# Patient Record
Sex: MALE | Race: WHITE | NOT HISPANIC OR LATINO | Employment: OTHER | URBAN - METROPOLITAN AREA
[De-identification: names, ages, dates, MRNs, and addresses within clinical notes are randomized per-mention and may not be internally consistent; named-entity substitution may affect disease eponyms.]

---

## 2017-01-17 ENCOUNTER — PATIENT MESSAGE (OUTPATIENT)
Dept: UROLOGY | Facility: CLINIC | Age: 62
End: 2017-01-17

## 2017-03-07 ENCOUNTER — LAB VISIT (OUTPATIENT)
Dept: LAB | Facility: HOSPITAL | Age: 62
End: 2017-03-07
Attending: INTERNAL MEDICINE
Payer: COMMERCIAL

## 2017-03-07 DIAGNOSIS — R97.20 ELEVATED PSA: ICD-10-CM

## 2017-03-07 DIAGNOSIS — N40.1 BPH WITH URINARY OBSTRUCTION: ICD-10-CM

## 2017-03-07 DIAGNOSIS — Z00.00 ROUTINE MEDICAL EXAM: ICD-10-CM

## 2017-03-07 DIAGNOSIS — R97.20 ELEVATED PSA, BETWEEN 10 AND LESS THAN 20 NG/ML: ICD-10-CM

## 2017-03-07 DIAGNOSIS — N13.8 BPH WITH URINARY OBSTRUCTION: ICD-10-CM

## 2017-03-07 LAB
ALBUMIN SERPL BCP-MCNC: 3.8 G/DL
ALP SERPL-CCNC: 51 U/L
ALT SERPL W/O P-5'-P-CCNC: 23 U/L
ANION GAP SERPL CALC-SCNC: 8 MMOL/L
AST SERPL-CCNC: 22 U/L
BASOPHILS # BLD AUTO: 0.02 K/UL
BASOPHILS NFR BLD: 0.4 %
BILIRUB SERPL-MCNC: 1.3 MG/DL
BUN SERPL-MCNC: 18 MG/DL
CALCIUM SERPL-MCNC: 9 MG/DL
CHLORIDE SERPL-SCNC: 105 MMOL/L
CHOLEST/HDLC SERPL: 4.7 {RATIO}
CO2 SERPL-SCNC: 28 MMOL/L
COMPLEXED PSA SERPL-MCNC: 16.3 NG/ML
COMPLEXED PSA SERPL-MCNC: 16.3 NG/ML
CREAT SERPL-MCNC: 1.1 MG/DL
DIFFERENTIAL METHOD: ABNORMAL
EOSINOPHIL # BLD AUTO: 0.1 K/UL
EOSINOPHIL NFR BLD: 1 %
ERYTHROCYTE [DISTWIDTH] IN BLOOD BY AUTOMATED COUNT: 14 %
EST. GFR  (AFRICAN AMERICAN): >60 ML/MIN/1.73 M^2
EST. GFR  (NON AFRICAN AMERICAN): >60 ML/MIN/1.73 M^2
ESTIMATED AVG GLUCOSE: 91 MG/DL
GLUCOSE SERPL-MCNC: 109 MG/DL
HBA1C MFR BLD HPLC: 4.8 %
HCT VFR BLD AUTO: 41.7 %
HDL/CHOLESTEROL RATIO: 21.2 %
HDLC SERPL-MCNC: 184 MG/DL
HDLC SERPL-MCNC: 39 MG/DL
HGB BLD-MCNC: 13.9 G/DL
LDLC SERPL CALC-MCNC: 117.2 MG/DL
LYMPHOCYTES # BLD AUTO: 1.3 K/UL
LYMPHOCYTES NFR BLD: 26.7 %
MCH RBC QN AUTO: 29.4 PG
MCHC RBC AUTO-ENTMCNC: 33.3 %
MCV RBC AUTO: 88 FL
MONOCYTES # BLD AUTO: 0.5 K/UL
MONOCYTES NFR BLD: 10.4 %
NEUTROPHILS # BLD AUTO: 3 K/UL
NEUTROPHILS NFR BLD: 61.3 %
NONHDLC SERPL-MCNC: 145 MG/DL
PLATELET # BLD AUTO: 196 K/UL
PMV BLD AUTO: 10.3 FL
POTASSIUM SERPL-SCNC: 3.9 MMOL/L
PROT SERPL-MCNC: 6.7 G/DL
RBC # BLD AUTO: 4.72 M/UL
SODIUM SERPL-SCNC: 141 MMOL/L
TRIGL SERPL-MCNC: 139 MG/DL
TSH SERPL DL<=0.005 MIU/L-ACNC: 1.54 UIU/ML
WBC # BLD AUTO: 4.91 K/UL

## 2017-03-07 PROCEDURE — 83036 HEMOGLOBIN GLYCOSYLATED A1C: CPT

## 2017-03-07 PROCEDURE — 84153 ASSAY OF PSA TOTAL: CPT

## 2017-03-07 PROCEDURE — 85025 COMPLETE CBC W/AUTO DIFF WBC: CPT

## 2017-03-07 PROCEDURE — 80053 COMPREHEN METABOLIC PANEL: CPT

## 2017-03-07 PROCEDURE — 36415 COLL VENOUS BLD VENIPUNCTURE: CPT

## 2017-03-07 PROCEDURE — 84443 ASSAY THYROID STIM HORMONE: CPT

## 2017-03-07 PROCEDURE — 80061 LIPID PANEL: CPT

## 2017-03-10 ENCOUNTER — OFFICE VISIT (OUTPATIENT)
Dept: INTERNAL MEDICINE | Facility: CLINIC | Age: 62
End: 2017-03-10
Payer: COMMERCIAL

## 2017-03-10 VITALS
BODY MASS INDEX: 28.07 KG/M2 | HEIGHT: 72 IN | HEART RATE: 76 BPM | DIASTOLIC BLOOD PRESSURE: 72 MMHG | SYSTOLIC BLOOD PRESSURE: 122 MMHG | WEIGHT: 207.25 LBS

## 2017-03-10 DIAGNOSIS — Z00.00 ROUTINE PHYSICAL EXAMINATION: Primary | ICD-10-CM

## 2017-03-10 DIAGNOSIS — N40.1 BPH WITH URINARY OBSTRUCTION: ICD-10-CM

## 2017-03-10 DIAGNOSIS — N13.8 BPH WITH URINARY OBSTRUCTION: ICD-10-CM

## 2017-03-10 DIAGNOSIS — Z12.11 COLON CANCER SCREENING: ICD-10-CM

## 2017-03-10 DIAGNOSIS — I10 ESSENTIAL HYPERTENSION: ICD-10-CM

## 2017-03-10 DIAGNOSIS — R97.20 ELEVATED PSA: ICD-10-CM

## 2017-03-10 PROCEDURE — 99999 PR PBB SHADOW E&M-EST. PATIENT-LVL III: CPT | Mod: PBBFAC,,, | Performed by: INTERNAL MEDICINE

## 2017-03-10 PROCEDURE — 99396 PREV VISIT EST AGE 40-64: CPT | Mod: S$GLB,,, | Performed by: INTERNAL MEDICINE

## 2017-03-10 PROCEDURE — 3074F SYST BP LT 130 MM HG: CPT | Mod: S$GLB,,, | Performed by: INTERNAL MEDICINE

## 2017-03-10 PROCEDURE — 3078F DIAST BP <80 MM HG: CPT | Mod: S$GLB,,, | Performed by: INTERNAL MEDICINE

## 2017-03-10 RX ORDER — FENOFIBRATE 54 MG/1
54 TABLET ORAL DAILY
Qty: 30 TABLET | Refills: 12 | Status: SHIPPED | OUTPATIENT
Start: 2017-03-10 | End: 2018-03-11 | Stop reason: SDUPTHER

## 2017-03-10 NOTE — MR AVS SNAPSHOT
[facility]  [address]  [facility] [address]  Phone: [phone]  Fax: [phone]                  Wero Spangler   3/10/2017 1:30 PM   Office Visit    Description:  Male : 1955   Provider:  Duke Cano MD   [facility]           Reason for Visit     Annual Exam           Diagnoses this Visit        Comments    Colon cancer screening    -  Primary            To Do List           Future Appointments        Provider Department Dept Phone    2017 3:00 PM Darren Yuen MD [facility] - Urology 4th Floor [phone]      To Schedule:     Please call the Endoscopy Department at [phone] to schedule your appointment.          Goals (5 Years of Data)     None       These Medications        Disp Refills Start End    fenofibrate (TRICOR) 54 MG tablet 30 tablet 12 3/10/2017     Take 1 tablet (54 mg total) by mouth once daily. - Oral    Pharmacy: [id number] [place] [id number]  #: [phone]         [facility]     [facility] On [facility] Nurse Care Line -  Assistance  Registered nurses in the [facility] Call Center provide clinical advisement, health education, appointment booking, and other advisory services.  Call for this free service at [phone].             Medications           Message regarding Medications     Verify the changes and/or additions to your medication regime listed below are the same as discussed with your clinician today.  If any of these changes or additions are incorrect, please notify your healthcare provider.             Verify that the below list of medications is an accurate representation of the medications you are currently taking.  If none reported, the list may be blank. If incorrect, please contact your healthcare provider. Carry this list with you in case of emergency.           Current Medications     alprazolam (XANAX) 0.5 MG tablet Take 1 tablet (0.5  mg total) by mouth nightly.    fenofibrate (TRICOR) 54 MG tablet Take 1 tablet (54 mg total) by mouth once daily.    OMEPRAZOLE (PRILOSEC ORAL) Take by mouth.    sodium chloride 2% (BARBI 128) 2 % ophthalmic solution 1 drop as needed (takes 3-4 times/day).    triamterene-hydrochlorothiazide 37.5-25 mg (MAXZIDE-25) 37.5-25 mg per tablet Take 1 tablet by mouth once daily.           Clinical Reference Information           Your Vitals Were     BP Pulse Height Weight BMI    122/72 76 6' (1.829 m) 94 kg (207 lb 3.7 oz) 28.11 kg/m2      Blood Pressure          Most Recent Value    BP  122/72      Allergies as of 3/10/2017     No Known Allergies      Immunizations Administered on Date of Encounter - 3/10/2017     None      Orders Placed During Today's Visit      Normal Orders This Visit    Case request GI: COLONOSCOPY       Language Assistance Services     ATTENTION: Language assistance services are available, free of charge. Please call [phone].      ATENCIÓN: Si habla sam, tiene a moreno disposición servicios gratuitos de asistencia lingüística. Llame al [phone].     DALIA Ý: N?u b?n nói Ti?ng Vi?t, có các d?ch v? h? tr? ngôn ng? mi?n phí dành cho b?n. G?i s? [phone].         [facility] complies with applicable Federal civil rights laws and does not discriminate on the basis of race, color, national origin, age, disability, or sex.

## 2017-03-11 NOTE — PROGRESS NOTES
Subjective:       Patient ID: Wero Spangler is a 61 y.o. male.    Chief Complaint: Annual Exam    HPI Comments: History of present illness: Patient comes in for annual exam.  He continues to see urology for BPH and elevated PSA.  Prostate biopsy on 2 occasions were negative.  He would like a C-scope. Also pt wanted to discuss PPI use.  He noted some recent media reports of possible association with kidney abnormalities.  He believes that he cannot totally come off of an acid medicine we talked about tapering down and trying an H2 blocker.  I assured him that his kidney function has been stable and we reviewed that in detail.  We will continue to monitor.  He otherwise feels well.  He sees urology regularly.    Review of Systems   Constitutional: Negative for chills, fatigue, fever and unexpected weight change.   HENT: Negative for ear pain and sore throat.    Eyes: Negative for pain and visual disturbance.   Respiratory: Negative for cough, shortness of breath and wheezing.    Cardiovascular: Negative for chest pain and leg swelling.   Gastrointestinal: Negative for abdominal pain, constipation, diarrhea, nausea and vomiting.   Genitourinary: Negative for difficulty urinating, frequency and hematuria.   Musculoskeletal: Negative for arthralgias, back pain, myalgias, neck pain and neck stiffness.   Skin: Negative for rash and wound.   Neurological: Negative for dizziness, numbness and headaches.   Hematological: Negative for adenopathy.   Psychiatric/Behavioral: Positive for sleep disturbance (occasional). Negative for dysphoric mood. The patient is not nervous/anxious.        Objective:      Physical Exam   Constitutional: He is oriented to person, place, and time. He appears well-developed and well-nourished. No distress.   HENT:   Head: Normocephalic and atraumatic.   Right Ear: External ear normal.   Left Ear: External ear normal.   Nose: Nose normal. No rhinorrhea.   Mouth/Throat: Oropharynx is clear and  moist and mucous membranes are normal. No oropharyngeal exudate.   Eyes: Conjunctivae and EOM are normal. Pupils are equal, round, and reactive to light.   Neck: Normal range of motion. Neck supple. No JVD present. No thyromegaly present.   No supraclavicular nodes palpated bilaterally.    Cardiovascular: Normal rate, regular rhythm, normal heart sounds and intact distal pulses.    No murmur heard.  Pulmonary/Chest: Effort normal and breath sounds normal. He has no wheezes. He has no rales.   Abdominal: Soft. Bowel sounds are normal. He exhibits no distension and no mass. There is no tenderness.   Musculoskeletal: Normal range of motion. He exhibits no edema or tenderness.   Lymphadenopathy:     He has no cervical adenopathy.        Right: No supraclavicular adenopathy present.        Left: No supraclavicular adenopathy present.   Neurological: He is alert and oriented to person, place, and time. He has normal reflexes. No cranial nerve deficit.   Reflex Scores:       Bicep reflexes are 2+ on the right side and 2+ on the left side.       Patellar reflexes are 2+ on the right side and 2+ on the left side.  Skin: Skin is warm and dry. No rash noted.   Psychiatric: He has a normal mood and affect. His behavior is normal. He does not exhibit a depressed mood.       Assessment:       1. Routine physical examination    2. Colon cancer screening    3. Elevated PSA    4. BPH with urinary obstruction    5. Essential hypertension        Plan:       Wero was seen today for annual exam.    Diagnoses and all orders for this visit:    Routine physical examination    Colon cancer screening  -     Case request GI: COLONOSCOPY    Elevated PSA    BPH with urinary obstruction    Essential hypertension    Other orders  -     fenofibrate (TRICOR) 54 MG tablet; Take 1 tablet (54 mg total) by mouth once daily.        Continue meds.  Continue regular exercise, diet.  Follow-up in 6-12 months

## 2017-03-13 RX ORDER — TRIAMTERENE/HYDROCHLOROTHIAZID 37.5-25 MG
1 TABLET ORAL DAILY
Qty: 30 TABLET | Refills: 12 | Status: SHIPPED | OUTPATIENT
Start: 2017-03-13 | End: 2017-10-20 | Stop reason: SDUPTHER

## 2017-04-18 ENCOUNTER — PATIENT MESSAGE (OUTPATIENT)
Dept: INTERNAL MEDICINE | Facility: CLINIC | Age: 62
End: 2017-04-18

## 2017-04-18 DIAGNOSIS — L98.9 SKIN LESION OF FACE: Primary | ICD-10-CM

## 2017-04-18 NOTE — TELEPHONE ENCOUNTER
Pt would like to see derm for a red, rough, circular bump on his forehead x 1 month. Please advise orders.

## 2017-05-09 ENCOUNTER — OFFICE VISIT (OUTPATIENT)
Dept: OPTOMETRY | Facility: CLINIC | Age: 62
End: 2017-05-09
Payer: COMMERCIAL

## 2017-05-09 DIAGNOSIS — H16.001 CORNEAL EROSION OF RIGHT EYE: Primary | ICD-10-CM

## 2017-05-09 PROCEDURE — 92012 INTRM OPH EXAM EST PATIENT: CPT | Mod: S$GLB,,, | Performed by: OPTOMETRIST

## 2017-05-09 PROCEDURE — 99999 PR PBB SHADOW E&M-EST. PATIENT-LVL II: CPT | Mod: PBBFAC,,, | Performed by: OPTOMETRIST

## 2017-05-09 RX ORDER — TOBRAMYCIN AND DEXAMETHASONE 3; 1 MG/ML; MG/ML
1 SUSPENSION/ DROPS OPHTHALMIC 4 TIMES DAILY
Qty: 5 ML | Refills: 0 | Status: SHIPPED | OUTPATIENT
Start: 2017-05-09 | End: 2017-05-16

## 2017-05-09 NOTE — MR AVS SNAPSHOT
[place] [facility]   [facility]  [place] [address]  Phone: [phone]  Fax: [phone]                  Wero Spangler   2017 10:30 AM   Office Visit    Description:  Male : 1955   Provider:  Doron Pereyra OD   Department:  [place] [facility]           Reason for Visit     Concerns About Ocular Health           Diagnoses this Visit        Comments    Corneal erosion of right eye    -  Primary            To Do List           Future Appointments        Provider Department Dept Phone    2017 1:00 PM Darren Yuen MD [facility] - Urology 4th Floor [phone]      Goals (5 Years of Data)     None      Follow-Up and Disposition     Return if symptoms worsen or fail to improve.       These Medications        Disp Refills Start End    tobramycin-dexamethasone 0.3-0.1% (TOBRADEX) 0.3-0.1 % DrpS 5 mL 0 2017    Place 1 drop into the right eye 4 (four) times daily. - Right Eye    Pharmacy: [id number] - [address] Ph #: [phone]         [facility] On Call     [facility] Nurse Care Line -  Assistance  Unless otherwise directed by your provider, please contact [facility], our nurse care line that is available for  assistance.     Registered nurses in the [facility] Center provide: appointment scheduling, clinical advisement, health education, and other advisory services.  Call: [phone] (toll free)               Medications           Message regarding Medications     Verify the changes and/or additions to your medication regime listed below are the same as discussed with your clinician today.  If any of these changes or additions are incorrect, please notify your healthcare provider.        START taking these NEW medications        Refills    tobramycin-dexamethasone 0.3-0.1% (TOBRADEX) 0.3-0.1 % DrpS 0    Sig: Place 1 drop into the right eye 4 (four) times daily.    Class: Normal     Route: Right Eye           Verify that the below list of medications is an accurate representation of the medications you are currently taking.  If none reported, the list may be blank. If incorrect, please contact your healthcare provider. Carry this list with you in case of emergency.           Current Medications     alprazolam (XANAX) 0.5 MG tablet Take 1 tablet (0.5 mg total) by mouth nightly.    fenofibrate (TRICOR) 54 MG tablet Take 1 tablet (54 mg total) by mouth once daily.    OMEPRAZOLE (PRILOSEC ORAL) Take by mouth.    sodium chloride 2% (BARBI 128) 2 % ophthalmic solution 1 drop as needed (takes 3-4 times/day).    triamterene-hydrochlorothiazide 37.5-25 mg (MAXZIDE-25) 37.5-25 mg per tablet Take 1 tablet by mouth once daily.    tobramycin-dexamethasone 0.3-0.1% (TOBRADEX) 0.3-0.1 % DrpS Place 1 drop into the right eye 4 (four) times daily.           Clinical Reference Information           Allergies as of 5/9/2017     No Known Allergies      Immunizations Administered on Date of Encounter - 5/9/2017     None      Language Assistance Services     ATTENTION: Language assistance services are available, free of charge. Please call [phone].      ATENCIÓN: Si habla sam, tiene a moreno disposición servicios gratuitos de asistencia lingüística. Llame al [phone].     CHÚ Ý: N?u b?n nói Ti?ng Vi?t, có các d?ch v? h? tr? ngôn ng? mi?n phí dành cho b?n. G?i s? [phone].         [facility] complies with applicable Federal civil rights laws and does not discriminate on the basis of race, color, national origin, age, disability, or sex.

## 2017-05-09 NOTE — PROGRESS NOTES
HPI     Concerns About Ocular Health    Additional comments: Pt states that on Saturday while cutting down tree   limbs he thinks some saw dust went into the right eye .           Comments   `Pt states that on Saturday while cutting down tree limbs he thinks some   saw dust went into the right eye . FB sensation right eyes. Sharp eye pain   on this morning. Watering right eye. No light sensitivity. No noticeable   VA changes since last visit. Pull the top lid out to make the right eye   tear, but did not seem to change anything per pt.     Meds: Olya TID to QID OU       Last edited by Doron Pereyra, OD on 5/9/2017 10:56 AM. (History)        ROS     Positive for: Gastrointestinal, Cardiovascular, Eyes    Negative for: Constitutional, Neurological, Skin, Genitourinary,   Musculoskeletal, HENT, Endocrine, Respiratory, Psychiatric, Allergic/Imm,   Heme/Lymph    Last edited by Doron Pereyra, OD on 5/9/2017 11:00 AM. (History)        Assessment /Plan     For exam results, see Encounter Report.    Corneal erosion of right eye  -     tobramycin-dexamethasone 0.3-0.1% (TOBRADEX) 0.3-0.1 % DrpS; Place 1 drop into the right eye 4 (four) times daily.  Dispense: 5 mL; Refill: 0      Pt reports over weekend was cutting tree limbs and felt some saw dust get into OD.  Has had mild FBS off and on since, until this AM had what sounds like a K erosion.  No FB noted today, but does have mild SPK inf OD, so may be dislodged FB?  Pt ALSO has K dyst so might be mild MCE?    PLAN:    1. TOBRADEX QID OD X 1 week  2. Pt will call if sx persist w tx.  Otherwise rtc as sched July 2017 for full exam

## 2017-05-17 ENCOUNTER — PATIENT MESSAGE (OUTPATIENT)
Dept: OPTOMETRY | Facility: CLINIC | Age: 62
End: 2017-05-17

## 2017-05-24 ENCOUNTER — OFFICE VISIT (OUTPATIENT)
Dept: UROLOGY | Facility: CLINIC | Age: 62
End: 2017-05-24
Payer: COMMERCIAL

## 2017-05-24 VITALS
HEART RATE: 68 BPM | WEIGHT: 206.38 LBS | HEIGHT: 72 IN | BODY MASS INDEX: 27.95 KG/M2 | SYSTOLIC BLOOD PRESSURE: 134 MMHG | DIASTOLIC BLOOD PRESSURE: 71 MMHG

## 2017-05-24 DIAGNOSIS — R97.20 ELEVATED PSA: Primary | ICD-10-CM

## 2017-05-24 DIAGNOSIS — N40.2 PROSTATE NODULE: ICD-10-CM

## 2017-05-24 DIAGNOSIS — N13.8 BPH WITH URINARY OBSTRUCTION: ICD-10-CM

## 2017-05-24 DIAGNOSIS — N40.1 BPH WITH URINARY OBSTRUCTION: ICD-10-CM

## 2017-05-24 PROCEDURE — 99213 OFFICE O/P EST LOW 20 MIN: CPT | Mod: S$GLB,,, | Performed by: UROLOGY

## 2017-05-24 PROCEDURE — 3075F SYST BP GE 130 - 139MM HG: CPT | Mod: S$GLB,,, | Performed by: UROLOGY

## 2017-05-24 PROCEDURE — 99999 PR PBB SHADOW E&M-EST. PATIENT-LVL III: CPT | Mod: PBBFAC,,, | Performed by: UROLOGY

## 2017-05-24 PROCEDURE — 3078F DIAST BP <80 MM HG: CPT | Mod: S$GLB,,, | Performed by: UROLOGY

## 2017-05-24 PROCEDURE — 1160F RVW MEDS BY RX/DR IN RCRD: CPT | Mod: S$GLB,,, | Performed by: UROLOGY

## 2017-05-24 NOTE — PROGRESS NOTES
CHIEF COMPLAINT:    Mr. Spangler is a 61 y.o. male presenting with an elevated PSA.    PRESENTING ILLNESS:    Wero Spangler is a 61 y.o. male.  He has had multiple biopsies done.  One was in 2012 when his PSA was 10.14.  There was also a prostate nodule at that time.  Most recent one was 8/23/16 when his PSA was 18.1.  This was a cognitive fusion biopsy.    He also has nocturia x 2-3 and slight decreased FOS.  He started flomax, but he stopped it as he thought it was given him leg cramps.  He's currently pleased with his voiding.  No hematuria. No dysuria.  No f/c.    He denies ED.    REVIEW OF SYSTEMS:    Patient denies bleeding diathesis, chills, dysuria, fever, flank pain, frequency or urgency, hematuria, hesitancy, intermittency or feeling of incomplete emptying, stones, stress or urgency incontinence, TB or genitourinary trauma and urethral discharge.    JOSE MANUEL  1. 3  2. 3  3. 4  4. 4  5. 4     PATIENT HISTORY:    Past Medical History:   Diagnosis Date    Allergy     Cataract     Elevated PSA     Enlarged prostate     General anesthetics causing adverse effect in therapeutic use 2000    delayed emergence after back surgery    GERD (gastroesophageal reflux disease)     HTN (hypertension) 9/24/2013    Hypertension        Past Surgical History:   Procedure Laterality Date    BACK SURGERY  4/2000    CATARACT EXTRACTION W/  INTRAOCULAR LENS IMPLANT      Both Eyes    EYE SURGERY      TONSILLECTOMY         Family History   Problem Relation Age of Onset    Cancer Mother     Prostate cancer Brother     Cancer Brother      prostate    Macular degeneration Maternal Grandmother     Amblyopia Neg Hx     Blindness Neg Hx     Cataracts Neg Hx     Glaucoma Neg Hx     Retinal detachment Neg Hx     Strabismus Neg Hx        Social History     Social History    Marital status:      Spouse name: N/A    Number of children: N/A    Years of education: N/A     Occupational History    Not on file.      Social History Main Topics    Smoking status: Former Smoker    Smokeless tobacco: Never Used    Alcohol use 0.6 oz/week     1 Glasses of wine per week      Comment: social    Drug use: No    Sexual activity: Yes     Partners: Female     Other Topics Concern    Not on file     Social History Narrative    No narrative on file       Allergies:  Review of patient's allergies indicates no known allergies.    Medications:    Current Outpatient Prescriptions:     alprazolam (XANAX) 0.5 MG tablet, Take 1 tablet (0.5 mg total) by mouth nightly., Disp: 30 tablet, Rfl: 5    fenofibrate (TRICOR) 54 MG tablet, Take 1 tablet (54 mg total) by mouth once daily., Disp: 30 tablet, Rfl: 12    OMEPRAZOLE (PRILOSEC ORAL), Take by mouth., Disp: , Rfl:     sodium chloride 2% (BARBI 128) 2 % ophthalmic solution, 1 drop as needed (takes 3-4 times/day)., Disp: , Rfl:     triamterene-hydrochlorothiazide 37.5-25 mg (MAXZIDE-25) 37.5-25 mg per tablet, Take 1 tablet by mouth once daily., Disp: 30 tablet, Rfl: 12    PHYSICAL EXAMINATION:    The patient generally appears in good health, is appropriately interactive, and is in no apparent distress.    Skin: No lesions.    Mental: Cooperative with normal affect.    Neuro: Grossly intact.    HEENT: Normal. No evidence of lymphadenopathy.    Heart: Regular rhythm.  No murmurs    Abdomen: BS active. Soft, non-tender. No masses or organomegaly. Bladder is not palpable. No evidence of flank discomfort. No evidence of inguinal hernia.    Genitourinary: The penis is not circumcised with no evidence of plaques or induration. The urethral meatus is normal. The testes, epididymides, and cord structures are normal in size and contour bilaterally. The scrotum is normal in size and contour.    Normal anal sphincter tone. No rectal mass.    The prostate is 40 g. Normal landmarks. Lateral sulci. Median furrow intact. There is a 1.5 cm x 1.5 cm nodule in the midportion of the gland. Stable.  Seminal  vesicles are normal.    Extremities: No clubbing, cyanosis, or edema      LABS:    UA dipped negative today  Lab Results   Component Value Date    PSA 14.0 (H) 09/22/2014    PSA 15.48 (H) 08/16/2013    PSA 11.06 (H) 02/25/2013    PSADIAG 16.3 (H) 03/07/2017    PSADIAG 16.3 (H) 03/07/2017    PSADIAG 18.1 (H) 08/23/2016    PSATOTAL 6.8 (H) 07/12/2011    PSATOTAL 7.9 (H) 01/11/2011    PSAFREE 1.07 07/12/2011    PSAFREE 1.67 (H) 01/11/2011    PSAFREEPCT 15.74 07/12/2011    PSAFREEPCT 21.14 01/11/2011        IMPRESSION:    Encounter Diagnoses   Name Primary?    Elevated PSA Yes    Prostate nodule     BPH with urinary obstruction        PLAN:    1. Can observe his LUTS as they don't bother him.  2. Went over his PSA.  3. RTC 6 months with a PSA.      Copy to:

## 2017-06-28 DIAGNOSIS — Z12.11 COLON CANCER SCREENING: ICD-10-CM

## 2017-07-17 ENCOUNTER — OFFICE VISIT (OUTPATIENT)
Dept: OPTOMETRY | Facility: CLINIC | Age: 62
End: 2017-07-17
Payer: COMMERCIAL

## 2017-07-17 DIAGNOSIS — H18.509 CORNEAL DYSTROPHY: Primary | ICD-10-CM

## 2017-07-17 DIAGNOSIS — H52.4 PRESBYOPIA OU: ICD-10-CM

## 2017-07-17 DIAGNOSIS — Z98.890 HISTORY OF LASER PHOTOCOAGULATION OF RETINA: ICD-10-CM

## 2017-07-17 DIAGNOSIS — H52.13 MYOPIA OF BOTH EYES: ICD-10-CM

## 2017-07-17 PROCEDURE — 99999 PR PBB SHADOW E&M-EST. PATIENT-LVL II: CPT | Mod: PBBFAC,,, | Performed by: OPTOMETRIST

## 2017-07-17 PROCEDURE — 92015 DETERMINE REFRACTIVE STATE: CPT | Mod: S$GLB,,, | Performed by: OPTOMETRIST

## 2017-07-17 PROCEDURE — 92014 COMPRE OPH EXAM EST PT 1/>: CPT | Mod: S$GLB,,, | Performed by: OPTOMETRIST

## 2017-07-17 NOTE — PROGRESS NOTES
HPI     DLS: 5/9/2017  Pt states no va changes. States occasionally he may feel pain in the right   eye more than left eye.   States he rubs eyes constantly   Denies f/f    Tobradex od qid     Last edited by Qi Galindo on 7/17/2017  9:39 AM. (History)        ROS     Positive for: Eyes (K dyst )    Negative for: Constitutional, Gastrointestinal, Neurological, Skin,   Genitourinary, Musculoskeletal, HENT, Endocrine, Respiratory, Psychiatric,   Allergic/Imm, Heme/Lymph    Last edited by Doron Pereyra, OD on 7/17/2017 10:04 AM. (History)        Assessment /Plan     For exam results, see Encounter Report.    Corneal dystrophy - Both Eyes    Presbyopia OU    Myopia of both eyes    History of laser photocoagulation of retina      1. Mild pco sp pciol ou--pt happy w Rx--dist only  2. Sp focal laser for periph hole OD.  Stable (hx high myopia prior to cat surgery)  3. 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  Discussed surgery, but pt wishes to wait--pt to cont w josé antonio 128 QID+         Plan:    rtc 1 yr, or will call sooner if wishes corneal consult

## 2017-08-08 ENCOUNTER — OFFICE VISIT (OUTPATIENT)
Dept: INTERNAL MEDICINE | Facility: CLINIC | Age: 62
End: 2017-08-08
Payer: COMMERCIAL

## 2017-08-08 ENCOUNTER — PATIENT MESSAGE (OUTPATIENT)
Dept: INTERNAL MEDICINE | Facility: CLINIC | Age: 62
End: 2017-08-08

## 2017-08-08 VITALS
WEIGHT: 205.25 LBS | BODY MASS INDEX: 27.8 KG/M2 | HEIGHT: 72 IN | DIASTOLIC BLOOD PRESSURE: 84 MMHG | SYSTOLIC BLOOD PRESSURE: 124 MMHG | HEART RATE: 84 BPM

## 2017-08-08 DIAGNOSIS — R53.83 FATIGUE, UNSPECIFIED TYPE: ICD-10-CM

## 2017-08-08 DIAGNOSIS — R53.1 WEAKNESS: ICD-10-CM

## 2017-08-08 DIAGNOSIS — R10.9 RIGHT SIDED ABDOMINAL PAIN: Primary | ICD-10-CM

## 2017-08-08 PROCEDURE — 3074F SYST BP LT 130 MM HG: CPT | Mod: S$GLB,,, | Performed by: INTERNAL MEDICINE

## 2017-08-08 PROCEDURE — 3008F BODY MASS INDEX DOCD: CPT | Mod: S$GLB,,, | Performed by: INTERNAL MEDICINE

## 2017-08-08 PROCEDURE — 99214 OFFICE O/P EST MOD 30 MIN: CPT | Mod: S$GLB,,, | Performed by: INTERNAL MEDICINE

## 2017-08-08 PROCEDURE — 3079F DIAST BP 80-89 MM HG: CPT | Mod: S$GLB,,, | Performed by: INTERNAL MEDICINE

## 2017-08-08 PROCEDURE — 99999 PR PBB SHADOW E&M-EST. PATIENT-LVL III: CPT | Mod: PBBFAC,,, | Performed by: INTERNAL MEDICINE

## 2017-08-08 NOTE — PROGRESS NOTES
Subjective:       Patient ID: Wero Spanlger is a 61 y.o. male.    Chief Complaint: Abdominal Pain (RLQ pain x 1 day)    History of present illness: Patient complains of 1-2 days of abdominal pain along with some weakness and some nausea.  Does feel better today.  2 days ago, Sunday, he had some vague central abdominal discomfort.  Decreased appetite.  Didn't eat anything unusual.  Yesterday was somewhat tired and felt similar so went to bed early.  He did have a normal bowel movement.  No urinary symptoms fever or new symptoms.  He awoke last night with more right sided abdominal pain.  He thought it was more in the right lower quadrant and thought about going to the emergency room but didn't.  This morning he awoke and has a bit better energy and appetite.  Was able to eat his own and have some coffee this morning.  That went down fine.  The pain is now much milder but in the right mid to upper abdomen.  No pain with walking or riding in the car.  He wanted my opinion and possibly some labs      Abdominal Pain   Associated symptoms include nausea (improved). Pertinent negatives include no constipation, diarrhea, fever, headaches or vomiting.     Review of Systems   Constitutional: Positive for fatigue. Negative for chills, fever and unexpected weight change.   HENT: Negative for trouble swallowing.    Eyes: Negative for visual disturbance.   Respiratory: Negative for cough, shortness of breath and wheezing.    Cardiovascular: Negative for chest pain and palpitations.   Gastrointestinal: Positive for abdominal pain and nausea (improved). Negative for blood in stool, constipation, diarrhea and vomiting.   Genitourinary: Negative for difficulty urinating.   Musculoskeletal: Negative for neck pain.   Skin: Negative for rash.   Neurological: Negative for dizziness and headaches.       Objective:      Physical Exam   Constitutional: He is oriented to person, place, and time. He appears well-developed and  well-nourished. No distress.   HENT:   Head: Normocephalic and atraumatic.   Mouth/Throat: No oropharyngeal exudate.   TM's clear, pharynx clear   Eyes: Conjunctivae and EOM are normal. Pupils are equal, round, and reactive to light. No scleral icterus.   Neck: Normal range of motion. Neck supple. No thyromegaly present.   No supraclavicular nodes palpated   Cardiovascular: Normal rate, regular rhythm and normal heart sounds.    No murmur heard.  Pulmonary/Chest: Effort normal and breath sounds normal. He has no wheezes.   Abdominal: Soft. Bowel sounds are normal. He exhibits no distension and no mass. There is tenderness (mild mid to right upper quadrant tenderness.  No right lower quadrant tenderness). There is no rebound and no guarding.   Musculoskeletal: He exhibits no edema.   Lymphadenopathy:     He has no cervical adenopathy.   Neurological: He is alert and oriented to person, place, and time.   Skin: No pallor.   Psychiatric: He has a normal mood and affect.       Assessment:       1. Right sided abdominal pain    2. Weakness    3. Fatigue, unspecified type        Plan:       Wero was seen today for abdominal pain.    Diagnoses and all orders for this visit:    Right sided abdominal pain  -     CBC auto differential; Future  -     Comprehensive metabolic panel; Future  -     Urinalysis; Future    Weakness  -     CBC auto differential; Future  -     Comprehensive metabolic panel; Future  -     Urinalysis; Future    Fatigue, unspecified type  -     CBC auto differential; Future  -     Comprehensive metabolic panel; Future  -     Urinalysis; Future        Pt does not have a surgical abdomen by my exam. Nothing to indicate appendicitis. Better today than yesterday. He would still like to get labs. [place] diet and monitor symptoms.

## 2017-08-24 ENCOUNTER — PATIENT MESSAGE (OUTPATIENT)
Dept: INTERNAL MEDICINE | Facility: CLINIC | Age: 62
End: 2017-08-24

## 2017-08-24 DIAGNOSIS — R10.11 RUQ ABDOMINAL PAIN: Primary | ICD-10-CM

## 2017-08-24 NOTE — TELEPHONE ENCOUNTER
Good afternoon, I've scheduled your appointment for an ultrasound on this Friday at [address] [place] [id number] ([place]) 8/25/2017 for 5:00 PM. Please let me know if this date and time works for you.      ANNE Dye.

## 2017-08-25 ENCOUNTER — HOSPITAL ENCOUNTER (OUTPATIENT)
Dept: RADIOLOGY | Facility: HOSPITAL | Age: 62
Discharge: HOME | End: 2017-08-25
Attending: INTERNAL MEDICINE
Payer: COMMERCIAL

## 2017-08-25 DIAGNOSIS — R10.11 RUQ ABDOMINAL PAIN: ICD-10-CM

## 2017-08-25 PROCEDURE — 76700 US EXAM ABDOM COMPLETE: CPT | Mod: 26,,, | Performed by: RADIOLOGY

## 2017-08-25 PROCEDURE — 76700 US EXAM ABDOM COMPLETE: CPT | Mod: TC

## 2017-08-28 ENCOUNTER — PATIENT MESSAGE (OUTPATIENT)
Dept: INTERNAL MEDICINE | Facility: CLINIC | Age: 62
End: 2017-08-28

## 2017-08-28 ENCOUNTER — HOSPITAL ENCOUNTER (OUTPATIENT)
Dept: RADIOLOGY | Facility: HOSPITAL | Age: 62
Discharge: HOME | End: 2017-08-28
Attending: INTERNAL MEDICINE
Payer: COMMERCIAL

## 2017-08-28 DIAGNOSIS — K82.8 GALLBLADDER MASS: ICD-10-CM

## 2017-08-28 DIAGNOSIS — R93.2 ABNORMAL GALLBLADDER ULTRASOUND: Primary | ICD-10-CM

## 2017-08-28 DIAGNOSIS — N28.89 LEFT KIDNEY MASS: ICD-10-CM

## 2017-08-28 DIAGNOSIS — R93.2 ABNORMAL GALLBLADDER ULTRASOUND: ICD-10-CM

## 2017-08-28 PROCEDURE — 74178 CT ABD&PLV WO CNTR FLWD CNTR: CPT | Mod: TC

## 2017-08-28 PROCEDURE — 25500020 PHARM REV CODE 255: Performed by: INTERNAL MEDICINE

## 2017-08-28 PROCEDURE — 74178 CT ABD&PLV WO CNTR FLWD CNTR: CPT | Mod: 26,,, | Performed by: RADIOLOGY

## 2017-08-28 RX ADMIN — IOHEXOL 15 ML: 350 INJECTION, SOLUTION INTRAVENOUS at 04:08

## 2017-08-28 RX ADMIN — IOHEXOL 15 ML: 350 INJECTION, SOLUTION INTRAVENOUS at 03:08

## 2017-08-28 RX ADMIN — IOHEXOL 100 ML: 350 INJECTION, SOLUTION INTRAVENOUS at 05:08

## 2017-08-28 NOTE — TELEPHONE ENCOUNTER
Good morning, I've scheduled your appointment today for 5:15pm although to take the prep you will need to be there 2 hours prior to appointment.

## 2017-08-29 ENCOUNTER — PATIENT MESSAGE (OUTPATIENT)
Dept: INTERNAL MEDICINE | Facility: CLINIC | Age: 62
End: 2017-08-29

## 2017-08-29 DIAGNOSIS — N28.89 LEFT RENAL MASS: Primary | ICD-10-CM

## 2017-08-29 DIAGNOSIS — M89.9 BONE LESION: ICD-10-CM

## 2017-08-29 NOTE — TELEPHONE ENCOUNTER
Please help pt see Urology for a renal mass. The bone scan can be scheduled but I would want it after the Urology visit in case they recommend something else. Let me know of any questions.

## 2017-08-30 ENCOUNTER — PATIENT MESSAGE (OUTPATIENT)
Dept: INTERNAL MEDICINE | Facility: CLINIC | Age: 62
End: 2017-08-30

## 2017-08-31 ENCOUNTER — PATIENT MESSAGE (OUTPATIENT)
Dept: INTERNAL MEDICINE | Facility: CLINIC | Age: 62
End: 2017-08-31

## 2017-08-31 ENCOUNTER — OFFICE VISIT (OUTPATIENT)
Dept: UROLOGY | Facility: CLINIC | Age: 62
End: 2017-08-31
Payer: COMMERCIAL

## 2017-08-31 VITALS — HEIGHT: 72 IN | BODY MASS INDEX: 28.37 KG/M2 | WEIGHT: 209.44 LBS

## 2017-08-31 DIAGNOSIS — R97.20 ELEVATED PSA: ICD-10-CM

## 2017-08-31 DIAGNOSIS — N13.8 BPH WITH URINARY OBSTRUCTION: ICD-10-CM

## 2017-08-31 DIAGNOSIS — N40.1 BPH WITH URINARY OBSTRUCTION: ICD-10-CM

## 2017-08-31 DIAGNOSIS — N28.89 RENAL MASS: Primary | ICD-10-CM

## 2017-08-31 DIAGNOSIS — N40.2 PROSTATE NODULE: ICD-10-CM

## 2017-08-31 PROCEDURE — 99999 PR PBB SHADOW E&M-EST. PATIENT-LVL III: CPT | Mod: PBBFAC,,, | Performed by: UROLOGY

## 2017-08-31 PROCEDURE — 99214 OFFICE O/P EST MOD 30 MIN: CPT | Mod: S$GLB,,, | Performed by: UROLOGY

## 2017-08-31 PROCEDURE — 3008F BODY MASS INDEX DOCD: CPT | Mod: S$GLB,,, | Performed by: UROLOGY

## 2017-08-31 NOTE — LETTER
August 31, 2017      Duke Cano MD  [address]  [facility] [address]           [facility] 4th Floor  [address]  [facility] [address]  Phone: [phone]          Patient: Wero Spangler   MR Number: [id number]   YOB: 1955   Date of Visit: 8/31/2017       Dear Dr. Duke Cano:    Thank you for referring Wero Spangler to me for evaluation. Attached you will find relevant portions of my assessment and plan of care.    If you have questions, please do not hesitate to call me. I look forward to following Wero Spangler along with you.    Sincerely,    Darren Yuen MD    Enclosure  CC:  No Recipients    If you would like to receive this communication electronically, please contact [email or web address] or [phone] to request more information on [employer] access.    For providers and/or their staff who would like to refer a patient to [facility], please contact us through our one-stop-shop provider referral line, [facility], at [phone].    If you feel you have received this communication in error or would no longer like to receive these types of communications, please e-mail [email or web address]

## 2017-08-31 NOTE — PROGRESS NOTES
CHIEF COMPLAINT:    Mr. Spangler is a 62 y.o. male presenting with an elevated PSA.    PRESENTING ILLNESS:    Wero Spangler is a 62 y.o. male.  He has had multiple biopsies done.  One was in 2012 when his PSA was 10.14.  There was also a prostate nodule at that time.  Most recent one was 8/23/16 when his PSA was 18.1.  This was a cognitive fusion biopsy.    He also has nocturia x 2-3 and slight decreased FOS.  He started flomax, but he stopped it as he thought it was given him leg cramps.  He's currently pleased with his voiding.  No hematuria. No dysuria.  No f/c.    He denies ED.    He was c/o some vague abdominal pain and underwent a renal u/s.  This showed a possible renal mass.  As a result, he underwent a CT with and without.  I reviewed it personally.  There is a 2.5 cm R cystic exophytic renal mass that appears to enhance.      Per radiology's report, there's also a sclerotic lesion in the L ilium.     REVIEW OF SYSTEMS:    Patient denies bleeding diathesis, chills, dysuria, fever, flank pain, frequency or urgency, hematuria, hesitancy, intermittency or feeling of incomplete emptying, stones, stress or urgency incontinence, TB or genitourinary trauma and urethral discharge.    JOSE MANUEL Questionnaire   1. 4   2. 3   3. 4   4. 4   5. 4      PATIENT HISTORY:    Past Medical History:   Diagnosis Date    Allergy     Cataract     Elevated PSA     Enlarged prostate     General anesthetics causing adverse effect in therapeutic use 2000    delayed emergence after back surgery    GERD (gastroesophageal reflux disease)     HTN (hypertension) 9/24/2013    Hypertension        Past Surgical History:   Procedure Laterality Date    BACK SURGERY  4/2000    CATARACT EXTRACTION W/  INTRAOCULAR LENS IMPLANT      Both Eyes    EYE SURGERY      TONSILLECTOMY         Family History   Problem Relation Age of Onset    Cancer Mother     Prostate cancer Brother     Cancer Brother      prostate    Macular degeneration  Maternal Grandmother     Amblyopia Neg Hx     Blindness Neg Hx     Cataracts Neg Hx     Glaucoma Neg Hx     Retinal detachment Neg Hx     Strabismus Neg Hx        Social History     Social History    Marital status:      Spouse name: N/A    Number of children: N/A    Years of education: N/A     Occupational History    Not on file.     Social History Main Topics    Smoking status: Former Smoker    Smokeless tobacco: Never Used    Alcohol use 0.6 oz/week     1 Glasses of wine per week      Comment: social    Drug use: No    Sexual activity: Yes     Partners: Female     Other Topics Concern    Not on file     Social History Narrative    No narrative on file       Allergies:  Review of patient's allergies indicates no known allergies.    Medications:    Current Outpatient Prescriptions:     alprazolam (XANAX) 0.5 MG tablet, Take 1 tablet (0.5 mg total) by mouth nightly., Disp: 30 tablet, Rfl: 5    fenofibrate (TRICOR) 54 MG tablet, Take 1 tablet (54 mg total) by mouth once daily., Disp: 30 tablet, Rfl: 12    OMEPRAZOLE (PRILOSEC ORAL), Take by mouth., Disp: , Rfl:     sodium chloride 2% (BARBI 128) 2 % ophthalmic solution, 1 drop as needed (takes 3-4 times/day)., Disp: , Rfl:     triamterene-hydrochlorothiazide 37.5-25 mg (MAXZIDE-25) 37.5-25 mg per tablet, Take 1 tablet by mouth once daily., Disp: 30 tablet, Rfl: 12    PHYSICAL EXAMINATION:    The patient generally appears in good health, is appropriately interactive, and is in no apparent distress.     Eyes: anicteric sclerae, moist conjunctivae; no lid-lag; PERRLA     HENT: Atraumatic; oropharynx clear with moist mucous membranes and no mucosal ulcerations;normal hard and soft palate.  No evidence of lymphadenopathy.    Neck: Trachea midline.  No thyromegaly.    Musculoskeletal: No abnormal gait.    Skin: No lesions.    Mental: Cooperative with normal affect.  Is oriented to time, place, and person.    Neuro: Grossly intact.    Chest:  Normal inspiratory effort.   No accessory muscles.  No audible wheezes.  Respirations symmetric on inspiration and expiration.    Heart: Regular rhythm.      Abdomen:  Soft, non-tender. No masses or organomegaly. Bladder is not palpable. No evidence of flank discomfort. No evidence of inguinal hernia.  Genitourinary: The penis is not circumcised with no evidence of plaques or induration. The urethral meatus is normal. The testes, epididymides, and cord structures are normal in size and contour bilaterally. The scrotum is normal in size and contour.    Normal anal sphincter tone. No rectal mass.    The prostate is 40 g. Normal landmarks. Lateral sulci. Median furrow intact. There is a 1.5 cm x 1.5 cm nodule in the midportion of the gland. Stable.  Seminal vesicles are normal.    Extremities: No clubbing, cyanosis, or edema      LABS:    UA dipped negative today  Lab Results   Component Value Date    PSA 14.0 (H) 09/22/2014    PSA 15.48 (H) 08/16/2013    PSA 11.06 (H) 02/25/2013    PSADIAG 16.3 (H) 03/07/2017    PSADIAG 16.3 (H) 03/07/2017    PSADIAG 18.1 (H) 08/23/2016    PSATOTAL 6.8 (H) 07/12/2011    PSATOTAL 7.9 (H) 01/11/2011    PSAFREE 1.07 07/12/2011    PSAFREE 1.67 (H) 01/11/2011    PSAFREEPCT 15.74 07/12/2011    PSAFREEPCT 21.14 01/11/2011        IMPRESSION:    Encounter Diagnoses   Name Primary?    Elevated PSA Yes    BPH with urinary obstruction     Prostate nodule        PLAN:    1. Can observe his LUTS as they don't bother him.  2. Discussed that he has a R renal mass that is concerning for malignancy.  It's in an excellent location for a robotic partial.  Will consult Dr. Cameron.  3. Discussed that he has a sclerotic lesion in his L ilieum.  However, it would be very unusual for this to be a met from a renal cell.  4. He can keep his appt for the elevated PSA.      Copy to:

## 2017-09-06 ENCOUNTER — OFFICE VISIT (OUTPATIENT)
Dept: UROLOGY | Facility: CLINIC | Age: 62
End: 2017-09-06
Payer: COMMERCIAL

## 2017-09-06 VITALS
WEIGHT: 209.44 LBS | HEIGHT: 72 IN | RESPIRATION RATE: 16 BRPM | BODY MASS INDEX: 28.37 KG/M2 | DIASTOLIC BLOOD PRESSURE: 66 MMHG | SYSTOLIC BLOOD PRESSURE: 114 MMHG | HEART RATE: 68 BPM

## 2017-09-06 DIAGNOSIS — N28.89 LEFT RENAL MASS: Primary | ICD-10-CM

## 2017-09-06 DIAGNOSIS — N18.1 CHRONIC KIDNEY DISEASE (CKD), STAGE I: ICD-10-CM

## 2017-09-06 PROCEDURE — 3074F SYST BP LT 130 MM HG: CPT | Mod: S$GLB,,, | Performed by: UROLOGY

## 2017-09-06 PROCEDURE — 3078F DIAST BP <80 MM HG: CPT | Mod: S$GLB,,, | Performed by: UROLOGY

## 2017-09-06 PROCEDURE — 3008F BODY MASS INDEX DOCD: CPT | Mod: S$GLB,,, | Performed by: UROLOGY

## 2017-09-06 PROCEDURE — 99215 OFFICE O/P EST HI 40 MIN: CPT | Mod: S$GLB,,, | Performed by: UROLOGY

## 2017-09-06 PROCEDURE — 99999 PR PBB SHADOW E&M-EST. PATIENT-LVL IV: CPT | Mod: PBBFAC,,, | Performed by: UROLOGY

## 2017-09-06 RX ORDER — FLUTICASONE PROPIONATE 50 MCG
1 SPRAY, SUSPENSION (ML) NASAL DAILY PRN
COMMUNITY
End: 2021-02-24

## 2017-09-06 RX ORDER — HEPARIN SODIUM 5000 [USP'U]/ML
5000 INJECTION, SOLUTION INTRAVENOUS; SUBCUTANEOUS EVERY 8 HOURS
Status: CANCELLED | OUTPATIENT
Start: 2017-09-06

## 2017-09-06 NOTE — PROGRESS NOTES
Subjective:       Patient ID: Wero Spangler is a 62 y.o. male.    Chief Complaint: renal mass; Elevated PSA; Benign Prostatic Hypertrophy; and Nocturia (2-5)      HPI: Wero Spangler is a 62 y.o. White male who presents today for evaluation and management of a left renal mass.    The mass was found incidentally during evaluation for possible gallbladder pathology.    The patient denies gross hematuria and/or constitutional symptoms attributable to his recently discovered renal mass. The patient denies a personal and family history of kidney cancer.    The Nephrometry score of the patient's mass is R=1, E=2, N=1, X, L=2, 6x low complexity.    The patient presents today to discuss evaluation and management of his left renal mass.    Review of patient's allergies indicates:  No Known Allergies    Current Outpatient Prescriptions   Medication Sig Dispense Refill    alprazolam (XANAX) 0.5 MG tablet Take 1 tablet (0.5 mg total) by mouth nightly. 30 tablet 5    fenofibrate (TRICOR) 54 MG tablet Take 1 tablet (54 mg total) by mouth once daily. 30 tablet 12    fluticasone (FLONASE) 50 mcg/actuation nasal spray 1 spray by Each Nare route once daily.      OMEPRAZOLE (PRILOSEC ORAL) Take by mouth.      sodium chloride 2% (BARBI 128) 2 % ophthalmic solution 1 drop as needed (takes 3-4 times/day).      triamterene-hydrochlorothiazide 37.5-25 mg (MAXZIDE-25) 37.5-25 mg per tablet Take 1 tablet by mouth once daily. 30 tablet 12     No current facility-administered medications for this visit.        Past Medical History:   Diagnosis Date    Allergy     Cataract     Elevated PSA     Enlarged prostate     Gallstones     General anesthetics causing adverse effect in therapeutic use 2000    delayed emergence after back surgery    GERD (gastroesophageal reflux disease)     HTN (hypertension) 9/24/2013    Hypertension     Urinary tract infection        Past Surgical History:   Procedure Laterality Date    BACK  SURGERY  4/2000    CATARACT EXTRACTION W/  INTRAOCULAR LENS IMPLANT      Both Eyes    EYE SURGERY      PROSTATE SURGERY      prostate biopsy benign    TONSILLECTOMY      VASECTOMY         Family History   Problem Relation Age of Onset    Cancer Mother     Prostate cancer Brother     Cancer Brother      prostate    Macular degeneration Maternal Grandmother     Cancer Other     Cancer Other     Amblyopia Neg Hx     Blindness Neg Hx     Cataracts Neg Hx     Glaucoma Neg Hx     Retinal detachment Neg Hx     Strabismus Neg Hx        Review of Systems    Review of Systems   Constitutional: Negative for fever, chills, activity change, appetite change and unexpected weight change.   HENT: Negative for congestion, nosebleeds, sneezing, sore throat and trouble swallowing.    Eyes: Negative for pain, discharge, redness and visual disturbance.   Respiratory: Negative for cough, choking, chest tightness and shortness of breath.    Cardiovascular: Negative for chest pain, palpitations and leg swelling.   Gastrointestinal: Negative for nausea, vomiting, abdominal pain, diarrhea, blood in stool, abdominal distention and anal bleeding.  Genitourinary: As documented per HPI   Endocrine: Negative for cold intolerance, heat intolerance, polydipsia, polyphagia and polyuria.   Musculoskeletal: Negative for myalgias, gait problem, neck pain and neck stiffness.   Skin: Negative for color change, pallor, rash and wound.   Allergic/Immunologic: Negative for immunocompromised state.   Neurological: Negative for seizures, facial asymmetry, speech difficulty, weakness and light-headedness.   Hematological: Negative for adenopathy. Does not bruise/bleed easily.   Psychiatric/Behavioral: Negative for hallucinations, behavioral problems, self-injury and agitation. The patient is not hyperactive.      All other systems were reviewed and were negative.    Objective:     Vitals:    09/06/17 0946   BP: 114/66   Pulse: 68   Resp: 16      Physical Exam   Vitals reviewed.  Constitutional: He is oriented to person, place, and time. He appears well-developed and well-nourished. No distress.   HENT:   Head: Normocephalic and atraumatic.   Right Ear: External ear normal.   Left Ear: External ear normal.   Nose: Nose normal.   Eyes: EOM are normal. Pupils are equal, round, and reactive to light. Right eye exhibits no discharge. Left eye exhibits no discharge.   Neck: Normal range of motion. Neck supple. No tracheal deviation present. No thyroid enlargement or tenderness.  Cardiovascular: Regular rhythm and intact distal pulses. No signs of peripheral edema.   Pulmonary/Chest: Effort normal and breath sounds normal. No stridor. No respiratory distress. He has no wheezes.   Abdominal: Soft. Bowel sounds are normal. He exhibits no distension. There is no tenderness. Hernia confirmed negative in the right inguinal area and confirmed negative in the left inguinal area. No hepatic or splenic enlargement or tenderness.  Genitourinary: Penis normal. Right testis shows no mass, no swelling and no tenderness. Right testis is descended. Left testis shows no mass, no swelling and no tenderness. Left testis is descended. Circumcised. No phimosis or hypospadias.   Musculoskeletal: Normal range of motion. He exhibits no edema.   Neurological: He is alert and oriented to person, place, and time. He exhibits normal muscle tone. Coordination normal.   Skin: Skin is warm. No rash noted.   Lymphatic: No palpable lymph nodes.  Psychiatric: He has a normal mood and affect. His behavior is normal. Judgment and thought content normal.     Lab Results   Component Value Date    PSA 14.0 (H) 09/22/2014    PSA 15.48 (H) 08/16/2013    PSA 11.06 (H) 02/25/2013     Lab Results   Component Value Date    CREATININE 1.1 08/08/2017     Lab Results   Component Value Date    EGFRNONAA >60 08/08/2017     Lab Results   Component Value Date    ESTGFRAFRICA >60 08/08/2017     I personally  reviewed all the patient's imaging studies.    CT abdomen/pelvis without and with contrast (8/28/17): Enhancing soft tissue lesion in the midpole of left kidney measuring 2.3 x 2.0 cm, concerning for neoplasm.  RCC cannot be excluded.  Consultation with urology is recommended.  The renal vasculature and IVC are patent.     Assessment:       1. Left renal mass    2. Chronic kidney disease (CKD), stage I        Plan:     Wero was seen today for renal mass, elevated psa, benign prostatic hypertrophy and nocturia.    Diagnoses and all orders for this visit:    Left renal mass  -     Diet NPO; Standing  -     Case Request Operating Room: ROBOTIC ASSISTED LAPAROSCOPIC NEPHRECTOMY PARTIAL  -     Admit to Inpatient; Standing    Chronic kidney disease (CKD), stage I    Other orders  -     High Risk of VTE; Standing  -     heparin (porcine) injection 5,000 Units; Inject 1 mL (5,000 Units total) into the skin every 8 (eight) hours.    The patient has a cT1a left renal mass.    I spent a long time discussing with the patient and his family the nature and natural history of renal masses. >80% of enhancing renal masses represent a renal cell carcinoma, and surgical excision of these masses is the primary and preferred treatment strategy. Fortunately, for small renal masses (< 4 cm), surgical excision is curative with no need for further treatments. When amenable, a nephron-sparing procedure is optimal followed by a minimally-invasive approach. These treatment principles result in outstanding oncologic outcomes for patients with T1 renal masses (> 90%). I did mention to the patient that although partial nephrectomies do maximize long-term renal function, they are associated with a slightly increased risk of lacye-procedural complications (bleeding and urinary fistulas). However, these short-term complications are far outweighed by the benefits of renal preservation. We also spent some time discussing the concept of active  surveillance of renal masses, including its risks and benefits. In general, active surveillance is usually recommended for older and/or more co-morbid patients who have a higher surgical risk. Fortunately, there is no long-term data to suggest that the risk of metastasis with a mass less than 4 cm while on active surveillance approaches 0%.    I had a long discussion with the patient regarding his left renal mass. We discussed excision versus surveillance. After our discussion, the patient wished to proceed with excision. I described the surgery, associated hospital stay, and overall recovery. Due to family events, the patient wishes to undergo surgery in early November. We will make all the arrangements.    I answered all his and his family's questions.    At the conclusion of our visit, the patient was amenable to proceeding as outlined above.    I encouraged him or any of his family members to call or email me with questions and/or concerns.    I spent 40 minutes with the patient of which more than half was spent in coordinating the patient's care as well as in direct consultation with the patient in regards to our treatment and plan.

## 2017-09-06 NOTE — PATIENT INSTRUCTIONS
What Is Kidney (Renal) Cancer?    Cancer occurs when cells in the body begin changing and multiplying out of control. These cells can form lumps of tissue called tumors. Cancer that starts in a kidney is called kidney or renal cancer.  Understanding the kidneys  The kidneys are bean-shaped organs about the size of a bar of soap. They are found in the low back area, one on each side of the spine. The kidneys help keep the body alive by filtering waste and excess fluid from the blood. The kidneys send this liquid and waste (urine) to the bladder through the ureters. Urine then leaves the body through the urethra.  When kidney cancer forms  Kidney cancer forms when cells in the kidney change and multiply abnormally. The cancer can interfere with the working of the kidneys. Kidney cancer may spread beyond the kidneys to other parts of the body. This spread is called metastasis. The more cancer spreads, the harder it is to treat.  Treatment choices for kidney cancer  You and your healthcare provider will discuss a treatment plan that's best for your needs. Treatment choices may include the following.  Surgery  Surgery is the most common treatment for kidney cancer. Your healthcare provider may advise surgery to treat your kidney cancer if any of these apply to you:  · You are healthy enough to have surgery. Your healthcare provider will only advise surgery if he or she expects you to be able to recover from it.  · The tumor is small. In this case, your healthcare provider may do a partial nephrectomy. This surgery allows you to keep some kidney function. Only the part of the kidney that contains the tumor is taken out. In some cases, your provider may advise this surgery if the tumor is larger but you have cancer in both kidneys or if you have only one kidney. The benefit is that you keep part of the kidney. The risk is that there is a chance some cancer cells will be left behind.  · The tumor is larger, but is only in  your kidney. Your healthcare provider will advise the type of surgery you need based on the size of the tumor and where it is. Your provider may advise a simple nephrectomy. This is surgery to take out the entire kidney. Or your provider may advise a radical nephrectomy. This is surgery to take out the whole kidney and the adrenal gland. The adrenal gland is attached to the top of the kidney. Much of the nearby fatty tissue is also taken out. Nearby lymph nodes will also likely be removed. That's because cancer may travel to the nodes first. Taking out the lymph nodes may help prevent the spread of cancer to other parts of your body. And looking at these lymph nodes helps your provider figure out the stage of the cancer. This is important in deciding whether you need other treatments after surgery.  · You have kidney cancer that has spread to only one other area. Your healthcare provider may suggest a radical nephrectomy and removal of nearby lymph nodes. The tumor or tumors in other parts of the body will also be removed. Even in cases where surgery wont cure the cancer, surgery can sometimes help ease symptoms such as pressure, pain, or bleeding.  · You have symptoms. You may have pain, pressure, or bleeding from tumors that have spread. Your healthcare provider may suggest surgery to remove the tumors. This is done to ease symptoms. Because it doesn't cure the cancer, it is called palliative therapy.  Biologic therapy (immunotherapy)  This treatment strengthens your immune system to help fight cancer. It uses medicines that work like the chemicals that your bodys immune system makes. They help your immune system fight the cancer.  Chemotherapy  Chemotherapy uses strong medicines to kill cancer cells. Regular chemotherapy medicines don't usually work well against kidney cancer. Chemotherapy has mostly been replaced by biologic therapy in kidney cancer treatment. But chemotherapy medicines are still sometimes  used in rare cases where biologic therapy has not worked.  Radiation therapy  Radiation therapy uses directed rays of energy to kill cancer cells. Radiation therapy doesn't work as well as other methods for treating kidney cancer. Still, it may be used to treat someone who's not healthy enough to have surgery, or a person with kidney cancer that's not responding to other treatments. It may also be used to treat kidney cancer that has spread to the brain or bones. It may be used to treat pain caused by cancer that has spread to the bone (bone metastasis). It can also help stabilize a bone that has been weakened by metastasis and may be at risk for breaking. In cases like this, you will get the radiation treatment, but also medicines to help increase bone density and further protect against fractures.  Targeted therapies  These therapies use medicines to focus on or prevent changes in the cell. The changes may be either in the cell's molecules or genes. These medicines keep the cells from growing out of control.  Date Last Reviewed: 3/28/2015  © 5075-9908 [employer]. [address] [place] [address], [place] [address]. All rights reserved. This information is not intended as a substitute for professional medical care. Always follow your healthcare professional's instructions.

## 2017-09-07 ENCOUNTER — TELEPHONE (OUTPATIENT)
Dept: RADIOLOGY | Facility: HOSPITAL | Age: 62
End: 2017-09-07

## 2017-09-08 ENCOUNTER — HOSPITAL ENCOUNTER (OUTPATIENT)
Dept: RADIOLOGY | Facility: HOSPITAL | Age: 62
Discharge: HOME | End: 2017-09-08
Attending: INTERNAL MEDICINE
Payer: COMMERCIAL

## 2017-09-08 DIAGNOSIS — N28.89 LEFT RENAL MASS: ICD-10-CM

## 2017-09-08 DIAGNOSIS — M89.9 BONE LESION: ICD-10-CM

## 2017-09-08 PROCEDURE — A9503 TC99M MEDRONATE: HCPCS

## 2017-09-08 PROCEDURE — 78306 BONE IMAGING WHOLE BODY: CPT | Mod: 26,,, | Performed by: RADIOLOGY

## 2017-09-11 ENCOUNTER — PATIENT MESSAGE (OUTPATIENT)
Dept: INTERNAL MEDICINE | Facility: CLINIC | Age: 62
End: 2017-09-11

## 2017-09-11 ENCOUNTER — PATIENT MESSAGE (OUTPATIENT)
Dept: SURGERY | Facility: HOSPITAL | Age: 62
End: 2017-09-11

## 2017-09-14 ENCOUNTER — PATIENT MESSAGE (OUTPATIENT)
Dept: INTERNAL MEDICINE | Facility: CLINIC | Age: 62
End: 2017-09-14

## 2017-09-14 DIAGNOSIS — M89.9 RIB LESION: Primary | ICD-10-CM

## 2017-09-18 ENCOUNTER — HOSPITAL ENCOUNTER (OUTPATIENT)
Dept: RADIOLOGY | Facility: HOSPITAL | Age: 62
Discharge: HOME | End: 2017-09-18
Attending: INTERNAL MEDICINE
Payer: COMMERCIAL

## 2017-09-18 ENCOUNTER — PATIENT MESSAGE (OUTPATIENT)
Dept: INTERNAL MEDICINE | Facility: CLINIC | Age: 62
End: 2017-09-18

## 2017-09-18 DIAGNOSIS — M89.9 RIB LESION: ICD-10-CM

## 2017-09-18 PROCEDURE — 71100 X-RAY EXAM RIBS UNI 2 VIEWS: CPT | Mod: TC

## 2017-09-18 PROCEDURE — 71100 X-RAY EXAM RIBS UNI 2 VIEWS: CPT | Mod: 26,,, | Performed by: RADIOLOGY

## 2017-09-19 ENCOUNTER — PATIENT MESSAGE (OUTPATIENT)
Dept: INTERNAL MEDICINE | Facility: CLINIC | Age: 62
End: 2017-09-19

## 2017-10-03 RX ORDER — ALPRAZOLAM 0.5 MG/1
0.5 TABLET ORAL NIGHTLY
Qty: 30 TABLET | Refills: 5 | Status: SHIPPED | OUTPATIENT
Start: 2017-10-03 | End: 2018-06-29 | Stop reason: SDUPTHER

## 2017-10-19 NOTE — TELEPHONE ENCOUNTER
----- Message from Danielito Acevedo sent at 10/19/2017  1:41 PM CDT -----  Contact: self [phone]  Patient stated he had an emergency out of town , requesting medication triamterene-hydrochlorothiazide 37.5-25 mg (MAXZIDE-25) 37.5-25 mg per tablet call into [address] . Phone number [phone].      Please advise , Thanks !

## 2017-10-20 RX ORDER — TRIAMTERENE/HYDROCHLOROTHIAZID 37.5-25 MG
1 TABLET ORAL DAILY
Qty: 30 TABLET | Refills: 0 | Status: SHIPPED | OUTPATIENT
Start: 2017-10-20 | End: 2019-07-31

## 2017-10-20 NOTE — TELEPHONE ENCOUNTER
----- Message from Danielito Acevedo sent at 10/20/2017 12:34 PM CDT -----  Contact: self [phone]  Patient is calling to do a status check on medication triamterene-hydrochlorothiazide 37.5-25 mg (MAXZIDE-25) 37.5-25 mg per tablet . Please advise, Thanks !

## 2017-10-24 ENCOUNTER — ANESTHESIA EVENT (OUTPATIENT)
Dept: SURGERY | Facility: HOSPITAL | Age: 62
DRG: 658 | End: 2017-10-24
Payer: COMMERCIAL

## 2017-10-24 DIAGNOSIS — Z01.818 PREOP TESTING: Primary | ICD-10-CM

## 2017-10-24 NOTE — ANESTHESIA PREPROCEDURE EVALUATION
Anesthesia Assessment: Preoperative EQUATION     Planned Procedure: Procedure(s) (LRB):  ROBOTIC ASSISTED LAPAROSCOPIC NEPHRECTOMY PARTIAL (Left)  Requested Anesthesia Type:General  Surgeon: Joe Cameron MD  Service: Urology  Known or anticipated Date of Surgery:11/7/2017     Surgeon notes: reviewed     Electronic QUestionnaire Assessment completed via nurse interview with patient.                     Optimization:  Anesthesia Preop Clinic Assessment  Indicated    Medical Opinion Indicated                                        PLan:   Testing:  Hematology Profile, CMP, T&S and EKG   Pre-anesthesia  visit                                        Visit focus: concerns in complex and/or prolonged anesthesia                           Consultation:IM Perioperative Hospitalist                             Navigation: Tests Scheduled.                         Consults scheduled.                        Results will be tracked by Preop Clinic.                                                                                                                 10/24/2017  Wero Spangler is a 62 y.o., male.    Anesthesia Evaluation    I have reviewed the Patient Summary Reports.     I have reviewed the Medications.     Review of Systems  Anesthesia Hx:  Hx of Anesthetic complications States was unable to be aroused after back surgery 17 years ago.  Surgery in 2015 without any problems History of prior surgery of interest to airway management or planning: Previous anesthesia: General   Social:  Alcohol Use, Non-Smoker 1-2 drinks a week   Hematology/Oncology:  Hematology Normal   Oncology Normal     EENT/Dental:EENT/Dental Normal   Cardiovascular:   Exercise tolerance: good Hypertension, well controlled Walk 2 miles a day.  Denies chest pain or sob   Pulmonary:  Pulmonary Normal  Possible Obstructive Sleep Apnea , (STOP/BANG) Symptoms S - Snoring (loud), P - Pressure being treated for high BP, G - Gender (Male > Female), A -  Age > 50 and N - Neck circumference > 40 cms    Renal/:   BPH    Hepatic/GI:   GERD, well controlled Liver Disease, (fatty liver)    Musculoskeletal:   S/p back sx Musculoskeletal General/Symptoms: Functional capacity is ambulatory without assistance.  Spine Disorders: Disc disease    Neurological:   Denies CVA. Denies Seizures.  Denies Pain    Endocrine:  Endocrine Normal    Dermatological:  Skin Normal    Psych:  Psychiatric Normal           Physical Exam  General:  Well nourished    Airway/Jaw/Neck:  Airway Findings: Mouth Opening: Normal Tongue: Normal  General Airway Assessment: Adult  Mallampati: II  TM Distance: Normal, at least 6 cm  Jaw/Neck Findings:  Neck ROM: Normal ROM      Dental:  Dental Findings: In tact, molar caps        Mental Status:  Mental Status Findings:  Cooperative           Labs pending    Please refer to , Internal Medicine, perioperative risk assessment and recommendations.     Elba Kathleen RN 11/02/2017                Anesthesia Plan  Type of Anesthesia, risks & benefits discussed:  Anesthesia Type:  general  Patient's Preference: GA  Intra-op Monitoring Plan: standard ASA monitors and arterial line  Intra-op Monitoring Plan Comments:   Post Op Pain Control Plan:   Post Op Pain Control Plan Comments:   Induction:   IV  Beta Blocker:  Patient is not currently on a Beta-Blocker (No further documentation required).       Informed Consent: Patient understands risks and agrees with Anesthesia plan.  Questions answered. Anesthesia consent signed with patient.  ASA Score: 3     Day of Surgery Review of History & Physical:  There are no significant changes.  H&P update referred to the surgeon.         Ready For Surgery From Anesthesia Perspective.

## 2017-10-24 NOTE — PRE ADMISSION SCREENING
Anesthesia Assessment: Preoperative EQUATION    Planned Procedure: Procedure(s) (LRB):  ROBOTIC ASSISTED LAPAROSCOPIC NEPHRECTOMY PARTIAL (Left)  Requested Anesthesia Type:General  Surgeon: Joe Cameron MD  Service: Urology  Known or anticipated Date of Surgery:11/7/2017    Surgeon notes: reviewed    Electronic QUestionnaire Assessment completed via nurse interview with patient.      Pt states had a problem with anesthesia when had back surgery/ didn't  Wake up          Optimization:  Anesthesia Preop Clinic Assessment  Indicated    Medical Opinion Indicated           PLan:   Testing:  Hematology Profile, CMP, T&S and EKG   Pre-anesthesia  visit       Visit focus: concerns in complex and/or prolonged anesthesia     Consultation:IM Perioperative Hospitalist       Navigation: Tests Scheduled.              Consults scheduled.             Results will be tracked by Preop Clinic.

## 2017-11-02 ENCOUNTER — OFFICE VISIT (OUTPATIENT)
Dept: UROLOGY | Facility: CLINIC | Age: 62
End: 2017-11-02
Payer: COMMERCIAL

## 2017-11-02 ENCOUNTER — INITIAL CONSULT (OUTPATIENT)
Dept: INTERNAL MEDICINE | Facility: CLINIC | Age: 62
End: 2017-11-02
Payer: COMMERCIAL

## 2017-11-02 ENCOUNTER — HOSPITAL ENCOUNTER (OUTPATIENT)
Dept: PREADMISSION TESTING | Facility: HOSPITAL | Age: 62
Discharge: HOME | End: 2017-11-02
Attending: ANESTHESIOLOGY
Payer: COMMERCIAL

## 2017-11-02 ENCOUNTER — HOSPITAL ENCOUNTER (OUTPATIENT)
Dept: CARDIOLOGY | Facility: CLINIC | Age: 62
Discharge: HOME | End: 2017-11-02
Attending: ANESTHESIOLOGY
Payer: COMMERCIAL

## 2017-11-02 VITALS
WEIGHT: 211 LBS | SYSTOLIC BLOOD PRESSURE: 122 MMHG | OXYGEN SATURATION: 96 % | BODY MASS INDEX: 28.58 KG/M2 | DIASTOLIC BLOOD PRESSURE: 74 MMHG | HEART RATE: 69 BPM | HEIGHT: 72 IN | TEMPERATURE: 98 F

## 2017-11-02 DIAGNOSIS — I10 ESSENTIAL HYPERTENSION: ICD-10-CM

## 2017-11-02 DIAGNOSIS — Z01.818 PREOP TESTING: ICD-10-CM

## 2017-11-02 DIAGNOSIS — Z91.89: ICD-10-CM

## 2017-11-02 DIAGNOSIS — R17 ELEVATED BILIRUBIN: ICD-10-CM

## 2017-11-02 DIAGNOSIS — R06.83 SNORING: ICD-10-CM

## 2017-11-02 DIAGNOSIS — N13.8 BPH WITH URINARY OBSTRUCTION: ICD-10-CM

## 2017-11-02 DIAGNOSIS — N28.89 RENAL MASS, LEFT: Primary | ICD-10-CM

## 2017-11-02 DIAGNOSIS — N28.89 LEFT RENAL MASS: ICD-10-CM

## 2017-11-02 DIAGNOSIS — K76.0 FATTY LIVER: ICD-10-CM

## 2017-11-02 DIAGNOSIS — N40.1 BPH WITH URINARY OBSTRUCTION: ICD-10-CM

## 2017-11-02 DIAGNOSIS — K21.9 GASTROESOPHAGEAL REFLUX DISEASE, ESOPHAGITIS PRESENCE NOT SPECIFIED: ICD-10-CM

## 2017-11-02 DIAGNOSIS — R60.9 EDEMA, UNSPECIFIED TYPE: ICD-10-CM

## 2017-11-02 PROCEDURE — 99999 PR PBB SHADOW E&M-EST. PATIENT-LVL III: CPT | Mod: PBBFAC,,, | Performed by: HOSPITALIST

## 2017-11-02 PROCEDURE — 99244 OFF/OP CNSLTJ NEW/EST MOD 40: CPT | Mod: S$GLB,,, | Performed by: HOSPITALIST

## 2017-11-02 PROCEDURE — 93000 ELECTROCARDIOGRAM COMPLETE: CPT | Mod: S$GLB,,, | Performed by: INTERNAL MEDICINE

## 2017-11-02 PROCEDURE — 99999 PR PBB SHADOW E&M-EST. PATIENT-LVL II: CPT | Mod: PBBFAC,,,

## 2017-11-02 PROCEDURE — 99499 UNLISTED E&M SERVICE: CPT | Mod: S$GLB,,, | Performed by: UROLOGY

## 2017-11-02 RX ORDER — ACETAMINOPHEN 10 MG/ML
1000 INJECTION, SOLUTION INTRAVENOUS ONCE
Status: CANCELLED | OUTPATIENT
Start: 2017-11-02 | End: 2017-11-02

## 2017-11-02 RX ORDER — ACETAMINOPHEN 10 MG/ML
1000 INJECTION, SOLUTION INTRAVENOUS EVERY 8 HOURS
Status: CANCELLED | OUTPATIENT
Start: 2017-11-02 | End: 2017-11-03

## 2017-11-02 NOTE — HPI
History of present illness- I had the pleasure of meeting this pleasant 62 y.o. gentleman in the pre op clinic prior to his elective Urological surgery. The patient is new to me .   I have obtained the history by speaking to the patient and by reviewing the electronic health records.    Events leading up to surgery / History of presenting illness -    Left renal mass. The mass was found incidentally during evaluation for possible gallbladder pathology.  Had  Rt upper abdominal discomfort August 2017  .Had noticed at night time in the RUQ area , lasting for less than 24 hours and reoccurred 2 weeks later much less severity that lead to evaluation  Both occasions lasted less than 24 hours   No longer has any discomfort in that area      Relevant health conditions of significance for the perioperative period/ History of presenting illness -    Hypertension ,On medication,Usual BP readings 120-130/70's    BPH with urinary obstruction -usually urinates well , some times has hesitancy . Tried Flomax but had leg cramps  Risk of post op urinary retention discussed, suggested to seek attention , if unable to void      Had back surgery about  17 years ago for a herniated disc  Had bad back pain at that time .Had to wait for 4-5 days  for surgery and had Had to use opioids during the wait time   Was unwell and lost weight at that time , was hallucinating from opioids even the surgery   Eventually had back surgery  , took long time to recover from anesthesia   Was kept over night and was on Ventilator , woke up the Next morning after a bad night  and decided not to use opioids , and he recollects shaking , no nausea, vomiting   As per his interpretation , he may have received more opioids leading to late arousal / with drawl/ effects of restless night     Had knee surgery May 2015 ( not general to his understanding , out patient procedure ) and had no recurrence     US-Hepatic steatosis and mild splenomegaly. No focal hepatic  lesion.  Not known to have liver cirrhosis, jaundice, yellow urine, slow mentation, ascites  Suggested weight loss    Not known to have heart disease , Diabetes Mellitus, Lung disease

## 2017-11-02 NOTE — DISCHARGE INSTRUCTIONS
Your surgery has been scheduled for:__________________________________________    You should report to:  ____[facility], located on the [place] side of the first floor of the           [facility] ([phone])  ____[facility], located on the [facility] side of the            Second floor of the [facility] ([phone])  ____3rd Floor [facility] located on the [facility] side of the [facility] [phone]  Please Note   - Tell your doctor if you take Aspirin, products containing Aspirin, herbal medications  or blood thinners, such as Coumadin, Ticlid, or Plavix.  (Consult your provider regarding holding or stopping before surgery).  - Arrange for someone to drive you home following surgery.  You will not be allowed to leave the surgical facility alone or drive yourself home following sedation and anesthesia.  Before Surgery  - Stop taking all herbal medications 14days prior to surgery  - No Motrin/Advil (Ibuprofen) 7 days before surgery  - No Aleve (Naproxen) 7 days before surgery  - Stop Taking Asprin, products containing Asprin _____days before surgery  - Stop taking blood thinners_______days before surgery  - Refrain from drinking alcoholic beverages for 24hours before and after surgery  - Stop or limit smoking _________days before surgery  Night before Surgery  - DO NOT EAT OR DRINK ANYTHING AFTER MIDNIGHT, INCLUDING GUM, HARD CANDY, MINTS, OR CHEWING TOBACCO.  - Take a shower or bath (shower is recommended).  Bathe with Hibiclens soap or an antibacterial soap from the neck down.  If not supplied by your surgeon, hibiclens soap will need to be purchased over the counter in pharmacy.  Rinse soap off thoroughly.  - Shampoo your hair with your regular shampoo  The Day of Surgery  - Take another bath or shower with hibiclens or any antibacterial soap, to reduce the chance of infection.  - Take heart and blood  pressure medications with a small sip of water, as advised by the perioperative team.  - Do not take fluid pills  - You may brush your teeth and rinse your mouth, but do not swall any additional water.   - Do not apply perfumes, powder, body lotions or deodorant on the day of surgery.  - Nail polish should be removed.  - Do not wear makeup or moisturizer  - Wear comfortable clothes, such as a button front shirt and loose fitting pants.  - Leave all jewelry, including body piercings, and valuables at home.    - Bring any devices you will neeed after surgery such as crutches or canes.  - If you have sleep apnea, please bring your CPAP machine  In the event that your physical condition changes including the onset of a cold or respiratory illness, or if you have to delay or cancel your surgery, please notify your surgeon.Anesthesia: General Anesthesia  Youre due to have surgery. During surgery, youll be given medication called anesthesia. (It is also called anesthetic.) This will keep you comfortable and pain-free. Your anesthesia provider will use general anesthesia. This sheet tells you more about it.  What is general anesthesia?     You are watched continuously during your procedure by the anesthesia provider   General anesthesia puts you into a state like deep sleep. It goes into the bloodstream (IV anesthetics), into the lungs (gas anesthetics), or both. You feel nothing during the procedure. You will not remember it. During the procedure, the anesthesia provider monitors you continuously. He or she checks your heart rate and rhythm, blood pressure, breathing, and blood oxygen.  · IV Anesthetics. IV anesthetics are given through an IV line in your arm. Theyre often given first. This is so you are asleep before a gas anesthetic is started. Some kinds of IV anesthetics relieve pain. Others relax you. Your doctor will decide which kind is best in your case.  · Gas Anesthetics. Gas anesthetics are breathed into the  lungs. They are often used to keep you asleep. They can be given through a facemask or a tube placed in your larynx or trachea (breathing tube).  ? If you have a facemask, your anesthesia provider will most likely place it over your nose and mouth while youre still awake. Youll breathe oxygen through the mask as your IV anesthetic is started. Gas anesthetic may be added through the mask.  ? If you have a tube in the larynx or trachea, it will be inserted into your throat after youre asleep.  Anesthesia tools and medications  You will likely have:  · IV anesthetics. These are put into an IV line into your bloodstream.  · Gas anesthetics. You breathe these anesthetics into your lungs, where they pass into your bloodstream.  · Pulse oximeter. This is a small clip that is attached to the end of your finger. This measures your blood oxygen level.  · Electrocardiography leads (electrodes). These are small sticky pads that are placed on your chest. They record your heart rate and rhythm.  · Blood pressure cuff. This reads your blood pressure.  Risks and possible complications  General anesthesia has some risks. These include:  · Breathing problems  · Nausea and vomiting  · Sore throat or hoarseness (usually temporary)  · Allergic reaction to the anesthetic  · Irregular heartbeat (rare)  · Cardiac arrest (rare)   Anesthesia safety  · Follow all instructions you are given for how long not to eat or drink before your procedure.  · Be sure your doctor knows what medications and drugs you take. This includes over-the-counter medications, herbs, supplements, alcohol or other drugs. You will be asked when those were last taken.  · Have an adult family member or friend drive you home after the procedure.  · For the first 24 hours after your surgery:  ? Do not drive or use heavy equipment.  ? Have a trusted family member or spouse make important decisions or sign documents.  ? Avoid alcohol.  ? Have a responsible adult stay with  you. He or she can watch for problems and help keep you safe.  Date Last Reviewed: 10/16/2014  © 3280-6089 [employer]. [address] [place] [address], [place] [address]. All rights reserved. This information is not intended as a substitute for professional medical care. Always follow your healthcare professional's instructions.

## 2017-11-02 NOTE — LETTER
November 2, 2017      Joe Cameron MD  [address] [facility] [address]           [facility] - Pre [facility] Consult  [address] [facility] [address]  Phone: [phone]          Patient: Wero Spangler   MR Number: [id number]   YOB: 1955   Date of Visit: 11/2/2017       Dear Dr. Joe Cameron:    Thank you for referring Wero Spangler to me for evaluation. Attached you will find relevant portions of my assessment and plan of care.    If you have questions, please do not hesitate to call me. I look forward to following Wero Spangler along with you.    Sincerely,    Nadege Wright MD    Enclosure  CC:  No Recipients    If you would like to receive this communication electronically, please contact [email or web address] or [phone] to request more information on [employer] access.    For providers and/or their staff who would like to refer a patient to [facility], please contact us through our one-stop-shop provider referral line, [facility], at [phone].    If you feel you have received this communication in error or would no longer like to receive these types of communications, please e-mail [email or web address]

## 2017-11-02 NOTE — PROGRESS NOTES
Urology ([facility]) H&P for upcoming procedure  Staff:  Joe Cameron MD    Is this patient in a research study?  No    CC: left renal mass    HPI:  Wero Spangler is a 62 y.o. male with history of elevated PSA s/p two negative TRUS biopsies a left renal mass.        The mass was found incidentally during evaluation for possible gallbladder pathology.  He denies fever, chills, weight loss, bone pain, headaches, vision changes, seizures, SOB. No personal history of cancer.  Family history positive for prostate cancer in brother, no renal cell in family.        The Nephrometry score of the patient's mass is R=1, E=2, N=1, X, L=2, 6x low complexity.          ROS: Negative except for as stated above    Past Medical History:   Diagnosis Date    Allergy     Cataract     Elevated PSA     Enlarged prostate     Gallstones     General anesthetics causing adverse effect in therapeutic use 2000    delayed emergence after back surgery    GERD (gastroesophageal reflux disease)     HTN (hypertension) 9/24/2013    Hypertension     Urinary tract infection        Past Surgical History:   Procedure Laterality Date    BACK SURGERY  4/2000    CATARACT EXTRACTION W/  INTRAOCULAR LENS IMPLANT      Both Eyes    EYE SURGERY      PROSTATE SURGERY      prostate biopsy benign    TONSILLECTOMY      VASECTOMY         Social History     Social History    Marital status:      Spouse name: N/A    Number of children: N/A    Years of education: N/A     Social History Main Topics    Smoking status: Former Smoker     Quit date: 2000    Smokeless tobacco: Never Used    Alcohol use 0.6 oz/week     1 Glasses of wine per week      Comment: 2 beers three times a week     Drug use: No    Sexual activity: Yes     Partners: Female     Other Topics Concern    Not on file     Social History Narrative    No narrative on file       Family History   Problem Relation Age of Onset    Cancer Mother      breast    Prostate  cancer Brother     Cancer Brother      prostate    Macular degeneration Maternal Grandmother     Cancer Other     Cancer Other     Amblyopia Neg Hx     Blindness Neg Hx     Cataracts Neg Hx     Glaucoma Neg Hx     Retinal detachment Neg Hx     Strabismus Neg Hx        Review of patient's allergies indicates:  No Known Allergies    Current Outpatient Prescriptions on File Prior to Visit   Medication Sig Dispense Refill    alprazolam (XANAX) 0.5 MG tablet Take 1 tablet (0.5 mg total) by mouth nightly. (Patient taking differently: Take 0.25 mg by mouth nightly. To help with sleep) 30 tablet 5    fenofibrate (TRICOR) 54 MG tablet Take 1 tablet (54 mg total) by mouth once daily. (Patient taking differently: Take 54 mg by mouth every morning. ) 30 tablet 12    fluticasone (FLONASE) 50 mcg/actuation nasal spray 1 spray by Each Nare route daily as needed.       OMEPRAZOLE (PRILOSEC ORAL) Take by mouth every morning.       sodium chloride 2% (BARBI 128) 2 % ophthalmic solution 1 drop as needed (takes 3-4 times/day).      triamterene-hydrochlorothiazide 37.5-25 mg (MAXZIDE-25) 37.5-25 mg per tablet Take 1 tablet by mouth once daily. (Patient taking differently: Take 1 tablet by mouth every morning. ) 30 tablet 0     No current facility-administered medications on file prior to visit.        Anticoagulation:  No    Physical Exam:  weight 211 lb/96 kg  BMI 28.6    AAOx4, NAD, WDWN  NC/AT, EOMI, PER, sclerae anicteric, speech normal, tongue midline  Nl effort, CTAB  RRR  Soft, non-tender, non-distended, no CVAT   Scars: none  Circumcised, normal testes and cord structures bilaterally     Labs:    BMP  Lab Results   Component Value Date     11/02/2017    K 4.1 11/02/2017     11/02/2017    CO2 29 11/02/2017    BUN 17 11/02/2017    CREATININE 1.1 11/02/2017    CALCIUM 9.7 11/02/2017    ANIONGAP 9 11/02/2017    ESTGFRAFRICA >60.0 11/02/2017    EGFRNONAA >60.0 11/02/2017     Lab Results   Component Value  Date    ALT 24 11/02/2017    AST 24 11/02/2017    ALKPHOS 55 11/02/2017    BILITOT 1.3 (H) 11/02/2017     Lab Results   Component Value Date    WBC 5.75 11/02/2017    HGB 13.9 (L) 11/02/2017    HCT 39.8 (L) 11/02/2017    MCV 87 11/02/2017     11/02/2017         Lab Results   Component Value Date    PSA 14.0 (H) 09/22/2014    PSA 15.48 (H) 08/16/2013    PSA 11.06 (H) 02/25/2013       Imaging:   CTAP (date8/28/2017) - left renal mass, 2.7 cm, enhancing at middle pole, 1 left renal artery/ies, 1 left renal veins, negative for RP LAD    NM whole body bone scan 9/8/2017:  Increased uptake in left rib 7 and 10-- trauma vs malignancy. No increased uptake in left ilium    CXR (9/18/2017) - no evidence of metastatic disease.  No sclerotic lesion in left rib 7 noted.      Assessment: Wero Spangler is a 62 y.o. male with left renal mass, suspicious for [id number] RCC    Plan:     1.  To OR on 11/7/2017 for left robotic partial nephrectomy  2.  Consents signed   3.  I have explained the risk, benefits, and alternatives of the procedure in detail. The patient voices understanding and all questions have been answered. The patient agrees to proceed as planned.   4.  The risks and benefits of participating in our clinical trial have been discussed and the patient has consented for the research study here at [facility].   5.  Type and screen ordered preoperatively  6.  Patient to drop off urine specimen tomorrow     Kirk Lauren MD

## 2017-11-02 NOTE — ASSESSMENT & PLAN NOTE
Had back surgery about  17 years ago for a herniated disc  Had bad back pain at that time .Had to wait for 4-5 days  for surgery and had Had to use opioids during the wait time   Was unwell and lost weight at that time , was hallucinating from opioids even the surgery .Eventually had back surgery  , took long time to recover from anesthesia   Was kept over night and was on Ventilator , woke up the Next morning after a bad night  and decided not to use opioids , and he recollects shaking , no nausea, vomiting   As per his interpretation , he may have received more opioids leading to late arousal / with drawl/ effects of restless night   Sleep apnea was considered to be a possibility at that time     As per my interpretation , he may not have had opioid with drawl , having used opioids only a few days . He may have been sensitive to opioids with possible underlying sleep apnea  His current STOPBANG score 5/8

## 2017-11-02 NOTE — OUTPATIENT SUBJECTIVE & OBJECTIVE
Outpatient Subjective & Objective     Chief complaint-Preoperative evaluation, Perioperative Medical management, complication reduction plan     Active cardiac conditions- none    Revised cardiac risk index predictors- none    Functional capacity -Examples of physical activity, Retired in May 2017, and since then walks 2 miles a day ,  house work and can take 1 flight of stairs, can get on the roof and clean gutters and cuts  the trees ----- He can undertake all the above activities without  chest pain,chest tightness, Shortness of breath ,dizziness,lightheadedness making his exercise tolerance more  than 4 Mets.       Review of Systems   Constitutional: Negative for chills and fever.        No unusual weight changes   HENT:        Snoring   Elevated BP  Age over 50 years  Neck size over 40 CM  Male gender   Eyes:        No unusual vision changes   Respiratory:        No Cough ,Phlegm , hemoptysis     Cardiovascular:        As noted   Gastrointestinal:        Bowels- Regular  No overt GI/ blood losses   Endocrine:        Recent steroid use- none   Genitourinary: Negative for dysuria.   Musculoskeletal:        Leg cramps   Stays hydrated   Skin: Negative for rash.   Neurological: Negative for syncope.        No unilateral weakness   Hematological:        Current use of Anticoagulants- none   Psychiatric/Behavioral:        No Depression     No vascular stenting     Past Surgical History:   Procedure Laterality Date    BACK SURGERY  4/2000    CATARACT EXTRACTION W/  INTRAOCULAR LENS IMPLANT      Both Eyes    EYE SURGERY      PROSTATE SURGERY      prostate biopsy benign    TONSILLECTOMY      VASECTOMY         No  bleeding, cardiac problems, PONV with previous surgeries/procedures.  Medications and Allergies reviewed in epic.   Suggested cancer screening   FH- No anesthesia,bleeding , early onset heart disease in family   Mother had post op thrombosis after breast cancer surgery around 65 Years  Care with  Benzodiazepine   Lives with wife , help available     Physical Exam   HENT:   Head: Normocephalic.   .    Physical Exam   HENT:   Head: Normocephalic.     Constitutional- Vitals - Body mass index is 28.62 kg/m².,   Vitals:    11/02/17 0838   BP: 122/74   Pulse: 69   Temp: 97.8 °F (36.6 °C)     General appearance-Conscious,Coherent  Eyes- No conjunctival icterus,pupils  round  and reactive to light   ENT-Oral cavity- moist  , Hearing grossly normal   Neck- No thyromegaly ,Trachea -central, No jugular venous distension,   No Carotid Bruit   Cardiovascular -Heart Sounds- Normal  and  no murmur   , No gallop rhythm   Respiratory - Normal Respiratory Effort, Normal breath sounds and  no wheeze    Peripheral pitting pedal edema-- mild and  bilateral lower extremity telangiectasia , no calf pain   Gastrointestinal -Soft abdomen, No palpable masses, Non Tender,Liver,Spleen not palpable. No-- free fluid and shifting dullness  Musculoskeletal- No finger Clubbing. Strength grossly normal   Lymphatic-No Palpable cervical, axillary,Inguinal lymphadenopathy   Psychiatric - normal effect,Orientation  Rt Dorsalis pedis pulses-palpable    Lt Dorsalis pedis pulses- palpable   Rt Posterior tibial pulses -palpable   Left posterior tibial pulses -palpable   Miscellaneous -  no abnormal testicular masses,  no asterixis and  no renal bruit   No left sided bimanual masses  Blood pressure 122/74, pulse 69, temperature 97.8 °F (36.6 °C), temperature source Oral, height 6' (1.829 m), weight 95.7 kg (211 lb), SpO2 96 %.      Investigations  Lab and Imaging have been reviewed in epic.    Review of Medicine tests    EKG- I had independently reviewed the EKG from--4/24/2015   It was reported to be showing     Normal sinus rhythm  Right bundle branch block  Abnormal ECG  When compared with ECG of 12-JUL-2011 08:46,  No significant change was found    Review of clinical lab tests-Date---8/8/2017  Creatinine-1.1  Date-8/8/2017 -Hemoglobin--N   Platelet count--N    Review of old records- Was done and information gathered regards to events leading to surgery and health conditions of significance in the perioperative period.    Outpatient Subjective & Objective

## 2017-11-02 NOTE — ASSESSMENT & PLAN NOTE
Edema- I suggested avoidance of added salt,avoidance of NSAID's and suggested Limb elevation and branden hose use

## 2017-11-02 NOTE — ASSESSMENT & PLAN NOTE
sleep apnea- I suggested a sleep study and suggest caution with usage of medication that can cause respiratory suppression in the perioperative period  potential ramifications of untreated sleep apnea, which could include daytime sleepiness, hypertension, heart disease and stroke were discussed

## 2017-11-02 NOTE — PROGRESS NOTES
[facility] - Pre Op Consult  Progress Note    Patient Name: Wero Spangler  MRN: [id number]  Date of Evaluation- 11/02/2017  PCP- Duke Cano MD    Future cases for Wero Spangler [[id number]]     Case ID Status Date Time [facility] Procedure Provider Location    [id number] [facility] 11/7/2017  7:00  ROBOTIC ASSISTED LAPAROSCOPIC NEPHRECTOMY PARTIAL Joe Cameron MD [[id number]] [facility] OR 2ND FLR      Left     HPI:  History of present illness- I had the pleasure of meeting this pleasant 62 y.o. gentleman in the pre op clinic prior to his elective Urological surgery. The patient is new to me .   I have obtained the history by speaking to the patient and by reviewing the electronic health records.    Events leading up to surgery / History of presenting illness -    Left renal mass. The mass was found incidentally during evaluation for possible gallbladder pathology.  Had  Rt upper abdominal discomfort August 2017  .Had noticed at night time in the RUQ area , lasting for less than 24 hours and reoccurred 2 weeks later much less severity that lead to evaluation  Both occasions lasted less than 24 hours   No longer has any discomfort in that area      Relevant health conditions of significance for the perioperative period/ History of presenting illness -    Hypertension ,On medication,Usual BP readings 120-130/70's    BPH with urinary obstruction -usually urinates well , some times has hesitancy . Tried Flomax but had leg cramps  Risk of post op urinary retention discussed, suggested to seek attention , if unable to void      Had back surgery about  17 years ago for a herniated disc  Had bad back pain at that time .Had to wait for 4-5 days  for surgery and had Had to use opioids during the wait time   Was unwell and lost weight at that time , was hallucinating from opioids even the surgery   Eventually had back surgery  , took long time to recover from anesthesia   Was kept over night and was on Ventilator , woke up the Next  morning after a bad night  and decided not to use opioids , and he recollects shaking , no nausea, vomiting   As per his interpretation , he may have received more opioids leading to late arousal / with drawl/ effects of restless night     Had knee surgery May 2015 ( not general to his understanding , out patient procedure ) and had no recurrence     US-Hepatic steatosis and mild splenomegaly. No focal hepatic lesion.  Not known to have liver cirrhosis, jaundice, yellow urine, slow mentation, ascites  Suggested weight loss    Not known to have heart disease , Diabetes Mellitus, Lung disease         Subjective/ Objective:          Chief complaint-Preoperative evaluation, Perioperative Medical management, complication reduction plan     Active cardiac conditions- none    Revised cardiac risk index predictors- none    Functional capacity -Examples of physical activity, Retired in May 2017, and since then walks 2 miles a day ,  house work and can take 1 flight of stairs, can get on the roof and clean gutters and cuts  the trees ----- He can undertake all the above activities without  chest pain,chest tightness, Shortness of breath ,dizziness,lightheadedness making his exercise tolerance more  than 4 Mets.       Review of Systems   Constitutional: Negative for chills and fever.        No unusual weight changes   HENT:        Snoring   Elevated BP  Age over 48 years  Neck size over 40 CM  Male gender   Eyes:        No unusual vision changes   Respiratory:        No Cough ,Phlegm , hemoptysis     Cardiovascular:        As noted   Gastrointestinal:        Bowels- Regular  No overt GI/ blood losses   Endocrine:        Recent steroid use- none   Genitourinary: Negative for dysuria.   Musculoskeletal:        Leg cramps   Stays hydrated   Skin: Negative for rash.   Neurological: Negative for syncope.        No unilateral weakness   Hematological:        Current use of Anticoagulants- none   Psychiatric/Behavioral:        No  Depression     No vascular stenting     Past Surgical History:   Procedure Laterality Date    BACK SURGERY  4/2000    CATARACT EXTRACTION W/  INTRAOCULAR LENS IMPLANT      Both Eyes    EYE SURGERY      PROSTATE SURGERY      prostate biopsy benign    TONSILLECTOMY      VASECTOMY         No  bleeding, cardiac problems, PONV with previous surgeries/procedures.  Medications and Allergies reviewed in epic.   Suggested cancer screening   FH- No anesthesia,bleeding , early onset heart disease in family   Mother had post op thrombosis after breast cancer surgery around 65 Years  Care with Benzodiazepine   Lives with wife , help available     Physical Exam   HENT:   Head: Normocephalic.   .    Physical Exam   HENT:   Head: Normocephalic.     Constitutional- Vitals - Body mass index is 28.62 kg/m².,   Vitals:    11/02/17 0838   BP: 122/74   Pulse: 69   Temp: 97.8 °F (36.6 °C)     General appearance-Conscious,Coherent  Eyes- No conjunctival icterus,pupils  round  and reactive to light   ENT-Oral cavity- moist  , Hearing grossly normal   Neck- No thyromegaly ,Trachea -central, No jugular venous distension,   No Carotid Bruit   Cardiovascular -Heart Sounds- Normal  and  no murmur   , No gallop rhythm   Respiratory - Normal Respiratory Effort, Normal breath sounds and  no wheeze    Peripheral pitting pedal edema-- mild and  bilateral lower extremity telangiectasia , no calf pain   Gastrointestinal -Soft abdomen, No palpable masses, Non Tender,Liver,Spleen not palpable. No-- free fluid and shifting dullness  Musculoskeletal- No finger Clubbing. Strength grossly normal   Lymphatic-No Palpable cervical, axillary,Inguinal lymphadenopathy   Psychiatric - normal effect,Orientation  Rt Dorsalis pedis pulses-palpable    Lt Dorsalis pedis pulses- palpable   Rt Posterior tibial pulses -palpable   Left posterior tibial pulses -palpable   Miscellaneous -  no abnormal testicular masses,  no asterixis and  no renal bruit   No left sided  bimanual masses  Blood pressure 122/74, pulse 69, temperature 97.8 °F (36.6 °C), temperature source Oral, height 6' (1.829 m), weight 95.7 kg (211 lb), SpO2 96 %.      Investigations  Lab and Imaging have been reviewed in [facility].    Review of Medicine tests    EKG- I had independently reviewed the EKG from--4/24/2015   It was reported to be showing     Normal sinus rhythm  Right bundle branch block  Abnormal ECG  When compared with ECG of 12-JUL-2011 08:46,  No significant change was found    Review of clinical lab tests-Date---8/8/2017  Creatinine-1.1  Date-8/8/2017 -Hemoglobin--N  Platelet count--N    Review of old records- Was done and information gathered regards to events leading to surgery and health conditions of significance in the perioperative period.        Preoperative cardiac risk assessment-  The patient does not have any active cardiac conditions . Revised cardiac risk index predictors-0 ---.Functional capacity is more than 4 Mets. He will be undergoing a Urological procedure that carries a intermediate risk     The estimated risk of the rate of adverse cardiac outcomes  0.4%    No further cardiac work up is indicated prior to proceeding with the surgery     Orders Placed This Encounter    Protime-INR       American Society of Anesthesiologists Physical status classification ( ASA ) class: 3     Postoperative pulmonary complication risk assessment:     /74 Comment: rt  Pulse 69   Temp 97.8 °F (36.6 °C) (Oral)   Ht 6' (1.829 m)   Wt 95.7 kg (211 lb)   SpO2 96%   BMI 28.62 kg/m²      ARISCAT ( Canet) risk index- risk class -  Low, if duration of surgery is under 3 hours, intermediate, if duration of surgery is over 3 hours      Abdifatah Respiratory failure index- percentage risk of respiratory failure: 0.5 %    Assessment/Plan:     HTN (hypertension)  Hypertension-  Blood pressure is acceptable .  I suggest holding Losartan HCTZ- on the morning of the surgery and can continue that  post  operatively under blood pressure, electrolyte and renal function monitoring as long as they are acceptable.I suggest addressing pain control as uncontrolled pain can increased blood pressure     BPH with urinary obstruction  Discussed risk of post op urinary retention     Fatty liver  With splenomegaly   No suggestion of Cirrhosis , or liver decompensation  Platelet count is normal   Suggested follow up    Hx of complications due to general anesthesia  Had back surgery about  17 years ago for a herniated disc  Had bad back pain at that time .Had to wait for 4-5 days  for surgery and had Had to use opioids during the wait time   Was unwell and lost weight at that time , was hallucinating from opioids even the surgery .Eventually had back surgery  , took long time to recover from anesthesia   Was kept over night and was on Ventilator , woke up the Next morning after a bad night  and decided not to use opioids , and he recollects shaking , no nausea, vomiting   As per his interpretation , he may have received more opioids leading to late arousal / with drawl/ effects of restless night   Sleep apnea was considered to be a possibility at that time     As per my interpretation , he may not have had opioid with drawl , having used opioids only a few days . He may have been sensitive to opioids with possible underlying sleep apnea  His current STOPBANG score 5/8      Snoring    sleep apnea- I suggested a sleep study and suggest caution with usage of medication that can cause respiratory suppression in the perioperative period  potential ramifications of untreated sleep apnea, which could include daytime sleepiness, hypertension, heart disease and stroke were discussed          GERD (gastroesophageal reflux disease)  GERD-  I suggest continuation of the Proton pump inhibitor in the perioperative period . I suggest aspiration precautions    Elevated bilirubin  Chronic in nature       Edema  Edema- I suggested avoidance of  added salt,avoidance of NSAID's and suggested Limb elevation and branden hose use        Preventive perioperative care    Thromboembolic prophylaxis:  His risk factors for thrombosis include surgical procedure and age.I suggest  thromboembolic prophylaxis ( mechanical/pharmacological, weighing the risk benefits of pharmacological agent use considering lacey procedural bleeding )  during the perioperative period.I suggested being active in the post operative period.      Postoperative pulmonary complication prophylaxis-Risk factors for post operative pulmonary complications include surgery lasting over 3 hours, ASA class >2 and proximity of the surgical site to the lungs- I suggest incentive spirometry use, early ambulation and end tidal carbon dioxide monitoring  , oral care , head end of bed elevation      Renal complication prophylaxis-Risk factors for renal complications include hypertension . I suggest keeping him well hydrated I suggested drinking 2 litre's of water a day      Surgical site Infection Prophylaxis-I  suggest appropriate antibiotic for Prophylaxis against Surgical site infections     Delirium prophylaxis-Risk factors - benzodiazepine use - I suggest avoidance / minimizing the use of  Benzodiazepines ( unless the patient has been taking it on a regular basis ),Anticholinergic medication,Antihistamines ( like  Benadryl).I suggest minimizing the use of opioid medication and use of IV tylenol,if it is appropriate. I suggest using the lowest possible dose of opioids for the shortest duration possible in the perioperative period. I suggest to Keep shades/blinds open during the day, lights off and shades closed at night to encourage normal sleep/wake cycle.I encourage the presence of the family member with the patient at all times, if at all possible as mental status changes can be picked up early by the family members and they help with reorientation. I encouraged the presence of family to help with  orientation in the perioperative period. Benadryl avoidance suggested      In view of BPH the patient  is at risk of postoperative urinary retention.  I suggest avoidance / minimizing the of  Benzodiazepines,Anticholinergic medication,antihistamines ( Benadryl) , if possible in the perioperative period. I suggest using the minimum possible use of opioids for the minimum period of time in the perioperative period. Benadryl avoidance suggested      This visit was focused on Preoperative evaluation, Perioperative Medical management, complication reduction plans. I suggest that the patient follows up with primary care or relevant sub specialists for ongoing health care.    I appreciate the opportunity to be involved in this patients care. Please feel free to contact me if there were any questions about this consultation.    Patient is optimized    Nadege Wright MD  Perioperative Medicine  [facility]   Pager [phone]  ---------  11/2-- 17 20     CBC - mildly low Hb,HCT,INR-N  CMP- Bilirubin improved and down close to the base line     EKG from today personally reviewed reportedly showed     Sinus bradycardia  Right bundle branch block  Abnormal ECG  When compared with ECG of 24-APR-2015 08:55,  No significant change was found    Called to discuss above  Left a message to call, if needed   -------    11/6 - 17 20     Called to follow up  Doing good   No changes to medication, health   Hold Maxzide on the AM of surgery   Omeprazole AM of surgery

## 2017-11-02 NOTE — ASSESSMENT & PLAN NOTE
Hypertension-  Blood pressure is acceptable .  I suggest holding Losartan HCTZ- on the morning of the surgery and can continue that  post operatively under blood pressure, electrolyte and renal function monitoring as long as they are acceptable.I suggest addressing pain control as uncontrolled pain can increased blood pressure

## 2017-11-06 ENCOUNTER — PATIENT MESSAGE (OUTPATIENT)
Dept: INTERNAL MEDICINE | Facility: CLINIC | Age: 62
End: 2017-11-06

## 2017-11-06 ENCOUNTER — TELEPHONE (OUTPATIENT)
Dept: UROLOGY | Facility: CLINIC | Age: 62
End: 2017-11-06

## 2017-11-07 ENCOUNTER — HOSPITAL ENCOUNTER (INPATIENT)
Facility: HOSPITAL | Age: 62
LOS: 1 days | Discharge: HOME | DRG: 658 | End: 2017-11-08
Attending: UROLOGY | Admitting: UROLOGY
Payer: COMMERCIAL

## 2017-11-07 ENCOUNTER — ANESTHESIA (OUTPATIENT)
Dept: SURGERY | Facility: HOSPITAL | Age: 62
DRG: 658 | End: 2017-11-07
Payer: COMMERCIAL

## 2017-11-07 ENCOUNTER — SURGERY (OUTPATIENT)
Age: 62
End: 2017-11-07

## 2017-11-07 DIAGNOSIS — N28.89 LEFT RENAL MASS: ICD-10-CM

## 2017-11-07 DIAGNOSIS — N28.89 RENAL MASS, LEFT: ICD-10-CM

## 2017-11-07 LAB
ANION GAP SERPL CALC-SCNC: 5 MMOL/L
BASOPHILS # BLD AUTO: 0.03 K/UL
BASOPHILS NFR BLD: 0.5 %
BUN SERPL-MCNC: 19 MG/DL
CALCIUM SERPL-MCNC: 8.1 MG/DL
CHLORIDE SERPL-SCNC: 109 MMOL/L
CO2 SERPL-SCNC: 24 MMOL/L
CREAT SERPL-MCNC: 0.9 MG/DL
DIFFERENTIAL METHOD: ABNORMAL
EOSINOPHIL # BLD AUTO: 0 K/UL
EOSINOPHIL NFR BLD: 0.3 %
ERYTHROCYTE [DISTWIDTH] IN BLOOD BY AUTOMATED COUNT: 14 %
EST. GFR  (AFRICAN AMERICAN): >60 ML/MIN/1.73 M^2
EST. GFR  (NON AFRICAN AMERICAN): >60 ML/MIN/1.73 M^2
GLUCOSE SERPL-MCNC: 172 MG/DL
HCT VFR BLD AUTO: 36.3 %
HGB BLD-MCNC: 12.6 G/DL
IMM GRANULOCYTES # BLD AUTO: 0.06 K/UL
IMM GRANULOCYTES NFR BLD AUTO: 1 %
LYMPHOCYTES # BLD AUTO: 0.6 K/UL
LYMPHOCYTES NFR BLD: 10.4 %
MCH RBC QN AUTO: 29.9 PG
MCHC RBC AUTO-ENTMCNC: 34.7 G/DL
MCV RBC AUTO: 86 FL
MONOCYTES # BLD AUTO: 0.2 K/UL
MONOCYTES NFR BLD: 3.4 %
NEUTROPHILS # BLD AUTO: 5 K/UL
NEUTROPHILS NFR BLD: 84.4 %
NRBC BLD-RTO: 0 /100 WBC
PLATELET # BLD AUTO: 141 K/UL
PMV BLD AUTO: 9.7 FL
POTASSIUM SERPL-SCNC: 3.7 MMOL/L
RBC # BLD AUTO: 4.21 M/UL
SODIUM SERPL-SCNC: 138 MMOL/L
WBC # BLD AUTO: 5.97 K/UL

## 2017-11-07 PROCEDURE — 63600175 PHARM REV CODE 636 W HCPCS: Performed by: STUDENT IN AN ORGANIZED HEALTH CARE EDUCATION/TRAINING PROGRAM

## 2017-11-07 PROCEDURE — 63600175 PHARM REV CODE 636 W HCPCS: Performed by: UROLOGY

## 2017-11-07 PROCEDURE — 25000003 PHARM REV CODE 250: Performed by: NURSE ANESTHETIST, CERTIFIED REGISTERED

## 2017-11-07 PROCEDURE — 99900035 HC TECH TIME PER 15 MIN (STAT)

## 2017-11-07 PROCEDURE — 88305 TISSUE EXAM BY PATHOLOGIST: CPT | Mod: 26,,,

## 2017-11-07 PROCEDURE — 8E0W4CZ ROBOTIC ASSISTED PROCEDURE OF TRUNK REGION, PERCUTANEOUS ENDOSCOPIC APPROACH: ICD-10-PCS | Performed by: UROLOGY

## 2017-11-07 PROCEDURE — 50543 LAPARO PARTIAL NEPHRECTOMY: CPT | Mod: LT,,, | Performed by: UROLOGY

## 2017-11-07 PROCEDURE — 27100025 HC TUBING, SET FLUID WARMER: Performed by: NURSE ANESTHETIST, CERTIFIED REGISTERED

## 2017-11-07 PROCEDURE — 88307 TISSUE EXAM BY PATHOLOGIST: CPT | Mod: 26,,,

## 2017-11-07 PROCEDURE — A4216 STERILE WATER/SALINE, 10 ML: HCPCS | Performed by: NURSE ANESTHETIST, CERTIFIED REGISTERED

## 2017-11-07 PROCEDURE — 25000003 PHARM REV CODE 250: Performed by: ANESTHESIOLOGY

## 2017-11-07 PROCEDURE — 63600175 PHARM REV CODE 636 W HCPCS: Performed by: NURSE ANESTHETIST, CERTIFIED REGISTERED

## 2017-11-07 PROCEDURE — 71000039 HC RECOVERY, EACH ADD'L HOUR: Performed by: UROLOGY

## 2017-11-07 PROCEDURE — 50543 LAPARO PARTIAL NEPHRECTOMY: CPT | Mod: AS,LT,, | Performed by: PHYSICIAN ASSISTANT

## 2017-11-07 PROCEDURE — 27800505 HC CATH, RADIAL ARTERY KIT: Performed by: NURSE ANESTHETIST, CERTIFIED REGISTERED

## 2017-11-07 PROCEDURE — 25000003 PHARM REV CODE 250: Performed by: UROLOGY

## 2017-11-07 PROCEDURE — 37000008 HC ANESTHESIA 1ST 15 MINUTES: Performed by: UROLOGY

## 2017-11-07 PROCEDURE — 76998 US GUIDE INTRAOP: CPT | Mod: 26,,, | Performed by: UROLOGY

## 2017-11-07 PROCEDURE — 71000033 HC RECOVERY, INTIAL HOUR: Performed by: UROLOGY

## 2017-11-07 PROCEDURE — 85025 COMPLETE CBC W/AUTO DIFF WBC: CPT

## 2017-11-07 PROCEDURE — 27201037 HC PRESSURE MONITORING SET UP

## 2017-11-07 PROCEDURE — D9220A PRA ANESTHESIA: Mod: CRNA,,, | Performed by: NURSE ANESTHETIST, CERTIFIED REGISTERED

## 2017-11-07 PROCEDURE — 88331 PATH CONSLTJ SURG 1 BLK 1SPC: CPT | Mod: 26,,,

## 2017-11-07 PROCEDURE — 27000221 HC OXYGEN, UP TO 24 HOURS

## 2017-11-07 PROCEDURE — 11000001 HC ACUTE MED/SURG PRIVATE ROOM

## 2017-11-07 PROCEDURE — 0TB14ZZ EXCISION OF LEFT KIDNEY, PERCUTANEOUS ENDOSCOPIC APPROACH: ICD-10-PCS | Performed by: UROLOGY

## 2017-11-07 PROCEDURE — 36000712 HC OR TIME LEV V 1ST 15 MIN: Performed by: UROLOGY

## 2017-11-07 PROCEDURE — 36620 INSERTION CATHETER ARTERY: CPT | Mod: 59,,, | Performed by: ANESTHESIOLOGY

## 2017-11-07 PROCEDURE — D9220A PRA ANESTHESIA: Mod: ANES,,, | Performed by: ANESTHESIOLOGY

## 2017-11-07 PROCEDURE — 27201423 OPTIME MED/SURG SUP & DEVICES STERILE SUPPLY: Performed by: UROLOGY

## 2017-11-07 PROCEDURE — 36000713 HC OR TIME LEV V EA ADD 15 MIN: Performed by: UROLOGY

## 2017-11-07 PROCEDURE — 88305 TISSUE EXAM BY PATHOLOGIST: CPT

## 2017-11-07 PROCEDURE — 80048 BASIC METABOLIC PNL TOTAL CA: CPT

## 2017-11-07 PROCEDURE — 37000009 HC ANESTHESIA EA ADD 15 MINS: Performed by: UROLOGY

## 2017-11-07 RX ORDER — ACETAMINOPHEN 10 MG/ML
1000 INJECTION, SOLUTION INTRAVENOUS EVERY 8 HOURS
Status: COMPLETED | OUTPATIENT
Start: 2017-11-07 | End: 2017-11-07

## 2017-11-07 RX ORDER — ACETAMINOPHEN 10 MG/ML
1000 INJECTION, SOLUTION INTRAVENOUS EVERY 6 HOURS
Status: COMPLETED | OUTPATIENT
Start: 2017-11-07 | End: 2017-11-08

## 2017-11-07 RX ORDER — HEPARIN SODIUM 5000 [USP'U]/ML
5000 INJECTION, SOLUTION INTRAVENOUS; SUBCUTANEOUS EVERY 8 HOURS
Status: DISCONTINUED | OUTPATIENT
Start: 2017-11-07 | End: 2017-11-07

## 2017-11-07 RX ORDER — ONDANSETRON 2 MG/ML
4 INJECTION INTRAMUSCULAR; INTRAVENOUS EVERY 8 HOURS PRN
Status: DISCONTINUED | OUTPATIENT
Start: 2017-11-07 | End: 2017-11-08 | Stop reason: HOSPADM

## 2017-11-07 RX ORDER — SODIUM CHLORIDE 9 MG/ML
INJECTION, SOLUTION INTRAVENOUS CONTINUOUS
Status: DISCONTINUED | OUTPATIENT
Start: 2017-11-07 | End: 2017-11-07

## 2017-11-07 RX ORDER — OXYCODONE HYDROCHLORIDE 5 MG/1
10 TABLET ORAL EVERY 4 HOURS PRN
Status: DISCONTINUED | OUTPATIENT
Start: 2017-11-07 | End: 2017-11-08 | Stop reason: HOSPADM

## 2017-11-07 RX ORDER — OXYCODONE HYDROCHLORIDE 5 MG/1
5 TABLET ORAL EVERY 4 HOURS PRN
Status: DISCONTINUED | OUTPATIENT
Start: 2017-11-07 | End: 2017-11-08 | Stop reason: HOSPADM

## 2017-11-07 RX ORDER — POLYETHYLENE GLYCOL 3350 17 G/17G
17 POWDER, FOR SOLUTION ORAL DAILY
Status: DISCONTINUED | OUTPATIENT
Start: 2017-11-07 | End: 2017-11-08 | Stop reason: HOSPADM

## 2017-11-07 RX ORDER — SODIUM CHLORIDE 9 MG/ML
INJECTION, SOLUTION INTRAVENOUS CONTINUOUS
Status: DISCONTINUED | OUTPATIENT
Start: 2017-11-07 | End: 2017-11-08

## 2017-11-07 RX ORDER — LIDOCAINE HYDROCHLORIDE 10 MG/ML
1 INJECTION, SOLUTION EPIDURAL; INFILTRATION; INTRACAUDAL; PERINEURAL ONCE
Status: COMPLETED | OUTPATIENT
Start: 2017-11-07 | End: 2017-11-07

## 2017-11-07 RX ORDER — LIDOCAINE HCL/PF 100 MG/5ML
SYRINGE (ML) INTRAVENOUS
Status: DISCONTINUED | OUTPATIENT
Start: 2017-11-07 | End: 2017-11-07

## 2017-11-07 RX ORDER — MIDAZOLAM HYDROCHLORIDE 1 MG/ML
INJECTION, SOLUTION INTRAMUSCULAR; INTRAVENOUS
Status: DISCONTINUED | OUTPATIENT
Start: 2017-11-07 | End: 2017-11-07

## 2017-11-07 RX ORDER — BISACODYL 10 MG
10 SUPPOSITORY, RECTAL RECTAL DAILY
Status: DISCONTINUED | OUTPATIENT
Start: 2017-11-07 | End: 2017-11-08 | Stop reason: HOSPADM

## 2017-11-07 RX ORDER — LIDOCAINE HYDROCHLORIDE ANHYDROUS AND DEXTROSE MONOHYDRATE .8; 5 G/100ML; G/100ML
1 INJECTION, SOLUTION INTRAVENOUS CONTINUOUS
Status: DISCONTINUED | OUTPATIENT
Start: 2017-11-07 | End: 2017-11-08

## 2017-11-07 RX ORDER — FLUTICASONE PROPIONATE 50 MCG
1 SPRAY, SUSPENSION (ML) NASAL DAILY PRN
Status: DISCONTINUED | OUTPATIENT
Start: 2017-11-07 | End: 2017-11-08 | Stop reason: HOSPADM

## 2017-11-07 RX ORDER — ONDANSETRON 2 MG/ML
INJECTION INTRAMUSCULAR; INTRAVENOUS
Status: DISCONTINUED | OUTPATIENT
Start: 2017-11-07 | End: 2017-11-07

## 2017-11-07 RX ORDER — ROCURONIUM BROMIDE 10 MG/ML
INJECTION, SOLUTION INTRAVENOUS
Status: DISCONTINUED | OUTPATIENT
Start: 2017-11-07 | End: 2017-11-07

## 2017-11-07 RX ORDER — FENOFIBRATE 48 MG/1
48 TABLET, FILM COATED ORAL DAILY
Status: DISCONTINUED | OUTPATIENT
Start: 2017-11-07 | End: 2017-11-08 | Stop reason: HOSPADM

## 2017-11-07 RX ORDER — ALPRAZOLAM 0.25 MG/1
0.25 TABLET ORAL NIGHTLY
Status: DISCONTINUED | OUTPATIENT
Start: 2017-11-07 | End: 2017-11-08 | Stop reason: HOSPADM

## 2017-11-07 RX ORDER — LIDOCAINE HYDROCHLORIDE ANHYDROUS AND DEXTROSE MONOHYDRATE .8; 5 G/100ML; G/100ML
INJECTION, SOLUTION INTRAVENOUS CONTINUOUS PRN
Status: DISCONTINUED | OUTPATIENT
Start: 2017-11-07 | End: 2017-11-07

## 2017-11-07 RX ORDER — PANTOPRAZOLE SODIUM 40 MG/1
40 FOR SUSPENSION ORAL DAILY
Status: DISCONTINUED | OUTPATIENT
Start: 2017-11-08 | End: 2017-11-08 | Stop reason: HOSPADM

## 2017-11-07 RX ORDER — KETAMINE HYDROCHLORIDE 100 MG/ML
INJECTION, SOLUTION INTRAMUSCULAR; INTRAVENOUS
Status: DISCONTINUED | OUTPATIENT
Start: 2017-11-07 | End: 2017-11-07

## 2017-11-07 RX ORDER — DIPHENHYDRAMINE HYDROCHLORIDE 50 MG/ML
12.5 INJECTION INTRAMUSCULAR; INTRAVENOUS EVERY 6 HOURS PRN
Status: DISCONTINUED | OUTPATIENT
Start: 2017-11-07 | End: 2017-11-08 | Stop reason: HOSPADM

## 2017-11-07 RX ORDER — FENOFIBRATE 160 MG/1
54 TABLET ORAL EVERY MORNING
Status: DISCONTINUED | OUTPATIENT
Start: 2017-11-07 | End: 2017-11-07

## 2017-11-07 RX ORDER — PROPOFOL 10 MG/ML
VIAL (ML) INTRAVENOUS
Status: DISCONTINUED | OUTPATIENT
Start: 2017-11-07 | End: 2017-11-07

## 2017-11-07 RX ORDER — HYDROMORPHONE HYDROCHLORIDE 1 MG/ML
1 INJECTION, SOLUTION INTRAMUSCULAR; INTRAVENOUS; SUBCUTANEOUS
Status: DISCONTINUED | OUTPATIENT
Start: 2017-11-07 | End: 2017-11-08 | Stop reason: HOSPADM

## 2017-11-07 RX ORDER — CEFAZOLIN SODIUM 1 G/50ML
1 SOLUTION INTRAVENOUS
Status: COMPLETED | OUTPATIENT
Start: 2017-11-07 | End: 2017-11-08

## 2017-11-07 RX ORDER — DEXAMETHASONE SODIUM PHOSPHATE 100 MG/10ML
INJECTION INTRAMUSCULAR; INTRAVENOUS
Status: DISCONTINUED | OUTPATIENT
Start: 2017-11-07 | End: 2017-11-07

## 2017-11-07 RX ORDER — HYDROMORPHONE HYDROCHLORIDE 1 MG/ML
0.2 INJECTION, SOLUTION INTRAMUSCULAR; INTRAVENOUS; SUBCUTANEOUS EVERY 5 MIN PRN
Status: DISCONTINUED | OUTPATIENT
Start: 2017-11-07 | End: 2017-11-07 | Stop reason: HOSPADM

## 2017-11-07 RX ORDER — ONDANSETRON 2 MG/ML
4 INJECTION INTRAMUSCULAR; INTRAVENOUS DAILY PRN
Status: DISCONTINUED | OUTPATIENT
Start: 2017-11-07 | End: 2017-11-07 | Stop reason: HOSPADM

## 2017-11-07 RX ORDER — GLYCOPYRROLATE 0.2 MG/ML
INJECTION INTRAMUSCULAR; INTRAVENOUS
Status: DISCONTINUED | OUTPATIENT
Start: 2017-11-07 | End: 2017-11-07

## 2017-11-07 RX ORDER — SODIUM CHLORIDE 0.9 % (FLUSH) 0.9 %
3 SYRINGE (ML) INJECTION
Status: DISCONTINUED | OUTPATIENT
Start: 2017-11-07 | End: 2017-11-08 | Stop reason: HOSPADM

## 2017-11-07 RX ORDER — NEOSTIGMINE METHYLSULFATE 1 MG/ML
INJECTION, SOLUTION INTRAVENOUS
Status: DISCONTINUED | OUTPATIENT
Start: 2017-11-07 | End: 2017-11-07

## 2017-11-07 RX ADMIN — FENOFIBRATE 48 MG: 48 TABLET ORAL at 10:11

## 2017-11-07 RX ADMIN — SODIUM CHLORIDE, SODIUM GLUCONATE, SODIUM ACETATE, POTASSIUM CHLORIDE, MAGNESIUM CHLORIDE, SODIUM PHOSPHATE, DIBASIC, AND POTASSIUM PHOSPHATE: .53; .5; .37; .037; .03; .012; .00082 INJECTION, SOLUTION INTRAVENOUS at 07:11

## 2017-11-07 RX ADMIN — HYDROMORPHONE HYDROCHLORIDE 0.2 MG: 1 INJECTION, SOLUTION INTRAMUSCULAR; INTRAVENOUS; SUBCUTANEOUS at 10:11

## 2017-11-07 RX ADMIN — PROPOFOL 200 MG: 10 INJECTION, EMULSION INTRAVENOUS at 07:11

## 2017-11-07 RX ADMIN — OXYCODONE HYDROCHLORIDE 10 MG: 5 TABLET ORAL at 10:11

## 2017-11-07 RX ADMIN — GLYCOPYRROLATE 0.1 MG: 0.2 INJECTION, SOLUTION INTRAMUSCULAR; INTRAVENOUS at 08:11

## 2017-11-07 RX ADMIN — ALPRAZOLAM 0.25 MG: 0.25 TABLET ORAL at 08:11

## 2017-11-07 RX ADMIN — CEFAZOLIN SODIUM 1 G: 1 SOLUTION INTRAVENOUS at 04:11

## 2017-11-07 RX ADMIN — DEXMEDETOMIDINE HYDROCHLORIDE 0.5 MCG/KG/HR: 100 INJECTION, SOLUTION, CONCENTRATE INTRAVENOUS at 07:11

## 2017-11-07 RX ADMIN — HYDROMORPHONE HYDROCHLORIDE 0.2 MG: 1 INJECTION, SOLUTION INTRAMUSCULAR; INTRAVENOUS; SUBCUTANEOUS at 01:11

## 2017-11-07 RX ADMIN — LIDOCAINE HYDROCHLORIDE 60 MG: 20 INJECTION, SOLUTION INTRAVENOUS at 07:11

## 2017-11-07 RX ADMIN — HYDROMORPHONE HYDROCHLORIDE 0.2 MG: 1 INJECTION, SOLUTION INTRAMUSCULAR; INTRAVENOUS; SUBCUTANEOUS at 11:11

## 2017-11-07 RX ADMIN — SODIUM CHLORIDE: 0.9 INJECTION, SOLUTION INTRAVENOUS at 10:11

## 2017-11-07 RX ADMIN — EPHEDRINE SULFATE 15 MG: 50 INJECTION, SOLUTION INTRAMUSCULAR; INTRAVENOUS; SUBCUTANEOUS at 09:11

## 2017-11-07 RX ADMIN — LIDOCAINE HYDROCHLORIDE 0.2 MG: 10 INJECTION, SOLUTION EPIDURAL; INFILTRATION; INTRACAUDAL; PERINEURAL at 06:11

## 2017-11-07 RX ADMIN — ACETAMINOPHEN 1000 MG: 10 INJECTION, SOLUTION INTRAVENOUS at 06:11

## 2017-11-07 RX ADMIN — ACETAMINOPHEN 1000 MG: 10 INJECTION, SOLUTION INTRAVENOUS at 12:11

## 2017-11-07 RX ADMIN — GLYCOPYRROLATE 0.6 MG: 0.2 INJECTION, SOLUTION INTRAMUSCULAR; INTRAVENOUS at 09:11

## 2017-11-07 RX ADMIN — ROCURONIUM BROMIDE 10 MG: 10 INJECTION, SOLUTION INTRAVENOUS at 08:11

## 2017-11-07 RX ADMIN — GLYCOPYRROLATE 0.2 MG: 0.2 INJECTION, SOLUTION INTRAMUSCULAR; INTRAVENOUS at 08:11

## 2017-11-07 RX ADMIN — PROPOFOL 40 MG: 10 INJECTION, EMULSION INTRAVENOUS at 09:11

## 2017-11-07 RX ADMIN — ONDANSETRON 4 MG: 2 INJECTION INTRAMUSCULAR; INTRAVENOUS at 07:11

## 2017-11-07 RX ADMIN — Medication 2 G: at 07:11

## 2017-11-07 RX ADMIN — KETAMINE HYDROCHLORIDE 30 MG: 100 INJECTION, SOLUTION, CONCENTRATE INTRAMUSCULAR; INTRAVENOUS at 07:11

## 2017-11-07 RX ADMIN — NEOSTIGMINE METHYLSULFATE 5 MG: 1 INJECTION INTRAVENOUS at 09:11

## 2017-11-07 RX ADMIN — HYDROMORPHONE HYDROCHLORIDE 1 MG: 1 INJECTION, SOLUTION INTRAMUSCULAR; INTRAVENOUS; SUBCUTANEOUS at 02:11

## 2017-11-07 RX ADMIN — ROCURONIUM BROMIDE 10 MG: 10 INJECTION, SOLUTION INTRAVENOUS at 07:11

## 2017-11-07 RX ADMIN — SODIUM CHLORIDE: 0.9 INJECTION, SOLUTION INTRAVENOUS at 06:11

## 2017-11-07 RX ADMIN — HEPARIN SODIUM 5000 UNITS: 5000 INJECTION, SOLUTION INTRAVENOUS; SUBCUTANEOUS at 06:11

## 2017-11-07 RX ADMIN — DEXAMETHASONE SODIUM PHOSPHATE 8 MG: 10 INJECTION INTRAMUSCULAR; INTRAVENOUS at 07:11

## 2017-11-07 RX ADMIN — MIDAZOLAM HYDROCHLORIDE 2 MG: 1 INJECTION, SOLUTION INTRAMUSCULAR; INTRAVENOUS at 06:11

## 2017-11-07 RX ADMIN — KETAMINE HYDROCHLORIDE 10 MG: 100 INJECTION, SOLUTION, CONCENTRATE INTRAMUSCULAR; INTRAVENOUS at 09:11

## 2017-11-07 RX ADMIN — ROCURONIUM BROMIDE 40 MG: 10 INJECTION, SOLUTION INTRAVENOUS at 07:11

## 2017-11-07 RX ADMIN — EPHEDRINE SULFATE 5 MG: 50 INJECTION, SOLUTION INTRAMUSCULAR; INTRAVENOUS; SUBCUTANEOUS at 08:11

## 2017-11-07 RX ADMIN — LIDOCAINE HYDROCHLORIDE 0.03 MG/KG/MIN: 8 INJECTION, SOLUTION INTRAVENOUS at 07:11

## 2017-11-07 RX ADMIN — ACETAMINOPHEN 1000 MG: 10 INJECTION, SOLUTION INTRAVENOUS at 05:11

## 2017-11-07 NOTE — OP NOTE
Certification of Assistant at Surgery       Surgery Date: 11/7/2017     Participating Surgeons:  Surgeon(s) and Role:     * Davida Villarreal MD - Resident - Assisting     * Joe Cameron MD - Primary    Procedures:  Procedure(s) (LRB):  ROBOTIC ASSISTED LAPAROSCOPIC NEPHRECTOMY PARTIAL (Left)    Assistant Surgeon's Certification of Necessity:  I understand that section 1842 (b) (6) (d) of the Social Security Act generally prohibits Medicare Part B reasonable charge payment for the services of assistants at surgery in [facility] hospitals when qualified residents are available to furnish such services. I certify that the services for which payment is claimed were medically necessary, and that no qualified resident was available to perform the services. I further understand that these services are subject to post-payment review by the Medicare carrier.      Edita Hickey PA-C    11/07/2017  2:40 PM

## 2017-11-07 NOTE — ANESTHESIA PROCEDURE NOTES
Arterial    Diagnosis: Renal mass    Patient location during procedure: done in OR  Procedure start time: 11/7/2017 7:20 AM  Timeout: 11/7/2017 7:20 AM  Procedure end time: 11/7/2017 7:25 AM  Staffing  Resident/CRNA: JUSTIN CALHOUN  Performed: resident/CRNA   Anesthesiologist was present at the time of the procedure.  Preanesthetic Checklist  Completed: patient identified, site marked, surgical consent, pre-op evaluation, timeout performed, IV checked, risks and benefits discussed, monitors and equipment checked and anesthesia consent givenArterial  Skin Prep: chlorhexidine gluconate  Local Infiltration: none  Orientation: right  Location: radial  Catheter Size: 20 G  Catheter placement by Anatomical landmarks. Heme positive aspiration all ports.Insertion Attempts: 2  Assessment  Dressing: secured with tape and tegaderm  Patient: Tolerated well

## 2017-11-07 NOTE — H&P (VIEW-ONLY)
Urology ([facility]) H&P for upcoming procedure  Staff:  Joe Cameron MD    Is this patient in a research study?  No    CC: left renal mass    HPI:  Wero Spangler is a 62 y.o. male with history of elevated PSA s/p two negative TRUS biopsies a left renal mass.        The mass was found incidentally during evaluation for possible gallbladder pathology.  He denies fever, chills, weight loss, bone pain, headaches, vision changes, seizures, SOB. No personal history of cancer.  Family history positive for prostate cancer in brother, no renal cell in family.        The Nephrometry score of the patient's mass is R=1, E=2, N=1, X, L=2, 6x low complexity.          ROS: Negative except for as stated above    Past Medical History:   Diagnosis Date    Allergy     Cataract     Elevated PSA     Enlarged prostate     Gallstones     General anesthetics causing adverse effect in therapeutic use 2000    delayed emergence after back surgery    GERD (gastroesophageal reflux disease)     HTN (hypertension) 9/24/2013    Hypertension     Urinary tract infection        Past Surgical History:   Procedure Laterality Date    BACK SURGERY  4/2000    CATARACT EXTRACTION W/  INTRAOCULAR LENS IMPLANT      Both Eyes    EYE SURGERY      PROSTATE SURGERY      prostate biopsy benign    TONSILLECTOMY      VASECTOMY         Social History     Social History    Marital status:      Spouse name: N/A    Number of children: N/A    Years of education: N/A     Social History Main Topics    Smoking status: Former Smoker     Quit date: 2000    Smokeless tobacco: Never Used    Alcohol use 0.6 oz/week     1 Glasses of wine per week      Comment: 2 beers three times a week     Drug use: No    Sexual activity: Yes     Partners: Female     Other Topics Concern    Not on file     Social History Narrative    No narrative on file       Family History   Problem Relation Age of Onset    Cancer Mother      breast    Prostate  cancer Brother     Cancer Brother      prostate    Macular degeneration Maternal Grandmother     Cancer Other     Cancer Other     Amblyopia Neg Hx     Blindness Neg Hx     Cataracts Neg Hx     Glaucoma Neg Hx     Retinal detachment Neg Hx     Strabismus Neg Hx        Review of patient's allergies indicates:  No Known Allergies    Current Outpatient Prescriptions on File Prior to Visit   Medication Sig Dispense Refill    alprazolam (XANAX) 0.5 MG tablet Take 1 tablet (0.5 mg total) by mouth nightly. (Patient taking differently: Take 0.25 mg by mouth nightly. To help with sleep) 30 tablet 5    fenofibrate (TRICOR) 54 MG tablet Take 1 tablet (54 mg total) by mouth once daily. (Patient taking differently: Take 54 mg by mouth every morning. ) 30 tablet 12    fluticasone (FLONASE) 50 mcg/actuation nasal spray 1 spray by Each Nare route daily as needed.       OMEPRAZOLE (PRILOSEC ORAL) Take by mouth every morning.       sodium chloride 2% (BARBI 128) 2 % ophthalmic solution 1 drop as needed (takes 3-4 times/day).      triamterene-hydrochlorothiazide 37.5-25 mg (MAXZIDE-25) 37.5-25 mg per tablet Take 1 tablet by mouth once daily. (Patient taking differently: Take 1 tablet by mouth every morning. ) 30 tablet 0     No current facility-administered medications on file prior to visit.        Anticoagulation:  No    Physical Exam:  weight 211 lb/96 kg  BMI 28.6    AAOx4, NAD, WDWN  NC/AT, EOMI, PER, sclerae anicteric, speech normal, tongue midline  Nl effort, CTAB  RRR  Soft, non-tender, non-distended, no CVAT   Scars: none  Circumcised, normal testes and cord structures bilaterally     Labs:    BMP  Lab Results   Component Value Date     11/02/2017    K 4.1 11/02/2017     11/02/2017    CO2 29 11/02/2017    BUN 17 11/02/2017    CREATININE 1.1 11/02/2017    CALCIUM 9.7 11/02/2017    ANIONGAP 9 11/02/2017    ESTGFRAFRICA >60.0 11/02/2017    EGFRNONAA >60.0 11/02/2017     Lab Results   Component Value  Date    ALT 24 11/02/2017    AST 24 11/02/2017    ALKPHOS 55 11/02/2017    BILITOT 1.3 (H) 11/02/2017     Lab Results   Component Value Date    WBC 5.75 11/02/2017    HGB 13.9 (L) 11/02/2017    HCT 39.8 (L) 11/02/2017    MCV 87 11/02/2017     11/02/2017         Lab Results   Component Value Date    PSA 14.0 (H) 09/22/2014    PSA 15.48 (H) 08/16/2013    PSA 11.06 (H) 02/25/2013       Imaging:   CTAP (date8/28/2017) - left renal mass, 2.7 cm, enhancing at middle pole, 1 left renal artery/ies, 1 left renal veins, negative for RP LAD    NM whole body bone scan 9/8/2017:  Increased uptake in left rib 7 and 10-- trauma vs malignancy. No increased uptake in left ilium    CXR (9/18/2017) - no evidence of metastatic disease.  No sclerotic lesion in left rib 7 noted.      Assessment: Wero Spangler is a 62 y.o. male with left renal mass, suspicious for [id number] RCC    Plan:     1.  To OR on 11/7/2017 for left robotic partial nephrectomy  2.  Consents signed   3.  I have explained the risk, benefits, and alternatives of the procedure in detail. The patient voices understanding and all questions have been answered. The patient agrees to proceed as planned.   4.  The risks and benefits of participating in our clinical trial have been discussed and the patient has consented for the research study here at [facility].   5.  Type and screen ordered preoperatively  6.  Patient to drop off urine specimen tomorrow     Kirk Lauren MD

## 2017-11-07 NOTE — PLAN OF CARE
Rotbot time out completed with pre incision time out.  All DaVinci instruments inspected before case by Adria Cooks.  All instruments appear to be intact.

## 2017-11-07 NOTE — ANESTHESIA POSTPROCEDURE EVALUATION
Anesthesia Post Evaluation    Patient: Wero Spangler    Procedure(s) Performed: Procedure(s) (LRB):  ROBOTIC ASSISTED LAPAROSCOPIC NEPHRECTOMY PARTIAL (Left)    Final Anesthesia Type: general  Patient location during evaluation: PACU  Patient participation: Yes- Able to Participate  Level of consciousness: awake and alert  Post-procedure vital signs: reviewed and stable  Pain management: adequate  Airway patency: patent  PONV status at discharge: No PONV  Anesthetic complications: no      Cardiovascular status: blood pressure returned to baseline  Respiratory status: unassisted  Hydration status: euvolemic  Follow-up not needed.        Visit Vitals  BP (!) 142/75 (BP Location: Left arm, Patient Position: Lying)   Pulse 74   Temp 36.3 °C (97.3 °F) (Temporal)   Resp 16   Ht 6' (1.829 m)   Wt 94.8 kg (209 lb)   SpO2 96%   BMI 28.35 kg/m²       Pain/Hernandez Score: Pain Assessment Performed: Yes (11/7/2017  2:52 PM)  Presence of Pain: complains of pain/discomfort (11/7/2017  2:52 PM)  Pain Rating Prior to Med Admin: 3 (11/7/2017  2:52 PM)  Hernandez Score: 10 (11/7/2017  1:31 PM)

## 2017-11-07 NOTE — TRANSFER OF CARE
Anesthesia Transfer of Care Note    Patient: Wero Spangler    Procedure(s) Performed: Procedure(s) (LRB):  ROBOTIC ASSISTED LAPAROSCOPIC NEPHRECTOMY PARTIAL (Left)    Patient location: PACU    Anesthesia Type: general    Transport from OR: Transported from OR on 6-10 L/min O2 by face mask with adequate spontaneous ventilation    Post pain: adequate analgesia    Post assessment: no apparent anesthetic complications    Post vital signs: stable    Level of consciousness: awake and alert    Nausea/Vomiting: no nausea/vomiting    Complications: none    Transfer of care protocol was followed      Last vitals:   Visit Vitals  BP (!) 140/76   Pulse 63   Temp 36.4 °C (97.6 °F) (Oral)   Resp 20   Ht 6' (1.829 m)   Wt 94.8 kg (209 lb)   SpO2 100%   BMI 28.35 kg/m²

## 2017-11-07 NOTE — PLAN OF CARE
Problem: Patient Care Overview  Goal: Plan of Care Review  Outcome: Ongoing (interventions implemented as appropriate)  Patient transferred to the unit from PACU, s/p laparoscopic partial left nephrectomy 5 lap sites, dermabond in place.

## 2017-11-07 NOTE — OP NOTE
[facility]  Operative Note    Date: 11/07/2017    Pre-Op Diagnosis:   1. [id number] left renal mass suspicious for renal cell carcinoma  2. Hyperlipidemia  3. Gastroesophageal reflux  4. Hypertension  5. History of elevated PSA  6. Gallstones    Post-Op Diagnosis: same    Procedure(s) Performed:   1.  Robotically-assisted laparoscopic left partial nephrectomy  2.  Intraoperative US for localization of renal tumor    Specimen(s): left renal mass for frozen and permanent; fat overlying tumor    Surgeon: Marcelino Cameron MD    Assistant: Davida Villarreal MD    Surgical Bedside Assistant: Edita Parr PA-C, who assisted in the absence of a qualified resident.    Anesthesia: General endotracheal anesthesia    Indications: Wero Spangler is a 62 y.o. male with cT1a left renal mass suspicious for renal cell carcinoma.  After discussion of management options and risks and benefits associated with each the patient has elected to pursue surgical management.      Findings:   - ultrasound used to confirm mass location  - enucleation, tumor for frozen section  - 1 layer renorapphy, hemostatic  - warm ischemia time 16 minutes    EBL: 50 mL    Drains: 16Fr sanchez     Anesthesia: General endotracheal anesthesia    Complications:  none    Procedure in Detail: After discussion of risks and benefits of the procedure, informed consent was obtained. The patient was brought to the operating room and placed supine on the operating table. SCDs were applied and working prior to induction of anesthesia. General anesthesia was administered. A 16 Fr Sanchez catheter was placed in the standard fashion with 10 ml sterile water used to inflate the balloon. An OG tube was placed. The patient was then moved into the modified flank position with the left side up. The patient was appropriately padded and secured to the table. The patient was then prepped and draped in the usual sterile fashion. Timeout was performed and preoperative  antibiotics were confirmed.     A Veress needle was introduced into the abdomen via the umbilicus. Entry into the peritoneal cavity was confirmed with the drop test and an initial insufflation pressure of <10mmHg. Aspiration during our drop test revealed no blood or succus. The peritoneal cavity was insufflated up to 15 mmHg and the Veress was removed. The location of the 12 mm camera port was marked with a marking pen, 7 cm lateral and 7cm inferior to the umbilicus.  This was incised with bovie. The 12 mm camera port was then introduced. The camera was passed through the port and the abdomen was inspected and found to be free of bowel or vascular injury. Using direct vision the other 3 robotic ports were placed, just below the left costal margin and lower on the left abdomen, ensuring at least 8 cm between ports to allow robotic mobility. A 12 mm assistant port was placed just lateral to the umbilicus. Each trocar was introduced under direct vision ensuring no injury to the underlying abdominal contents.     Dissection began along the white line of Toldt, reflecting the colon medially away from the kidney. The splenic flexure was released. The psoas muscle was identified. Once the colon was reflected, dissection was carried out just below the kidney, and the ureter was identified. The ureter was elevated and we continued our dissection toward the lower pole of the kidney proximally until we identified the renal hilum. The ureter remained away from our dissection throughout the case.     Careful dissection of the hilum was performed. There were 1 renal vein and 1 renal artery identified.  All vessels were isolated circumferentially. Hemostasis was excellent.     The fat overlying the kidney was opened and freed from the underlying capsule along the kidney's length.  The kidney was freed from its surrounding attachments to allow adequate mobility to visualize the entire kidney.  This revealed the lateral anterior  midpole tumor.  The US probe was used to delineate the margins of the kidney, which were scored using hot scissors to ensure the entire tumor was identified.     A bulldog clamp was placed on the artery.  The tumor was then resected using cold scissors.  Margins were grossly negative, as confirmed with backbench visualization. Specimen sent for frozen.  There was not entry into the collecting system.    Electrocautery was used to control the edges of the tumor bed and shrink the defect. The capsule was then closed using 2-0 monocryl V-Lock with weck clip x2. This was a shallow tumor. Remaining Surgicel was placed over the closed defect. The bulldog clamp was released for a total warm ischemia time of 16. Hemostasis of the renorrhaphy was excellent and the kidney was noted to have good color and turgor. The ureter was again noted to be well away from the resection site and clamped renal artery.     The tumor was placed into an EndoCatch bag via the 12mm assistant port and extracted from this port.    The robot was undocked and all trocars were removed.    All skin incisions were closed using 4-0 monocry. Dermabond was applied to the incisions.     The patient tolerated the procedure well and was transferred to the recovery room in stable condition.    Disposition:  The patient will be admitted to the Urology service for post-operative monitoring, pain control, and observation due to risk of bleeding post-operatively.    Davida Villarreal MD    As the attending surgeon, Dr. Cameron was present for the entire procedure and performed all key aspects of the operation.

## 2017-11-07 NOTE — PLAN OF CARE
Vital signs stable. Pt resting quietly. Tolerating low amount of pain to abdomen. No nausea with clear liquids. Awaiting transfer to floor.

## 2017-11-07 NOTE — INTERVAL H&P NOTE
The patient has been examined and the H&P has been reviewed:    I concur with the findings and no changes have occurred since H&P was written.    Anesthesia/Surgery risks, benefits and alternative options discussed and understood by patient/family.          Active Hospital Problems    Diagnosis  POA    Left renal mass [N28.89]  Yes      Resolved Hospital Problems    Diagnosis Date Resolved POA   No resolved problems to display.

## 2017-11-08 ENCOUNTER — HOSPITAL ENCOUNTER (EMERGENCY)
Facility: HOSPITAL | Age: 62
Discharge: HOME | End: 2017-11-08
Attending: EMERGENCY MEDICINE
Payer: COMMERCIAL

## 2017-11-08 ENCOUNTER — NURSE TRIAGE (OUTPATIENT)
Dept: ADMINISTRATIVE | Facility: CLINIC | Age: 62
End: 2017-11-08

## 2017-11-08 VITALS
DIASTOLIC BLOOD PRESSURE: 67 MMHG | TEMPERATURE: 98 F | OXYGEN SATURATION: 99 % | BODY MASS INDEX: 28.31 KG/M2 | WEIGHT: 209 LBS | RESPIRATION RATE: 19 BRPM | SYSTOLIC BLOOD PRESSURE: 132 MMHG | HEART RATE: 80 BPM | HEIGHT: 72 IN

## 2017-11-08 VITALS
HEART RATE: 66 BPM | WEIGHT: 209 LBS | OXYGEN SATURATION: 98 % | HEIGHT: 72 IN | DIASTOLIC BLOOD PRESSURE: 67 MMHG | RESPIRATION RATE: 16 BRPM | SYSTOLIC BLOOD PRESSURE: 135 MMHG | BODY MASS INDEX: 28.31 KG/M2 | TEMPERATURE: 96 F

## 2017-11-08 DIAGNOSIS — R33.9 URINARY RETENTION: Primary | ICD-10-CM

## 2017-11-08 LAB
ALBUMIN SERPL BCP-MCNC: 3.4 G/DL
ALP SERPL-CCNC: 49 U/L
ALT SERPL W/O P-5'-P-CCNC: 21 U/L
ANION GAP SERPL CALC-SCNC: 5 MMOL/L
ANION GAP SERPL CALC-SCNC: 6 MMOL/L
AST SERPL-CCNC: 26 U/L
BASOPHILS # BLD AUTO: 0.01 K/UL
BASOPHILS # BLD AUTO: 0.02 K/UL
BASOPHILS NFR BLD: 0.1 %
BASOPHILS NFR BLD: 0.2 %
BILIRUB SERPL-MCNC: 1.1 MG/DL
BILIRUB UR QL STRIP: NEGATIVE
BUN SERPL-MCNC: 17 MG/DL
BUN SERPL-MCNC: 17 MG/DL (ref 6–30)
BUN SERPL-MCNC: 18 MG/DL
CALCIUM SERPL-MCNC: 8.5 MG/DL
CALCIUM SERPL-MCNC: 8.8 MG/DL
CHLORIDE SERPL-SCNC: 101 MMOL/L (ref 95–110)
CHLORIDE SERPL-SCNC: 106 MMOL/L
CHLORIDE SERPL-SCNC: 107 MMOL/L
CLARITY UR REFRACT.AUTO: CLEAR
CO2 SERPL-SCNC: 27 MMOL/L
CO2 SERPL-SCNC: 27 MMOL/L
COLOR UR AUTO: YELLOW
CREAT SERPL-MCNC: 1 MG/DL
CREAT SERPL-MCNC: 1.1 MG/DL
CREAT SERPL-MCNC: 1.1 MG/DL (ref 0.5–1.4)
DIFFERENTIAL METHOD: ABNORMAL
DIFFERENTIAL METHOD: ABNORMAL
EOSINOPHIL # BLD AUTO: 0 K/UL
EOSINOPHIL # BLD AUTO: 0 K/UL
EOSINOPHIL NFR BLD: 0 %
EOSINOPHIL NFR BLD: 0.2 %
ERYTHROCYTE [DISTWIDTH] IN BLOOD BY AUTOMATED COUNT: 14.5 %
ERYTHROCYTE [DISTWIDTH] IN BLOOD BY AUTOMATED COUNT: 14.6 %
EST. GFR  (AFRICAN AMERICAN): >60 ML/MIN/1.73 M^2
EST. GFR  (AFRICAN AMERICAN): >60 ML/MIN/1.73 M^2
EST. GFR  (NON AFRICAN AMERICAN): >60 ML/MIN/1.73 M^2
EST. GFR  (NON AFRICAN AMERICAN): >60 ML/MIN/1.73 M^2
GLUCOSE SERPL-MCNC: 125 MG/DL
GLUCOSE SERPL-MCNC: 134 MG/DL
GLUCOSE SERPL-MCNC: 134 MG/DL (ref 70–110)
GLUCOSE UR QL STRIP: NEGATIVE
HCT VFR BLD AUTO: 35.2 %
HCT VFR BLD AUTO: 36.1 %
HCT VFR BLD CALC: 34 %PCV (ref 36–54)
HGB BLD-MCNC: 12.2 G/DL
HGB BLD-MCNC: 12.3 G/DL
HGB UR QL STRIP: ABNORMAL
IMM GRANULOCYTES # BLD AUTO: 0.03 K/UL
IMM GRANULOCYTES # BLD AUTO: 0.03 K/UL
IMM GRANULOCYTES NFR BLD AUTO: 0.2 %
IMM GRANULOCYTES NFR BLD AUTO: 0.3 %
KETONES UR QL STRIP: NEGATIVE
LEUKOCYTE ESTERASE UR QL STRIP: NEGATIVE
LYMPHOCYTES # BLD AUTO: 0.8 K/UL
LYMPHOCYTES # BLD AUTO: 0.9 K/UL
LYMPHOCYTES NFR BLD: 7.2 %
LYMPHOCYTES NFR BLD: 7.9 %
MCH RBC QN AUTO: 30.3 PG
MCH RBC QN AUTO: 30.7 PG
MCHC RBC AUTO-ENTMCNC: 33.8 G/DL
MCHC RBC AUTO-ENTMCNC: 34.9 G/DL
MCV RBC AUTO: 87 FL
MCV RBC AUTO: 91 FL
MICROSCOPIC COMMENT: ABNORMAL
MONOCYTES # BLD AUTO: 1 K/UL
MONOCYTES # BLD AUTO: 1.1 K/UL
MONOCYTES NFR BLD: 8.7 %
MONOCYTES NFR BLD: 9.4 %
NEUTROPHILS # BLD AUTO: 10.3 K/UL
NEUTROPHILS # BLD AUTO: 8.5 K/UL
NEUTROPHILS NFR BLD: 82 %
NEUTROPHILS NFR BLD: 83.8 %
NITRITE UR QL STRIP: NEGATIVE
NRBC BLD-RTO: 0 /100 WBC
NRBC BLD-RTO: 0 /100 WBC
PH UR STRIP: 6 [PH] (ref 5–8)
PLATELET # BLD AUTO: 187 K/UL
PLATELET # BLD AUTO: 207 K/UL
PMV BLD AUTO: 10.6 FL
PMV BLD AUTO: 9.7 FL
POC IONIZED CALCIUM: 1.18 MMOL/L (ref 1.06–1.42)
POC TCO2 (MEASURED): 23 MMOL/L (ref 23–29)
POTASSIUM BLD-SCNC: 3.5 MMOL/L (ref 3.5–5.1)
POTASSIUM SERPL-SCNC: 3.6 MMOL/L
POTASSIUM SERPL-SCNC: 3.8 MMOL/L
PROT SERPL-MCNC: 6.3 G/DL
PROT UR QL STRIP: NEGATIVE
RBC # BLD AUTO: 3.97 M/UL
RBC # BLD AUTO: 4.06 M/UL
RBC #/AREA URNS AUTO: >100 /HPF (ref 0–4)
SAMPLE: ABNORMAL
SODIUM BLD-SCNC: 140 MMOL/L (ref 136–145)
SODIUM SERPL-SCNC: 139 MMOL/L
SODIUM SERPL-SCNC: 139 MMOL/L
SP GR UR STRIP: 1.01 (ref 1–1.03)
URN SPEC COLLECT METH UR: ABNORMAL
UROBILINOGEN UR STRIP-ACNC: NEGATIVE EU/DL
WBC # BLD AUTO: 10.41 K/UL
WBC # BLD AUTO: 12.29 K/UL
WBC #/AREA URNS AUTO: 2 /HPF (ref 0–5)

## 2017-11-08 PROCEDURE — 25000003 PHARM REV CODE 250: Performed by: UROLOGY

## 2017-11-08 PROCEDURE — 80053 COMPREHEN METABOLIC PANEL: CPT

## 2017-11-08 PROCEDURE — 81001 URINALYSIS AUTO W/SCOPE: CPT

## 2017-11-08 PROCEDURE — 85025 COMPLETE CBC W/AUTO DIFF WBC: CPT | Mod: 91

## 2017-11-08 PROCEDURE — 36415 COLL VENOUS BLD VENIPUNCTURE: CPT

## 2017-11-08 PROCEDURE — 94799 UNLISTED PULMONARY SVC/PX: CPT

## 2017-11-08 PROCEDURE — 63600175 PHARM REV CODE 636 W HCPCS: Performed by: UROLOGY

## 2017-11-08 PROCEDURE — 80048 BASIC METABOLIC PNL TOTAL CA: CPT

## 2017-11-08 PROCEDURE — 25000003 PHARM REV CODE 250: Performed by: EMERGENCY MEDICINE

## 2017-11-08 PROCEDURE — 99284 EMERGENCY DEPT VISIT MOD MDM: CPT | Mod: ,,, | Performed by: EMERGENCY MEDICINE

## 2017-11-08 PROCEDURE — 51702 INSERT TEMP BLADDER CATH: CPT

## 2017-11-08 PROCEDURE — 99284 EMERGENCY DEPT VISIT MOD MDM: CPT | Mod: 25

## 2017-11-08 PROCEDURE — 85025 COMPLETE CBC W/AUTO DIFF WBC: CPT

## 2017-11-08 RX ORDER — TAMSULOSIN HYDROCHLORIDE 0.4 MG/1
0.4 CAPSULE ORAL
Status: COMPLETED | OUTPATIENT
Start: 2017-11-08 | End: 2017-11-08

## 2017-11-08 RX ORDER — TAMSULOSIN HYDROCHLORIDE 0.4 MG/1
0.4 CAPSULE ORAL NIGHTLY
Qty: 10 CAPSULE | Refills: 0 | Status: SHIPPED | OUTPATIENT
Start: 2017-11-08 | End: 2017-11-17

## 2017-11-08 RX ORDER — POLYETHYLENE GLYCOL 3350 17 G/17G
17 POWDER, FOR SOLUTION ORAL DAILY
Qty: 1 BOTTLE | Refills: 0 | Status: SHIPPED | OUTPATIENT
Start: 2017-11-08 | End: 2018-08-11

## 2017-11-08 RX ORDER — OXYCODONE AND ACETAMINOPHEN 5; 325 MG/1; MG/1
1-2 TABLET ORAL EVERY 4 HOURS PRN
Qty: 31 TABLET | Refills: 0 | Status: SHIPPED | OUTPATIENT
Start: 2017-11-08 | End: 2017-12-07

## 2017-11-08 RX ADMIN — ACETAMINOPHEN 1000 MG: 10 INJECTION, SOLUTION INTRAVENOUS at 01:11

## 2017-11-08 RX ADMIN — SODIUM CHLORIDE: 0.9 INJECTION, SOLUTION INTRAVENOUS at 06:11

## 2017-11-08 RX ADMIN — PANTOPRAZOLE SODIUM 40 MG: 40 GRANULE, DELAYED RELEASE ORAL at 09:11

## 2017-11-08 RX ADMIN — TAMSULOSIN HYDROCHLORIDE 0.4 MG: 0.4 CAPSULE ORAL at 08:11

## 2017-11-08 RX ADMIN — CEFAZOLIN SODIUM 1 G: 1 SOLUTION INTRAVENOUS at 01:11

## 2017-11-08 RX ADMIN — ACETAMINOPHEN 1000 MG: 10 INJECTION, SOLUTION INTRAVENOUS at 06:11

## 2017-11-08 RX ADMIN — CEFAZOLIN SODIUM 1 G: 1 SOLUTION INTRAVENOUS at 09:11

## 2017-11-08 RX ADMIN — FENOFIBRATE 48 MG: 48 TABLET ORAL at 09:11

## 2017-11-08 NOTE — DISCHARGE INSTRUCTIONS
What to expect with your Partial Nephrectomy.  [facility]  Updated 06/10/2011   After surgery  o You may or may not have a drain that is shaped like a grenade and put to suction  - This drain usually may or may not come out on Post op day 1. If you go home with a drain, the nurses will teach you how to record the output and you will come back 3-5 days after you leave to have the drain removed in clinic.  o You will be on bedrest overnight. The next morning we will come by and see you and take you off bedrest sometime mid morning, that is when your catheter will come out.   o You will have a catheter after your surgery. It should come out the next day and you should be able to void on your own before you leave. If you are a male and on a BPH medicine, we will need to make sure you restart that the night of your surgery.  o The night of surgery we expect and hope that you will:  - Eat - you do not have to eat a whole meal, but we want to make sure you can tolerate liquid and/or solid food without nausea and vomiting  - Use your incentive spirometer - this is the breathing apparatus that helps you expand your lungs. If and when you have pain you will not want to take deep breaths. But if you dont take deep breaths, you are at risk for pneumonia. The incentive spirometer will help prevent this from occurring by expanding your lungs.  o Symptoms you may experience immediately post-op:  - Bloating and/or shoulder pain if you had a laparascopic procedure- when we do this operation, we fill up your abdominal cavity with gas to better help us visualize the organs and allow our instruments to fit. After the surgery, not all the air can be removed and your body will eventually absorb this small amount of air. However this can make you feel bloated. In addition, when you sit up, the air can sit right under a muscle (the diaphragm) which has connecting nerves to the shoulders, which could explain why you have shoulder  pain.  - Do not expect necessarily to have gas or to have a bowel movement - this goes along with the bloating, you may feel like you want to pass gas or have a bowel movement but you cant. This is normal and you will feel like this for a couple days. There are no pills to help with this. Small walks throughout the day should help with this.  - Pain - your pain should be able to be controlled with medicines by mouth that we prescribe. It is important for you to tell us if you are on any pain medications at home before the surgery as you may need stronger pain meds while in the hospital.   You can go home when:  o Pain is controlled with medicines by mouth  o You are able to walk without difficulty or pain  o You are tolerating a regulating diet  o Your are voiding. If you cannot void we may have to replace a catheter and you will follow up 3-5 days after you leave to have a voiding trial. The nurses will teach you how to care for your catheter.   When you go home:  o Blood pressure  - Your blood pressure must be well controlled (<140 systolic blood pressure), if youre blood pressure is not controlled, there is an increased risk of bleeding.  o Activity  - Continue to walk - small walks throughout the day are better than one long walk.   - Do not lift anything greater than 8 pounds for 6 weeks - we want your abdominal wall muscles to heal.  o Bowel Movements - Do not strain to have a bowel movement - the pain medicines will make you constipated. That is why we also ask you to take colace 2-3 x per day to help keep your bowels regular. If you are still having trouble, then you can also add Miralax once a day. Do not take any stool softeners if you are having diarrhea.  o Drain - If you have a drain (not your catheter, but a separate drain) then record the output and bring it with you to your next appointment  o Smoking - If you smoke, we encourage you to STOP. Smoking interferes with the healing process and your  prolong your healing with continued smoking.  o Driving - Do not drive while you are on pain meds or with your catheter in place.  o Bathing - If you do not have a drain, you can shower 48 hours after your surgery. If you do have a drain, sponge bathe only until the drain is out.  o Dressing - you can remove the dressings if there is no drainage or change them as needed if there is. The little sterile band-aid strips will fall off on their own in 10-14 days. If they have not fallen off then you can remove them yourself.  o Restarting medicines -especially blood thinners (Aspirin, Plavix, Coumadin), Fish Oil. Discuss this with your physician prior to discharge.   When to return to the ER  o Fever - If you have a fever >101.5. This could be due to a number of reasons such as infection of the urine or incision. If your catheter has been removed, you could possibly have a leak. It would be best to come to the ER so they can better evaluate you.  o Severe pain - pain is expected, but severe or new onset of pain is not normal.   o Inability to tolerate food or liquid with nausea and vomiting - it would be best to go to the ER for them to better evaluate you.

## 2017-11-08 NOTE — HPI
Wero Spangler is a 62 y.o. male with history of elevated PSA s/p two negative TRUS biopsies a left renal mass. The mass was found incidentally during evaluation for possible gallbladder pathology.  He denies fever, chills, weight loss, bone pain, headaches, vision changes, seizures, SOB. No personal history of cancer.  Family history positive for prostate cancer in brother, no renal cell in family. Now s/p left RAL partial nephrectomy.

## 2017-11-08 NOTE — PLAN OF CARE
Duke Cano MD     Extended Emergency Contact Information  Primary Emergency Contact: Vandana Spangler  Address: [address] [place] [address] [facility]  Home Phone: [phone]  Mobile Phone: [phone]  Relation: Daughter     Payor: BLUE CROSS BLUE SHIELD / Plan: BCBS OF [place] LOC[place] PLUS / Product Type: Commercial /        [id number] [address] [facility]  [address] [facility] [address]  Phone: [phone] Fax: [phone]    [id number] - [place], [place] - [address]  [address] [facility] [address]  Phone: [phone] Fax: [phone]         11/08/17 1056   Discharge Assessment   Assessment Type Discharge Planning Assessment   Confirmed/corrected address and phone number on facesheet? Yes   Assessment information obtained from? Patient;Medical Record   Expected Length of Stay (days) 3   Communicated expected length of stay with patient/caregiver yes   Prior to hospitilization cognitive status: Alert/Oriented   Prior to hospitalization functional status: Independent   Current cognitive status: Alert/Oriented   Current Functional Status: Independent   Able to Return to Prior Arrangements yes   Is patient able to care for self after discharge? Yes   Patient's perception of discharge disposition home or selfcare   Readmission Within The Last 30 Days no previous admission in last 30 days   Patient currently being followed by outpatient case management? No   Patient currently receives any other outside agency services? No   Do you have any problems affording any of your prescribed medications? No   Is the patient taking medications as prescribed? yes   Does the patient have transportation home? Yes   Transportation Available family or friend will provide   Dialysis Name and Scheduled days n/a   Does the patient receive services at the Coumadin Clinic? No   Discharge Plan A Home   Discharge Plan B  Home with family;Home Health   Patient/Family In Agreement With Plan yes

## 2017-11-08 NOTE — NURSING
Pt received dc instructions pt verbalized understanding of dc instructions. Pt received prescriptions, iv removed no signs of irritation. Pt leaving floor via wheelchair with daughter

## 2017-11-08 NOTE — SUBJECTIVE & OBJECTIVE
Interval History:   KRANTHI, feels well  Walked  Pain controlled  Tolerating diet  Afebrile overnight    Review of Systems  Objective:     Temp:  [96.3 °F (35.7 °C)-97.9 °F (36.6 °C)] 97.1 °F (36.2 °C)  Pulse:  [55-87] 72  Resp:  [11-18] 17  SpO2:  [93 %-100 %] 98 %  BP: (114-142)/(58-75) 124/58     Body mass index is 28.34 kg/m².            Drains     Drain                 Urethral Catheter 11/07/17 0722 Non-latex;Straight-tip 16 Fr. 1 day                Physical Exam   Constitutional: No distress.   HENT:   Head: Normocephalic and atraumatic.   Eyes: Conjunctivae are normal. No scleral icterus.   Neck: Normal range of motion.   Cardiovascular: Normal rate.    Pulmonary/Chest: Effort normal. No respiratory distress.   Abdominal: Soft. He exhibits no distension. There is tenderness (appropriate). There is no rebound and no guarding.   Incisions c/d/i  sanchez draining clear yellow   Musculoskeletal: Normal range of motion.   SCDs in place   Neurological: He is alert.   Skin: Skin is warm and dry. He is not diaphoretic.     Psychiatric: He has a normal mood and affect.       Significant Labs:    BMP:    Recent Labs  Lab 11/02/17  1053 11/07/17  0958 11/08/17  0615    138 139   K 4.1 3.7 3.8    109 106   CO2 29 24 27   BUN 17 19 18   CREATININE 1.1 0.9 1.0   CALCIUM 9.7 8.1* 8.5*       CBC:     Recent Labs  Lab 11/02/17  1053 11/07/17  0958   WBC 5.75 5.97   HGB 13.9* 12.6*   HCT 39.8* 36.3*    141*       All pertinent labs results from the past 24 hours have been reviewed.    Significant Imaging:  All pertinent imaging results/findings from the past 24 hours have been reviewed.

## 2017-11-08 NOTE — PROGRESS NOTES
[facility]  Urology  Progress Note    Patient Name: Wero Spangler  MRN: [id number]  Admission Date: 11/7/2017  Hospital Length of Stay: 1 days  Code Status: No Order   Attending Provider: Joe Cameron MD   Primary Care Physician: Duke Cano MD    Subjective:     HPI:  Wero Spangler is a 62 y.o. male with history of elevated PSA s/p two negative TRUS biopsies a left renal mass. The mass was found incidentally during evaluation for possible gallbladder pathology.  He denies fever, chills, weight loss, bone pain, headaches, vision changes, seizures, SOB. No personal history of cancer.  Family history positive for prostate cancer in brother, no renal cell in family. Now s/p left RAL partial nephrectomy.         Interval History:   KRANTHI, feels well  Walked  Pain controlled  Tolerating diet  Afebrile overnight    Review of Systems  Objective:     Temp:  [96.3 °F (35.7 °C)-97.9 °F (36.6 °C)] 97.1 °F (36.2 °C)  Pulse:  [55-87] 72  Resp:  [11-18] 17  SpO2:  [93 %-100 %] 98 %  BP: (114-142)/(58-75) 124/58     Body mass index is 28.34 kg/m².            Drains     Drain                 Urethral Catheter 11/07/17 0722 Non-latex;Straight-tip 16 Fr. 1 day                Physical Exam   Constitutional: No distress.   HENT:   Head: Normocephalic and atraumatic.   Eyes: Conjunctivae are normal. No scleral icterus.   Neck: Normal range of motion.   Cardiovascular: Normal rate.    Pulmonary/Chest: Effort normal. No respiratory distress.   Abdominal: Soft. He exhibits no distension. There is tenderness (appropriate). There is no rebound and no guarding.   Incisions c/d/i  sanchez draining clear yellow   Musculoskeletal: Normal range of motion.   SCDs in place   Neurological: He is alert.   Skin: Skin is warm and dry. He is not diaphoretic.     Psychiatric: He has a normal mood and affect.       Significant Labs:    BMP:    Recent Labs  Lab 11/02/17  1053 11/07/17  0958 11/08/17  0615    138 139    K 4.1 3.7 3.8    109 106   CO2 29 24 27   BUN 17 19 18   CREATININE 1.1 0.9 1.0   CALCIUM 9.7 8.1* 8.5*       CBC:     Recent Labs  Lab 11/02/17  1053 11/07/17  0958   WBC 5.75 5.97   HGB 13.9* 12.6*   HCT 39.8* 36.3*    141*       All pertinent labs results from the past 24 hours have been reviewed.    Significant Imaging:  All pertinent imaging results/findings from the past 24 hours have been reviewed.                  Assessment/Plan:     Left renal mass    61yo M s/p L RAL partial nephrectomy    - regular diet  - SLIV  - ambulate, oobtc  - pain control  - remove sanchez, voiding trial  - GI ppx, SCDs  - home later if feeling well            VTE Risk Mitigation         Ordered     Place sequential compression device  Until discontinued      11/07/17 0958          Gerber Kurtz MD  Urology  [facility]

## 2017-11-08 NOTE — DISCHARGE SUMMARY
[facility]  Urology  Discharge Summary      Patient Name: Wero Spangler  MRN: [id number]  Admission Date: 11/7/2017  Hospital Length of Stay: 1 days  Discharge Date and Time:  11/08/2017 1:58 PM  Attending Physician: Joe Cameron MD   Discharging Provider: Gerber Kurtz MD  Primary Care Physician: Duke Cano MD    HPI: Wero Spangler is a 62 y.o. male with history of elevated PSA s/p two negative TRUS biopsies a left renal mass. The mass was found incidentally during evaluation for possible gallbladder pathology.  He denies fever, chills, weight loss, bone pain, headaches, vision changes, seizures, SOB. No personal history of cancer.  Family history positive for prostate cancer in brother, no renal cell in family.     Procedure(s) (LRB):  ROBOTIC ASSISTED LAPAROSCOPIC NEPHRECTOMY PARTIAL (Left)     Indwelling Lines/Drains at time of discharge:   Lines/Drains/Airways     Drain                 Urethral Catheter 11/07/17 0722 Non-latex;Straight-tip 16 Fr. 1 day                Hospital Course (synopsis of major diagnoses, care, treatment, and services provided during the course of the hospital stay): The pt tolerated the procedure well. he was transferred to recovery post-op and then to the floor. Once on the floor his diet was advanced and he ambulated the night of surgery. On POD 1 the pt was tolerating a regular diet, ambulating without difficulty. His pain was well controlled. The sanchez was draining clear yellow urine. His sanchez catheter was removed on POD1 and he was able to void without issue. On discharge, the pt's incisions were c/d/i and abdomen was appropriately tender to palpation and soft.      Consults:     Significant Diagnostic Studies: none    Pending Diagnostic Studies:     None          Final Active Diagnoses:    Diagnosis Date Noted POA    PRINCIPAL PROBLEM:  Left renal mass [N28.89] 11/07/2017 Yes    Fatty liver [K76.0] 11/02/2017 Yes    Hx of complications due  to general anesthesia [Z91.89] 11/02/2017 Not Applicable    HTN (hypertension) [I10] 09/24/2013 Yes    Prostate nodule [N40.2] 08/22/2013 Yes    Elevated PSA [R97.20] 09/05/2012 Yes      Problems Resolved During this Admission:    Diagnosis Date Noted Date Resolved POA       Discharged Condition: stable    Disposition: Home or Self Care    Follow Up:  Follow-up Information     Joe Cameron MD On 11/27/2017.    Specialty:  Urology  Why:  @11:00am  Contact information:  [address]  [facility] [address]  [phone]                 Patient Instructions:     Diet general     Activity as tolerated     Call MD for:  persistent nausea and vomiting or diarrhea     Call MD for:  severe uncontrolled pain     Call MD for:   Order Comments: Temperature > 101F     Type And Screen Preop   Standing Status: Future  Standing Exp. Date: 01/01/19       Medications:  Reconciled Home Medications:   Current Discharge Medication List      START taking these medications    Details   oxyCODONE-acetaminophen (PERCOCET) 5-325 mg per tablet Take 1-2 tablets by mouth every 4 (four) hours as needed for Pain.  Qty: 31 tablet, Refills: 0      polyethylene glycol (GLYCOLAX) 17 gram/dose powder Take 17 g by mouth once daily.  Qty: 1 Bottle, Refills: 0         CONTINUE these medications which have NOT CHANGED    Details   alprazolam (XANAX) 0.5 MG tablet Take 1 tablet (0.5 mg total) by mouth nightly.  Qty: 30 tablet, Refills: 5      fenofibrate (TRICOR) 54 MG tablet Take 1 tablet (54 mg total) by mouth once daily.  Qty: 30 tablet, Refills: 12      fluticasone (FLONASE) 50 mcg/actuation nasal spray 1 spray by Each Nare route daily as needed.       OMEPRAZOLE (PRILOSEC ORAL) Take by mouth every morning.       sodium chloride 2% (BARBI 128) 2 % ophthalmic solution 1 drop as needed (takes 3-4 times/day).      triamterene-hydrochlorothiazide 37.5-25 mg (MAXZIDE-25) 37.5-25 mg per tablet Take 1 tablet by mouth once daily.  Qty: 30 tablet,  Refills: 0             Time spent on the discharge of patient: 15 minutes    Gerber Kurtz MD  Urology  [facility]

## 2017-11-08 NOTE — PLAN OF CARE
11/08/17 1053   Final Note   Assessment Type Final Discharge Note   Discharge Disposition Home   Hospital Follow Up  Appt(s) scheduled? Yes   Discharge plans and expectations educations in teach back method with documentation complete? Yes   Right Care Referral Info   Post Acute Recommendation No Care

## 2017-11-08 NOTE — ASSESSMENT & PLAN NOTE
61yo M s/p L RAL partial nephrectomy    - regular diet  - SLIV  - ambulate, oobtc  - pain control  - remove sanchez, voiding trial  - GI ppx, SCDs  - home later if feeling well

## 2017-11-08 NOTE — TELEPHONE ENCOUNTER
"  Reason for Disposition   Patient sounds very sick or weak to the triager     Called to on call.  Patient is postop and cannot void with a strong urge to do so. Advised to return patient to  ER    Answer Assessment - Initial Assessment Questions  1. SYMPTOM: "What's the main symptom you're concerned about?" (e.g., frequency, incontinence)      retention  2. ONSET: "When did the  ________  start?"      1.5 hours ago  3. PAIN: "Is there any pain?" If so, ask: "How bad is it?" (Scale: 1-10; mild, moderate, severe)      yes and pressure  4. CAUSE: "What do you think is causing the symptoms?"      Post op/bladder infection  5. OTHER SYMPTOMS: "Do you have any other symptoms?" (e.g., fever, flank pain, blood in urine, pain with urination)      No urine passed  6. PREGNANCY: "Is there any chance you are pregnant?" "When was your last menstrual period?"      n/a    Protocols used: ST URINARY SYMPTOMS-A-    "

## 2017-11-09 ENCOUNTER — PATIENT MESSAGE (OUTPATIENT)
Dept: UROLOGY | Facility: CLINIC | Age: 62
End: 2017-11-09

## 2017-11-09 NOTE — ED TRIAGE NOTES
Pt states he had kidney surgery yesterday and was released earlier today from hospital.  Pt states he had catheter post op which was removed this am.  States he was urinating ok when discharged but has had difficulty urinating for the past 2 hrs.  Pt states he is very uncomfortable.

## 2017-11-09 NOTE — ED PROVIDER NOTES
Encounter Date: 11/8/2017    SCRIBE #1 NOTE: I, Hoda Amezcua, am scribing for, and in the presence of,  Dr. Crowell. I have scribed the following portions of the note - Other sections scribed: ALVERTO LIMA,Attending ED notes.       History     Chief Complaint   Patient presents with    Post-op Problem     had lesion removed from L kidney yest, now trouble    Time patient was seen by the provider: 6:31 PM      The patient is a 62 y.o. male with hx of: HTN, GERD, and HTN that presents to the ED with a complaint of urinary retention that began today. Pt is s/p left kidney lesion removal yesterday and was discharged this afternoon. When his catheter was removed this morning he does report that he was able to urinate. As the day went on he felt the urge to urinate but was not able to void. Pt reports having body shakes currently. Pt denies pain at surgical incision site. Pt denies fever and chills.    The history is provided by the patient and medical records.     Review of patient's allergies indicates:  No Known Allergies  Past Medical History:   Diagnosis Date    Allergy     Cataract     Elevated PSA     Enlarged prostate     Gallstones     General anesthetics causing adverse effect in therapeutic use 2000    delayed emergence after back surgery    GERD (gastroesophageal reflux disease)     HTN (hypertension) 9/24/2013    Hypertension     Urinary tract infection      Past Surgical History:   Procedure Laterality Date    BACK SURGERY  4/2000    CATARACT EXTRACTION W/  INTRAOCULAR LENS IMPLANT      Both Eyes    EYE SURGERY      PROSTATE SURGERY      prostate biopsy benign    TONSILLECTOMY      VASECTOMY       Family History   Problem Relation Age of Onset    Cancer Mother      breast    Prostate cancer Brother     Cancer Brother      prostate    Macular degeneration Maternal Grandmother     Cancer Other     Cancer Other     Amblyopia Neg Hx     Blindness Neg Hx     Cataracts Neg Hx     Glaucoma Neg  Hx     Retinal detachment Neg Hx     Strabismus Neg Hx      Social History   Substance Use Topics    Smoking status: Former Smoker     Quit date: 2000    Smokeless tobacco: Never Used    Alcohol use 0.6 oz/week     1 Glasses of wine per week      Comment: 2 beers three times a week      Review of Systems   Constitutional: Negative for chills and fever.        (+) body shakes   HENT: Negative for congestion.    Eyes: Negative for pain.   Respiratory: Negative for shortness of breath.    Cardiovascular: Negative for chest pain.   Gastrointestinal: Negative for nausea and vomiting.   Genitourinary: Positive for difficulty urinating.   Musculoskeletal: Negative for myalgias.   Skin: Negative for rash.   Neurological: Negative for headaches.       Physical Exam     Initial Vitals [11/08/17 1803]   BP Pulse Resp Temp SpO2   (!) 180/96 97 18 98.1 °F (36.7 °C) 97 %      MAP       124         Physical Exam    Nursing note and vitals reviewed.    Gen/Constitutional: Interactive. No acute distress  Head: Normocephalic, Atraumatic  Neck: supple, no masses or LAD  Eyes: PERRLA, conjunctiva clear  Ears, Nose and Throat: No rhinorrhea or stridor.  Cardiac: Reg Rhythm, No murmur  Pulmonary: CTA Bilat, no wheezes, rhonchi, rales.  GI: Abdomen soft, non-tender, non-distended; no rebound or guarding.  Incisions CDI with dermabond  : No CVA tenderness.  No suprapubic tenderness  Musculoskeletal: Extremities warm, well perfused, no erythema, no edema  Skin: No rashes  Neuro: Alert and Oriented x 3; No focal motor or sensory deficits.    Psych: Normal affect      ED Course   Procedures  Labs Reviewed   URINALYSIS, REFLEX TO URINE CULTURE - Abnormal; Notable for the following:        Result Value    Occult Blood UA 3+ (*)     All other components within normal limits    Narrative:     Preferred Collection Type->Urine, Clean Catch   CBC W/ AUTO DIFFERENTIAL - Abnormal; Notable for the following:     RBC 4.06 (*)     Hemoglobin 12.3  (*)     Hematocrit 35.2 (*)     RDW 14.6 (*)     Gran # 8.5 (*)     Lymph # 0.8 (*)     Gran% 82.0 (*)     Lymph% 7.9 (*)     All other components within normal limits   COMPREHENSIVE METABOLIC PANEL - Abnormal; Notable for the following:     Glucose 134 (*)     Albumin 3.4 (*)     Total Bilirubin 1.1 (*)     Alkaline Phosphatase 49 (*)     Anion Gap 5 (*)     All other components within normal limits   URINALYSIS MICROSCOPIC - Abnormal; Notable for the following:     RBC, UA >100 (*)     All other components within normal limits    Narrative:     Preferred Collection Type->Urine, Clean Catch   ISTAT PROCEDURE - Abnormal; Notable for the following:     POC Glucose 134 (*)     POC Hematocrit 34 (*)     All other components within normal limits           Clinical Tests:  Labs Test(s) were ordered and reviewed by me.      Pt presents with urinary retention after robotic left partial nephrectomy.  Patricio placed and 400ml UOP.  Also considered prostatitis, UTI, KENZIE among other diagnoses.  Plan to sent labs and U/A.  Will call urology.         Medical Decision Making:   History:   Old Medical Records: I decided to obtain old medical records.  Other:   I have discussed this case with another health care provider.            Scribe Attestation:   Scribe #1: I performed the above scribed service and the documentation accurately describes the services I performed. I attest to the accuracy of the note.    Attending Attestation:             Attending ED Notes:   7:42 PM  Spoke with urology who recommend discharge with leg bag and follow up in clinic on Monday or Tuesday of next week for fill and pull. Recommend Flomax qhs. Stable for discharge. The patient was given strict return precautions and follow up instructions and expressed understanding of these.  The patient felt comfortable with the plan for discharge and I did as well.  Stable for DC.       I, Dr. Carlos Crowell, personally performed the services described in this  documentation. All medical record entries made by the scribe were at my direction and in my presence.  I have reviewed the chart and agree that the record reflects my personal performance and is accurate and complete. Carlos Crowell MD.  4:14 AM 11/09/2017            ED Course      Clinical Impression:   The encounter diagnosis was Urinary retention.    Disposition:   Disposition: Discharged  Condition: Stable                        Carlos Crowell MD  11/09/17 0414

## 2017-11-13 ENCOUNTER — OFFICE VISIT (OUTPATIENT)
Dept: UROLOGY | Facility: CLINIC | Age: 62
End: 2017-11-13
Payer: COMMERCIAL

## 2017-11-13 VITALS
BODY MASS INDEX: 27.77 KG/M2 | WEIGHT: 205 LBS | TEMPERATURE: 98 F | DIASTOLIC BLOOD PRESSURE: 72 MMHG | SYSTOLIC BLOOD PRESSURE: 132 MMHG | HEART RATE: 79 BPM | HEIGHT: 72 IN

## 2017-11-13 DIAGNOSIS — N28.89 RENAL MASS: ICD-10-CM

## 2017-11-13 DIAGNOSIS — N13.8 BPH WITH URINARY OBSTRUCTION: ICD-10-CM

## 2017-11-13 DIAGNOSIS — N99.89 POSTOPERATIVE URINARY RETENTION: Primary | ICD-10-CM

## 2017-11-13 DIAGNOSIS — N40.1 BPH WITH URINARY OBSTRUCTION: ICD-10-CM

## 2017-11-13 DIAGNOSIS — R33.8 POSTOPERATIVE URINARY RETENTION: Primary | ICD-10-CM

## 2017-11-13 PROCEDURE — 51700 IRRIGATION OF BLADDER: CPT | Mod: 58,S$GLB,, | Performed by: NURSE PRACTITIONER

## 2017-11-13 PROCEDURE — 99024 POSTOP FOLLOW-UP VISIT: CPT | Mod: S$GLB,,, | Performed by: NURSE PRACTITIONER

## 2017-11-13 PROCEDURE — 99999 PR PBB SHADOW E&M-EST. PATIENT-LVL III: CPT | Mod: PBBFAC,,, | Performed by: NURSE PRACTITIONER

## 2017-11-13 NOTE — PROGRESS NOTES
Subjective:       Patient ID: Wero Spangler is a 62 y.o. male.    Chief Complaint: Urinary Retention      HPI: Wero Spangler is a 62 y.o. White male who presents today for evaluation and management of post op urinary retention.  Last seen in surgery with Dr. Cameron on 11/8/17.     He presented to the ED with a complaint of urinary retention the day after left partial nephrectomy. He was discharged this afternoon. When his catheter was removed that morning he was able to urinate. As the day went on he felt the urge to urinate but was not able to void. Pt denies pain at surgical incision site. Pt denies fever and chills.    Today he reports that the catheter has been draining well with no complaints.   He reports increased bruising to his abdomen.   Pt denies pain at surgical incision site.   Pt denies fever and chills.    11/8/17 Procedure(s) Performed:   1.  Robotically-assisted laparoscopic left partial nephrectomy  2.  Intraoperative US for localization of renal tumor     Specimen(s): left renal mass for frozen and permanent; fat overlying tumor      Indications: Wero Spangler is a 62 y.o. male with cT1a left renal mass suspicious for renal cell carcinoma.  After discussion of management options and risks and benefits associated with each the patient has elected to pursue surgical management.       Findings:   - ultrasound used to confirm mass location  - enucleation, tumor for frozen section  - 1 layer renorapphy, hemostatic  - warm ischemia time 16 minutes    Review of patient's allergies indicates:  No Known Allergies    Current Outpatient Prescriptions   Medication Sig Dispense Refill    alprazolam (XANAX) 0.5 MG tablet Take 1 tablet (0.5 mg total) by mouth nightly. (Patient taking differently: Take 0.25 mg by mouth nightly. To help with sleep) 30 tablet 5    fenofibrate (TRICOR) 54 MG tablet Take 1 tablet (54 mg total) by mouth once daily. (Patient taking differently: Take 54 mg by mouth  every morning. ) 30 tablet 12    fluticasone (FLONASE) 50 mcg/actuation nasal spray 1 spray by Each Nare route daily as needed.       OMEPRAZOLE (PRILOSEC ORAL) Take by mouth every morning.       oxyCODONE-acetaminophen (PERCOCET) 5-325 mg per tablet Take 1-2 tablets by mouth every 4 (four) hours as needed for Pain. 31 tablet 0    polyethylene glycol (GLYCOLAX) 17 gram/dose powder Take 17 g by mouth once daily. 1 Bottle 0    sodium chloride 2% (BARBI 128) 2 % ophthalmic solution 1 drop as needed (takes 3-4 times/day).      tamsulosin (FLOMAX) 0.4 mg Cp24 Take 1 capsule (0.4 mg total) by mouth every evening. 10 capsule 0    triamterene-hydrochlorothiazide 37.5-25 mg (MAXZIDE-25) 37.5-25 mg per tablet Take 1 tablet by mouth once daily. (Patient taking differently: Take 1 tablet by mouth every morning. ) 30 tablet 0     No current facility-administered medications for this visit.        Past Medical History:   Diagnosis Date    Allergy     Cataract     Elevated PSA     Enlarged prostate     Gallstones     General anesthetics causing adverse effect in therapeutic use 2000    delayed emergence after back surgery    GERD (gastroesophageal reflux disease)     HTN (hypertension) 9/24/2013    Hypertension     Urinary tract infection        Past Surgical History:   Procedure Laterality Date    BACK SURGERY  4/2000    CATARACT EXTRACTION W/  INTRAOCULAR LENS IMPLANT      Both Eyes    EYE SURGERY      PROSTATE SURGERY      prostate biopsy benign    TONSILLECTOMY      VASECTOMY         Family History   Problem Relation Age of Onset    Cancer Mother      breast    Prostate cancer Brother     Cancer Brother      prostate    Macular degeneration Maternal Grandmother     Cancer Other     Cancer Other     Amblyopia Neg Hx     Blindness Neg Hx     Cataracts Neg Hx     Glaucoma Neg Hx     Retinal detachment Neg Hx     Strabismus Neg Hx        Review of Systems    Review of Systems   Constitutional:  Negative for fever, chills, activity change, appetite change and unexpected weight change.   HENT: Negative for congestion, nosebleeds, sneezing, sore throat and trouble swallowing.    Eyes: Negative for pain, discharge, redness and visual disturbance.   Respiratory: Negative for cough, choking, chest tightness and shortness of breath.    Cardiovascular: Negative for chest pain, palpitations and leg swelling.   Gastrointestinal: Negative for nausea, vomiting, abdominal pain, diarrhea, blood in stool, abdominal distention and anal bleeding.  Genitourinary: As documented per HPI   Endocrine: Negative for cold intolerance, heat intolerance, polydipsia, polyphagia and polyuria.   Musculoskeletal: Negative for myalgias, gait problem, neck pain and neck stiffness.   Skin: Negative for color change, pallor, rash and wound.   Allergic/Immunologic: Negative for immunocompromised state.   Neurological: Negative for seizures, facial asymmetry, speech difficulty, weakness and light-headedness.   Hematological: Negative for adenopathy. Does not bruise/bleed easily.   Psychiatric/Behavioral: Negative for hallucinations, behavioral problems, self-injury and agitation. The patient is not hyperactive.      All other systems were reviewed and were negative.    Objective:     Vitals:    11/13/17 0821   BP: 132/72   Pulse: 79   Temp: 97.9 °F (36.6 °C)     Physical Exam   Vitals reviewed.  Constitutional: He is oriented to person, place, and time. He appears well-developed and well-nourished. No distress.   HENT:   Head: Normocephalic and atraumatic.   Right Ear: External ear normal.   Left Ear: External ear normal.   Nose: Nose normal.   Eyes: EOM are normal. Pupils are equal, round, and reactive to light. Right eye exhibits no discharge. Left eye exhibits no discharge.   Neck: Normal range of motion. Neck supple. No tracheal deviation present. No thyroid enlargement or tenderness.  Cardiovascular: Regular rhythm and intact distal  pulses. No signs of peripheral edema.   Pulmonary/Chest: Effort normal and breath sounds normal. No stridor. No respiratory distress. He has no wheezes.   Abdominal: Soft. Bowel sounds are normal. He exhibits no distension. Incisions healing well. No signs of drainage or infection. Bruising to abdomen.   Musculoskeletal: Normal range of motion. He exhibits no edema.   Neurological: He is alert and oriented to person, place, and time. He exhibits normal muscle tone. Coordination normal.   Skin: Skin is warm. No rash noted.   Psychiatric: He has a normal mood and affect. His behavior is normal. Judgment and thought content normal.     Lab Results   Component Value Date    PSA 14.0 (H) 09/22/2014    PSA 15.48 (H) 08/16/2013    PSA 11.06 (H) 02/25/2013     Lab Results   Component Value Date    CREATININE 1.1 11/08/2017     Lab Results   Component Value Date    EGFRNONAA >60.0 11/08/2017     Lab Results   Component Value Date    ESTGFRAFRICA >60.0 11/08/2017     I personally reviewed all the patient's imaging studies.    CT abdomen/pelvis without and with contrast (8/28/17): Enhancing soft tissue lesion in the midpole of left kidney measuring 2.3 x 2.0 cm, concerning for neoplasm.  RCC cannot be excluded.  Consultation with urology is recommended.  The renal vasculature and IVC are patent.     Assessment:       1. Postoperative urinary retention    2. BPH with urinary obstruction    3. Renal mass        Plan:     Wero was seen today for urinary retention.    Diagnoses and all orders for this visit:    Postoperative urinary retention  -     Voiding Trial    BPH with urinary obstruction    Renal mass       Discussed post op expectations,   Voiding trial performed by Nurse Bowen. 180 ml of sterile water was instilled into bladder.  Patricio catheter was removed. Patient urinated 200 ml without difficulty. PVR was 25 cc.  Voiding trial passed.  Patient was instructed to drink plenty of fluids today.  Informed patient to return  to clinic or emergency department (if after clinic hours) to have sanchez catheter put back in if unable to urinate within 5 hours of sanchez catheter removal or starts to experience bladder pressure/pain, decrease flow, straining/difficulty urinating.    Discussed flomax se, moa, and indications. Discussed to continue and may also try to stop, but if difficulties urinating then restart flomax.   Dr. Cameron assessed pt and agrees with plan of care.   RTC on 11/27 for f/u with Dr. Cameron and 12/7 with Dr. Yuen.  Patient voiced understanding.    I encouraged him or any of his family members to call or email me with questions and/or concerns.

## 2017-11-16 NOTE — PHYSICIAN QUERY
PT Name: Wero Spangler  MR #: [id number]    Physician Query Form - Pathology Findings Clarification     CDS/: Rita Pratt               Contact information: [email or web address]  This form is a permanent document in the medical record.     Query Date: November 16, 2017      By submitting this query, we are merely seeking further clarification of documentation.  Please utilize your independent clinical judgment when addressing the question(s) below.      The medical record contains the following:     Findings Supporting Clinical Information Location in Medical Record   Left Renal Mass:   Oncocytoma  Pathology Report, Collection Date 11/07/17     Please document the clinical significance of the Pathologists findings of _____Oncocytoma_____.          [ x ] I agree with the Pathology Findings        [  ] I do not agree with the Pathology Findings        [  ] Clinically Undetermined        [  ] Other/Clarification of Findings: ______________________________________________    Please document in your progress notes daily for the duration of treatment until resolved and include in your discharge summary.

## 2017-11-17 ENCOUNTER — PATIENT MESSAGE (OUTPATIENT)
Dept: UROLOGY | Facility: CLINIC | Age: 62
End: 2017-11-17

## 2017-11-17 RX ORDER — TAMSULOSIN HYDROCHLORIDE 0.4 MG/1
0.4 CAPSULE ORAL DAILY
Qty: 30 CAPSULE | Refills: 3 | Status: SHIPPED | OUTPATIENT
Start: 2017-11-17 | End: 2017-12-07 | Stop reason: SDUPTHER

## 2017-11-27 ENCOUNTER — OFFICE VISIT (OUTPATIENT)
Dept: UROLOGY | Facility: CLINIC | Age: 62
End: 2017-11-27
Payer: COMMERCIAL

## 2017-11-27 VITALS
DIASTOLIC BLOOD PRESSURE: 70 MMHG | WEIGHT: 194 LBS | HEART RATE: 68 BPM | SYSTOLIC BLOOD PRESSURE: 147 MMHG | HEIGHT: 72 IN | BODY MASS INDEX: 26.28 KG/M2

## 2017-11-27 DIAGNOSIS — N99.89 POSTOPERATIVE URINARY RETENTION: Primary | ICD-10-CM

## 2017-11-27 DIAGNOSIS — N28.89 RENAL MASS, LEFT: ICD-10-CM

## 2017-11-27 DIAGNOSIS — D30.02 RENAL ONCOCYTOMA OF LEFT KIDNEY: ICD-10-CM

## 2017-11-27 DIAGNOSIS — R33.8 POSTOPERATIVE URINARY RETENTION: Primary | ICD-10-CM

## 2017-11-27 PROCEDURE — 99024 POSTOP FOLLOW-UP VISIT: CPT | Mod: S$GLB,,, | Performed by: UROLOGY

## 2017-11-27 PROCEDURE — 99999 PR PBB SHADOW E&M-EST. PATIENT-LVL III: CPT | Mod: PBBFAC,,, | Performed by: UROLOGY

## 2017-11-27 NOTE — PROGRESS NOTES
Subjective:       Patient ID: Wero Spangler is a 62 y.o. male.    Chief Complaint: Post-op Evaluation (doing well, feels great, wounds are healing)      HPI: Wero Spangler is a 62 y.o. White male who returns today in follow-up for management of a left renal mass s/p robotic left partial nephrectomy 11/7/17.    The mass was found incidentally during evaluation for possible gallbladder pathology.    The patient denies gross hematuria and/or constitutional symptoms attributable to his recently discovered renal mass. The patient denies a personal and family history of kidney cancer.    The Nephrometry score of the patient's mass is R=1, E=2, N=1, X, L=2, 6x low complexity.    The patient underwent surgery and did very well.    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 His final pathologic stage is [id number].    The patient returns today for his routine post-operative visit.    He had some issues with post-operative urinary retention that have since resolved. He is still taking flomax.    Review of patient's allergies indicates:  No Known Allergies    Current Outpatient Prescriptions   Medication Sig Dispense Refill    alprazolam (XANAX) 0.5 MG tablet Take 1 tablet (0.5 mg total) by mouth nightly. (Patient taking differently: Take 0.25 mg by mouth nightly. To help with sleep) 30 tablet 5    fenofibrate (TRICOR) 54 MG tablet Take 1 tablet (54 mg total) by mouth once daily. (Patient taking differently: Take 54 mg by mouth every morning. ) 30 tablet 12    fluticasone (FLONASE) 50 mcg/actuation nasal spray 1 spray by Each Nare route daily as needed.       OMEPRAZOLE (PRILOSEC ORAL) Take by mouth every morning.       oxyCODONE-acetaminophen (PERCOCET) 5-325 mg per tablet Take 1-2 tablets by mouth every 4 (four) hours as needed for Pain. 31 tablet 0    polyethylene glycol (GLYCOLAX) 17 gram/dose powder Take 17 g by mouth once daily. 1 Bottle 0    sodium chloride 2% (BARBI  128) 2 % ophthalmic solution 1 drop as needed (takes 3-4 times/day).      tamsulosin (FLOMAX) 0.4 mg Cp24 Take 1 capsule (0.4 mg total) by mouth once daily. 30 capsule 3    triamterene-hydrochlorothiazide 37.5-25 mg (MAXZIDE-25) 37.5-25 mg per tablet Take 1 tablet by mouth once daily. (Patient taking differently: Take 1 tablet by mouth every morning. ) 30 tablet 0     No current facility-administered medications for this visit.        Past Medical History:   Diagnosis Date    Allergy     Cataract     Elevated PSA     Enlarged prostate     Gallstones     General anesthetics causing adverse effect in therapeutic use 2000    delayed emergence after back surgery    GERD (gastroesophageal reflux disease)     HTN (hypertension) 9/24/2013    Hypertension     Urinary tract infection        Past Surgical History:   Procedure Laterality Date    BACK SURGERY  4/2000    CATARACT EXTRACTION W/  INTRAOCULAR LENS IMPLANT      Both Eyes    EYE SURGERY      PROSTATE SURGERY      prostate biopsy benign    TONSILLECTOMY      VASECTOMY         Family History   Problem Relation Age of Onset    Cancer Mother      breast    Prostate cancer Brother     Cancer Brother      prostate    Macular degeneration Maternal Grandmother     Cancer Other     Cancer Other     Amblyopia Neg Hx     Blindness Neg Hx     Cataracts Neg Hx     Glaucoma Neg Hx     Retinal detachment Neg Hx     Strabismus Neg Hx        Review of Systems    Review of Systems   Constitutional: Negative for fever, chills, activity change, appetite change and unexpected weight change.   HENT: Negative for congestion, nosebleeds, sneezing, sore throat and trouble swallowing.    Eyes: Negative for pain, discharge, redness and visual disturbance.   Respiratory: Negative for cough, choking, chest tightness and shortness of breath.    Cardiovascular: Negative for chest pain, palpitations and leg swelling.   Gastrointestinal: Negative for nausea, vomiting,  abdominal pain, diarrhea, blood in stool, abdominal distention and anal bleeding.  Genitourinary: As documented per HPI   Endocrine: Negative for cold intolerance, heat intolerance, polydipsia, polyphagia and polyuria.   Musculoskeletal: Negative for myalgias, gait problem, neck pain and neck stiffness.   Skin: Negative for color change, pallor, rash and wound.   Allergic/Immunologic: Negative for immunocompromised state.   Neurological: Negative for seizures, facial asymmetry, speech difficulty, weakness and light-headedness.   Hematological: Negative for adenopathy. Does not bruise/bleed easily.   Psychiatric/Behavioral: Negative for hallucinations, behavioral problems, self-injury and agitation. The patient is not hyperactive.      All other systems were reviewed and were negative.    Objective:     Vitals:    11/27/17 1041   BP: (!) 147/70   Pulse: 68     Physical Exam   Vitals reviewed.  Constitutional: He is oriented to person, place, and time. He appears well-developed and well-nourished. No distress.   HENT:   Head: Normocephalic and atraumatic.   Right Ear: External ear normal.   Left Ear: External ear normal.   Nose: Nose normal.   Eyes: EOM are normal. Pupils are equal, round, and reactive to light. Right eye exhibits no discharge. Left eye exhibits no discharge.   Neck: Normal range of motion. Neck supple. No tracheal deviation present. No thyroid enlargement or tenderness.  Cardiovascular: Regular rhythm and intact distal pulses. No signs of peripheral edema.   Pulmonary/Chest: Effort normal and breath sounds normal. No stridor. No respiratory distress. He has no wheezes.   Abdominal: Soft. Bowel sounds are normal. He exhibits no distension. There is no tenderness. Hernia confirmed negative in the right inguinal area and confirmed negative in the left inguinal area. No hepatic or splenic enlargement or tenderness. Well-healing robotic port sites.  Genitourinary: Penis normal. Right testis shows no mass,  no swelling and no tenderness. Right testis is descended. Left testis shows no mass, no swelling and no tenderness. Left testis is descended. Circumcised. No phimosis or hypospadias.   Musculoskeletal: Normal range of motion. He exhibits no edema.   Neurological: He is alert and oriented to person, place, and time. He exhibits normal muscle tone. Coordination normal.   Skin: Skin is warm. No rash noted.   Lymphatic: No palpable lymph nodes.  Psychiatric: He has a normal mood and affect. His behavior is normal. Judgment and thought content normal.     Lab Results   Component Value Date    PSA 14.0 (H) 09/22/2014    PSA 15.48 (H) 08/16/2013    PSA 11.06 (H) 02/25/2013     Lab Results   Component Value Date    CREATININE 1.1 11/08/2017     Lab Results   Component Value Date    EGFRNONAA >60.0 11/08/2017     Lab Results   Component Value Date    ESTGFRAFRICA >60.0 11/08/2017     I personally reviewed all the patient's imaging studies.    CT abdomen/pelvis without and with contrast (8/28/17): Enhancing soft tissue lesion in the midpole of left kidney measuring 2.3 x 2.0 cm, concerning for neoplasm.  RCC cannot be excluded.  Consultation with urology is recommended.  The renal vasculature and IVC are patent.     Assessment:       1. Postoperative urinary retention    2. Renal mass, left    3. Renal oncocytoma of left kidney        Plan:     Wero was seen today for post-op evaluation.    Diagnoses and all orders for this visit:    Postoperative urinary retention    Renal mass, left    Renal oncocytoma of left kidney    The patient is doing well since surgery.    His incisions are healing well.    His pathology is benign, which is overall great news.    He will follow-up with Dr. Yuen for his LUTS and PSA.    I answered all his questions.    I encouraged him or any of his family members to call or email me with questions and/or concerns.    I spent 20 minutes with the patient of which more than half was spent in  coordinating the patient's care as well as in direct consultation with the patient in regards to our treatment and plan.

## 2017-11-27 NOTE — PATIENT INSTRUCTIONS
What Is Kidney (Renal) Cancer?    Cancer occurs when cells in the body mutate or change and start multiplying out of control. These cells can form lumps of tissue called tumors. Cancer that starts in the cells that make up the kidney is called kidney or renal cancer.  Understanding the kidneys  The kidneys are bean-shaped organs about the size of a bar of soap. They are found in the low back area, one on each side of the spine. The kidneys help keep the body alive by filtering waste and excess fluid from the blood. The kidneys send this liquid and waste (urine) to the bladder through the ureters. Urine then leaves the body through the urethra.  When kidney cancer forms  Kidney cancer forms when cells in the kidney change and multiply abnormally. The cancer can interfere with the working of the kidneys. Kidney cancer may spread beyond the kidneys to other parts of the body. This spread is called metastasis. The more cancer spreads, the harder it is to treat.  Treatment choices for kidney cancer  You and your healthcare provider will discuss a treatment plan that's best for your needs. Treatment choices may include the following.  Surgery  Surgery is the most common treatment for kidney cancer. Your healthcare provider may advise surgery to treat your kidney cancer if any of these apply to you:  · You are healthy enough to have surgery. Your healthcare provider will only advise surgery if he or she expects you to be able to recover from it.  · The tumor is small. In this case, your healthcare provider may do a partial nephrectomy. This surgery allows you to keep some kidney function. Only the part of the kidney that contains the tumor is taken out. In some cases, your provider may advise this surgery if the tumor is larger but you have cancer in both kidneys or if you have only one kidney. The benefit is that you keep part of the kidney. The risk is that there is a chance some cancer cells will be left  behind.  · The tumor is larger, but is only in your kidney. Your healthcare provider will advise the type of surgery you need based on the size of the tumor and where it is. Your provider may advise a simple nephrectomy. This is surgery to take out the entire kidney. Or your provider may advise a radical nephrectomy. This is surgery to take out the whole kidney and the adrenal gland. The adrenal gland is attached to the top of the kidney. Much of the nearby fatty tissue is also taken out. Nearby lymph nodes will also likely be removed. That's because cancer may travel to the nodes first. Taking out the lymph nodes may help prevent the spread of cancer to other parts of your body. And looking at these lymph nodes helps your provider figure out the stage of the cancer. This is important in deciding whether you need other treatments after surgery.  · You have kidney cancer that has spread to other areas and your doctors believe that removing the original kidney tumor will be a useful and effective addition to treatment. Your healthcare provider may suggest a radical nephrectomy and removal of nearby lymph nodes. The tumor or tumors in other parts of the body may also be removed or it may be recommended that you get anti-cancer drug therapy. Even in cases where surgery wont cure the cancer, surgery can sometimes help ease symptoms such as pressure, pain, or bleeding.  · You have symptoms. You may have pain, pressure, or bleeding from tumors that have spread. Your healthcare provider may suggest surgery to remove the tumors. This is done to ease symptoms. Because it doesn't cure the cancer, it is called palliative therapy.  Biologic therapy (immunotherapy)  This treatment strengthens your immune system to help fight cancer. It uses medicines that work like the chemicals that your bodys immune system makes. They help your immune system fight the cancer.  Chemotherapy  Chemotherapy uses medicines to kill cancer cells.  Regular chemotherapy medicines don't usually work well against kidney cancer. Chemotherapy has mostly been replaced by biologic therapy in kidney cancer treatment. But chemotherapy medicines are still sometimes used in rare cases where biologic therapy has not worked.  Radiation therapy  Radiation therapy uses directed rays of energy to kill cancer cells. Radiation therapy doesn't work as well as other methods for treating kidney cancer. Still, it may be used to treat someone who's not healthy enough to have surgery, or a person with kidney cancer that's not responding to other treatments. It may also be used to treat kidney cancer that has spread to the brain or bones. It may be used to treat pain caused by cancer that has spread to the bone (bone metastasis). It can also help stabilize a bone that has been weakened by metastasis and may be at risk for breaking.  Targeted therapies  These therapies use medicines to focus on or prevent changes in the cancer cells. The changes may be either in the cell's molecules or genes. These medicines help keep the cells from growing out of control and spreading to other parts of the body.  Date Last Reviewed: 2/1/2017 © 2000-2017 [employer]. [address] [place] [address], [place] [address]. All rights reserved. This information is not intended as a substitute for professional medical care. Always follow your healthcare professional's instructions.

## 2017-12-06 ENCOUNTER — LAB VISIT (OUTPATIENT)
Dept: LAB | Facility: HOSPITAL | Age: 62
End: 2017-12-06
Attending: UROLOGY
Payer: COMMERCIAL

## 2017-12-06 DIAGNOSIS — R97.20 ELEVATED PSA: ICD-10-CM

## 2017-12-06 LAB — COMPLEXED PSA SERPL-MCNC: 15.4 NG/ML

## 2017-12-06 PROCEDURE — 36415 COLL VENOUS BLD VENIPUNCTURE: CPT

## 2017-12-06 PROCEDURE — 84153 ASSAY OF PSA TOTAL: CPT

## 2017-12-07 ENCOUNTER — OFFICE VISIT (OUTPATIENT)
Dept: UROLOGY | Facility: CLINIC | Age: 62
End: 2017-12-07
Payer: COMMERCIAL

## 2017-12-07 VITALS
DIASTOLIC BLOOD PRESSURE: 70 MMHG | HEIGHT: 72 IN | HEART RATE: 64 BPM | BODY MASS INDEX: 28.31 KG/M2 | WEIGHT: 209 LBS | SYSTOLIC BLOOD PRESSURE: 142 MMHG

## 2017-12-07 DIAGNOSIS — N13.8 BPH WITH URINARY OBSTRUCTION: ICD-10-CM

## 2017-12-07 DIAGNOSIS — N28.89 LEFT RENAL MASS: ICD-10-CM

## 2017-12-07 DIAGNOSIS — N40.1 BPH WITH URINARY OBSTRUCTION: ICD-10-CM

## 2017-12-07 DIAGNOSIS — R97.20 ELEVATED PSA: Primary | ICD-10-CM

## 2017-12-07 DIAGNOSIS — N40.2 PROSTATE NODULE: ICD-10-CM

## 2017-12-07 PROBLEM — R60.9 EDEMA: Status: RESOLVED | Noted: 2017-11-02 | Resolved: 2017-12-07

## 2017-12-07 PROBLEM — R06.83 SNORING: Status: RESOLVED | Noted: 2017-11-02 | Resolved: 2017-12-07

## 2017-12-07 PROBLEM — Z91.89: Status: RESOLVED | Noted: 2017-11-02 | Resolved: 2017-12-07

## 2017-12-07 PROCEDURE — 99213 OFFICE O/P EST LOW 20 MIN: CPT | Mod: 24,S$GLB,, | Performed by: UROLOGY

## 2017-12-07 PROCEDURE — 99999 PR PBB SHADOW E&M-EST. PATIENT-LVL III: CPT | Mod: PBBFAC,,, | Performed by: UROLOGY

## 2017-12-07 RX ORDER — TAMSULOSIN HYDROCHLORIDE 0.4 MG/1
0.4 CAPSULE ORAL DAILY
Qty: 30 CAPSULE | Refills: 5 | Status: SHIPPED | OUTPATIENT
Start: 2017-12-07 | End: 2018-09-05 | Stop reason: SDUPTHER

## 2017-12-07 NOTE — PROGRESS NOTES
CHIEF COMPLAINT:    Mr. Spangler is a 62 y.o. male presenting with an elevated PSA.    PRESENTING ILLNESS:    Wero Spangler is a 62 y.o. male.  He has had multiple biopsies done.  One was in 2012 when his PSA was 10.14.  There was also a prostate nodule at that time.  Most recent one was 8/23/16 when his PSA was 18.1.  This was a cognitive fusion biopsy.      He is s/p robotic left partial nephrectomy 11/7/17.  Path returned an oncocytoma.    He also has nocturia x 2-3 and slight decreased FOS.  He did develop post op AUR and has been on flomax since then.  He feels like his LUTS are slightly improved on the flomax.    He denies ED.    REVIEW OF SYSTEMS:    Wero Spangler denies any history of headache, blurred vision, fever, nausea, vomiting, chills, flank discomfort, abdominal pain, bleeding per rectum, cough, SOB, recent loss of consciousness, recent mental status changes, seizures, dizziness, or upper or lower extremity weakness.    JOSE MANUEL  1. 3  2. 3  3. 3  4. 4  5. 3     PATIENT HISTORY:    Past Medical History:   Diagnosis Date    Allergy     Cataract     Elevated PSA     Enlarged prostate     Gallstones     General anesthetics causing adverse effect in therapeutic use 2000    delayed emergence after back surgery    GERD (gastroesophageal reflux disease)     HTN (hypertension) 9/24/2013    Hypertension     Urinary tract infection        Past Surgical History:   Procedure Laterality Date    BACK SURGERY  4/2000    CATARACT EXTRACTION W/  INTRAOCULAR LENS IMPLANT      Both Eyes    EYE SURGERY      PROSTATE SURGERY      prostate biopsy benign    TONSILLECTOMY      VASECTOMY         Family History   Problem Relation Age of Onset    Cancer Mother      breast    Prostate cancer Brother     Cancer Brother      prostate    Macular degeneration Maternal Grandmother     Cancer Other     Cancer Other     Amblyopia Neg Hx     Blindness Neg Hx     Cataracts Neg Hx     Glaucoma Neg Hx      Retinal detachment Neg Hx     Strabismus Neg Hx        Social History     Social History    Marital status:      Spouse name: N/A    Number of children: N/A    Years of education: N/A     Occupational History    Not on file.     Social History Main Topics    Smoking status: Former Smoker     Quit date: 2000    Smokeless tobacco: Never Used    Alcohol use 0.6 oz/week     1 Glasses of wine per week      Comment: 2 beers three times a week     Drug use: No    Sexual activity: Yes     Partners: Female     Other Topics Concern    Not on file     Social History Narrative    No narrative on file       Allergies:  Patient has no known allergies.    Medications:    Current Outpatient Prescriptions:     alprazolam (XANAX) 0.5 MG tablet, Take 1 tablet (0.5 mg total) by mouth nightly. (Patient taking differently: Take 0.25 mg by mouth nightly. To help with sleep), Disp: 30 tablet, Rfl: 5    fenofibrate (TRICOR) 54 MG tablet, Take 1 tablet (54 mg total) by mouth once daily. (Patient taking differently: Take 54 mg by mouth every morning. ), Disp: 30 tablet, Rfl: 12    fluticasone (FLONASE) 50 mcg/actuation nasal spray, 1 spray by Each Nare route daily as needed. , Disp: , Rfl:     OMEPRAZOLE (PRILOSEC ORAL), Take by mouth every morning. , Disp: , Rfl:     polyethylene glycol (GLYCOLAX) 17 gram/dose powder, Take 17 g by mouth once daily., Disp: 1 Bottle, Rfl: 0    sodium chloride 2% (BARBI 128) 2 % ophthalmic solution, 1 drop as needed (takes 3-4 times/day)., Disp: , Rfl:     tamsulosin (FLOMAX) 0.4 mg Cp24, Take 1 capsule (0.4 mg total) by mouth once daily., Disp: 30 capsule, Rfl: 3    triamterene-hydrochlorothiazide 37.5-25 mg (MAXZIDE-25) 37.5-25 mg per tablet, Take 1 tablet by mouth once daily. (Patient taking differently: Take 1 tablet by mouth every morning. ), Disp: 30 tablet, Rfl: 0    PHYSICAL EXAMINATION:    The patient generally appears in good health, is appropriately interactive, and is in  no apparent distress.     Eyes: anicteric sclerae, moist conjunctivae; no lid-lag; PERRLA     HENT: Atraumatic; oropharynx clear with moist mucous membranes and no mucosal ulcerations;normal hard and soft palate.  No evidence of lymphadenopathy.    Neck: Trachea midline.  No thyromegaly.    Musculoskeletal: No abnormal gait.    Skin: No lesions.    Mental: Cooperative with normal affect.  Is oriented to time, place, and person.    Neuro: Grossly intact.    Chest: Normal inspiratory effort.   No accessory muscles.  No audible wheezes.  Respirations symmetric on inspiration and expiration.    Heart: Regular rhythm.      Abdomen:  Soft, non-tender. No masses or organomegaly. Bladder is not palpable. No evidence of flank discomfort. No evidence of inguinal hernia.    Genitourinary: The penis is not circumcised with no evidence of plaques or induration. The urethral meatus is normal. The testes, epididymides, and cord structures are normal in size and contour bilaterally. The scrotum is normal in size and contour.    Normal anal sphincter tone. No rectal mass.    The prostate is 40 g. Normal landmarks. Lateral sulci. Median furrow intact. There is a 1.5 cm x 1.5 cm nodule in the midportion of the gland. Stable.  Seminal vesicles are normal.    Extremities: No clubbing, cyanosis, or edema      LABS:    UA dipped negative today  Lab Results   Component Value Date    PSA 14.0 (H) 09/22/2014    PSA 15.48 (H) 08/16/2013    PSA 11.06 (H) 02/25/2013    PSADIAG 15.4 (H) 12/06/2017    PSADIAG 16.3 (H) 03/07/2017    PSADIAG 16.3 (H) 03/07/2017    PSATOTAL 6.8 (H) 07/12/2011    PSATOTAL 7.9 (H) 01/11/2011    PSAFREE 1.07 07/12/2011    PSAFREE 1.67 (H) 01/11/2011    PSAFREEPCT 15.74 07/12/2011    PSAFREEPCT 21.14 01/11/2011        IMPRESSION:    Encounter Diagnoses   Name Primary?    Elevated PSA Yes    BPH with urinary obstruction     Prostate nodule     Left renal mass        PLAN:    1. Continue flomax.  Refilled today.   Discussed Rezum.  He'd like to think about this.  2. RTC 6 months with a PSA.      Copy to:

## 2018-01-03 ENCOUNTER — OFFICE VISIT (OUTPATIENT)
Dept: URGENT CARE | Facility: CLINIC | Age: 63
End: 2018-01-03
Payer: COMMERCIAL

## 2018-01-03 VITALS
RESPIRATION RATE: 18 BRPM | BODY MASS INDEX: 28.31 KG/M2 | OXYGEN SATURATION: 98 % | TEMPERATURE: 98 F | DIASTOLIC BLOOD PRESSURE: 78 MMHG | HEIGHT: 72 IN | HEART RATE: 97 BPM | SYSTOLIC BLOOD PRESSURE: 131 MMHG | WEIGHT: 209 LBS

## 2018-01-03 DIAGNOSIS — J32.9 SINUSITIS, UNSPECIFIED CHRONICITY, UNSPECIFIED LOCATION: Primary | ICD-10-CM

## 2018-01-03 DIAGNOSIS — J02.9 SORE THROAT: ICD-10-CM

## 2018-01-03 LAB
CTP QC/QA: YES
S PYO RRNA THROAT QL PROBE: NEGATIVE

## 2018-01-03 PROCEDURE — 87880 STREP A ASSAY W/OPTIC: CPT | Mod: QW,S$GLB,, | Performed by: PHYSICIAN ASSISTANT

## 2018-01-03 PROCEDURE — 99214 OFFICE O/P EST MOD 30 MIN: CPT | Mod: 25,S$GLB,, | Performed by: PHYSICIAN ASSISTANT

## 2018-01-03 PROCEDURE — 96372 THER/PROPH/DIAG INJ SC/IM: CPT | Mod: S$GLB,,, | Performed by: PHYSICIAN ASSISTANT

## 2018-01-03 RX ORDER — DEXAMETHASONE SODIUM PHOSPHATE 100 MG/10ML
8 INJECTION INTRAMUSCULAR; INTRAVENOUS
Status: COMPLETED | OUTPATIENT
Start: 2018-01-03 | End: 2018-01-03

## 2018-01-03 RX ADMIN — DEXAMETHASONE SODIUM PHOSPHATE 8 MG: 100 INJECTION INTRAMUSCULAR; INTRAVENOUS at 10:01

## 2018-01-03 NOTE — PATIENT INSTRUCTIONS
- Rest.    - Drink plenty of fluids.    - Tylenol or Ibuprofen as directed as needed for fever/pain.    - Take over-the-counter claritin, zyrtec, allegra, or xyzal as directed.  - Use over the counter Flonase as directed for sinus congestion and postnasal drip.  - use nasal saline prior to Flonase.   - Antibiotics are not needed at this time.  - Usual course of cold symptom is 10-14 days, but longer if patient is a smoker.   - Use salt water gargle for sore throat.   - Follow up with your PCP or specialty clinic as directed in the next 1-2 weeks if not improved or as needed.  You can call [phone] to schedule an appointment with the appropriate provider.    - Go to the ED if your symptoms worsen.    Sinusitis (No Antibiotics)    The sinuses are air-filled spaces within the bones of the face. They connect to the inside of the nose. Sinusitis is an inflammation of the tissue lining the sinus cavity. Sinus inflammation can occur during a cold. It can also be due to allergies to pollens and other particles in the air. It can cause symptoms such as sinus congestion, headache, sore throat, facial swelling and fullness. It may also cause a low-grade fever. No infection is present, and no antibiotic treatment is needed.  Home care  · Drink plenty of water, hot tea, and other liquids. This may help thin mucus. It also may promote sinus drainage.  · Heat may help soothe painful areas of the face. Use a towel soaked in hot water. Or,  the shower and direct the hot spray onto your face. Using a vaporizer along with a menthol rub at night may also help.   · An expectorant containing guaifenesin may help thin the mucus and promote drainage from the sinuses.  · Over-the-counter decongestants may be used unless a similar medicine was prescribed. Nasal sprays work the fastest. Use one that contains phenylephrine or oxymetazoline. First blow the nose gently. Then use the spray. Do not use these medicines more often than  directed on the label or symptoms may get worse. You may also use tablets containing pseudoephedrine. Avoid products that combine ingredients, because side effects may be increased. Read labels. You can also ask the pharmacist for help. (NOTE: Persons with high blood pressure should not use decongestants. They can raise blood pressure.)  · Over-the-counter antihistamines may help if allergies contributed to your sinusitis.    · Use acetaminophen or ibuprofen to control pain, unless another pain medicine was prescribed. (If you have chronic liver or kidney disease or ever had a stomach ulcer, talk with your doctor before using these medicines. Aspirin should never be used in anyone under 18 years of age who is ill with a fever. It may cause severe liver damage.)  · Use nasal rinses or irrigation as instructed by your health care provider.  · Don't smoke. This can worsen symptoms.  Follow-up care  Follow up with your healthcare provider or our staff if you are not improving within the next week.  When to seek medical advice  Call your healthcare provider if any of these occur:  · Green or yellow discharge from the nose or into the throat  · Facial pain or headache becoming more severe  · Stiff neck  · Unusual drowsiness or confusion  · Swelling of the forehead or eyelids  · Vision problems, including blurred or double vision  · Fever of 100.4ºF (38ºC) or higher, or as directed by your healthcare provider  · Seizure  · Breathing problems  · Symptoms not resolving within 10 days  Date Last Reviewed: 4/13/2015  © 7536-1121 [employer]. [address] [place] [address], [place] [address]. All rights reserved. This information is not intended as a substitute for professional medical care. Always follow your healthcare professional's instructions.

## 2018-01-03 NOTE — PROGRESS NOTES
Subjective:       Patient ID: Wero Spangler is a 62 y.o. male.    Vitals:  height is 6' (1.829 m) and weight is 94.8 kg (209 lb). His tympanic temperature is 98.1 °F (36.7 °C). His blood pressure is 131/78 and his pulse is 97. His respiration is 18 and oxygen saturation is 98%.     Chief Complaint: Sore Throat (8 days )    This is a 62 y.o. male with Past Medical History:  No date: Allergy  No date: Cataract  No date: Elevated PSA  No date: Enlarged prostate  No date: Gallstones  2000: General anesthetics causing adverse effect in *      Comment: delayed emergence after back surgery  No date: GERD (gastroesophageal reflux disease)  9/24/2013: HTN (hypertension)  No date: Hypertension  No date: Urinary tract infection   who presents today with a chief complaint of sore throat for 8 days.        Sore Throat    This is a new problem. The current episode started 1 to 4 weeks ago (8 days ). The problem has been gradually worsening. The pain is worse on the right side. There has been no fever. The pain is at a severity of 8/10. The pain is severe. Associated symptoms include coughing, headaches, neck pain and trouble swallowing. Pertinent negatives include no abdominal pain, congestion, ear pain, hoarse voice or shortness of breath. He has tried acetaminophen (flonase ) for the symptoms. The treatment provided mild relief.     Review of Systems   Constitution: Negative for chills, fever and malaise/fatigue.   HENT: Positive for sore throat and trouble swallowing. Negative for congestion, ear pain and hoarse voice.    Eyes: Positive for discharge and photophobia. Negative for redness.   Cardiovascular: Negative for chest pain, dyspnea on exertion and leg swelling.   Respiratory: Positive for cough. Negative for shortness of breath, sputum production and wheezing.    Musculoskeletal: Positive for neck pain. Negative for myalgias.   Gastrointestinal: Negative for abdominal pain and nausea.   Neurological: Positive for  headaches.       Objective:      Physical Exam   Constitutional: He is oriented to person, place, and time. He appears well-developed and well-nourished. No distress.   HENT:   Head: Normocephalic and atraumatic.   Right Ear: Hearing, tympanic membrane, external ear and ear canal normal.   Left Ear: Hearing, tympanic membrane, external ear and ear canal normal.   Nose: Mucosal edema present. Right sinus exhibits no maxillary sinus tenderness and no frontal sinus tenderness. Left sinus exhibits no maxillary sinus tenderness and no frontal sinus tenderness.   Mouth/Throat: Uvula is midline and oropharynx is clear and moist.   Eyes: Conjunctivae are normal.   Neck: Normal range of motion. Neck supple.   Cardiovascular: Normal rate and regular rhythm.  Exam reveals no gallop and no friction rub.    No murmur heard.  Pulmonary/Chest: Effort normal and breath sounds normal. He has no wheezes. He has no rales.   Musculoskeletal: Normal range of motion.   Neurological: He is alert and oriented to person, place, and time.   Skin: Skin is warm and dry. No rash noted. No erythema.   Psychiatric: He has a normal mood and affect. His behavior is normal. Judgment and thought content normal.   Nursing note and vitals reviewed.      Assessment:       1. Sinusitis, unspecified chronicity, unspecified location    2. Sore throat        Plan:         Sinusitis, unspecified chronicity, unspecified location  -     dexamethasone injection 8 mg; Inject 0.8 mLs (8 mg total) into the muscle one time.    Sore throat  -     POCT rapid strep A      Wero was seen today for sore throat.    Diagnoses and all orders for this visit:    Sinusitis, unspecified chronicity, unspecified location  -     dexamethasone injection 8 mg; Inject 0.8 mLs (8 mg total) into the muscle one time.    Sore throat  -     POCT rapid strep A      Patient Instructions   - Rest.    - Drink plenty of fluids.    - Tylenol or Ibuprofen as directed as needed for fever/pain.     - Take over-the-counter claritin, zyrtec, allegra, or xyzal as directed.  - Use over the counter Flonase as directed for sinus congestion and postnasal drip.  - use nasal saline prior to Flonase.   - Antibiotics are not needed at this time.  - Usual course of cold symptom is 10-14 days, but longer if patient is a smoker.   - Use salt water gargle for sore throat.   - Follow up with your PCP or specialty clinic as directed in the next 1-2 weeks if not improved or as needed.  You can call [phone] to schedule an appointment with the appropriate provider.    - Go to the ED if your symptoms worsen.    Sinusitis (No Antibiotics)    The sinuses are air-filled spaces within the bones of the face. They connect to the inside of the nose. Sinusitis is an inflammation of the tissue lining the sinus cavity. Sinus inflammation can occur during a cold. It can also be due to allergies to pollens and other particles in the air. It can cause symptoms such as sinus congestion, headache, sore throat, facial swelling and fullness. It may also cause a low-grade fever. No infection is present, and no antibiotic treatment is needed.  Home care  · Drink plenty of water, hot tea, and other liquids. This may help thin mucus. It also may promote sinus drainage.  · Heat may help soothe painful areas of the face. Use a towel soaked in hot water. Or,  the shower and direct the hot spray onto your face. Using a vaporizer along with a menthol rub at night may also help.   · An expectorant containing guaifenesin may help thin the mucus and promote drainage from the sinuses.  · Over-the-counter decongestants may be used unless a similar medicine was prescribed. Nasal sprays work the fastest. Use one that contains phenylephrine or oxymetazoline. First blow the nose gently. Then use the spray. Do not use these medicines more often than directed on the label or symptoms may get worse. You may also use tablets containing pseudoephedrine.  Avoid products that combine ingredients, because side effects may be increased. Read labels. You can also ask the pharmacist for help. (NOTE: Persons with high blood pressure should not use decongestants. They can raise blood pressure.)  · Over-the-counter antihistamines may help if allergies contributed to your sinusitis.    · Use acetaminophen or ibuprofen to control pain, unless another pain medicine was prescribed. (If you have chronic liver or kidney disease or ever had a stomach ulcer, talk with your doctor before using these medicines. Aspirin should never be used in anyone under 18 years of age who is ill with a fever. It may cause severe liver damage.)  · Use nasal rinses or irrigation as instructed by your health care provider.  · Don't smoke. This can worsen symptoms.  Follow-up care  Follow up with your healthcare provider or our staff if you are not improving within the next week.  When to seek medical advice  Call your healthcare provider if any of these occur:  · Green or yellow discharge from the nose or into the throat  · Facial pain or headache becoming more severe  · Stiff neck  · Unusual drowsiness or confusion  · Swelling of the forehead or eyelids  · Vision problems, including blurred or double vision  · Fever of 100.4ºF (38ºC) or higher, or as directed by your healthcare provider  · Seizure  · Breathing problems  · Symptoms not resolving within 10 days  Date Last Reviewed: 4/13/2015  © 7141-8998 [employer]. [address] [place] [address], [place] [address]. All rights reserved. This information is not intended as a substitute for professional medical care. Always follow your healthcare professional's instructions.

## 2018-01-04 ENCOUNTER — PATIENT MESSAGE (OUTPATIENT)
Dept: RESEARCH | Facility: HOSPITAL | Age: 63
End: 2018-01-04

## 2018-01-08 ENCOUNTER — PATIENT MESSAGE (OUTPATIENT)
Dept: INTERNAL MEDICINE | Facility: CLINIC | Age: 63
End: 2018-01-08

## 2018-01-08 RX ORDER — BENZONATATE 100 MG/1
100 CAPSULE ORAL 3 TIMES DAILY PRN
Qty: 30 CAPSULE | Refills: 0 | Status: SHIPPED | OUTPATIENT
Start: 2018-01-08 | End: 2018-01-18

## 2018-01-10 ENCOUNTER — PATIENT MESSAGE (OUTPATIENT)
Dept: INTERNAL MEDICINE | Facility: CLINIC | Age: 63
End: 2018-01-10

## 2018-01-11 ENCOUNTER — OFFICE VISIT (OUTPATIENT)
Dept: INTERNAL MEDICINE | Facility: CLINIC | Age: 63
End: 2018-01-11
Payer: COMMERCIAL

## 2018-01-11 VITALS
DIASTOLIC BLOOD PRESSURE: 88 MMHG | BODY MASS INDEX: 28.6 KG/M2 | TEMPERATURE: 98 F | OXYGEN SATURATION: 98 % | SYSTOLIC BLOOD PRESSURE: 126 MMHG | HEART RATE: 86 BPM | WEIGHT: 211.19 LBS | HEIGHT: 72 IN

## 2018-01-11 DIAGNOSIS — R05.9 COUGH: ICD-10-CM

## 2018-01-11 DIAGNOSIS — J06.9 UPPER RESPIRATORY TRACT INFECTION, UNSPECIFIED TYPE: Primary | ICD-10-CM

## 2018-01-11 PROCEDURE — 99213 OFFICE O/P EST LOW 20 MIN: CPT | Mod: S$GLB,,, | Performed by: NURSE PRACTITIONER

## 2018-01-11 PROCEDURE — 99999 PR PBB SHADOW E&M-EST. PATIENT-LVL III: CPT | Mod: PBBFAC,,, | Performed by: NURSE PRACTITIONER

## 2018-01-11 NOTE — PROGRESS NOTES
INTERNAL MEDICINE PROGRESS/URGENT CARE NOTE    CHIEF COMPLAINT     Chief Complaint   Patient presents with    Cough     watering eyes 1x week cant sleep        HPI     Wero Spangler is a 62 y.o. male allergies, BPH, gerd, htn, and OA who presents for an urgent visit today.  He is an established pt of Dr. Cano.     Here with c/o cough with watery eyes x 1 week. Cough is constant and is prohibiting sleep with interrupted sleep. Cough is productive.   Treating with tessalon pearls without relief. Was seen at urgent care 1 week ago - treated with steroid shot and zyrtec.          Past Medical History:  Past Medical History:   Diagnosis Date    Allergy     Cataract     Elevated PSA     Enlarged prostate     Gallstones     General anesthetics causing adverse effect in therapeutic use 2000    delayed emergence after back surgery    GERD (gastroesophageal reflux disease)     HTN (hypertension) 9/24/2013    Hypertension     Urinary tract infection        Home Medications:  Prior to Admission medications    Medication Sig Start Date End Date Taking? Authorizing Provider   alprazolam (XANAX) 0.5 MG tablet Take 1 tablet (0.5 mg total) by mouth nightly.  Patient taking differently: Take 0.25 mg by mouth nightly. To help with sleep 10/3/17  Yes Duke Cano MD   benzonatate (TESSALON) 100 MG capsule Take 1 capsule (100 mg total) by mouth 3 (three) times daily as needed. 1/8/18 1/18/18 Yes Duke Cano MD   fenofibrate (TRICOR) 54 MG tablet Take 1 tablet (54 mg total) by mouth once daily.  Patient taking differently: Take 54 mg by mouth every morning.  3/10/17  Yes Duke Cano MD   fluticasone (FLONASE) 50 mcg/actuation nasal spray 1 spray by Each Nare route daily as needed.    Yes Historical Provider, MD   OMEPRAZOLE (PRILOSEC ORAL) Take by mouth every morning.    Yes Historical Provider, MD   sodium chloride 2% (BARBI 128) 2 % ophthalmic solution 1 drop as needed (takes 3-4 times/day).   Yes  Historical Provider, MD   triamterene-hydrochlorothiazide 37.5-25 mg (MAXZIDE-25) 37.5-25 mg per tablet Take 1 tablet by mouth once daily.  Patient taking differently: Take 1 tablet by mouth every morning.  10/20/17  Yes Berenice Castro MD   polyethylene glycol (GLYCOLAX) 17 gram/dose powder Take 17 g by mouth once daily. 11/8/17   Gerber Kurtz MD   tamsulosin (FLOMAX) 0.4 mg Cp24 Take 1 capsule (0.4 mg total) by mouth once daily. 12/7/17 1/6/18  Darren Yuen MD       Review of Systems:  Review of Systems   Constitutional: Positive for fatigue. Negative for chills, diaphoresis and fever.   HENT: Positive for congestion and postnasal drip. Negative for rhinorrhea, sinus pain, sinus pressure, sneezing and sore throat.    Eyes: Positive for discharge (watering ).   Respiratory: Positive for cough. Negative for shortness of breath and wheezing.    Cardiovascular: Negative for chest pain and palpitations.   Gastrointestinal: Negative for abdominal pain, constipation, diarrhea, nausea and vomiting.   Neurological: Negative for dizziness, light-headedness and headaches.       Health Maintainence:   Immunizations:  Health Maintenance       Date Due Completion Date    TETANUS VACCINE 08/18/1971 ---    Colonoscopy 08/18/2003 ---    Zoster Vaccine 08/18/2013 ---    Influenza Vaccine 08/01/2017 ---    PROSTATE-SPECIFIC ANTIGEN 12/06/2018 12/6/2017    Lipid Panel 03/07/2022 3/7/2017           PHYSICAL EXAM     /88 (BP Location: Left arm, Patient Position: Sitting, BP Method: Large (Manual))   Pulse 86   Temp 98.2 °F (36.8 °C) (Oral)   Ht 6' (1.829 m)   Wt 95.8 kg (211 lb 3.2 oz)   SpO2 98%   BMI 28.64 kg/m²     Physical Exam   Constitutional: He is oriented to person, place, and time. He appears well-developed and well-nourished.   HENT:   Head: Normocephalic.   Right Ear: External ear normal. Tympanic membrane is injected.   Left Ear: External ear normal. Tympanic membrane is injected.   Nose: Nose  normal.   Mouth/Throat: Oropharynx is clear and moist. No oropharyngeal exudate.   Eyes: Pupils are equal, round, and reactive to light.   Neck: No JVD present. No tracheal deviation present. No thyromegaly present.   Cardiovascular: Normal rate, regular rhythm and intact distal pulses.  Exam reveals no gallop and no friction rub.    No murmur heard.  Pulmonary/Chest: Breath sounds normal. No respiratory distress. He has no wheezes. He has no rales. He exhibits no tenderness.   Abdominal: Soft. Bowel sounds are normal. He exhibits no distension. There is no tenderness.   Musculoskeletal: Normal range of motion. He exhibits no edema or tenderness.   Lymphadenopathy:     He has no cervical adenopathy.   Neurological: He is alert and oriented to person, place, and time.   Skin: Skin is warm and dry. No rash noted.   Psychiatric: He has a normal mood and affect. His behavior is normal.   Vitals reviewed.      LABS     Lab Results   Component Value Date    HGBA1C 4.8 03/07/2017     CMP  Sodium   Date Value Ref Range Status   11/08/2017 139 136 - 145 mmol/L Final     Potassium   Date Value Ref Range Status   11/08/2017 3.6 3.5 - 5.1 mmol/L Final     Chloride   Date Value Ref Range Status   11/08/2017 107 95 - 110 mmol/L Final     CO2   Date Value Ref Range Status   11/08/2017 27 23 - 29 mmol/L Final     Glucose   Date Value Ref Range Status   11/08/2017 134 (H) 70 - 110 mg/dL Final     BUN, Bld   Date Value Ref Range Status   11/08/2017 17 8 - 23 mg/dL Final     Creatinine   Date Value Ref Range Status   11/08/2017 1.1 0.5 - 1.4 mg/dL Final     Calcium   Date Value Ref Range Status   11/08/2017 8.8 8.7 - 10.5 mg/dL Final     Total Protein   Date Value Ref Range Status   11/08/2017 6.3 6.0 - 8.4 g/dL Final     Albumin   Date Value Ref Range Status   11/08/2017 3.4 (L) 3.5 - 5.2 g/dL Final     Total Bilirubin   Date Value Ref Range Status   11/08/2017 1.1 (H) 0.1 - 1.0 mg/dL Final     Comment:     For infants and newborns,  interpretation of results should be based  on gestational age, weight and in agreement with clinical  observations.  Premature Infant recommended reference ranges:  Up to 24 hours.............<8.0 mg/dL  Up to 48 hours............<12.0 mg/dL  3-5 days..................<15.0 mg/dL  6-29 days.................<15.0 mg/dL       Alkaline Phosphatase   Date Value Ref Range Status   11/08/2017 49 (L) 55 - 135 U/L Final     AST   Date Value Ref Range Status   11/08/2017 26 10 - 40 U/L Final     ALT   Date Value Ref Range Status   11/08/2017 21 10 - 44 U/L Final     Anion Gap   Date Value Ref Range Status   11/08/2017 5 (L) 8 - 16 mmol/L Final     eGFR if    Date Value Ref Range Status   11/08/2017 >60.0 >60 mL/min/1.73 m^2 Final     eGFR if non    Date Value Ref Range Status   11/08/2017 >60.0 >60 mL/min/1.73 m^2 Final     Comment:     Calculation used to obtain the estimated glomerular filtration  rate (eGFR) is the CKD-EPI equation.        Lab Results   Component Value Date    WBC 10.41 11/08/2017    HGB 12.3 (L) 11/08/2017    HCT 34 (L) 11/08/2017    MCV 87 11/08/2017     11/08/2017     Lab Results   Component Value Date    CHOL 184 03/07/2017    CHOL 186 01/12/2016    CHOL 184 12/17/2014     Lab Results   Component Value Date    HDL 39 (L) 03/07/2017    HDL 39 (L) 01/12/2016    HDL 36 (L) 12/17/2014     Lab Results   Component Value Date    LDLCALC 117.2 03/07/2017    LDLCALC 108.8 01/12/2016    LDLCALC 111.6 12/17/2014     Lab Results   Component Value Date    TRIG 139 03/07/2017    TRIG 191 (H) 01/12/2016    TRIG 182 (H) 12/17/2014     Lab Results   Component Value Date    CHOLHDL 21.2 03/07/2017    CHOLHDL 21.0 01/12/2016    CHOLHDL 19.6 (L) 12/17/2014     Lab Results   Component Value Date    TSH 1.542 03/07/2017       ASSESSMENT/PLAN     Wero Spangler is a 62 y.o. male with  Past Medical History:   Diagnosis Date    Allergy     Cataract     Elevated PSA     Enlarged  prostate     Gallstones     General anesthetics causing adverse effect in therapeutic use 2000    delayed emergence after back surgery    GERD (gastroesophageal reflux disease)     HTN (hypertension) 9/24/2013    Hypertension     Urinary tract infection      Upper respiratory tract infection, unspecified type- resolving. will cont tessalon pearls as needed, refuses cough syrup states it doesn't;t work. May use suhas x 3 nights as needed for nasal congestion. Cont flonase 2 squirts daily.     Cough- may cont tessalon pearls as needed.           Follow up as needed     Patient education provided from Jose. Patient was counseled on when and how to seek emergent care.       Judi GARCIAS, APRN, FNP-c   Department of Internal Medicine - [facility]  2:56 PM

## 2018-02-08 ENCOUNTER — PATIENT MESSAGE (OUTPATIENT)
Dept: OPTOMETRY | Facility: CLINIC | Age: 63
End: 2018-02-08

## 2018-02-09 ENCOUNTER — OFFICE VISIT (OUTPATIENT)
Dept: OPTOMETRY | Facility: CLINIC | Age: 63
End: 2018-02-09
Payer: COMMERCIAL

## 2018-02-09 DIAGNOSIS — H18.509 CORNEAL DYSTROPHY: ICD-10-CM

## 2018-02-09 DIAGNOSIS — H16.141 PUNCTATE KERATITIS OF RIGHT EYE: Primary | ICD-10-CM

## 2018-02-09 PROCEDURE — 99999 PR PBB SHADOW E&M-EST. PATIENT-LVL II: CPT | Mod: PBBFAC,,, | Performed by: OPTOMETRIST

## 2018-02-09 PROCEDURE — 92012 INTRM OPH EXAM EST PATIENT: CPT | Mod: S$GLB,,, | Performed by: OPTOMETRIST

## 2018-02-09 RX ORDER — NEOMYCIN SULFATE, POLYMYXIN B SULFATE AND DEXAMETHASONE 3.5; 10000; 1 MG/ML; [USP'U]/ML; MG/ML
1 SUSPENSION/ DROPS OPHTHALMIC 4 TIMES DAILY
Qty: 5 ML | Refills: 0 | Status: SHIPPED | OUTPATIENT
Start: 2018-02-09 | End: 2018-06-06

## 2018-02-09 NOTE — PROGRESS NOTES
HPI       DLS: 7/17/2017 with Dr. Pereyra  Pt reports FBS in the right eye x 2 days.   +Tearing   +Redness   States va is stable    Olya 128 ou QID     Corneal Dystrophy OU   Sp PC IOL OU   Sp focal Laser for peripheral hole OD    Last edited by Sarah Caballero, OD on 2/9/2018  9:53 AM. (History)            Assessment /Plan     For exam results, see Encounter Report.    Punctate keratitis of right eye  -     neomycin-polymyxin-dexamethasone (MAXITROL) 3.5mg/mL-10,000 unit/mL-0.1 % DrpS; Place 1 drop into the right eye 4 (four) times daily.  Dispense: 5 mL; Refill: 0    Corneal dystrophy - Both Eyes      1. (+) SPK OD. Pt feels that he may have rubbed eye too hard earlier this week and that today he hasnt had much irritation.. Educated pt of today's findings. Rx Maxitrol to take 1 gt OD QID. RTC in 1 week for aseg eval or prn.  2. 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  Pt would like to continue 1 gt of Olya 128 QID OU.

## 2018-02-15 ENCOUNTER — OFFICE VISIT (OUTPATIENT)
Dept: OPTOMETRY | Facility: CLINIC | Age: 63
End: 2018-02-15
Payer: COMMERCIAL

## 2018-02-15 DIAGNOSIS — H16.141 PUNCTATE KERATITIS OF RIGHT EYE: Primary | ICD-10-CM

## 2018-02-15 PROCEDURE — 92012 INTRM OPH EXAM EST PATIENT: CPT | Mod: S$GLB,,, | Performed by: OPTOMETRIST

## 2018-02-15 PROCEDURE — 99999 PR PBB SHADOW E&M-EST. PATIENT-LVL II: CPT | Mod: PBBFAC,,, | Performed by: OPTOMETRIST

## 2018-02-15 NOTE — PROGRESS NOTES
HPI     RITESH: 02/09/2018  Pt here for 1 week f/u OD for SPK. Pt says eye feels much better but eyes   feel a lttle dry in the morning.  (-)pain, irritation  (-)flashes, floaters    Last edited by Sarah Caballero, OD on 2/15/2018 11:15 AM. (History)            Assessment /Plan     For exam results, see Encounter Report.    Punctate keratitis of right eye      1. SPK resolved. Educated pt on today's findings and recommended the use of AT's OD prn to help with dry eyes.  RTC prn.

## 2018-03-11 RX ORDER — FENOFIBRATE 54 MG/1
TABLET ORAL
Qty: 30 TABLET | Refills: 11 | Status: SHIPPED | OUTPATIENT
Start: 2018-03-11 | End: 2019-02-25 | Stop reason: SDUPTHER

## 2018-03-18 RX ORDER — TRIAMTERENE/HYDROCHLOROTHIAZID 37.5-25 MG
TABLET ORAL
Qty: 30 TABLET | Refills: 11 | Status: SHIPPED | OUTPATIENT
Start: 2018-03-18 | End: 2019-02-25 | Stop reason: SDUPTHER

## 2018-05-30 ENCOUNTER — LAB VISIT (OUTPATIENT)
Dept: LAB | Facility: HOSPITAL | Age: 63
End: 2018-05-30
Attending: UROLOGY
Payer: COMMERCIAL

## 2018-05-30 DIAGNOSIS — R97.20 ELEVATED PSA: ICD-10-CM

## 2018-05-30 LAB — COMPLEXED PSA SERPL-MCNC: 18.2 NG/ML

## 2018-05-30 PROCEDURE — 36415 COLL VENOUS BLD VENIPUNCTURE: CPT

## 2018-05-30 PROCEDURE — 84153 ASSAY OF PSA TOTAL: CPT

## 2018-06-06 ENCOUNTER — OFFICE VISIT (OUTPATIENT)
Dept: UROLOGY | Facility: CLINIC | Age: 63
End: 2018-06-06
Payer: COMMERCIAL

## 2018-06-06 VITALS
HEIGHT: 72 IN | WEIGHT: 213.88 LBS | SYSTOLIC BLOOD PRESSURE: 127 MMHG | HEART RATE: 70 BPM | DIASTOLIC BLOOD PRESSURE: 83 MMHG | BODY MASS INDEX: 28.97 KG/M2

## 2018-06-06 DIAGNOSIS — N13.8 BPH WITH URINARY OBSTRUCTION: ICD-10-CM

## 2018-06-06 DIAGNOSIS — N40.1 BPH WITH URINARY OBSTRUCTION: ICD-10-CM

## 2018-06-06 DIAGNOSIS — N40.2 PROSTATE NODULE: ICD-10-CM

## 2018-06-06 DIAGNOSIS — R97.20 ELEVATED PSA: Primary | ICD-10-CM

## 2018-06-06 PROCEDURE — 3008F BODY MASS INDEX DOCD: CPT | Mod: CPTII,S$GLB,, | Performed by: UROLOGY

## 2018-06-06 PROCEDURE — 3074F SYST BP LT 130 MM HG: CPT | Mod: CPTII,S$GLB,, | Performed by: UROLOGY

## 2018-06-06 PROCEDURE — 99999 PR PBB SHADOW E&M-EST. PATIENT-LVL III: CPT | Mod: PBBFAC,,, | Performed by: UROLOGY

## 2018-06-06 PROCEDURE — 3079F DIAST BP 80-89 MM HG: CPT | Mod: CPTII,S$GLB,, | Performed by: UROLOGY

## 2018-06-06 PROCEDURE — 99213 OFFICE O/P EST LOW 20 MIN: CPT | Mod: S$GLB,,, | Performed by: UROLOGY

## 2018-06-06 NOTE — PROGRESS NOTES
CHIEF COMPLAINT:    Mr. Spangler is a 62 y.o. male presenting with an elevated PSA.    PRESENTING ILLNESS:    Wero Spangler is a 62 y.o. male.  He has had multiple biopsies done.  One was in 2012 when his PSA was 10.14.  There was also a prostate nodule at that time.  Most recent one was 8/23/16 when his PSA was 18.1.  This was a cognitive fusion biopsy.      He is s/p robotic left partial nephrectomy 11/7/17.  Path returned an oncocytoma.    He also has nocturia x 2-3 and slight decreased FOS.  He did develop post op AUR and has been on flomax since then.  He feels like his LUTS are slightly improved on the flomax.    He denies ED.    REVIEW OF SYSTEMS:    Wero Spangler denies any history of headache, blurred vision, fever, nausea, vomiting, chills, flank discomfort, abdominal pain, bleeding per rectum, cough, SOB, recent loss of consciousness, recent mental status changes, seizures, dizziness, or upper or lower extremity weakness.    JOSE MANUEL  1. 3  2. 3  3. 3  4. 4  5. 3     PATIENT HISTORY:    Past Medical History:   Diagnosis Date    Allergy     Cataract     Elevated PSA     Enlarged prostate     Gallstones     General anesthetics causing adverse effect in therapeutic use 2000    delayed emergence after back surgery    GERD (gastroesophageal reflux disease)     HTN (hypertension) 9/24/2013    Hypertension     Urinary tract infection        Past Surgical History:   Procedure Laterality Date    BACK SURGERY  4/2000    CATARACT EXTRACTION W/  INTRAOCULAR LENS IMPLANT      Both Eyes    EYE SURGERY      PROSTATE SURGERY      prostate biopsy benign    TONSILLECTOMY      VASECTOMY         Family History   Problem Relation Age of Onset    Cancer Mother         breast    Prostate cancer Brother     Cancer Brother         prostate    Macular degeneration Maternal Grandmother     Cancer Other     Cancer Other     Amblyopia Neg Hx     Blindness Neg Hx     Cataracts Neg Hx     Glaucoma Neg Hx      Retinal detachment Neg Hx     Strabismus Neg Hx        Social History     Social History    Marital status:      Spouse name: N/A    Number of children: N/A    Years of education: N/A     Occupational History    Not on file.     Social History Main Topics    Smoking status: Former Smoker     Quit date: 2000    Smokeless tobacco: Never Used    Alcohol use 0.6 oz/week     1 Glasses of wine per week      Comment: 2 beers three times a week     Drug use: No    Sexual activity: Yes     Partners: Female     Other Topics Concern    Not on file     Social History Narrative    No narrative on file       Allergies:  Patient has no known allergies.    Medications:    Current Outpatient Prescriptions:     alprazolam (XANAX) 0.5 MG tablet, Take 1 tablet (0.5 mg total) by mouth nightly. (Patient taking differently: Take 0.25 mg by mouth nightly. To help with sleep), Disp: 30 tablet, Rfl: 5    fenofibrate (TRICOR) 54 MG tablet, TAKE ONE TABLET BY MOUTH ONCE DAILY, Disp: 30 tablet, Rfl: 11    fluticasone (FLONASE) 50 mcg/actuation nasal spray, 1 spray by Each Nare route daily as needed. , Disp: , Rfl:     OMEPRAZOLE (PRILOSEC ORAL), Take by mouth every morning. , Disp: , Rfl:     polyethylene glycol (GLYCOLAX) 17 gram/dose powder, Take 17 g by mouth once daily., Disp: 1 Bottle, Rfl: 0    sodium chloride 2% (BARBI 128) 2 % ophthalmic solution, 1 drop as needed (takes 3-4 times/day)., Disp: , Rfl:     triamterene-hydrochlorothiazide 37.5-25 mg (MAXZIDE-25) 37.5-25 mg per tablet, Take 1 tablet by mouth once daily. (Patient taking differently: Take 1 tablet by mouth every morning. ), Disp: 30 tablet, Rfl: 0    triamterene-hydrochlorothiazide 37.5-25 mg (MAXZIDE-25) 37.5-25 mg per tablet, TAKE ONE TABLET BY MOUTH ONE TIME DAILY , Disp: 30 tablet, Rfl: 11    tamsulosin (FLOMAX) 0.4 mg Cp24, Take 1 capsule (0.4 mg total) by mouth once daily., Disp: 30 capsule, Rfl: 5    PHYSICAL EXAMINATION:    The patient  generally appears in good health, is appropriately interactive, and is in no apparent distress.     Eyes: anicteric sclerae, moist conjunctivae; no lid-lag; PERRLA     HENT: Atraumatic; oropharynx clear with moist mucous membranes and no mucosal ulcerations;normal hard and soft palate.  No evidence of lymphadenopathy.    Neck: Trachea midline.  No thyromegaly.    Musculoskeletal: No abnormal gait.    Skin: No lesions.    Mental: Cooperative with normal affect.  Is oriented to time, place, and person.    Neuro: Grossly intact.    Chest: Normal inspiratory effort.   No accessory muscles.  No audible wheezes.  Respirations symmetric on inspiration and expiration.    Heart: Regular rhythm.      Abdomen:  Soft, non-tender. No masses or organomegaly. Bladder is not palpable. No evidence of flank discomfort. No evidence of inguinal hernia.    Genitourinary: The penis is not circumcised with no evidence of plaques or induration. The urethral meatus is normal. The testes, epididymides, and cord structures are normal in size and contour bilaterally. The scrotum is normal in size and contour.    Normal anal sphincter tone. No rectal mass.    The prostate is 40 g. Normal landmarks. Lateral sulci. Median furrow intact. There is a 1.5 cm x 1.5 cm nodule in the midportion of the gland. Stable.  Seminal vesicles are normal.    Extremities: No clubbing, cyanosis, or edema      LABS:    UA dipped negative today  Lab Results   Component Value Date    PSA 14.0 (H) 09/22/2014    PSA 15.48 (H) 08/16/2013    PSA 11.06 (H) 02/25/2013    PSADIAG 18.2 (H) 05/30/2018    PSADIAG 15.4 (H) 12/06/2017    PSADIAG 16.3 (H) 03/07/2017    PSADIAG 16.3 (H) 03/07/2017    PSATOTAL 6.8 (H) 07/12/2011    PSATOTAL 7.9 (H) 01/11/2011    PSAFREE 1.07 07/12/2011    PSAFREE 1.67 (H) 01/11/2011    PSAFREEPCT 15.74 07/12/2011    PSAFREEPCT 21.14 01/11/2011        IMPRESSION:    Encounter Diagnoses   Name Primary?    Elevated PSA Yes    BPH with urinary  obstruction     Prostate nodule        PLAN:    1. Continue flomax.  Refilled today.  Discussed Rezum.  He'd like to think about this.  2. RTC 6 months with a PSA.      Copy to:

## 2018-06-29 ENCOUNTER — OFFICE VISIT (OUTPATIENT)
Dept: INTERNAL MEDICINE | Facility: CLINIC | Age: 63
End: 2018-06-29
Payer: COMMERCIAL

## 2018-06-29 ENCOUNTER — LAB VISIT (OUTPATIENT)
Dept: LAB | Facility: HOSPITAL | Age: 63
End: 2018-06-29
Attending: INTERNAL MEDICINE
Payer: COMMERCIAL

## 2018-06-29 VITALS
HEIGHT: 72 IN | SYSTOLIC BLOOD PRESSURE: 128 MMHG | WEIGHT: 213.63 LBS | BODY MASS INDEX: 28.93 KG/M2 | HEART RATE: 76 BPM | OXYGEN SATURATION: 98 % | DIASTOLIC BLOOD PRESSURE: 70 MMHG

## 2018-06-29 DIAGNOSIS — R42 LIGHTHEADEDNESS: ICD-10-CM

## 2018-06-29 DIAGNOSIS — R42 DIZZINESS: Primary | ICD-10-CM

## 2018-06-29 DIAGNOSIS — I10 ESSENTIAL HYPERTENSION: ICD-10-CM

## 2018-06-29 DIAGNOSIS — R42 DIZZINESS: ICD-10-CM

## 2018-06-29 LAB
ANION GAP SERPL CALC-SCNC: 6 MMOL/L
BASOPHILS # BLD AUTO: 0.03 K/UL
BASOPHILS NFR BLD: 0.5 %
BUN SERPL-MCNC: 17 MG/DL
CALCIUM SERPL-MCNC: 9.2 MG/DL
CHLORIDE SERPL-SCNC: 104 MMOL/L
CO2 SERPL-SCNC: 30 MMOL/L
CREAT SERPL-MCNC: 1.1 MG/DL
DIFFERENTIAL METHOD: ABNORMAL
EOSINOPHIL # BLD AUTO: 0.1 K/UL
EOSINOPHIL NFR BLD: 1.4 %
ERYTHROCYTE [DISTWIDTH] IN BLOOD BY AUTOMATED COUNT: 14.7 %
EST. GFR  (AFRICAN AMERICAN): >60 ML/MIN/1.73 M^2
EST. GFR  (NON AFRICAN AMERICAN): >60 ML/MIN/1.73 M^2
ESTIMATED AVG GLUCOSE: 94 MG/DL
GLUCOSE SERPL-MCNC: 83 MG/DL
HBA1C MFR BLD HPLC: 4.9 %
HCT VFR BLD AUTO: 39 %
HGB BLD-MCNC: 13.9 G/DL
LYMPHOCYTES # BLD AUTO: 1.8 K/UL
LYMPHOCYTES NFR BLD: 27.4 %
MCH RBC QN AUTO: 30.6 PG
MCHC RBC AUTO-ENTMCNC: 35.6 G/DL
MCV RBC AUTO: 86 FL
MONOCYTES # BLD AUTO: 0.7 K/UL
MONOCYTES NFR BLD: 11.3 %
NEUTROPHILS # BLD AUTO: 3.9 K/UL
NEUTROPHILS NFR BLD: 59.2 %
PLATELET # BLD AUTO: 232 K/UL
PMV BLD AUTO: 9.9 FL
POTASSIUM SERPL-SCNC: 3.7 MMOL/L
RBC # BLD AUTO: 4.54 M/UL
SODIUM SERPL-SCNC: 140 MMOL/L
TSH SERPL DL<=0.005 MIU/L-ACNC: 1.56 UIU/ML
WBC # BLD AUTO: 6.57 K/UL

## 2018-06-29 PROCEDURE — 84443 ASSAY THYROID STIM HORMONE: CPT

## 2018-06-29 PROCEDURE — 3078F DIAST BP <80 MM HG: CPT | Mod: CPTII,S$GLB,, | Performed by: INTERNAL MEDICINE

## 2018-06-29 PROCEDURE — 80048 BASIC METABOLIC PNL TOTAL CA: CPT

## 2018-06-29 PROCEDURE — 3074F SYST BP LT 130 MM HG: CPT | Mod: CPTII,S$GLB,, | Performed by: INTERNAL MEDICINE

## 2018-06-29 PROCEDURE — 85025 COMPLETE CBC W/AUTO DIFF WBC: CPT

## 2018-06-29 PROCEDURE — 99214 OFFICE O/P EST MOD 30 MIN: CPT | Mod: S$GLB,,, | Performed by: INTERNAL MEDICINE

## 2018-06-29 PROCEDURE — 3008F BODY MASS INDEX DOCD: CPT | Mod: CPTII,S$GLB,, | Performed by: INTERNAL MEDICINE

## 2018-06-29 PROCEDURE — 83036 HEMOGLOBIN GLYCOSYLATED A1C: CPT

## 2018-06-29 PROCEDURE — 36415 COLL VENOUS BLD VENIPUNCTURE: CPT

## 2018-06-29 PROCEDURE — 99999 PR PBB SHADOW E&M-EST. PATIENT-LVL III: CPT | Mod: PBBFAC,,, | Performed by: INTERNAL MEDICINE

## 2018-06-29 RX ORDER — ALPRAZOLAM 0.5 MG/1
0.5 TABLET ORAL NIGHTLY
Qty: 30 TABLET | Refills: 5 | Status: SHIPPED | OUTPATIENT
Start: 2018-06-29 | End: 2019-03-14 | Stop reason: SDUPTHER

## 2018-06-29 NOTE — PROGRESS NOTES
Subjective:       Patient ID: Wero Spangler is a 62 y.o. male.    Chief Complaint: Dizziness    HPI:  Patient comes in for urgent care visit for about 2 weeks complaint of dizziness especially when standing up.  He denies spinning or vertigo type symptoms.  No loss of balance.  He took a trip to [place] about 2 weeks ago and sat and watched few baseball games during the afternoon and said he was sweating profusely but kept up with his fluids primarily water.  Felt slightly dizzy during that trip but nothing major.  Says he usually goes for a walk in the mornings but a day or 2 ago upon trying to do that felt that he was too dizzy to do so.  Was sweating profusely.  He did not feel well enough to go this morning and wanted to come in and be checked.  No recent change in his medications.  No diarrhea.  He does sometimes wake up at night to urinate but blames that on prostate.  No excessive thirst.  Nothing to obviously indicate diabetes.  No palpitations.      Dizziness:    Associated symptoms: light-headedness.no fever, no headaches, no nausea, no vomiting, no palpitations and no chest pain.    Review of Systems   Constitutional: Negative for chills, fatigue, fever and unexpected weight change.   HENT: Negative for nosebleeds and trouble swallowing.    Eyes: Negative for pain and visual disturbance.   Respiratory: Negative for cough, shortness of breath and wheezing.    Cardiovascular: Negative for chest pain and palpitations.   Gastrointestinal: Negative for abdominal pain, constipation, diarrhea, nausea and vomiting.   Genitourinary: Negative for difficulty urinating and hematuria.   Musculoskeletal: Negative for neck pain.   Skin: Negative for rash.   Neurological: Positive for dizziness and light-headedness. Negative for headaches.   Hematological: Does not bruise/bleed easily.   Psychiatric/Behavioral: Negative for dysphoric mood, sleep disturbance and suicidal ideas.       Objective:      Physical Exam    Constitutional: He is oriented to person, place, and time. He appears well-developed and well-nourished. No distress.   HENT:   Head: Normocephalic and atraumatic.   Right Ear: External ear normal.   Left Ear: External ear normal.   Mouth/Throat: Oropharynx is clear and moist. No oropharyngeal exudate.   TM's clear, pharynx clear   Eyes: Conjunctivae and EOM are normal. Pupils are equal, round, and reactive to light. No scleral icterus.   Neck: Normal range of motion. Neck supple. No thyromegaly present.   No supraclavicular nodes palpated   Cardiovascular: Normal rate, regular rhythm and normal heart sounds.    No murmur heard.  Pulmonary/Chest: Effort normal and breath sounds normal. He has no wheezes.   Abdominal: Soft. Bowel sounds are normal. He exhibits no mass. There is no tenderness.   Musculoskeletal: He exhibits no edema.   Lymphadenopathy:     He has no cervical adenopathy.   Neurological: He is alert and oriented to person, place, and time.   Skin: No rash noted. No erythema. No pallor.   Psychiatric: He has a normal mood and affect. His behavior is normal.   Vitals reviewed.      Assessment:       1. Dizziness    2. Lightheadedness    3. Essential hypertension        Plan:       Wero was seen today for dizziness.    Diagnoses and all orders for this visit:    Dizziness  -     CBC auto differential; Future  -     Basic metabolic panel; Future  -     TSH; Future  -     Hemoglobin A1c; Future    Lightheadedness  -     CBC auto differential; Future  -     Basic metabolic panel; Future  -     TSH; Future  -     Hemoglobin A1c; Future    Essential hypertension  -     CBC auto differential; Future  -     Basic metabolic panel; Future  -     TSH; Future  -     Hemoglobin A1c; Future    Other orders  -     ALPRAZolam (XANAX) 0.5 MG tablet; Take 1 tablet (0.5 mg total) by mouth nightly.        exam seems stable and patient is not orthostatic.  We will assess some labs.  I asked him to consider drinking and  electrolyte drink before and after walking.  Monitor blood pressure carefully.  Further evaluation depending on above

## 2018-07-01 ENCOUNTER — PATIENT MESSAGE (OUTPATIENT)
Dept: INTERNAL MEDICINE | Facility: CLINIC | Age: 63
End: 2018-07-01

## 2018-07-18 ENCOUNTER — OFFICE VISIT (OUTPATIENT)
Dept: OPTOMETRY | Facility: CLINIC | Age: 63
End: 2018-07-18
Payer: COMMERCIAL

## 2018-07-18 DIAGNOSIS — Z98.890 HISTORY OF LASER PHOTOCOAGULATION OF RETINA: ICD-10-CM

## 2018-07-18 DIAGNOSIS — H18.509 CORNEAL DYSTROPHY: Primary | ICD-10-CM

## 2018-07-18 PROCEDURE — 99999 PR PBB SHADOW E&M-EST. PATIENT-LVL II: CPT | Mod: PBBFAC,,, | Performed by: OPTOMETRIST

## 2018-07-18 PROCEDURE — 92015 DETERMINE REFRACTIVE STATE: CPT | Mod: S$GLB,,, | Performed by: OPTOMETRIST

## 2018-07-18 PROCEDURE — 92014 COMPRE OPH EXAM EST PT 1/>: CPT | Mod: S$GLB,,, | Performed by: OPTOMETRIST

## 2018-07-18 NOTE — PROGRESS NOTES
HPI     DLS: 2/15/2018 with Dr. Caballero  Pt states no noticeable va changes. Denies f/f  Pt states no eye allergies or eye pain.    José Antonio 128 TID to QID    Sp pciol OU  Fuchs' Corneal Dystrophy       Last edited by Qi Galindo on 7/18/2018  9:02 AM. (History)        ROS     Positive for: Eyes (K dyst /cat surgery OU)    Negative for: Constitutional, Gastrointestinal, Neurological, Skin,   Genitourinary, Musculoskeletal, HENT, Endocrine, Respiratory, Psychiatric,   Allergic/Imm, Heme/Lymph    Last edited by Doron Pereyra, OD on 7/18/2018  9:14 AM. (History)        Assessment /Plan     For exam results, see Encounter Report.    Corneal dystrophy - Both Eyes    History of laser photocoagulation of retina      1. Mild pco sp pciol ou--pt happy w Rx  2. Sp focal laser for periph hole OD.  Stable (hx high myopia prior to cat surgery)  3. 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  Discussed surgery, but pt wishes to wait--pt to cont w josé antonio 128 QID+         Plan:    rtc 1 yr, or will call sooner if wishes corneal consult

## 2018-08-11 ENCOUNTER — OFFICE VISIT (OUTPATIENT)
Dept: INTERNAL MEDICINE | Facility: CLINIC | Age: 63
End: 2018-08-11
Payer: MEDICARE

## 2018-08-11 ENCOUNTER — HOSPITAL ENCOUNTER (OUTPATIENT)
Dept: RADIOLOGY | Facility: HOSPITAL | Age: 63
Discharge: HOME | End: 2018-08-11
Attending: NURSE PRACTITIONER
Payer: MEDICARE

## 2018-08-11 VITALS
HEART RATE: 73 BPM | DIASTOLIC BLOOD PRESSURE: 68 MMHG | SYSTOLIC BLOOD PRESSURE: 116 MMHG | WEIGHT: 210.31 LBS | TEMPERATURE: 98 F | OXYGEN SATURATION: 98 % | BODY MASS INDEX: 28.52 KG/M2

## 2018-08-11 DIAGNOSIS — R22.2 CHEST WALL MASS: Primary | ICD-10-CM

## 2018-08-11 DIAGNOSIS — R22.2 CHEST WALL MASS: ICD-10-CM

## 2018-08-11 PROCEDURE — 71250 CT THORAX DX C-: CPT | Mod: TC

## 2018-08-11 PROCEDURE — 71250 CT THORAX DX C-: CPT | Mod: 26,,, | Performed by: RADIOLOGY

## 2018-08-11 PROCEDURE — 99213 OFFICE O/P EST LOW 20 MIN: CPT | Mod: S$PBB,,, | Performed by: NURSE PRACTITIONER

## 2018-08-11 PROCEDURE — 99999 PR PBB SHADOW E&M-EST. PATIENT-LVL IV: CPT | Mod: PBBFAC,,, | Performed by: NURSE PRACTITIONER

## 2018-08-11 NOTE — PROGRESS NOTES
"Subjective:       Patient ID: Wero Spangler is a 62 y.o. male.    Chief Complaint: Mass    HPI:  61 yo male that presents to clinic today with complaint of lump to his chest.    Denies any trauma to area.    States that the lump has been there for about a year but states that over the past couple weeks he has noticed a change in size.  It is located just left of his sternum beneath left pectoral.  States that it "used to be the size of a dime but has definitely gotten bigger."  States that it is not painful.    Denies any fever, SOB, chest pain, n/v or dizziness.    Review of Systems   Constitutional: Negative for activity change, appetite change, fatigue and fever.   Respiratory: Negative for apnea, cough, shortness of breath and wheezing.    Cardiovascular: Negative for chest pain, palpitations and leg swelling.   Gastrointestinal: Negative for abdominal distention, abdominal pain, constipation, diarrhea, nausea and vomiting.   Musculoskeletal: Negative for arthralgias, back pain, myalgias, neck pain and neck stiffness.   Skin: Negative for color change.        Admits lump to chest wall   Neurological: Negative for dizziness, light-headedness, numbness and headaches.   Psychiatric/Behavioral: Negative for behavioral problems.       Objective:      Physical Exam   Constitutional: He is oriented to person, place, and time. He appears well-developed and well-nourished. No distress.   Neck: Normal range of motion. Neck supple. No thyromegaly present.   Cardiovascular: Normal rate, regular rhythm, normal heart sounds and intact distal pulses.    No murmur heard.  Pulmonary/Chest: Effort normal and breath sounds normal. No respiratory distress. He has no wheezes. He has no rales. He exhibits mass. He exhibits no tenderness, no laceration, no crepitus, no edema, no deformity, no swelling and no retraction.       Firm non-tender mass to left of sternum below left pectoral.  No rash, drainage or discharge noted. "   Abdominal: Soft. Bowel sounds are normal. He exhibits no distension and no mass. There is no tenderness.   Lymphadenopathy:     He has no cervical adenopathy.   Neurological: He is alert and oriented to person, place, and time. No cranial nerve deficit or sensory deficit.   Skin: Skin is warm and dry. No rash noted.   Psychiatric: His behavior is normal.       Assessment:       1. Chest wall mass        Plan:         1. Chest wall mass    -Vitals are stable in clinic.  -No other symptoms or complaints.  -Given duration and change in size, will order CT of chest without contrast to evaluate mass further.

## 2018-08-13 NOTE — PROGRESS NOTES
Reyes Helm,    I need to get Mr Spangler setup with a general surgery appointment.  Can you call him back with an appointment date and time.  Thanks.    Ellis

## 2018-08-22 ENCOUNTER — OFFICE VISIT (OUTPATIENT)
Dept: SURGERY | Facility: CLINIC | Age: 63
End: 2018-08-22
Payer: MEDICARE

## 2018-08-22 VITALS
SYSTOLIC BLOOD PRESSURE: 148 MMHG | DIASTOLIC BLOOD PRESSURE: 68 MMHG | HEIGHT: 72 IN | HEART RATE: 85 BPM | WEIGHT: 210.75 LBS | BODY MASS INDEX: 28.54 KG/M2 | TEMPERATURE: 98 F

## 2018-08-22 DIAGNOSIS — L72.3 SEBACEOUS CYST: Primary | ICD-10-CM

## 2018-08-22 PROCEDURE — 99213 OFFICE O/P EST LOW 20 MIN: CPT | Mod: PBBFAC | Performed by: SURGERY

## 2018-08-22 PROCEDURE — 99999 PR PBB SHADOW E&M-EST. PATIENT-LVL III: CPT | Mod: PBBFAC,,, | Performed by: SURGERY

## 2018-08-22 PROCEDURE — 99203 OFFICE O/P NEW LOW 30 MIN: CPT | Mod: S$PBB,,, | Performed by: SURGERY

## 2018-08-22 RX ORDER — SULFAMETHOXAZOLE AND TRIMETHOPRIM 800; 160 MG/1; MG/1
1 TABLET ORAL 2 TIMES DAILY
Qty: 14 TABLET | Refills: 0 | Status: SHIPPED | OUTPATIENT
Start: 2018-08-22 | End: 2019-03-18

## 2018-08-22 NOTE — LETTER
August 22, 2018      Ellis Wang, NP  [address]  [place] [address]           [facility]  [address]  [place] [address]  Phone: [phone]          Patient: Wero Spangler   MR Number: [id number]   YOB: 1955   Date of Visit: 8/22/2018       Dear Ellis Wang:    Thank you for referring Wero Spangler to me for evaluation. Attached you will find relevant portions of my assessment and plan of care.    If you have questions, please do not hesitate to call me. I look forward to following Wero Spangler along with you.    Sincerely,    Demetrius Mark Jr., MD    Enclosure  CC:  No Recipients    If you would like to receive this communication electronically, please contact [email or web address] or [phone] to request more information on [facility] Link access.    For providers and/or their staff who would like to refer a patient to [facility], please contact us through our one-stop-shop provider referral line, [facility] , at [phone].    If you feel you have received this communication in error or would no longer like to receive these types of communications, please e-mail [email or web address]

## 2018-08-22 NOTE — MEDICAL/APP STUDENT
Subjective:       Patient ID: Wero Spangler is a 63 y.o. male.    Chief Complaint: No chief complaint on file.    1 year history of hard mass on anterior chest. Road trip (got back on Aug 6th) it began to become enlarged over 2 weeks. States mass is stationary and non-mobile. Had a CT without contrast which showed findings suggestive of a benign cystic process.    Current Outpatient Medications on File Prior to Visit:  ALPRAZolam (XANAX) 0.5 MG tablet, Take 1 tablet (0.5 mg total) by mouth nightly., Disp: 30 tablet, Rfl: 5  fenofibrate (TRICOR) 54 MG tablet, TAKE ONE TABLET BY MOUTH ONCE DAILY, Disp: 30 tablet, Rfl: 11  fluticasone (FLONASE) 50 mcg/actuation nasal spray, 1 spray by Each Nare route daily as needed. , Disp: , Rfl:   OMEPRAZOLE (PRILOSEC ORAL), Take by mouth every morning. , Disp: , Rfl:   sodium chloride 2% (BARBI 128) 2 % ophthalmic solution, 1 drop as needed (takes 3-4 times/day)., Disp: , Rfl:   triamterene-hydrochlorothiazide 37.5-25 mg (MAXZIDE-25) 37.5-25 mg per tablet, Take 1 tablet by mouth once daily. (Patient taking differently: Take 1 tablet by mouth every morning. ), Disp: 30 tablet, Rfl: 0  triamterene-hydrochlorothiazide 37.5-25 mg (MAXZIDE-25) 37.5-25 mg per tablet, TAKE ONE TABLET BY MOUTH ONE TIME DAILY , Disp: 30 tablet, Rfl: 11  tamsulosin (FLOMAX) 0.4 mg Cp24, Take 1 capsule (0.4 mg total) by mouth once daily., Disp: 30 capsule, Rfl: 5    Past Medical History:  No date: Allergy  No date: Cataract  No date: Elevated PSA  No date: Enlarged prostate  No date: Gallstones  2000: General anesthetics causing adverse effect in therapeutic use      Comment:  delayed emergence after back surgery  No date: GERD (gastroesophageal reflux disease)  9/24/2013: HTN (hypertension)  No date: Hypertension  No date: Urinary tract infection    Past Surgical History:  4/2000: BACK SURGERY  No date: CATARACT EXTRACTION W/  INTRAOCULAR LENS IMPLANT      Comment:  Both Eyes  No date: EYE SURGERY  No  date: PROSTATE SURGERY      Comment:  prostate biopsy benign  No date: TONSILLECTOMY  No date: VASECTOMY      Review of Systems   Constitutional: Negative for chills, diaphoresis, fatigue, fever and unexpected weight change.   Respiratory: Negative for cough, chest tightness and shortness of breath.    Cardiovascular: Negative for chest pain and palpitations.   Gastrointestinal: Negative for abdominal distention and abdominal pain.   Genitourinary: Negative for flank pain.   Musculoskeletal: Negative for arthralgias and myalgias.       Objective:      Physical Exam   Constitutional: He is oriented to person, place, and time. He appears well-developed and well-nourished.   HENT:   Head: Normocephalic and atraumatic.   Eyes: EOM are normal. Pupils are equal, round, and reactive to light.   Neck: Normal range of motion. Neck supple. No JVD present. No thyromegaly present.   Cardiovascular: Normal rate, regular rhythm, normal heart sounds and intact distal pulses. Exam reveals no gallop and no friction rub.   No murmur heard.  Pulmonary/Chest: Effort normal and breath sounds normal. No stridor. No respiratory distress. He has no wheezes. He has no rales. He exhibits no tenderness.   Abdominal: Soft. Bowel sounds are normal. He exhibits no distension and no mass. There is no tenderness. There is no rebound and no guarding. No hernia.   Lymphadenopathy:     He has no cervical adenopathy.   Neurological: He is alert and oriented to person, place, and time.   Skin: Skin is warm and dry. Capillary refill takes less than 2 seconds. No rash noted. There is erythema.   2 cm firm mass on lower border of sternum with associated redness. Mass non-fixed, non-fluctuant. Inflamed pore present near center of mass.   Psychiatric: He has a normal mood and affect. His behavior is normal. Judgment and thought content normal.       Assessment:        Anterior chest mass:  - ddx infected cyst vs lipoma  - most likely infected sebaceous  cyst  No diagnosis found.    Plan:     Anterior chest mass:  - Red and inflamed  - Will tx w abx with f/u 2 weeks for excision of cyst  - told to call if worsen or signs of systemic illness

## 2018-08-22 NOTE — PROGRESS NOTES
Subjective:       Patient ID: Wero Spangler is a 63 y.o. male.     Chief Complaint: No chief complaint on file.     1 year history of hard mass on anterior chest. Road trip (got back on Aug 6th) it began to become enlarged over 2 weeks. States mass is stationary and non-mobile. Had a CT without contrast which showed findings suggestive of a benign cystic process.     Review of patient's allergies indicates:  No Known Allergies    Current Outpatient Medications on File Prior to Visit:  ALPRAZolam (XANAX) 0.5 MG tablet, Take 1 tablet (0.5 mg total) by mouth nightly., Disp: 30 tablet, Rfl: 5  fenofibrate (TRICOR) 54 MG tablet, TAKE ONE TABLET BY MOUTH ONCE DAILY, Disp: 30 tablet, Rfl: 11  fluticasone (FLONASE) 50 mcg/actuation nasal spray, 1 spray by Each Nare route daily as needed. , Disp: , Rfl:   OMEPRAZOLE (PRILOSEC ORAL), Take by mouth every morning. , Disp: , Rfl:   sodium chloride 2% (BARBI 128) 2 % ophthalmic solution, 1 drop as needed (takes 3-4 times/day)., Disp: , Rfl:   triamterene-hydrochlorothiazide 37.5-25 mg (MAXZIDE-25) 37.5-25 mg per tablet, Take 1 tablet by mouth once daily. (Patient taking differently: Take 1 tablet by mouth every morning. ), Disp: 30 tablet, Rfl: 0  triamterene-hydrochlorothiazide 37.5-25 mg (MAXZIDE-25) 37.5-25 mg per tablet, TAKE ONE TABLET BY MOUTH ONE TIME DAILY , Disp: 30 tablet, Rfl: 11  tamsulosin (FLOMAX) 0.4 mg Cp24, Take 1 capsule (0.4 mg total) by mouth once daily., Disp: 30 capsule, Rfl: 5     Past Medical History:  No date: Allergy  No date: Cataract  No date: Elevated PSA  No date: Enlarged prostate  No date: Gallstones  2000: General anesthetics causing adverse effect in therapeutic use      Comment:  delayed emergence after back surgery  No date: GERD (gastroesophageal reflux disease)  9/24/2013: HTN (hypertension)  No date: Hypertension  No date: Urinary tract infection     Past Surgical History:  4/2000: BACK SURGERY  No date: CATARACT EXTRACTION W/   INTRAOCULAR LENS IMPLANT      Comment:  Both Eyes  No date: EYE SURGERY  No date: PROSTATE SURGERY      Comment:  prostate biopsy benign  No date: TONSILLECTOMY  No date: VASECTOMY        Family History   Problem Relation Age of Onset    Cancer Mother         breast    Prostate cancer Brother     Cancer Brother         prostate    Macular degeneration Maternal Grandmother     Cancer Other     Cancer Other     Amblyopia Neg Hx     Blindness Neg Hx     Cataracts Neg Hx     Glaucoma Neg Hx     Retinal detachment Neg Hx     Strabismus Neg Hx           Social History     Socioeconomic History    Marital status:      Spouse name: Not on file    Number of children: Not on file    Years of education: Not on file    Highest education level: Not on file   Social Needs    Financial resource strain: Not on file    Food insecurity - worry: Not on file    Food insecurity - inability: Not on file    Transportation needs - medical: Not on file    Transportation needs - non-medical: Not on file   Occupational History    Not on file   Tobacco Use    Smoking status: Former Smoker     Last attempt to quit: 2000     Years since quittin.6    Smokeless tobacco: Never Used   Substance and Sexual Activity    Alcohol use: Yes     Alcohol/week: 0.6 oz     Types: 1 Glasses of wine per week     Comment: 2 beers three times a week     Drug use: No    Sexual activity: Yes     Partners: Female   Other Topics Concern    Not on file   Social History Narrative    Not on file          Review of Systems   Constitutional: Negative for chills, diaphoresis, fatigue, fever and unexpected weight change.   Respiratory: Negative for cough, chest tightness and shortness of breath.    Cardiovascular: Negative for chest pain and palpitations.   Gastrointestinal: Negative for abdominal distention and abdominal pain.   Genitourinary: Negative for flank pain.   Musculoskeletal: Negative for arthralgias and myalgias.        Objective:   Physical Exam   Constitutional: He is oriented to person, place, and time. He appears well-developed and well-nourished.   HENT:   Head: Normocephalic and atraumatic.   Eyes: EOM are normal. Pupils are equal, round, and reactive to light.   Neck: Normal range of motion. Neck supple. No JVD present. No thyromegaly present.   Cardiovascular: Normal rate, regular rhythm, normal heart sounds and intact distal pulses. Exam reveals no gallop and no friction rub.   No murmur heard.  Pulmonary/Chest: Effort normal and breath sounds normal. No stridor. No respiratory distress. He has no wheezes. He has no rales. He exhibits no tenderness.   Abdominal: Soft. Bowel sounds are normal. He exhibits no distension and no mass. There is no tenderness. There is no rebound and no guarding. No hernia.   Lymphadenopathy:     He has no cervical adenopathy.   Neurological: He is alert and oriented to person, place, and time.   Skin: Skin is warm and dry. Capillary refill takes less than 2 seconds. No rash noted. There is erythema.   2 cm firm mass on lower border of sternum with associated redness. Mass non-fixed, non-fluctuant. Inflamed pore present near center of mass.   Psychiatric: He has a normal mood and affect. His behavior is normal. Judgment and thought content normal.       Assessment:        Anterior chest mass:  - ddx infected cyst vs lipoma  - most likely infected sebaceous cyst  No diagnosis found.    Plan:      Anterior chest mass:  - Red and inflamed  - Will tx w abx (Bactrim) with f/u 2 weeks for excision of cyst  - told to call if worsen or signs of systemic illness  - Will attempt possible removal at time of return of redness resolved.

## 2018-09-05 RX ORDER — TAMSULOSIN HYDROCHLORIDE 0.4 MG/1
CAPSULE ORAL
Qty: 30 CAPSULE | Refills: 3 | Status: SHIPPED | OUTPATIENT
Start: 2018-09-05 | End: 2018-12-29 | Stop reason: SDUPTHER

## 2018-09-07 ENCOUNTER — PROCEDURE VISIT (OUTPATIENT)
Dept: SURGERY | Facility: CLINIC | Age: 63
End: 2018-09-07
Payer: MEDICARE

## 2018-09-07 VITALS
SYSTOLIC BLOOD PRESSURE: 154 MMHG | HEART RATE: 67 BPM | TEMPERATURE: 98 F | WEIGHT: 212.94 LBS | BODY MASS INDEX: 28.84 KG/M2 | DIASTOLIC BLOOD PRESSURE: 80 MMHG | HEIGHT: 72 IN

## 2018-09-07 DIAGNOSIS — L72.9 CYST OF SKIN: Primary | ICD-10-CM

## 2018-09-07 PROCEDURE — 11403 EXC TR-EXT B9+MARG 2.1-3CM: CPT | Mod: 51,S$PBB,, | Performed by: SURGERY

## 2018-09-07 PROCEDURE — 88304 TISSUE EXAM BY PATHOLOGIST: CPT | Performed by: PATHOLOGY

## 2018-09-07 PROCEDURE — 11403 EXC TR-EXT B9+MARG 2.1-3CM: CPT | Mod: PBBFAC | Performed by: SURGERY

## 2018-09-07 PROCEDURE — 88304 TISSUE EXAM BY PATHOLOGIST: CPT | Mod: 26,,, | Performed by: PATHOLOGY

## 2018-09-07 PROCEDURE — 12032 INTMD RPR S/A/T/EXT 2.6-7.5: CPT | Mod: S$PBB,,, | Performed by: SURGERY

## 2018-09-07 NOTE — PROCEDURES
"Exc, Cyst  Date/Time: 9/7/2018 1:15 PM  Performed by: Demetrius Mark Jr., MD  Authorized by: Demetrius Mark Jr., MD     Consent Done?:  Yes (Written)  Time out: Immediately prior to procedure a "time out" was called to verify the correct patient, procedure, equipment, support staff and site/side marked as required.      STAFF:  Assistants?: Yes    List of assistants:  Koko Conrad I was present for the entire procedure.  INDICATIONS:cyst  LOCATION:  Body area:  Chest    PREP:  Patient was prepped and draped in the normal sterile fashion.Position:  Supine    ANESTHESIA:  Anesthesia:  Local infiltration  Local anesthetic:  Lidocaine 1% with epinephrine    PROCEDURE DETAILS:  Excision type:  Skin  Malignancy:  Benign  Excision size (cm):  3  Scalpel size:  15  Incision type:  Elliptical  Specimens?: Yes    Specimens submitted to pathology. (cyst)  Hemostasis was obtained.  Estimated blood loss (cc):  3  Wound closure:  Intermediate layered  Wound repair size (cm):  3  Sutures:  3-0 Vicryl and 4-0 Monocryl  Sterile dressings:  Gauze, Mastisol, Steri-strips, Telfa gauze and Tegaderm  Post-op diagnosis: Same as pre-op diagnosis.  Needle, instrument, and sponge counts were correct.  Patient tolerated the procedure well with no immediate complications.  Post-operative instructions were provided for the patient.  Patient was discharged and will follow up for wound check and pathology results. and Patient was discharged and will follow up for wound check.        "

## 2018-09-11 ENCOUNTER — PATIENT MESSAGE (OUTPATIENT)
Dept: SURGERY | Facility: CLINIC | Age: 63
End: 2018-09-11

## 2018-09-18 ENCOUNTER — PATIENT MESSAGE (OUTPATIENT)
Dept: INTERNAL MEDICINE | Facility: CLINIC | Age: 63
End: 2018-09-18

## 2018-09-18 DIAGNOSIS — M25.561 CHRONIC PAIN OF RIGHT KNEE: Primary | ICD-10-CM

## 2018-09-18 DIAGNOSIS — M25.461 PAIN AND SWELLING OF RIGHT KNEE: ICD-10-CM

## 2018-09-18 DIAGNOSIS — M25.561 PAIN AND SWELLING OF RIGHT KNEE: ICD-10-CM

## 2018-09-18 DIAGNOSIS — G89.29 CHRONIC PAIN OF RIGHT KNEE: Primary | ICD-10-CM

## 2018-09-19 NOTE — TELEPHONE ENCOUNTER
Pt is requesting one refill of triamterene-hydrochlorothiazide 37.5-25mg to [facility] in [place], [place]. Pt is in [place] caring for his mother. Pt only has enough medication to last through Monday.      172.72

## 2018-09-21 ENCOUNTER — HOSPITAL ENCOUNTER (OUTPATIENT)
Dept: RADIOLOGY | Facility: HOSPITAL | Age: 63
Discharge: HOME | End: 2018-09-21
Attending: INTERNAL MEDICINE
Payer: MEDICARE

## 2018-09-21 ENCOUNTER — OFFICE VISIT (OUTPATIENT)
Dept: SURGERY | Facility: CLINIC | Age: 63
End: 2018-09-21
Payer: MEDICARE

## 2018-09-21 VITALS
WEIGHT: 211.75 LBS | TEMPERATURE: 98 F | HEART RATE: 69 BPM | BODY MASS INDEX: 28.68 KG/M2 | SYSTOLIC BLOOD PRESSURE: 139 MMHG | DIASTOLIC BLOOD PRESSURE: 67 MMHG | HEIGHT: 72 IN

## 2018-09-21 DIAGNOSIS — M25.461 PAIN AND SWELLING OF RIGHT KNEE: ICD-10-CM

## 2018-09-21 DIAGNOSIS — M25.561 PAIN AND SWELLING OF RIGHT KNEE: ICD-10-CM

## 2018-09-21 DIAGNOSIS — M25.561 CHRONIC PAIN OF RIGHT KNEE: ICD-10-CM

## 2018-09-21 DIAGNOSIS — Z09 POSTOP CHECK: Primary | ICD-10-CM

## 2018-09-21 DIAGNOSIS — G89.29 CHRONIC PAIN OF RIGHT KNEE: ICD-10-CM

## 2018-09-21 PROCEDURE — 73565 X-RAY EXAM OF KNEES: CPT | Mod: 26,,, | Performed by: RADIOLOGY

## 2018-09-21 PROCEDURE — 99024 POSTOP FOLLOW-UP VISIT: CPT | Mod: POP,,, | Performed by: SURGERY

## 2018-09-21 PROCEDURE — 73565 X-RAY EXAM OF KNEES: CPT | Mod: TC

## 2018-09-21 PROCEDURE — 99999 PR PBB SHADOW E&M-EST. PATIENT-LVL III: CPT | Mod: PBBFAC,,, | Performed by: SURGERY

## 2018-09-21 PROCEDURE — 99213 OFFICE O/P EST LOW 20 MIN: CPT | Mod: PBBFAC,25 | Performed by: SURGERY

## 2018-09-21 NOTE — MEDICAL/APP STUDENT
[facility]  General Surgery  History & Physical    Patient Name: Wero Spangler  MRN: [id number]  Attending Physician: Demetrius Mark MD  Primary Care Provider: Duke Cano MD    Subjective:      Wero Spangler presents to the clinic s/p sebaceous cyst excision in the anterior mid-chest (9/7/18) for 2wk follow-up. The patient reports no problems with fever, chills or their wound. The patient is not having any pain. Pt reported slight drainage from the wound after his shower yesterday.     Objective:      /67   Pulse 69   Temp 97.6 °F (36.4 °C)   Ht 6' (1.829 m)   Wt 96.1 kg (211 lb 12 oz)   BMI 28.72 kg/m²   General:  alert, appears stated age and cooperative   Incision:   healing well, very scant serous drainage on gauze, no erythema, mild swelling, incision well approximated      Final Pathological Diagnosis:  Skin, mid chest, excision:  -GRANULOMATOUS INFLAMMATION WITH FOREIGN BODY GIANT CELL REACTION AND CORNEOCYTES,  see comment  COMMENT: The lesion likely represents a ruptured cyst or hair follicle. Correlation is recommended.    Assessment/Plan:     Patient is doing well post-procedure. RTC as needed.    Judi Wilkins  General Surgery  [facility]

## 2018-09-21 NOTE — PROGRESS NOTES
HPI:  The patient is status post-excision of sebaceous cyst.  Doing well.  No complaints.    PHYSICAL EXAM:  Physical Exam    In NAD  Incision c/d/i    FINAL PATHOLOGIC DIAGNOSIS  Skin, mid chest, excision:  -GRANULOMATOUS INFLAMMATION WITH FOREIGN BODY GIANT CELL REACTION AND CORNEOCYTES,    ASSESSMENT:    The patient is doing well after surgery.     PLAN:    Follow up PRN.  Activity: regular duty        Demetrius Mark MD

## 2018-09-24 ENCOUNTER — PATIENT MESSAGE (OUTPATIENT)
Dept: INTERNAL MEDICINE | Facility: CLINIC | Age: 63
End: 2018-09-24

## 2018-10-04 ENCOUNTER — OFFICE VISIT (OUTPATIENT)
Dept: ORTHOPEDICS | Facility: CLINIC | Age: 63
End: 2018-10-04
Payer: MEDICARE

## 2018-10-04 VITALS
HEART RATE: 68 BPM | BODY MASS INDEX: 28.89 KG/M2 | HEIGHT: 72 IN | WEIGHT: 213.31 LBS | DIASTOLIC BLOOD PRESSURE: 74 MMHG | SYSTOLIC BLOOD PRESSURE: 116 MMHG

## 2018-10-04 DIAGNOSIS — M17.11 PRIMARY OSTEOARTHRITIS OF RIGHT KNEE: Primary | ICD-10-CM

## 2018-10-04 PROCEDURE — 99999 PR PBB SHADOW E&M-EST. PATIENT-LVL III: CPT | Mod: PBBFAC,,, | Performed by: PHYSICIAN ASSISTANT

## 2018-10-04 PROCEDURE — 99203 OFFICE O/P NEW LOW 30 MIN: CPT | Mod: S$PBB,,, | Performed by: PHYSICIAN ASSISTANT

## 2018-10-04 PROCEDURE — 99213 OFFICE O/P EST LOW 20 MIN: CPT | Mod: PBBFAC | Performed by: PHYSICIAN ASSISTANT

## 2018-10-04 RX ORDER — MELOXICAM 15 MG/1
15 TABLET ORAL DAILY
Qty: 30 TABLET | Refills: 1 | Status: SHIPPED | OUTPATIENT
Start: 2018-10-04 | End: 2018-11-03

## 2018-10-04 NOTE — PROGRESS NOTES
SUBJECTIVE:     Chief Complaint & History of Present Illness:  Wero Spangler is a New patient 63 y.o. male who is seen here today with a complaint of    Chief Complaint   Patient presents with    Right Knee - Pain    . Patient states pain is medial, lateral right knee beginning about 3 months ago.  On a scale of 1-10, with 10 being worst pain imaginable, he rates this pain as 0 on good days and 7 on bad days.  he describes the pain as intermittent and achy. Pain is worse with standing after prolonged sitting and occasionally waking him up at night. He denies any associated symptoms. He mentions holding his knee in flexion will sometimes relieve pain, but he has not tried any other alleviating factors for this pain.    Review of patient's allergies indicates:  No Known Allergies      Current Outpatient Medications   Medication Sig Dispense Refill    ALPRAZolam (XANAX) 0.5 MG tablet Take 1 tablet (0.5 mg total) by mouth nightly. 30 tablet 5    fenofibrate (TRICOR) 54 MG tablet TAKE ONE TABLET BY MOUTH ONCE DAILY 30 tablet 11    fluticasone (FLONASE) 50 mcg/actuation nasal spray 1 spray by Each Nare route daily as needed.       OMEPRAZOLE (PRILOSEC ORAL) Take by mouth every morning.       sodium chloride 2% (BARBI 128) 2 % ophthalmic solution 1 drop as needed (takes 3-4 times/day).      sulfamethoxazole-trimethoprim 800-160mg (BACTRIM DS) 800-160 mg Tab Take 1 tablet by mouth 2 (two) times daily. 14 tablet 0    tamsulosin (FLOMAX) 0.4 mg Cap TAKE ONE CAPSULE BY MOUTH ONE TIME DAILY  30 capsule 3    triamterene-hydrochlorothiazide 37.5-25 mg (MAXZIDE-25) 37.5-25 mg per tablet Take 1 tablet by mouth once daily. (Patient taking differently: Take 1 tablet by mouth every morning. ) 30 tablet 0    triamterene-hydrochlorothiazide 37.5-25 mg (MAXZIDE-25) 37.5-25 mg per tablet TAKE ONE TABLET BY MOUTH ONE TIME DAILY  30 tablet 11     No current facility-administered medications for this visit.        Past  Medical History:   Diagnosis Date    Allergy     Cataract     Elevated PSA     Enlarged prostate     Gallstones     General anesthetics causing adverse effect in therapeutic use 2000    delayed emergence after back surgery    GERD (gastroesophageal reflux disease)     HTN (hypertension) 9/24/2013    Hypertension     Urinary tract infection        Past Surgical History:   Procedure Laterality Date    BACK SURGERY  4/2000    CATARACT EXTRACTION W/  INTRAOCULAR LENS IMPLANT      Both Eyes    CHONDROPLASTY-KNEE Left 5/14/2015    [facility]    EYE SURGERY      MENISCECTOMY-MEDIAL and LATERAL Left 5/14/2015    [facility]    PROSTATE SURGERY      prostate biopsy benign    ROBOTIC ASSISTED LAPAROSCOPIC NEPHRECTOMY PARTIAL Left 11/7/2017    [facility] 2ND [facility]    TONSILLECTOMY      VASECTOMY         Vital Signs (Most Recent)  Vitals:    10/04/18 1026   BP: 116/74   Pulse: 68           Review of Systems:  ROS:  Constitutional: no fever or chills  Eyes: no visual changes  ENT: no nasal congestion or sore throat  Respiratory: no cough or shortness of breath  Cardiovascular: no chest pain or palpitations  Gastrointestinal: reflux  Genitourinary: no hematuria or dysuria  Integument/Breast: no rash or pruritis  Hematologic/Lymphatic: no easy bruising or lymphadenopathy  Musculoskeletal: Right knee pain  Neurological: no seizures or tremors  Behavioral/Psych: no auditory or visual hallucinations  Endocrine: no heat or cold intolerance                OBJECTIVE:     PHYSICAL EXAM:  Height: 6' (182.9 cm) Weight: 96.7 kg (213 lb 4.7 oz), General Appearance: Well nourished, well developed, in no acute distress.  Neurological: Mood & affect are normal.  right  Knee Exam:  Knee Range of Motion:normal   Effusion:none  Condition of skin:intact  Location of tenderness:Medial joint line and Lateral joint line   Strength:limited  by pain and 5 of 5  Stability:  Lachman: stable, LCL: stable, MCL: stable, PCL: stable and posteromedial (dial): stable  Varus /Valgus stress:  normal  Jazz:   negative/negative    left  Knee Exam:  Knee Range of Motion:normal   Effusion:none  Condition of skin:intact  Location of tenderness:None   Strength:5 of 5  Stability:  Lachman: stable, LCL: stable, MCL: stable, PCL: stable and posteromedial (dial): stable  Varus /Valgus stress:  normal  Jazz:   negative/negative      Hip Examination:  normal    RADIOGRAPHS:  XRays reviewed. Minimal joint narrowing noted in R knee.    ASSESSMENT/PLAN:     Plan: We discussed with the patient at length all the different treatment options available for  the knee including anti-inflammatories, acetaminophen, rest, ice, knee strengthening exercise, occasional cortisone injections for temporary relief, Viscosupplimentation injections, arthroscopic debridement osteotomy, and finally knee arthroplasty.   Will give Mobic 15mg PO once daily x4 weeks for knee pain. I advised patient to take medication with food to prevent reflux symptoms as noted in his medical hx. Patient agreed he would like to try oral medication before any other treatment modalities.  Patient instructed to follow up in 3-4 weeks, but to call if pain does not improve or worsens before follow up appointment.

## 2018-11-01 ENCOUNTER — OFFICE VISIT (OUTPATIENT)
Dept: ORTHOPEDICS | Facility: CLINIC | Age: 63
End: 2018-11-01
Payer: MEDICARE

## 2018-11-01 VITALS
SYSTOLIC BLOOD PRESSURE: 139 MMHG | BODY MASS INDEX: 28.86 KG/M2 | HEART RATE: 70 BPM | DIASTOLIC BLOOD PRESSURE: 84 MMHG | WEIGHT: 213.06 LBS | HEIGHT: 72 IN

## 2018-11-01 DIAGNOSIS — M17.11 PRIMARY OSTEOARTHRITIS OF RIGHT KNEE: Primary | ICD-10-CM

## 2018-11-01 PROCEDURE — 99213 OFFICE O/P EST LOW 20 MIN: CPT | Mod: PBBFAC | Performed by: PHYSICIAN ASSISTANT

## 2018-11-01 PROCEDURE — 99999 PR PBB SHADOW E&M-EST. PATIENT-LVL III: CPT | Mod: PBBFAC,,, | Performed by: PHYSICIAN ASSISTANT

## 2018-11-01 PROCEDURE — 99213 OFFICE O/P EST LOW 20 MIN: CPT | Mod: S$PBB,,, | Performed by: PHYSICIAN ASSISTANT

## 2018-11-01 NOTE — PROGRESS NOTES
SUBJECTIVE:     Chief Complaint & History of Present Illness:  Wero Spangler is a Established patient 63 y.o. male who is seen here today with a complaint of    Chief Complaint   Patient presents with    Right Knee - Pain    .  He has patient well-known to me was last seen treated the clinic for this condition 10/04/2018.  At that time we had prescribed him meloxicam 15 mg q.d. for his arthritic right knee pain. He reports he has received some relief from the meloxicam but continues to struggle with start-up pain and pain with periods of prolonged standing and ambulation.  He has also been has been similar symptoms in the left knee. He has tried to alter his daily activities to accommodate his pain as a difficulty negotiating stairs  On a scale of 1-10, with 10 being worst pain imaginable, he rates this pain as 3 on good days and 6 on bad days.  he describes the pain as sore and achy.    Review of patient's allergies indicates:  No Known Allergies      Current Outpatient Medications   Medication Sig Dispense Refill    ALPRAZolam (XANAX) 0.5 MG tablet Take 1 tablet (0.5 mg total) by mouth nightly. 30 tablet 5    fenofibrate (TRICOR) 54 MG tablet TAKE ONE TABLET BY MOUTH ONCE DAILY 30 tablet 11    fluticasone (FLONASE) 50 mcg/actuation nasal spray 1 spray by Each Nare route daily as needed.       meloxicam (MOBIC) 15 MG tablet Take 1 tablet (15 mg total) by mouth once daily. 30 tablet 1    OMEPRAZOLE (PRILOSEC ORAL) Take by mouth every morning.       sodium chloride 2% (BARBI 128) 2 % ophthalmic solution 1 drop as needed (takes 3-4 times/day).      sulfamethoxazole-trimethoprim 800-160mg (BACTRIM DS) 800-160 mg Tab Take 1 tablet by mouth 2 (two) times daily. 14 tablet 0    tamsulosin (FLOMAX) 0.4 mg Cap TAKE ONE CAPSULE BY MOUTH ONE TIME DAILY  30 capsule 3    triamterene-hydrochlorothiazide 37.5-25 mg (MAXZIDE-25) 37.5-25 mg per tablet Take 1 tablet by mouth once daily. (Patient taking differently:  Take 1 tablet by mouth every morning. ) 30 tablet 0    triamterene-hydrochlorothiazide 37.5-25 mg (MAXZIDE-25) 37.5-25 mg per tablet TAKE ONE TABLET BY MOUTH ONE TIME DAILY  30 tablet 11     Current Facility-Administered Medications   Medication Dose Route Frequency Provider Last Rate Last Dose    sodium hyaluronate (EUFLEXXA) 10 mg/mL(mw 2.4 -3.6 million) Syrg 20 mg  20 mg Intra-articular Weekly Cesar King PA-C           Past Medical History:   Diagnosis Date    Allergy     Cataract     Elevated PSA     Enlarged prostate     Gallstones     General anesthetics causing adverse effect in therapeutic use 2000    delayed emergence after back surgery    GERD (gastroesophageal reflux disease)     HTN (hypertension) 9/24/2013    Hypertension     Urinary tract infection        Past Surgical History:   Procedure Laterality Date    BACK SURGERY  4/2000    CATARACT EXTRACTION W/  INTRAOCULAR LENS IMPLANT      Both Eyes    CHONDROPLASTY-KNEE Left 5/14/2015    [facility]    EYE SURGERY      MENISCECTOMY-MEDIAL and LATERAL Left 5/14/2015    [facility]    PROSTATE SURGERY      prostate biopsy benign    ROBOTIC ASSISTED LAPAROSCOPIC NEPHRECTOMY PARTIAL Left 11/7/2017    [facility] 2ND [facility]    TONSILLECTOMY      VASECTOMY         Vital Signs (Most Recent)  Vitals:    11/01/18 1011   BP: 139/84   Pulse: 70           Review of Systems:  ROS:  Constitutional: no fever or chills  Eyes: no visual changes  ENT: no nasal congestion or sore throat  Respiratory: no cough or shortness of breath  Cardiovascular: no chest pain or palpitations  Gastrointestinal: reflux  Genitourinary: no hematuria or dysuria  Integument/Breast: no rash or pruritis  Hematologic/Lymphatic: no easy bruising or lymphadenopathy  Musculoskeletal: Right knee pain  Neurological: no seizures or tremors  Behavioral/Psych: no auditory or visual  hallucinations  Endocrine: no heat or cold intolerance              OBJECTIVE:     PHYSICAL EXAM:  Height: 6' (182.9 cm) Weight: 96.6 kg (213 lb 1.2 oz), General Appearance: Well nourished, well developed, in no acute distress.  Neurological: Mood & affect are normal.  left  Knee Exam:  Knee Range of Motion:0-120 degrees flexion   Effusion:none  Condition of skin:intact  Location of tenderness:Medial joint line   Strength:5 of 5  Stability:  Lachman: stable, LCL: stable, MCL: stable, PCL: stable and posteromedial (dial): stable  Varus /Valgus stress:  normal  Jazz:   negative/negative    right  Knee Exam:  Knee Range of Motion:0-115 degrees flexion   Effusion:none  Condition of skin:intact  Location of tenderness:Medial joint line   Strength:5 of 5  Stability:  Lachman: stable, LCL: stable, MCL: stable, PCL: stable and posteromedial (dial): stable  Varus /Valgus stress:  normal  Jazz:   negative/negative      Hip Examination:  normal    RADIOGRAPHS:  X-rays from previous visit reviewed by me today demonstrate mild arthritic changes throughout both knees with medial joint space narrowing right more so than left early sclerotic changes noted primarily medial compartment no evidence of osteophytic spurring fracture dislocation or other bony abnormalities    ASSESSMENT/PLAN:     Plan: We discussed with the patient at length all the different treatment options available for  the knee including anti-inflammatories, acetaminophen, rest, ice, knee strengthening exercise, occasional cortisone injections for temporary relief, Viscosupplimentation injections, arthroscopic debridement osteotomy, and finally knee arthroplasty.   Will plan to proceed with the Euflexxa injection of the right knee will order the medication plan begin next week

## 2018-11-08 ENCOUNTER — OFFICE VISIT (OUTPATIENT)
Dept: ORTHOPEDICS | Facility: CLINIC | Age: 63
End: 2018-11-08
Payer: MEDICARE

## 2018-11-08 VITALS
HEART RATE: 69 BPM | HEIGHT: 72 IN | WEIGHT: 212.06 LBS | DIASTOLIC BLOOD PRESSURE: 83 MMHG | SYSTOLIC BLOOD PRESSURE: 133 MMHG | BODY MASS INDEX: 28.72 KG/M2

## 2018-11-08 DIAGNOSIS — M17.11 PRIMARY OSTEOARTHRITIS OF RIGHT KNEE: Primary | ICD-10-CM

## 2018-11-08 PROCEDURE — 99999 PR PBB SHADOW E&M-EST. PATIENT-LVL III: CPT | Mod: PBBFAC,,, | Performed by: PHYSICIAN ASSISTANT

## 2018-11-08 PROCEDURE — 99499 UNLISTED E&M SERVICE: CPT | Mod: S$PBB,,, | Performed by: PHYSICIAN ASSISTANT

## 2018-11-08 PROCEDURE — 20610 DRAIN/INJ JOINT/BURSA W/O US: CPT | Mod: S$PBB,RT,, | Performed by: PHYSICIAN ASSISTANT

## 2018-11-08 PROCEDURE — 20610 DRAIN/INJ JOINT/BURSA W/O US: CPT | Mod: PBBFAC | Performed by: PHYSICIAN ASSISTANT

## 2018-11-08 PROCEDURE — 99213 OFFICE O/P EST LOW 20 MIN: CPT | Mod: PBBFAC,25 | Performed by: PHYSICIAN ASSISTANT

## 2018-11-08 RX ADMIN — Medication 20 MG: at 10:11

## 2018-11-08 NOTE — PROGRESS NOTES
Wero Spangler is a 63 y.o. year old, he is here today for his 1st Euflexxa injection for degenerative changes of his right knee. he was last seen and treated in the clinic on 11/1/2018. There has been no significant change in his medical status since his last visit. No Fever, chills, malaise, or unexplained weight change.      Allergies, Medications, past medical and surgical history were reviewed .    Examination of the knee demonstrates  No evidence of edema, erythema , echymosis strength and range of motion are unchanged from previous visit.    The injection site was identified and the skin was prepared with a betadine solution. The  right knee joint was injected with 2 ml of Euflexxa solution under sterile technique. Wero Spangler tolerated the procedure well, he was advised to rest the knee today, ice and elevation. It may take 3-6 weeks following the last injection to get the full benefit of the medication.  I will see him back in 1 week for his 2nd Euflexxa injection. Sooner if he has any problems or concerns.      I have reviewed the history, diagnosis, and physical exam performed by Otoniel Palomino, we have discussed the treatment plan  and I am in agreement with the treatment.

## 2018-11-13 ENCOUNTER — TELEPHONE (OUTPATIENT)
Dept: ORTHOPEDICS | Facility: CLINIC | Age: 63
End: 2018-11-13

## 2018-11-13 NOTE — TELEPHONE ENCOUNTER
----- Message from Ofelia Vaca sent at 11/13/2018  9:05 AM CST -----  Contact: Self  Pt wants to know if he can come in anytime for injections, pt can be reached @ Home#   none

## 2018-11-13 NOTE — TELEPHONE ENCOUNTER
Spoke with  and pt stated that he would like to come in at 9:30am. I explained to him that this was perfectly fine and I would mention this in his appt notes.

## 2018-11-15 ENCOUNTER — OFFICE VISIT (OUTPATIENT)
Dept: ORTHOPEDICS | Facility: CLINIC | Age: 63
End: 2018-11-15
Payer: MEDICARE

## 2018-11-15 VITALS
WEIGHT: 212.06 LBS | HEIGHT: 72 IN | SYSTOLIC BLOOD PRESSURE: 142 MMHG | HEART RATE: 67 BPM | BODY MASS INDEX: 28.72 KG/M2 | DIASTOLIC BLOOD PRESSURE: 81 MMHG

## 2018-11-15 DIAGNOSIS — M17.11 PRIMARY OSTEOARTHRITIS OF RIGHT KNEE: ICD-10-CM

## 2018-11-15 PROCEDURE — 99499 UNLISTED E&M SERVICE: CPT | Mod: S$PBB,,, | Performed by: NURSE PRACTITIONER

## 2018-11-15 PROCEDURE — 99213 OFFICE O/P EST LOW 20 MIN: CPT | Mod: PBBFAC,25 | Performed by: NURSE PRACTITIONER

## 2018-11-15 PROCEDURE — 99999 PR PBB SHADOW E&M-EST. PATIENT-LVL III: CPT | Mod: PBBFAC,,, | Performed by: NURSE PRACTITIONER

## 2018-11-15 PROCEDURE — 20610 DRAIN/INJ JOINT/BURSA W/O US: CPT | Mod: PBBFAC | Performed by: NURSE PRACTITIONER

## 2018-11-15 PROCEDURE — 20610 DRAIN/INJ JOINT/BURSA W/O US: CPT | Mod: S$PBB,RT,, | Performed by: NURSE PRACTITIONER

## 2018-11-15 RX ADMIN — Medication 20 MG: at 10:11

## 2018-11-15 NOTE — LETTER
November 15, 2018      Cesar King PA-C  [address]  [facility] [address]           [facility] - Orthoped[facility]  [address]  [facility] [address]  Phone: [phone]          Patient: Wero Spangler   MR Number: [id number]   YOB: 1955   Date of Visit: 11/15/2018       Dear Cesar King:    Thank you for referring Wero Spangler to me for evaluation. Attached you will find relevant portions of my assessment and plan of care.    If you have questions, please do not hesitate to call me. I look forward to following Wero Spangler along with you.    Sincerely,    Ana Iraheta, NP    Enclosure  CC:  No Recipients    If you would like to receive this communication electronically, please contact [email or web address] or [phone] to request more information on [facility] access.    For providers and/or their staff who would like to refer a patient to [facility], please contact us through our one-stop-shop provider referral line, Jelani Manzo, at [phone].    If you feel you have received this communication in error or would no longer like to receive these types of communications, please e-mail [email or web address]

## 2018-11-15 NOTE — PROGRESS NOTES
Wero Spangler presents to clinic today for the second right knee Euflexxa injection.    Exam demonstrates the no effusion in the  right knee, and the skin is intact.    Diagnosis: osteoarthritis knee    After time out was performed and patient ID, side, and site were verified, the  right  knee was sterilly prepped in the standard fashion.  A 22-gauge needle was introduced into right knee joint from an joanie-lateral site without complication and knee was then injected with 2 ml of Euflexxa.  Sterile dressing was applied.  The patient was instructed to resume activities as tolerated and to call with any problems.     We will see Wero Spangler back next week for the third injection.

## 2018-11-23 ENCOUNTER — OFFICE VISIT (OUTPATIENT)
Dept: ORTHOPEDICS | Facility: CLINIC | Age: 63
End: 2018-11-23
Payer: MEDICARE

## 2018-11-23 VITALS
WEIGHT: 211.56 LBS | HEIGHT: 72 IN | BODY MASS INDEX: 28.65 KG/M2 | HEART RATE: 64 BPM | SYSTOLIC BLOOD PRESSURE: 132 MMHG | DIASTOLIC BLOOD PRESSURE: 84 MMHG

## 2018-11-23 DIAGNOSIS — M17.11 PRIMARY OSTEOARTHRITIS OF RIGHT KNEE: Primary | ICD-10-CM

## 2018-11-23 PROCEDURE — 20610 DRAIN/INJ JOINT/BURSA W/O US: CPT | Mod: S$PBB,RT,, | Performed by: PHYSICIAN ASSISTANT

## 2018-11-23 PROCEDURE — 99499 UNLISTED E&M SERVICE: CPT | Mod: S$PBB,,, | Performed by: PHYSICIAN ASSISTANT

## 2018-11-23 PROCEDURE — 99999 PR PBB SHADOW E&M-EST. PATIENT-LVL III: CPT | Mod: PBBFAC,,, | Performed by: PHYSICIAN ASSISTANT

## 2018-11-23 PROCEDURE — 20610 DRAIN/INJ JOINT/BURSA W/O US: CPT | Mod: PBBFAC | Performed by: PHYSICIAN ASSISTANT

## 2018-11-23 PROCEDURE — 99213 OFFICE O/P EST LOW 20 MIN: CPT | Mod: PBBFAC | Performed by: PHYSICIAN ASSISTANT

## 2018-11-23 RX ADMIN — Medication 20 MG: at 11:11

## 2018-11-23 NOTE — LETTER
November 23, 2018      Cesar King PA-C  [address]  [facility] [address]           [facility] - Orthoped[facility]  [address], [address]  [facility] [address]  Phone: [phone]          Patient: Wero Spangler   MR Number: [id number]   YOB: 1955   Date of Visit: 11/23/2018       Dear Cesar King:    Thank you for referring Wero Spangler to me for evaluation. Attached you will find relevant portions of my assessment and plan of care.    If you have questions, please do not hesitate to call me. I look forward to following Wero Spangler along with you.    Sincerely,    Tati Salmeron PA-C    Enclosure  CC:  No Recipients    If you would like to receive this communication electronically, please contact [email or web address] or [phone] to request more information on [facility] access.    For providers and/or their staff who would like to refer a patient to [facility], please contact us through our one-stop-shop provider referral line, [facility], at [phone].    If you feel you have received this communication in error or would no longer like to receive these types of communications, please e-mail [email or web address]

## 2018-11-23 NOTE — PROGRESS NOTES
Wero Spangler presents to clinic today for the third right knee Euflexxa injection.    Exam demonstrates the no effusion in the  right knee, and the skin is intact.    Diagnosis: osteoarthritis knee    After time out was performed and patient ID, side, and site were verified, the  right  knee was sterilly prepped in the standard fashion.  A 22-gauge needle was introduced into right knee joint from an joanie-lateral site without complication and knee was then injected with 2 ml of Euflexxa.  Sterile dressing was applied.  The patient was instructed to resume activities as tolerated and to call with any problems.     F/u prn.

## 2018-11-28 ENCOUNTER — LAB VISIT (OUTPATIENT)
Dept: LAB | Facility: HOSPITAL | Age: 63
End: 2018-11-28
Attending: UROLOGY
Payer: MEDICARE

## 2018-11-28 DIAGNOSIS — R97.20 ELEVATED PSA: ICD-10-CM

## 2018-11-28 LAB — COMPLEXED PSA SERPL-MCNC: 15.6 NG/ML

## 2018-11-28 PROCEDURE — 84153 ASSAY OF PSA TOTAL: CPT

## 2018-11-28 PROCEDURE — 36415 COLL VENOUS BLD VENIPUNCTURE: CPT

## 2018-12-12 ENCOUNTER — OFFICE VISIT (OUTPATIENT)
Dept: UROLOGY | Facility: CLINIC | Age: 63
End: 2018-12-12
Payer: MEDICARE

## 2018-12-12 VITALS
WEIGHT: 211.63 LBS | SYSTOLIC BLOOD PRESSURE: 147 MMHG | DIASTOLIC BLOOD PRESSURE: 79 MMHG | BODY MASS INDEX: 28.66 KG/M2 | HEIGHT: 72 IN | HEART RATE: 66 BPM

## 2018-12-12 DIAGNOSIS — N13.8 BPH WITH URINARY OBSTRUCTION: ICD-10-CM

## 2018-12-12 DIAGNOSIS — N40.2 PROSTATE NODULE: ICD-10-CM

## 2018-12-12 DIAGNOSIS — N40.1 BPH WITH URINARY OBSTRUCTION: ICD-10-CM

## 2018-12-12 DIAGNOSIS — R97.20 ELEVATED PSA: Primary | ICD-10-CM

## 2018-12-12 PROCEDURE — 99213 OFFICE O/P EST LOW 20 MIN: CPT | Mod: PBBFAC | Performed by: UROLOGY

## 2018-12-12 PROCEDURE — 99999 PR PBB SHADOW E&M-EST. PATIENT-LVL III: CPT | Mod: PBBFAC,,, | Performed by: UROLOGY

## 2018-12-12 PROCEDURE — 99214 OFFICE O/P EST MOD 30 MIN: CPT | Mod: S$PBB,,, | Performed by: UROLOGY

## 2018-12-12 NOTE — PROGRESS NOTES
CHIEF COMPLAINT:    Mr. Spangler is a 63 y.o. male presenting with an elevated PSA.    PRESENTING ILLNESS:    Wero Spangler is a 63 y.o. male.  He has had multiple biopsies done.  One was in 2012 when his PSA was 10.14.  There was also a prostate nodule at that time.  Most recent one was 8/23/16 when his PSA was 18.1.  This was a cognitive fusion biopsy.    He is s/p robotic left partial nephrectomy 11/7/17.  Path returned an oncocytoma.    He also has nocturia x 2-3 and slight decreased FOS.  He did develop post op AUR and has been on flomax since then.  He feels like his LUTS are slightly improved on the flomax.    He denies ED.    REVIEW OF SYSTEMS:    Wero Spangler denies any history of headache, blurred vision, fever, nausea, vomiting, chills, flank discomfort, abdominal pain, bleeding per rectum, cough, SOB, recent loss of consciousness, recent mental status changes, seizures, dizziness, or upper or lower extremity weakness.    JOSE MANUEL  1. 2  2. 2  3. 2  4. 3  5. 2     PATIENT HISTORY:    Past Medical History:   Diagnosis Date    Allergy     Cataract     Elevated PSA     Enlarged prostate     Gallstones     General anesthetics causing adverse effect in therapeutic use 2000    delayed emergence after back surgery    GERD (gastroesophageal reflux disease)     HTN (hypertension) 9/24/2013    Hypertension     Urinary tract infection        Past Surgical History:   Procedure Laterality Date    BACK SURGERY  4/2000    CATARACT EXTRACTION W/  INTRAOCULAR LENS IMPLANT      Both Eyes    CHONDROPLASTY-KNEE Left 5/14/2015    [facility]    EYE SURGERY      MENISCECTOMY-MEDIAL and LATERAL Left 5/14/2015    [facility]    PROSTATE SURGERY      prostate biopsy benign    ROBOTIC ASSISTED LAPAROSCOPIC NEPHRECTOMY PARTIAL Left 11/7/2017    [facility]ND [facility]    TONSILLECTOMY      VASECTOMY         Family  History   Problem Relation Age of Onset    Cancer Mother         breast    Prostate cancer Brother     Cancer Brother         prostate    Macular degeneration Maternal Grandmother     Cancer Other     Cancer Other     Amblyopia Neg Hx     Blindness Neg Hx     Cataracts Neg Hx     Glaucoma Neg Hx     Retinal detachment Neg Hx     Strabismus Neg Hx        Social History     Socioeconomic History    Marital status:      Spouse name: Not on file    Number of children: Not on file    Years of education: Not on file    Highest education level: Not on file   Social Needs    Financial resource strain: Not on file    Food insecurity - worry: Not on file    Food insecurity - inability: Not on file    Transportation needs - medical: Not on file    Transportation needs - non-medical: Not on file   Occupational History    Not on file   Tobacco Use    Smoking status: Former Smoker     Last attempt to quit: 2000     Years since quittin.9    Smokeless tobacco: Never Used   Substance and Sexual Activity    Alcohol use: Yes     Alcohol/week: 0.6 oz     Types: 1 Glasses of wine per week     Comment: 2 beers three times a week     Drug use: No    Sexual activity: Yes     Partners: Female   Other Topics Concern    Not on file   Social History Narrative    Not on file       Allergies:  Patient has no known allergies.    Medications:    Current Outpatient Medications:     ALPRAZolam (XANAX) 0.5 MG tablet, Take 1 tablet (0.5 mg total) by mouth nightly., Disp: 30 tablet, Rfl: 5    fenofibrate (TRICOR) 54 MG tablet, TAKE ONE TABLET BY MOUTH ONCE DAILY, Disp: 30 tablet, Rfl: 11    fluticasone (FLONASE) 50 mcg/actuation nasal spray, 1 spray by Each Nare route daily as needed. , Disp: , Rfl:     OMEPRAZOLE (PRILOSEC ORAL), Take by mouth every morning. , Disp: , Rfl:     sodium chloride 2% (BARBI 128) 2 % ophthalmic solution, 1 drop as needed (takes 3-4 times/day)., Disp: , Rfl:      sulfamethoxazole-trimethoprim 800-160mg (BACTRIM DS) 800-160 mg Tab, Take 1 tablet by mouth 2 (two) times daily., Disp: 14 tablet, Rfl: 0    tamsulosin (FLOMAX) 0.4 mg Cap, TAKE ONE CAPSULE BY MOUTH ONE TIME DAILY , Disp: 30 capsule, Rfl: 3    triamterene-hydrochlorothiazide 37.5-25 mg (MAXZIDE-25) 37.5-25 mg per tablet, Take 1 tablet by mouth once daily. (Patient taking differently: Take 1 tablet by mouth every morning. ), Disp: 30 tablet, Rfl: 0    triamterene-hydrochlorothiazide 37.5-25 mg (MAXZIDE-25) 37.5-25 mg per tablet, TAKE ONE TABLET BY MOUTH ONE TIME DAILY , Disp: 30 tablet, Rfl: 11    PHYSICAL EXAMINATION:    The patient generally appears in good health, is appropriately interactive, and is in no apparent distress.     Eyes: anicteric sclerae, moist conjunctivae; no lid-lag; PERRLA     HENT: Atraumatic; oropharynx clear with moist mucous membranes and no mucosal ulcerations;normal hard and soft palate.  No evidence of lymphadenopathy.    Neck: Trachea midline.  No thyromegaly.    Musculoskeletal: No abnormal gait.    Skin: No lesions.    Mental: Cooperative with normal affect.  Is oriented to time, place, and person.    Neuro: Grossly intact.    Chest: Normal inspiratory effort.   No accessory muscles.  No audible wheezes.  Respirations symmetric on inspiration and expiration.    Heart: Regular rhythm.      Abdomen:  Soft, non-tender. No masses or organomegaly. Bladder is not palpable. No evidence of flank discomfort. No evidence of inguinal hernia.    Genitourinary: The penis is not circumcised with no evidence of plaques or induration. The urethral meatus is normal. The testes, epididymides, and cord structures are normal in size and contour bilaterally. The scrotum is normal in size and contour.    Normal anal sphincter tone. No rectal mass.    The prostate is 40 g. Normal landmarks. Lateral sulci. Median furrow intact. There is a 1.5 cm x 1.5 cm nodule in the midportion of the gland. Stable.   Seminal vesicles are normal.    Extremities: No clubbing, cyanosis, or edema      LABS:    UA dipped negative today  Lab Results   Component Value Date    PSA 14.0 (H) 09/22/2014    PSA 15.48 (H) 08/16/2013    PSA 11.06 (H) 02/25/2013    PSADIAG 15.6 (H) 11/28/2018    PSADIAG 18.2 (H) 05/30/2018    PSADIAG 15.4 (H) 12/06/2017    PSATOTAL 6.8 (H) 07/12/2011    PSATOTAL 7.9 (H) 01/11/2011    PSAFREE 1.07 07/12/2011    PSAFREE 1.67 (H) 01/11/2011    PSAFREEPCT 15.74 07/12/2011    PSAFREEPCT 21.14 01/11/2011        IMPRESSION:    Encounter Diagnoses   Name Primary?    Elevated PSA Yes    BPH with urinary obstruction     Prostate nodule        PLAN:    1. Continue flomax.  Refilled today.  Discussed Rezum.  He'd like to think about this.    2. RTC 6 months with a PSA.  3. I spent 25 minutes with the patient of which more than half was spent in direct consultation with the patient in regards to our treatment and plan.           Copy to:

## 2018-12-14 ENCOUNTER — PATIENT MESSAGE (OUTPATIENT)
Dept: ORTHOPEDICS | Facility: CLINIC | Age: 63
End: 2018-12-14

## 2018-12-17 ENCOUNTER — OFFICE VISIT (OUTPATIENT)
Dept: INTERNAL MEDICINE | Facility: CLINIC | Age: 63
End: 2018-12-17
Payer: MEDICARE

## 2018-12-17 VITALS
HEIGHT: 72 IN | HEART RATE: 62 BPM | BODY MASS INDEX: 29.15 KG/M2 | SYSTOLIC BLOOD PRESSURE: 110 MMHG | OXYGEN SATURATION: 98 % | DIASTOLIC BLOOD PRESSURE: 68 MMHG | WEIGHT: 215.19 LBS

## 2018-12-17 DIAGNOSIS — I10 ESSENTIAL HYPERTENSION: Primary | ICD-10-CM

## 2018-12-17 DIAGNOSIS — K76.0 FATTY LIVER: ICD-10-CM

## 2018-12-17 DIAGNOSIS — G47.00 INSOMNIA, UNSPECIFIED TYPE: ICD-10-CM

## 2018-12-17 DIAGNOSIS — R17 ELEVATED BILIRUBIN: ICD-10-CM

## 2018-12-17 DIAGNOSIS — R73.09 ELEVATED GLUCOSE: ICD-10-CM

## 2018-12-17 DIAGNOSIS — K21.9 GASTROESOPHAGEAL REFLUX DISEASE, ESOPHAGITIS PRESENCE NOT SPECIFIED: ICD-10-CM

## 2018-12-17 DIAGNOSIS — H18.519 FUCHS' CORNEAL DYSTROPHY: ICD-10-CM

## 2018-12-17 PROCEDURE — 99213 OFFICE O/P EST LOW 20 MIN: CPT | Mod: PBBFAC | Performed by: INTERNAL MEDICINE

## 2018-12-17 PROCEDURE — 99214 OFFICE O/P EST MOD 30 MIN: CPT | Mod: S$PBB,,, | Performed by: INTERNAL MEDICINE

## 2018-12-17 PROCEDURE — 99999 PR PBB SHADOW E&M-EST. PATIENT-LVL III: CPT | Mod: PBBFAC,,, | Performed by: INTERNAL MEDICINE

## 2018-12-17 NOTE — PROGRESS NOTES
Subjective:       Patient ID: Wero Spangler is a 63 y.o. male.    Chief Complaint: Annual Exam    Patient with hypertension, dyslipidemia, insomnia and some vision problems and knee arthritis comes in for follow-up he is now on Medicare and retired.  He says he is doing well but is having some minor vision problems and some knee problems. Cartilage injections have not helped and he continues to have follow-ups.  He was told that surgery was probably not necessary but he is disappointed he has not had improvement.  He denies any GI or  complaints.  He did not need any refills.  We did some labs a few months ago so we will not repeat those now.      Review of Systems   Constitutional: Negative for activity change, chills, fever and unexpected weight change.   HENT: Negative for hearing loss, rhinorrhea and trouble swallowing.    Eyes: Positive for visual disturbance. Negative for discharge.   Respiratory: Negative for chest tightness and wheezing.    Cardiovascular: Negative for chest pain and palpitations.   Gastrointestinal: Negative for abdominal distention, abdominal pain, blood in stool, constipation, diarrhea, nausea, rectal pain and vomiting.   Endocrine: Negative for polydipsia and polyuria.   Genitourinary: Positive for urgency (Sees Urology). Negative for difficulty urinating, dysuria, flank pain, hematuria and testicular pain.   Musculoskeletal: Positive for arthralgias (knees). Negative for joint swelling and neck pain.   Skin: Negative for rash and wound.   Neurological: Negative for weakness, numbness and headaches.   Psychiatric/Behavioral: Negative for confusion and dysphoric mood.       Objective:      Physical Exam   Constitutional: He is oriented to person, place, and time. He appears well-developed and well-nourished. No distress.   HENT:   Head: Normocephalic and atraumatic.   Right Ear: External ear normal.   Left Ear: External ear normal.   Mouth/Throat: Oropharynx is clear and moist. No  oropharyngeal exudate.   TM's clear, pharynx clear   Eyes: Conjunctivae and EOM are normal. Pupils are equal, round, and reactive to light. No scleral icterus.   Neck: Normal range of motion. Neck supple. No thyromegaly present.   No supraclavicular nodes palpated   Cardiovascular: Normal rate, regular rhythm and normal heart sounds.   No murmur heard.  Pulmonary/Chest: Effort normal and breath sounds normal. He has no wheezes.   Abdominal: Soft. Bowel sounds are normal. He exhibits no mass. There is no tenderness.   Musculoskeletal: He exhibits tenderness (Mild tenderness with flexion and extension). He exhibits no edema.   Lymphadenopathy:     He has no cervical adenopathy.   Neurological: He is alert and oriented to person, place, and time.   Skin: No rash noted. No erythema. No pallor.   Psychiatric: He has a normal mood and affect. His behavior is normal.       Assessment:       1. Essential hypertension    2. Elevated bilirubin    3. Fatty liver    4. Gastroesophageal reflux disease, esophagitis presence not specified    5. Insomnia, unspecified type    6. Elevated glucose    7. Fuchs' corneal dystrophy        Plan:       Wero was seen today for annual exam.    Diagnoses and all orders for this visit:    Essential hypertension  -     CBC auto differential; Future  -     Comprehensive metabolic panel; Future  -     Hemoglobin A1c; Future  -     Lipid panel; Future    Elevated bilirubin  -     CBC auto differential; Future  -     Comprehensive metabolic panel; Future  -     Hemoglobin A1c; Future  -     Lipid panel; Future    Fatty liver  -     CBC auto differential; Future  -     Comprehensive metabolic panel; Future  -     Hemoglobin A1c; Future  -     Lipid panel; Future    Gastroesophageal reflux disease, esophagitis presence not specified  -     CBC auto differential; Future  -     Comprehensive metabolic panel; Future  -     Hemoglobin A1c; Future  -     Lipid panel; Future    Insomnia, unspecified type  -      CBC auto differential; Future  -     Comprehensive metabolic panel; Future  -     Hemoglobin A1c; Future  -     Lipid panel; Future    Elevated glucose  -     CBC auto differential; Future  -     Comprehensive metabolic panel; Future  -     Hemoglobin A1c; Future  -     Lipid panel; Future    Fuchs' corneal dystrophy

## 2018-12-31 RX ORDER — TAMSULOSIN HYDROCHLORIDE 0.4 MG/1
CAPSULE ORAL
Qty: 30 CAPSULE | Refills: 11 | Status: SHIPPED | OUTPATIENT
Start: 2018-12-31 | End: 2019-11-15

## 2019-01-07 ENCOUNTER — PATIENT MESSAGE (OUTPATIENT)
Dept: ORTHOPEDICS | Facility: CLINIC | Age: 64
End: 2019-01-07

## 2019-01-08 ENCOUNTER — OFFICE VISIT (OUTPATIENT)
Dept: ORTHOPEDICS | Facility: CLINIC | Age: 64
End: 2019-01-08
Payer: MEDICARE

## 2019-01-08 VITALS
WEIGHT: 210 LBS | HEIGHT: 72 IN | BODY MASS INDEX: 28.44 KG/M2 | DIASTOLIC BLOOD PRESSURE: 83 MMHG | HEART RATE: 82 BPM | SYSTOLIC BLOOD PRESSURE: 131 MMHG

## 2019-01-08 DIAGNOSIS — M17.11 PRIMARY OSTEOARTHRITIS OF RIGHT KNEE: Primary | ICD-10-CM

## 2019-01-08 PROCEDURE — 20610 DRAIN/INJ JOINT/BURSA W/O US: CPT | Mod: PBBFAC | Performed by: PHYSICIAN ASSISTANT

## 2019-01-08 PROCEDURE — 99999 PR PBB SHADOW E&M-EST. PATIENT-LVL III: ICD-10-PCS | Mod: PBBFAC,,, | Performed by: PHYSICIAN ASSISTANT

## 2019-01-08 PROCEDURE — 99213 OFFICE O/P EST LOW 20 MIN: CPT | Mod: S$PBB,25,, | Performed by: PHYSICIAN ASSISTANT

## 2019-01-08 PROCEDURE — 99213 OFFICE O/P EST LOW 20 MIN: CPT | Mod: PBBFAC | Performed by: PHYSICIAN ASSISTANT

## 2019-01-08 PROCEDURE — 20610 DRAIN/INJ JOINT/BURSA W/O US: CPT | Mod: S$PBB,RT,, | Performed by: PHYSICIAN ASSISTANT

## 2019-01-08 PROCEDURE — 20610 PR DRAIN/INJECT LARGE JOINT/BURSA: ICD-10-PCS | Mod: S$PBB,RT,, | Performed by: PHYSICIAN ASSISTANT

## 2019-01-08 PROCEDURE — 99999 PR PBB SHADOW E&M-EST. PATIENT-LVL III: CPT | Mod: PBBFAC,,, | Performed by: PHYSICIAN ASSISTANT

## 2019-01-08 PROCEDURE — 99213 PR OFFICE/OUTPT VISIT, EST, LEVL III, 20-29 MIN: ICD-10-PCS | Mod: S$PBB,25,, | Performed by: PHYSICIAN ASSISTANT

## 2019-01-09 RX ORDER — BETAMETHASONE SODIUM PHOSPHATE AND BETAMETHASONE ACETATE 3; 3 MG/ML; MG/ML
6 INJECTION, SUSPENSION INTRA-ARTICULAR; INTRALESIONAL; INTRAMUSCULAR; SOFT TISSUE
Status: COMPLETED | OUTPATIENT
Start: 2019-01-09 | End: 2019-01-09

## 2019-01-09 RX ADMIN — BETAMETHASONE ACETATE AND BETAMETHASONE SODIUM PHOSPHATE 6 MG: 3; 3 INJECTION, SUSPENSION INTRA-ARTICULAR; INTRALESIONAL; INTRAMUSCULAR; SOFT TISSUE at 09:01

## 2019-01-09 NOTE — PROGRESS NOTES
SUBJECTIVE:     Chief Complaint & History of Present Illness:  Wero Spangler is a Established patient 63 y.o. male who is seen here today with a complaint of  right knee pain  .  He has patient well-known to us was last seen treated the clinic for this condition 11/23/2018 worried upon he had completed a course of viscosupplementation injections for his right knee he has responded fairly well but is beginning to have return of soreness and pain in the knee and is requesting cortisone injection today to get him through his daily activities also complaining of pain and soreness primarily at night that interrupts his sleep  On a scale of 1-10, with 10 being worst pain imaginable, he rates this pain as 3 on good days and 6 on bad days.  he describes the pain as sore and achy.    Review of patient's allergies indicates:  No Known Allergies      Current Outpatient Medications   Medication Sig Dispense Refill    ALPRAZolam (XANAX) 0.5 MG tablet Take 1 tablet (0.5 mg total) by mouth nightly. 30 tablet 5    fenofibrate (TRICOR) 54 MG tablet TAKE ONE TABLET BY MOUTH ONCE DAILY 30 tablet 11    fluticasone (FLONASE) 50 mcg/actuation nasal spray 1 spray by Each Nare route daily as needed.       OMEPRAZOLE (PRILOSEC ORAL) Take by mouth every morning.       sodium chloride 2% (BARBI 128) 2 % ophthalmic solution 1 drop as needed (takes 3-4 times/day).      sulfamethoxazole-trimethoprim 800-160mg (BACTRIM DS) 800-160 mg Tab Take 1 tablet by mouth 2 (two) times daily. 14 tablet 0    tamsulosin (FLOMAX) 0.4 mg Cap TAKE ONE CAPSULE BY MOUTH ONCE DAILY 30 capsule 11    triamterene-hydrochlorothiazide 37.5-25 mg (MAXZIDE-25) 37.5-25 mg per tablet Take 1 tablet by mouth once daily. (Patient taking differently: Take 1 tablet by mouth every morning. ) 30 tablet 0    triamterene-hydrochlorothiazide 37.5-25 mg (MAXZIDE-25) 37.5-25 mg per tablet TAKE ONE TABLET BY MOUTH ONE TIME DAILY  30 tablet 11     No current  facility-administered medications for this visit.        Past Medical History:   Diagnosis Date    Allergy     Cataract     Elevated PSA     Enlarged prostate     Gallstones     General anesthetics causing adverse effect in therapeutic use 2000    delayed emergence after back surgery    GERD (gastroesophageal reflux disease)     HTN (hypertension) 9/24/2013    Hypertension     Urinary tract infection        Past Surgical History:   Procedure Laterality Date    BACK SURGERY  4/2000    CATARACT EXTRACTION W/  INTRAOCULAR LENS IMPLANT      Both Eyes    CHONDROPLASTY-KNEE Left 5/14/2015    [facility]    EYE SURGERY      MENISCECTOMY-MEDIAL and LATERAL Left 5/14/2015    [facility] at [facility]    PROSTATE SURGERY      prostate biopsy benign    ROBOTIC ASSISTED LAPAROSCOPIC NEPHRECTOMY PARTIAL Left 11/7/2017    [facility] 2[facility]    TONSILLECTOMY      VASECTOMY         Vital Signs (Most Recent)  Vitals:    01/08/19 1550   BP: 131/83   Pulse: 82           Review of Systems:  ROS:  Constitutional: no fever or chills  Eyes: no visual changes  ENT: no nasal congestion or sore throat  Respiratory: no cough or shortness of breath  Cardiovascular: no chest pain or palpitations  Gastrointestinal: reflux  Genitourinary: no hematuria or dysuria  Integument/Breast: no rash or pruritis  Hematologic/Lymphatic: no easy bruising or lymphadenopathy  Musculoskeletal: Right knee pain  Neurological: no seizures or tremors  Behavioral/Psych: no auditory or visual hallucinations  Endocrine: no heat or cold intolerance              OBJECTIVE:     PHYSICAL EXAM:  Height: 6' (182.9 cm) Weight: 95.2 kg (209 lb 15.8 oz), General Appearance: Well nourished, well developed, in no acute distress.  Neurological: Mood & affect are normal.  right  Knee Exam:  Knee Range of Motion:0-115 degrees flexion   Effusion:none  Condition of  skin:intact  Location of tenderness:Medial joint line   Strength:5 of 5  Stability:  Lachman: stable, LCL: stable, MCL: stable, PCL: stable and posteromedial (dial): stable  Varus /Valgus stress:  normal  Jazz:   negative/negative          ASSESSMENT/PLAN:     Plan: We discussed with the patient at length all the different treatment options available for  the knee including anti-inflammatories, acetaminophen, rest, ice, knee strengthening exercise, occasional cortisone injections for temporary relief, Viscosupplimentation injections, arthroscopic debridement osteotomy, and finally knee arthroplasty.   Proceed with therapeutic diagnostic cortisone injection of the right knee.  Compound pain cream to affected area up to t.i.d. P.r.n.     The injection site was identified and the skin was prepared with a betadine solution. The   right  knee was injected with 1 ml of Celestone and 5 ml Lidocaine under sterile technique. Wero Spangler tolerated the procedure well, he was advised to rest the knee today, ice and elevation. he did receive immediate relief of the pain in and about his knee he was told this would be short lived and is secondary to the lidocaine. he may have an increase in his discomfort tonight followed by steady improvement over the next several days. I may take 1-3 weeks following the injection to get the full benefit of the medication.  I will see him back in 4-6 months. Sooner if he has any problems or concerns.

## 2019-02-25 RX ORDER — FENOFIBRATE 54 MG/1
54 TABLET ORAL DAILY
Qty: 30 TABLET | Refills: 11 | Status: SHIPPED | OUTPATIENT
Start: 2019-02-25 | End: 2020-02-21 | Stop reason: SDUPTHER

## 2019-02-25 RX ORDER — TRIAMTERENE/HYDROCHLOROTHIAZID 37.5-25 MG
1 TABLET ORAL DAILY
Qty: 30 TABLET | Refills: 11 | Status: SHIPPED | OUTPATIENT
Start: 2019-02-25 | End: 2019-03-18 | Stop reason: SDUPTHER

## 2019-03-04 ENCOUNTER — PATIENT MESSAGE (OUTPATIENT)
Dept: ORTHOPEDICS | Facility: CLINIC | Age: 64
End: 2019-03-04

## 2019-03-07 ENCOUNTER — PATIENT MESSAGE (OUTPATIENT)
Dept: UROLOGY | Facility: CLINIC | Age: 64
End: 2019-03-07

## 2019-03-14 RX ORDER — ALPRAZOLAM 0.5 MG/1
0.5 TABLET ORAL NIGHTLY
Qty: 30 TABLET | Refills: 5 | Status: SHIPPED | OUTPATIENT
Start: 2019-03-14 | End: 2019-09-03 | Stop reason: SDUPTHER

## 2019-03-15 DIAGNOSIS — Z12.11 COLON CANCER SCREENING: ICD-10-CM

## 2019-03-18 ENCOUNTER — OFFICE VISIT (OUTPATIENT)
Dept: UROLOGY | Facility: CLINIC | Age: 64
End: 2019-03-18
Payer: MEDICARE

## 2019-03-18 VITALS
WEIGHT: 212.5 LBS | DIASTOLIC BLOOD PRESSURE: 80 MMHG | HEART RATE: 68 BPM | SYSTOLIC BLOOD PRESSURE: 136 MMHG | HEIGHT: 72 IN | BODY MASS INDEX: 28.78 KG/M2

## 2019-03-18 DIAGNOSIS — N13.8 BPH WITH URINARY OBSTRUCTION: Primary | ICD-10-CM

## 2019-03-18 DIAGNOSIS — N40.1 BPH WITH URINARY OBSTRUCTION: Primary | ICD-10-CM

## 2019-03-18 PROCEDURE — 99999 PR PBB SHADOW E&M-EST. PATIENT-LVL III: ICD-10-PCS | Mod: PBBFAC,,, | Performed by: UROLOGY

## 2019-03-18 PROCEDURE — 99214 OFFICE O/P EST MOD 30 MIN: CPT | Mod: S$PBB,,, | Performed by: UROLOGY

## 2019-03-18 PROCEDURE — 99999 PR PBB SHADOW E&M-EST. PATIENT-LVL III: CPT | Mod: PBBFAC,,, | Performed by: UROLOGY

## 2019-03-18 PROCEDURE — 99213 OFFICE O/P EST LOW 20 MIN: CPT | Mod: PBBFAC | Performed by: UROLOGY

## 2019-03-18 PROCEDURE — 99214 PR OFFICE/OUTPT VISIT, EST, LEVL IV, 30-39 MIN: ICD-10-PCS | Mod: S$PBB,,, | Performed by: UROLOGY

## 2019-03-18 RX ORDER — LIDOCAINE HYDROCHLORIDE 20 MG/ML
JELLY TOPICAL ONCE
Status: CANCELLED | OUTPATIENT
Start: 2019-03-18 | End: 2019-03-18

## 2019-03-18 NOTE — PROGRESS NOTES
CHIEF COMPLAINT:    Mr. Spangler is a 63 y.o. male presenting with an elevated PSA.    PRESENTING ILLNESS:    Wero Spangler is a 63 y.o. male.  He has had multiple biopsies done.  One was in 2012 when his PSA was 10.14.  There was also a prostate nodule at that time.  Most recent one was 8/23/16 when his PSA was 18.1.  This was a cognitive fusion biopsy.    He is s/p robotic left partial nephrectomy 11/7/17.  Path returned an oncocytoma.    He also has nocturia x 2-3 and slight decreased FOS.  He did develop post op AUR and has been on flomax since then.  He feels like his LUTS are slightly improved on the flomax.    He presents today saying he had another episode of retention while on a flight to [place].    He denies ED.    REVIEW OF SYSTEMS:    Wero Spangler denies any history of headache, blurred vision, fever, nausea, vomiting, chills, flank discomfort, abdominal pain, bleeding per rectum, cough, SOB, recent loss of consciousness, recent mental status changes, seizures, dizziness, or upper or lower extremity weakness.    JOSE MANUEL  1. 2  2. 2  3. 2  4. 3  5. 2     PATIENT HISTORY:    Past Medical History:   Diagnosis Date    Allergy     Cataract     Elevated PSA     Enlarged prostate     Gallstones     General anesthetics causing adverse effect in therapeutic use 2000    delayed emergence after back surgery    GERD (gastroesophageal reflux disease)     HTN (hypertension) 9/24/2013    Hypertension     Urinary tract infection        Past Surgical History:   Procedure Laterality Date    BACK SURGERY  4/2000    CATARACT EXTRACTION W/  INTRAOCULAR LENS IMPLANT      Both Eyes    CHONDROPLASTY-KNEE Left 5/14/2015    [facility]    EYE SURGERY      MENISCECTOMY-MEDIAL and LATERAL Left 5/14/2015    [facility] at [facility]    PROSTATE SURGERY      prostate biopsy benign    ROBOTIC ASSISTED LAPAROSCOPIC NEPHRECTOMY PARTIAL Left 11/7/2017    Performed by  [facility] at [facility]    TONSILLECTOMY      VASECTOMY         Family History   Problem Relation Age of Onset    Cancer Mother         breast    Prostate cancer Brother     Cancer Brother         prostate    Macular degeneration Maternal Grandmother     Cancer Other     Cancer Other     Amblyopia Neg Hx     Blindness Neg Hx     Cataracts Neg Hx     Glaucoma Neg Hx     Retinal detachment Neg Hx     Strabismus Neg Hx        Social History     Socioeconomic History    Marital status:      Spouse name: Not on file    Number of children: Not on file    Years of education: Not on file    Highest education level: Not on file   Social Needs    Financial resource strain: Not on file    Food insecurity - worry: Not on file    Food insecurity - inability: Not on file    Transportation needs - medical: Not on file    Transportation needs - non-medical: Not on file   Occupational History    Not on file   Tobacco Use    Smoking status: Former Smoker     Last attempt to quit:      Years since quittin.2    Smokeless tobacco: Never Used   Substance and Sexual Activity    Alcohol use: Yes     Alcohol/week: 0.6 oz     Types: 1 Glasses of wine per week     Comment: 2 beers three times a week     Drug use: No    Sexual activity: Yes     Partners: Female   Other Topics Concern    Not on file   Social History Narrative    Not on file       Allergies:  Patient has no known allergies.    Medications:    Current Outpatient Medications:     ALPRAZolam (XANAX) 0.5 MG tablet, Take 1 tablet (0.5 mg total) by mouth nightly., Disp: 30 tablet, Rfl: 5    fenofibrate (TRICOR) 54 MG tablet, Take 1 tablet (54 mg total) by mouth once daily., Disp: 30 tablet, Rfl: 11    fluticasone (FLONASE) 50 mcg/actuation nasal spray, 1 spray by Each Nare route daily as needed. , Disp: , Rfl:     OMEPRAZOLE (PRILOSEC ORAL), Take by mouth every morning. , Disp: , Rfl:     sodium chloride 2% (BARBI  128) 2 % ophthalmic solution, 1 drop as needed (takes 3-4 times/day)., Disp: , Rfl:     tamsulosin (FLOMAX) 0.4 mg Cap, TAKE ONE CAPSULE BY MOUTH ONCE DAILY, Disp: 30 capsule, Rfl: 11    triamterene-hydrochlorothiazide 37.5-25 mg (MAXZIDE-25) 37.5-25 mg per tablet, Take 1 tablet by mouth once daily. (Patient taking differently: Take 1 tablet by mouth every morning. ), Disp: 30 tablet, Rfl: 0    PHYSICAL EXAMINATION:    The patient generally appears in good health, is appropriately interactive, and is in no apparent distress.     Eyes: anicteric sclerae, moist conjunctivae; no lid-lag; PERRLA     HENT: Atraumatic; oropharynx clear with moist mucous membranes and no mucosal ulcerations;normal hard and soft palate.  No evidence of lymphadenopathy.    Neck: Trachea midline.  No thyromegaly.    Musculoskeletal: No abnormal gait.    Skin: No lesions.    Mental: Cooperative with normal affect.  Is oriented to time, place, and person.    Neuro: Grossly intact.    Chest: Normal inspiratory effort.   No accessory muscles.  No audible wheezes.  Respirations symmetric on inspiration and expiration.    Heart: Regular rhythm.      Abdomen:  Soft, non-tender. No masses or organomegaly. Bladder is not palpable. No evidence of flank discomfort. No evidence of inguinal hernia.    Genitourinary: The penis is not circumcised with no evidence of plaques or induration. The urethral meatus is normal. The testes, epididymides, and cord structures are normal in size and contour bilaterally. The scrotum is normal in size and contour.    Normal anal sphincter tone. No rectal mass.    The prostate is 40 g. Normal landmarks. Lateral sulci. Median furrow intact. There is a 1.5 cm x 1.5 cm nodule in the midportion of the gland. Stable.  Seminal vesicles are normal.    Extremities: No clubbing, cyanosis, or edema      LABS:    UA dipped negative today  Lab Results   Component Value Date    PSA 14.0 (H) 09/22/2014    PSA 15.48 (H) 08/16/2013     PSA 11.06 (H) 02/25/2013    PSADIAG 15.6 (H) 11/28/2018    PSADIAG 18.2 (H) 05/30/2018    PSADIAG 15.4 (H) 12/06/2017    PSATOTAL 6.8 (H) 07/12/2011    PSATOTAL 7.9 (H) 01/11/2011    PSAFREE 1.07 07/12/2011    PSAFREE 1.67 (H) 01/11/2011    PSAFREEPCT 15.74 07/12/2011    PSAFREEPCT 21.14 01/11/2011        IMPRESSION:    Encounter Diagnoses   Name Primary?    BPH with urinary obstruction Yes       PLAN:    1. Continue flomax.    Discussed Rezum.  He'd like this.  He wants it in May.  2. Discussed risks/benefits of Rezum.  Discussed bleeding, frequency, failure amongst other risks.  Also discussed very small risk of EjD and ED.  He was given the chance to ask questions.  Alternatives discussed.  Will schedule for Rezum.  3. Will cysto to make sure he's a candidate.      Copy to:

## 2019-03-18 NOTE — H&P (VIEW-ONLY)
CHIEF COMPLAINT:    Mr. Spangler is a 63 y.o. male presenting with an elevated PSA.    PRESENTING ILLNESS:    Wero Spangler is a 63 y.o. male.  He has had multiple biopsies done.  One was in 2012 when his PSA was 10.14.  There was also a prostate nodule at that time.  Most recent one was 8/23/16 when his PSA was 18.1.  This was a cognitive fusion biopsy.    He is s/p robotic left partial nephrectomy 11/7/17.  Path returned an oncocytoma.    He also has nocturia x 2-3 and slight decreased FOS.  He did develop post op AUR and has been on flomax since then.  He feels like his LUTS are slightly improved on the flomax.    He presents today saying he had another episode of retention while on a flight to [place].    He denies ED.    REVIEW OF SYSTEMS:    Wero Spangler denies any history of headache, blurred vision, fever, nausea, vomiting, chills, flank discomfort, abdominal pain, bleeding per rectum, cough, SOB, recent loss of consciousness, recent mental status changes, seizures, dizziness, or upper or lower extremity weakness.    JOSE MANUEL  1. 2  2. 2  3. 2  4. 3  5. 2     PATIENT HISTORY:    Past Medical History:   Diagnosis Date    Allergy     Cataract     Elevated PSA     Enlarged prostate     Gallstones     General anesthetics causing adverse effect in therapeutic use 2000    delayed emergence after back surgery    GERD (gastroesophageal reflux disease)     HTN (hypertension) 9/24/2013    Hypertension     Urinary tract infection        Past Surgical History:   Procedure Laterality Date    BACK SURGERY  4/2000    CATARACT EXTRACTION W/  INTRAOCULAR LENS IMPLANT      Both Eyes    CHONDROPLASTY-KNEE Left 5/14/2015    [facility] OR    EYE SURGERY      MENISCECTOMY-MEDIAL and LATERAL Left 5/14/2015    [facility] at [facility] OR    PROSTATE SURGERY      prostate biopsy benign    ROBOTIC ASSISTED LAPAROSCOPIC NEPHRECTOMY PARTIAL Left 11/7/2017    [facility]    TONSILLECTOMY      VASECTOMY         Family History   Problem Relation Age of Onset    Cancer Mother         breast    Prostate cancer Brother     Cancer Brother         prostate    Macular degeneration Maternal Grandmother     Cancer Other     Cancer Other     Amblyopia Neg Hx     Blindness Neg Hx     Cataracts Neg Hx     Glaucoma Neg Hx     Retinal detachment Neg Hx     Strabismus Neg Hx        Social History     Socioeconomic History    Marital status:      Spouse name: Not on file    Number of children: Not on file    Years of education: Not on file    Highest education level: Not on file   Social Needs    Financial resource strain: Not on file    Food insecurity - worry: Not on file    Food insecurity - inability: Not on file    Transportation needs - medical: Not on file    Transportation needs - non-medical: Not on file   Occupational History    Not on file   Tobacco Use    Smoking status: Former Smoker     Last attempt to quit:      Years since quittin.2    Smokeless tobacco: Never Used   Substance and Sexual Activity    Alcohol use: Yes     Alcohol/week: 0.6 oz     Types: 1 Glasses of wine per week     Comment: 2 beers three times a week     Drug use: No    Sexual activity: Yes     Partners: Female   Other Topics Concern    Not on file   Social History Narrative    Not on file       Allergies:  Patient has no known allergies.    Medications:    Current Outpatient Medications:     ALPRAZolam (XANAX) 0.5 MG tablet, Take 1 tablet (0.5 mg total) by mouth nightly., Disp: 30 tablet, Rfl: 5    fenofibrate (TRICOR) 54 MG tablet, Take 1 tablet (54 mg total) by mouth once daily., Disp: 30 tablet, Rfl: 11    fluticasone (FLONASE) 50 mcg/actuation nasal spray, 1 spray by Each Nare route daily as needed. , Disp: , Rfl:     OMEPRAZOLE (PRILOSEC ORAL), Take by mouth every morning. , Disp: , Rfl:     sodium chloride 2% (BARBI  128) 2 % ophthalmic solution, 1 drop as needed (takes 3-4 times/day)., Disp: , Rfl:     tamsulosin (FLOMAX) 0.4 mg Cap, TAKE ONE CAPSULE BY MOUTH ONCE DAILY, Disp: 30 capsule, Rfl: 11    triamterene-hydrochlorothiazide 37.5-25 mg (MAXZIDE-25) 37.5-25 mg per tablet, Take 1 tablet by mouth once daily. (Patient taking differently: Take 1 tablet by mouth every morning. ), Disp: 30 tablet, Rfl: 0    PHYSICAL EXAMINATION:    The patient generally appears in good health, is appropriately interactive, and is in no apparent distress.     Eyes: anicteric sclerae, moist conjunctivae; no lid-lag; PERRLA     HENT: Atraumatic; oropharynx clear with moist mucous membranes and no mucosal ulcerations;normal hard and soft palate.  No evidence of lymphadenopathy.    Neck: Trachea midline.  No thyromegaly.    Musculoskeletal: No abnormal gait.    Skin: No lesions.    Mental: Cooperative with normal affect.  Is oriented to time, place, and person.    Neuro: Grossly intact.    Chest: Normal inspiratory effort.   No accessory muscles.  No audible wheezes.  Respirations symmetric on inspiration and expiration.    Heart: Regular rhythm.      Abdomen:  Soft, non-tender. No masses or organomegaly. Bladder is not palpable. No evidence of flank discomfort. No evidence of inguinal hernia.    Genitourinary: The penis is not circumcised with no evidence of plaques or induration. The urethral meatus is normal. The testes, epididymides, and cord structures are normal in size and contour bilaterally. The scrotum is normal in size and contour.    Normal anal sphincter tone. No rectal mass.    The prostate is 40 g. Normal landmarks. Lateral sulci. Median furrow intact. There is a 1.5 cm x 1.5 cm nodule in the midportion of the gland. Stable.  Seminal vesicles are normal.    Extremities: No clubbing, cyanosis, or edema      LABS:    UA dipped negative today  Lab Results   Component Value Date    PSA 14.0 (H) 09/22/2014    PSA 15.48 (H) 08/16/2013     PSA 11.06 (H) 02/25/2013    PSADIAG 15.6 (H) 11/28/2018    PSADIAG 18.2 (H) 05/30/2018    PSADIAG 15.4 (H) 12/06/2017    PSATOTAL 6.8 (H) 07/12/2011    PSATOTAL 7.9 (H) 01/11/2011    PSAFREE 1.07 07/12/2011    PSAFREE 1.67 (H) 01/11/2011    PSAFREEPCT 15.74 07/12/2011    PSAFREEPCT 21.14 01/11/2011        IMPRESSION:    Encounter Diagnoses   Name Primary?    BPH with urinary obstruction Yes       PLAN:    1. Continue flomax.    Discussed Rezum.  He'd like this.  He wants it in May.  2. Discussed risks/benefits of Rezum.  Discussed bleeding, frequency, failure amongst other risks.  Also discussed very small risk of EjD and ED.  He was given the chance to ask questions.  Alternatives discussed.  Will schedule for Rezum.  3. Will cysto to make sure he's a candidate.      Copy to:

## 2019-03-27 ENCOUNTER — HOSPITAL ENCOUNTER (OUTPATIENT)
Dept: UROLOGY | Facility: HOSPITAL | Age: 64
Discharge: HOME | End: 2019-03-27
Attending: UROLOGY
Payer: MEDICARE

## 2019-03-27 ENCOUNTER — TELEPHONE (OUTPATIENT)
Dept: UROLOGY | Facility: CLINIC | Age: 64
End: 2019-03-27

## 2019-03-27 VITALS
WEIGHT: 211.88 LBS | RESPIRATION RATE: 16 BRPM | HEART RATE: 78 BPM | HEIGHT: 72 IN | BODY MASS INDEX: 28.7 KG/M2 | DIASTOLIC BLOOD PRESSURE: 83 MMHG | TEMPERATURE: 98 F | SYSTOLIC BLOOD PRESSURE: 149 MMHG

## 2019-03-27 DIAGNOSIS — N40.1 BPH WITH URINARY OBSTRUCTION: ICD-10-CM

## 2019-03-27 DIAGNOSIS — N13.8 BPH WITH OBSTRUCTION/LOWER URINARY TRACT SYMPTOMS: Primary | ICD-10-CM

## 2019-03-27 DIAGNOSIS — N40.1 BPH WITH URINARY OBSTRUCTION: Primary | ICD-10-CM

## 2019-03-27 DIAGNOSIS — N40.1 BPH WITH OBSTRUCTION/LOWER URINARY TRACT SYMPTOMS: Primary | ICD-10-CM

## 2019-03-27 DIAGNOSIS — N13.8 BPH WITH URINARY OBSTRUCTION: ICD-10-CM

## 2019-03-27 DIAGNOSIS — N13.8 BPH WITH URINARY OBSTRUCTION: Primary | ICD-10-CM

## 2019-03-27 PROCEDURE — 52000 PR CYSTOURETHROSCOPY: ICD-10-PCS | Mod: ,,, | Performed by: UROLOGY

## 2019-03-27 PROCEDURE — 52000 CYSTOURETHROSCOPY: CPT

## 2019-03-27 PROCEDURE — 52000 CYSTOURETHROSCOPY: CPT | Mod: ,,, | Performed by: UROLOGY

## 2019-03-27 RX ORDER — LIDOCAINE HYDROCHLORIDE 20 MG/ML
JELLY TOPICAL ONCE
Status: COMPLETED | OUTPATIENT
Start: 2019-03-27 | End: 2019-03-27

## 2019-03-27 RX ADMIN — LIDOCAINE HYDROCHLORIDE: 20 JELLY TOPICAL at 01:03

## 2019-03-27 NOTE — PATIENT INSTRUCTIONS
What to Expect After a Cystoscopy  For the next 24-48 hours, you may feel a mild burning when you urinate. This burning is normal and expected. Drink plenty of water to dilute the urine to help relieve the burning sensation. You may also see a small amount of blood in your urine after the procedure.    Unless you are already taking antibiotics, you may be given an antibiotic after the test to prevent infection.    Signs and Symptoms to Report  Call the [facility] at [phone] if you develop any of the following:  · Fever of 101 degrees or higher  · Chills or persistent bleeding  · Inability to urinate

## 2019-03-27 NOTE — PROCEDURES
Date: 03/27/2019     Surgeon: Wilfrid    Assistant: None    Procedure performed: cystoscopy    Blood loss: None    Specimen: None    Procedure in detail: Using standard sterile technique, the flexible cystoscope was assembled and passed into the patient's bladder.  Cystoscopic evaluation of the bladder revealed tri-lobar hypertrophy.  The estimated prostatic length was 5 cm.

## 2019-03-28 ENCOUNTER — TELEPHONE (OUTPATIENT)
Dept: UROLOGY | Facility: CLINIC | Age: 64
End: 2019-03-28

## 2019-03-28 NOTE — TELEPHONE ENCOUNTER
Pt called to reschedule his procedure to 5-14, he also needed to reschedule his u/a analysis. I told him I would send his update appt out today to the address we have on file. The patient verbally understood.

## 2019-04-23 ENCOUNTER — TELEPHONE (OUTPATIENT)
Dept: INTERNAL MEDICINE | Facility: CLINIC | Age: 64
End: 2019-04-23

## 2019-04-23 ENCOUNTER — ANESTHESIA EVENT (OUTPATIENT)
Dept: SURGERY | Facility: HOSPITAL | Age: 64
End: 2019-04-23
Payer: MEDICARE

## 2019-04-23 DIAGNOSIS — Z01.818 PREOP TESTING: Primary | ICD-10-CM

## 2019-04-23 NOTE — TELEPHONE ENCOUNTER
THat EKG report is not worrisome or concerning. He has not had any symptoms that I am aware.   Certainly if they need us to see him for preop that is OK too.

## 2019-04-23 NOTE — PRE ADMISSION SCREENING
Anesthesia Assessment: Preoperative EQUATION    Planned Procedure: Procedure(s) (LRB):  DESTRUCTION, PROSTATE, TRANSURETHRAL (N/A)  Requested Anesthesia Type:Monitor Anesthesia Care  Surgeon: Darren Yuen MD  Service: Urology  Known or anticipated Date of Surgery:5/14/2019    Surgeon notes: reviewed    Electronic QUestionnaire Assessment completed via nurse interview with patient.        No aq        Triage considerations:         Previous anesthesia records:GETA  11/7/17  Airway Present Prior to Hospital Arrival?: No Placement Date: 11/07/17 Placement Time: 0713 Method of Intubation: Direct laryngoscopy Inserted by: Tony Tomas  Airway Device: Endotracheal Tube Mask Ventilation: Easy Intubated: Postinduction Blade: Hollie #4 Airway Device Size: 7.5 Style: Cuffed Cuff Inflation: Minimal occlusive pressure Placement Verified By: Auscultation;Capnometry;ETT Condensation Grade: Grade II Complicating Factors: None Findings Post-Intubation: Positive EtCO2;Bilateral breath sounds;Atraumatic/Condition of teeth unchanged Depth of Insertion (cm): 23 Secured at: Lips Complications: None Breath Sounds: Equal Bilateral Insertion attempts (enter comment if more than 2 attempts): 1 Removal Date: 11/07/17 Removal Time: 0955   Airway Placement Date: 11/07/17 Placement Time: 0713 Method of Intubation: Direct laryngoscopy Inserted by: Tony Tomas  Airway Device: Endotracheal Tube Airway Device Size: 7.5 Style: Cuffed Depth of Insertion (cm): 23 Placement Verified By: Auscultation;Capnometry;ETT Condensation Breath Sounds: Equal Bilateral Insertion attempts (enter comment if more than 2 attempts): 1 Removal Date: 11/07/17 Removal Time: 0955           Last PCP note: 3-6 months ago , within [facility]  Subspecialty notes: ENT, urology    Other important co-morbidities:   Seasonal allergies  Fuch's corneal dystrophy  HTN  Elevated PSA  BPH with obstruction  Hx Fatty  Liver  GERD  Osteoathritis knee   insomnia     Tests already available:  No recent tests.            Instructions given. (See in Nurse's note)    Optimization:         Plan:    Testing:  Hematology Profile and BMP   Pre-anesthesia  visit       Visit focus: none     Consultation:notified PCP of  procedure       Navigation: Tests Scheduled.              Consults scheduled.             Results will be tracked by Preop Clinic.

## 2019-04-23 NOTE — PRE-PROCEDURE INSTRUCTIONS
Duke Cano MD           4/23/19 11:37 AM   Note      THat EKG report is not worrisome or concerning. He has not had any symptoms that I am aware.

## 2019-04-23 NOTE — TELEPHONE ENCOUNTER
----- Message from Joan Escamilla RN sent at 4/23/2019 10:58 AM CDT -----  Pt. Is scheduled for transurethral destruction of prostate by Dr. Yuen 5/14.  Noted last EKG was done in 2017    EKG 12-lead   Order: [id number]   Status:  Final result   Visible to patient:  Yes (Patient Portal) Next appt:  05/06/2019 at 07:30 AM in Lab (LAB, APPOINTMENT [place]) Dx:  Preop testing     Narrative   Performed by:     Test Reason : Z01.818  Blood Pressure :  Vent. Rate : 059 BPM     Atrial Rate : 059 BPM     P-R Int : 156 ms          QRS Dur : 154 ms      QT Int : 456 ms       P-R-T Axes : 057 089 027 degrees     QTc Int : 451 ms    Sinus bradycardia  Right bundle branch block  Abnormal ECG  When compared with ECG of 24-APR-2015 08:55,  No significant change was found  Confirmed by NUHA CR MD ([id number]) on 11/2/2017 2:18:53 PM    Referred By: RAMIRO NICOLE           Confirmed By:NUHA CR MD    Specimen Collected: 11/02/17 11:14 Last Resulted: 11/02/17 14:18        States not followed by a cardiologist, is this  Acceptable to proceed with surgery?          JOSHUA Escamilla RN BC  Pre-op anesthesia

## 2019-04-23 NOTE — ANESTHESIA PREPROCEDURE EVALUATION
Anesthesia Assessment: Preoperative EQUATION     Planned Procedure: Procedure(s) (LRB):  DESTRUCTION, PROSTATE, TRANSURETHRAL (N/A)  Requested Anesthesia Type:Monitor Anesthesia Care  Surgeon: Darren Yuen MD  Service: Urology  Known or anticipated Date of Surgery:5/14/2019     Surgeon notes: reviewed     Electronic QUestionnaire Assessment completed via nurse interview with patient.         No aq           Triage considerations:            Previous anesthesia records:GETA  11/7/17  Airway Present Prior to Hospital Arrival?: No Placement Date: 11/07/17 Placement Time: 0713 Method of Intubation: Direct laryngoscopy Inserted by: Tony Tomas  Airway Device: Endotracheal Tube Mask Ventilation: Easy Intubated: Postinduction Blade: Hollie #4 Airway Device Size: 7.5 Style: Cuffed Cuff Inflation: Minimal occlusive pressure Placement Verified By: Auscultation;Capnometry;ETT Condensation Grade: Grade II Complicating Factors: None Findings Post-Intubation: Positive EtCO2;Bilateral breath sounds;Atraumatic/Condition of teeth unchanged Depth of Insertion (cm): 23 Secured at: Lips Complications: None Breath Sounds: Equal Bilateral Insertion attempts (enter comment if more than 2 attempts): 1 Removal Date: 11/07/17 Removal Time: 0955   Airway Placement Date: 11/07/17 Placement Time: 0713 Method of Intubation: Direct laryngoscopy Inserted by: Tony Tomas  Airway Device: Endotracheal Tube Airway Device Size: 7.5 Style: Cuffed Depth of Insertion (cm): 23 Placement Verified By: Auscultation;Capnometry;ETT Condensation Breath Sounds: Equal Bilateral Insertion attempts (enter comment if more than 2 attempts): 1 Removal Date: 11/07/17 Removal Time: 0955               Last PCP note: 3-6 months ago , within [facility]  Subspecialty notes: ENT, urology     Other important co-morbidities:   Seasonal allergies  Fuch's corneal dystrophy  HTN  Elevated PSA  BPH with obstruction  Hx Fatty  Liver  GERD  Osteoathritis knee   insomnia     Tests already available:  No recent tests.                            Instructions given. (See in Nurse's note)     Optimization:          Plan:    Testing:  Hematology Profile and BMP   Pre-anesthesia  visit                                        Visit focus: none                           Consultation:notified PCP of  procedure                             Navigation: Tests Scheduled.                         Consults scheduled.                        Results will be tracked by Preop Clinic.                                                                                                                   04/23/2019  Wero Spangler is a 63 y.o., male.    Anesthesia Evaluation    I have reviewed the Patient Summary Reports.    I have reviewed the Nursing Notes.   I have reviewed the Medications.     Review of Systems  Anesthesia Hx:  No problems with previous Anesthesia Hx of Anesthetic complications Difficulty waking from past surgery on back History of prior surgery of interest to airway management or planning: Denies Family Hx of Anesthesia complications.  Personal Hx of Anesthesia complications Slow To Awaken/Delayed Emergence   Social:  Former Smoker, Social Alcohol Use    EENT/Dental:   Seasonal allergies    Fuch's corneal dystrophy   Cardiovascular:   Exercise tolerance: good Hypertension hyperlipidemia ECG has been reviewed.    Pulmonary:   COPD    Renal/:  Renal/ Normal  Elevated PSA  BPH with obstruction   Hepatic/GI:   GERD Hx fatty liver   Musculoskeletal:  Musculoskeletal Normal Arthritis   Osteoarthritis    Hx back  Surgery 2001   Neurological:  Neurology Normal  Denies CVA. Denies Seizures.    Endocrine:  Endocrine Normal    Dermatological:  Skin Normal    Psych:   insomnia         Physical Exam  General:  Well nourished, Obesity    Airway/Jaw/Neck:  Airway Findings: Mouth Opening: Normal Tongue: Normal  Jaw/Neck Findings:  Micrognathia: Negative  Neck ROM: Normal ROM      Dental:  Dental Findings: In tact   Chest/Lungs:  Chest/Lungs Findings: Clear to auscultation, Normal Respiratory Rate     Heart/Vascular:  Heart Findings: Rate: Normal  Rhythm: Regular Rhythm  Sounds: Normal  Heart murmur: negative    Abdomen:  Abdomen Findings:  Normal, Nontender, Soft       Mental Status:  Mental Status Findings:  Cooperative, Alert and Oriented         Anesthesia Plan  Type of Anesthesia, risks & benefits discussed:  Anesthesia Type:  general  Patient's Preference:   Intra-op Monitoring Plan:   Intra-op Monitoring Plan Comments:   Post Op Pain Control Plan: multimodal analgesia, IV/PO Opioids PRN and per primary service following discharge from PACU  Post Op Pain Control Plan Comments:   Induction:   Inhalation  Beta Blocker:  Patient is not currently on a Beta-Blocker (No further documentation required).       Informed Consent: Patient understands risks and agrees with Anesthesia plan.  Questions answered. Anesthesia consent signed with patient.  ASA Score: 3     Day of Surgery Review of History & Physical:    H&P update referred to the surgeon.         Ready For Surgery From Anesthesia Perspective.

## 2019-05-06 ENCOUNTER — LAB VISIT (OUTPATIENT)
Dept: LAB | Facility: HOSPITAL | Age: 64
End: 2019-05-06
Attending: UROLOGY
Payer: MEDICARE

## 2019-05-06 DIAGNOSIS — N13.8 BPH WITH URINARY OBSTRUCTION: ICD-10-CM

## 2019-05-06 DIAGNOSIS — N40.1 BPH WITH URINARY OBSTRUCTION: ICD-10-CM

## 2019-05-06 LAB
BILIRUB UR QL STRIP: NEGATIVE
CLARITY UR REFRACT.AUTO: CLEAR
COLOR UR AUTO: YELLOW
GLUCOSE UR QL STRIP: NEGATIVE
HGB UR QL STRIP: NEGATIVE
KETONES UR QL STRIP: NEGATIVE
LEUKOCYTE ESTERASE UR QL STRIP: NEGATIVE
NITRITE UR QL STRIP: NEGATIVE
PH UR STRIP: 8 [PH] (ref 5–8)
PROT UR QL STRIP: NEGATIVE
SP GR UR STRIP: 1.01 (ref 1–1.03)
URN SPEC COLLECT METH UR: NORMAL

## 2019-05-06 PROCEDURE — 81003 URINALYSIS AUTO W/O SCOPE: CPT

## 2019-05-13 ENCOUNTER — TELEPHONE (OUTPATIENT)
Dept: UROLOGY | Facility: CLINIC | Age: 64
End: 2019-05-13

## 2019-05-13 NOTE — TELEPHONE ENCOUNTER
Called pt to confirm arrival time 630am for procedure. Gave pt NPO instructions and gave pt opportunity to ask questions. Pt verbalized understanding.

## 2019-05-14 ENCOUNTER — ANESTHESIA (OUTPATIENT)
Dept: SURGERY | Facility: HOSPITAL | Age: 64
End: 2019-05-14
Payer: MEDICARE

## 2019-05-14 ENCOUNTER — HOSPITAL ENCOUNTER (OUTPATIENT)
Facility: HOSPITAL | Age: 64
Discharge: HOME | End: 2019-05-14
Attending: UROLOGY | Admitting: UROLOGY
Payer: MEDICARE

## 2019-05-14 VITALS
BODY MASS INDEX: 28.71 KG/M2 | HEART RATE: 67 BPM | WEIGHT: 212 LBS | TEMPERATURE: 97 F | SYSTOLIC BLOOD PRESSURE: 166 MMHG | DIASTOLIC BLOOD PRESSURE: 89 MMHG | OXYGEN SATURATION: 99 % | HEIGHT: 72 IN | RESPIRATION RATE: 18 BRPM

## 2019-05-14 DIAGNOSIS — N13.8 BPH WITH URINARY OBSTRUCTION: Primary | ICD-10-CM

## 2019-05-14 DIAGNOSIS — N40.1 BPH WITH URINARY OBSTRUCTION: Primary | ICD-10-CM

## 2019-05-14 PROCEDURE — 71000044 HC DOSC ROUTINE RECOVERY FIRST HOUR: Performed by: UROLOGY

## 2019-05-14 PROCEDURE — 25000003 PHARM REV CODE 250: Performed by: STUDENT IN AN ORGANIZED HEALTH CARE EDUCATION/TRAINING PROGRAM

## 2019-05-14 PROCEDURE — D9220A PRA ANESTHESIA: Mod: ANES,,, | Performed by: ANESTHESIOLOGY

## 2019-05-14 PROCEDURE — 53854 PR TRANSURETH DESTRUCTION, PROSTATE TISSUE, RF WV THERMOTHERAPY: ICD-10-PCS | Mod: ,,, | Performed by: UROLOGY

## 2019-05-14 PROCEDURE — D9220A PRA ANESTHESIA: Mod: CRNA,,, | Performed by: NURSE ANESTHETIST, CERTIFIED REGISTERED

## 2019-05-14 PROCEDURE — D9220A PRA ANESTHESIA: ICD-10-PCS | Mod: ANES,,, | Performed by: ANESTHESIOLOGY

## 2019-05-14 PROCEDURE — 36000707: Performed by: UROLOGY

## 2019-05-14 PROCEDURE — 63600175 PHARM REV CODE 636 W HCPCS: Performed by: NURSE ANESTHETIST, CERTIFIED REGISTERED

## 2019-05-14 PROCEDURE — 53854 TRURL DSTRJ PRST8 TISS RF WV: CPT | Mod: ,,, | Performed by: UROLOGY

## 2019-05-14 PROCEDURE — 36000706: Performed by: UROLOGY

## 2019-05-14 PROCEDURE — 37000008 HC ANESTHESIA 1ST 15 MINUTES: Performed by: UROLOGY

## 2019-05-14 PROCEDURE — 37000009 HC ANESTHESIA EA ADD 15 MINS: Performed by: UROLOGY

## 2019-05-14 PROCEDURE — 71000015 HC POSTOP RECOV 1ST HR: Performed by: UROLOGY

## 2019-05-14 PROCEDURE — 63600175 PHARM REV CODE 636 W HCPCS: Performed by: STUDENT IN AN ORGANIZED HEALTH CARE EDUCATION/TRAINING PROGRAM

## 2019-05-14 PROCEDURE — D9220A PRA ANESTHESIA: ICD-10-PCS | Mod: CRNA,,, | Performed by: NURSE ANESTHETIST, CERTIFIED REGISTERED

## 2019-05-14 PROCEDURE — 27201423 OPTIME MED/SURG SUP & DEVICES STERILE SUPPLY: Performed by: UROLOGY

## 2019-05-14 PROCEDURE — 71000016 HC POSTOP RECOV ADDL HR: Performed by: UROLOGY

## 2019-05-14 RX ORDER — IBUPROFEN 400 MG/1
400 TABLET ORAL ONCE
Status: COMPLETED | OUTPATIENT
Start: 2019-05-14 | End: 2019-05-14

## 2019-05-14 RX ORDER — SULFAMETHOXAZOLE AND TRIMETHOPRIM 800; 160 MG/1; MG/1
1 TABLET ORAL 2 TIMES DAILY
Qty: 14 TABLET | Refills: 0 | Status: SHIPPED | OUTPATIENT
Start: 2019-05-14 | End: 2019-05-21

## 2019-05-14 RX ORDER — OXYBUTYNIN CHLORIDE 5 MG/1
5 TABLET ORAL 3 TIMES DAILY PRN
Qty: 21 TABLET | Refills: 0 | Status: SHIPPED | OUTPATIENT
Start: 2019-05-14 | End: 2019-06-20

## 2019-05-14 RX ORDER — FENTANYL CITRATE 50 UG/ML
50 INJECTION, SOLUTION INTRAMUSCULAR; INTRAVENOUS EVERY 5 MIN PRN
Status: DISCONTINUED | OUTPATIENT
Start: 2019-05-14 | End: 2019-05-14 | Stop reason: HOSPADM

## 2019-05-14 RX ORDER — SODIUM CHLORIDE 0.9 % (FLUSH) 0.9 %
3 SYRINGE (ML) INJECTION
Status: DISCONTINUED | OUTPATIENT
Start: 2019-05-14 | End: 2019-05-14 | Stop reason: HOSPADM

## 2019-05-14 RX ORDER — LIDOCAINE HYDROCHLORIDE 10 MG/ML
1 INJECTION, SOLUTION EPIDURAL; INFILTRATION; INTRACAUDAL; PERINEURAL ONCE
Status: COMPLETED | OUTPATIENT
Start: 2019-05-14 | End: 2019-05-14

## 2019-05-14 RX ORDER — CEFAZOLIN SODIUM 1 G/3ML
2 INJECTION, POWDER, FOR SOLUTION INTRAMUSCULAR; INTRAVENOUS
Status: COMPLETED | OUTPATIENT
Start: 2019-05-14 | End: 2019-05-14

## 2019-05-14 RX ORDER — SODIUM CHLORIDE 9 MG/ML
INJECTION, SOLUTION INTRAVENOUS CONTINUOUS
Status: DISCONTINUED | OUTPATIENT
Start: 2019-05-14 | End: 2019-05-14 | Stop reason: HOSPADM

## 2019-05-14 RX ORDER — IBUPROFEN 400 MG/1
TABLET ORAL
Status: DISCONTINUED
Start: 2019-05-14 | End: 2019-05-14 | Stop reason: HOSPADM

## 2019-05-14 RX ORDER — IBUPROFEN 400 MG/1
400 TABLET ORAL 3 TIMES DAILY
Qty: 42 TABLET | Refills: 0 | Status: SHIPPED | OUTPATIENT
Start: 2019-05-14 | End: 2019-05-28

## 2019-05-14 RX ORDER — MIDAZOLAM HYDROCHLORIDE 1 MG/ML
INJECTION, SOLUTION INTRAMUSCULAR; INTRAVENOUS
Status: DISCONTINUED | OUTPATIENT
Start: 2019-05-14 | End: 2019-05-14

## 2019-05-14 RX ORDER — PROPOFOL 10 MG/ML
VIAL (ML) INTRAVENOUS
Status: DISCONTINUED | OUTPATIENT
Start: 2019-05-14 | End: 2019-05-14

## 2019-05-14 RX ORDER — LIDOCAINE HCL/PF 100 MG/5ML
SYRINGE (ML) INTRAVENOUS
Status: DISCONTINUED | OUTPATIENT
Start: 2019-05-14 | End: 2019-05-14

## 2019-05-14 RX ADMIN — PROPOFOL 100 MG: 10 INJECTION, EMULSION INTRAVENOUS at 07:05

## 2019-05-14 RX ADMIN — MIDAZOLAM HYDROCHLORIDE 1 MG: 1 INJECTION, SOLUTION INTRAMUSCULAR; INTRAVENOUS at 07:05

## 2019-05-14 RX ADMIN — PROPOFOL 30 MG: 10 INJECTION, EMULSION INTRAVENOUS at 07:05

## 2019-05-14 RX ADMIN — LIDOCAINE HYDROCHLORIDE 50 MG: 20 INJECTION, SOLUTION INTRAVENOUS at 07:05

## 2019-05-14 RX ADMIN — LIDOCAINE HYDROCHLORIDE 2 MG: 10 INJECTION, SOLUTION EPIDURAL; INFILTRATION; INTRACAUDAL; PERINEURAL at 07:05

## 2019-05-14 RX ADMIN — PROPOFOL 20 MG: 10 INJECTION, EMULSION INTRAVENOUS at 07:05

## 2019-05-14 RX ADMIN — PROPOFOL 50 MG: 10 INJECTION, EMULSION INTRAVENOUS at 07:05

## 2019-05-14 RX ADMIN — SODIUM CHLORIDE: 0.9 INJECTION, SOLUTION INTRAVENOUS at 07:05

## 2019-05-14 RX ADMIN — CEFAZOLIN 2 G: 330 INJECTION, POWDER, FOR SOLUTION INTRAMUSCULAR; INTRAVENOUS at 07:05

## 2019-05-14 RX ADMIN — IBUPROFEN 400 MG: 400 TABLET, FILM COATED ORAL at 09:05

## 2019-05-14 NOTE — PLAN OF CARE
Patient is awake and alert. No signs of distress noted. No complaints of pain.  No complaints of nausea.  Discharge instructions given and explained. Patricio care instructed.  Standard drainage bag changed to leg bag using aseptic technique.  Patient dressed and in wheelchair ready for discharge.

## 2019-05-14 NOTE — H&P
CHIEF COMPLAINT:    Mr. Spangler is a 63 y.o. male presenting with an elevated PSA.    PRESENTING ILLNESS:    Wero Spangler is a 63 y.o. male.  He has had multiple biopsies done.  One was in 2012 when his PSA was 10.14.  There was also a prostate nodule at that time.  Most recent one was 8/23/16 when his PSA was 18.1.  This was a cognitive fusion biopsy.    He is s/p robotic left partial nephrectomy 11/7/17.  Path returned an oncocytoma.    He also has nocturia x 2-3 and slight decreased FOS.  He did develop post op AUR and has been on flomax since then.  He feels like his LUTS are slightly improved on the flomax. On 3/27/19 he underwent cystoscopy showing prostate with trilobar hypertrophy and an estimated prostatic length of 5 cm.     He presents today for [facility].    He denies ED.    REVIEW OF SYSTEMS:    Wero Spangler denies any history of headache, blurred vision, fever, nausea, vomiting, chills, flank discomfort, abdominal pain, bleeding per rectum, cough, SOB, recent loss of consciousness, recent mental status changes, seizures, dizziness, or upper or lower extremity weakness.    JOSE MANUEL  1. 2  2. 2  3. 2  4. 3  5. 2     PATIENT HISTORY:    Past Medical History:   Diagnosis Date    Allergy     Cataract     Elevated PSA     Enlarged prostate     Gallstones     General anesthetics causing adverse effect in therapeutic use 2000    delayed emergence after back surgery    GERD (gastroesophageal reflux disease)     HTN (hypertension) 9/24/2013    Hypertension     Urinary tract infection        Past Surgical History:   Procedure Laterality Date    BACK SURGERY  4/2000    CATARACT EXTRACTION W/  INTRAOCULAR LENS IMPLANT      Both Eyes    CHONDROPLASTY-KNEE Left 5/14/2015    [facility]    EYE SURGERY      MENISCECTOMY-MEDIAL and LATERAL Left 5/14/2015    [facility] OR    PROSTATE SURGERY      prostate biopsy benign    ROBOTIC ASSISTED  LAPAROSCOPIC NEPHRECTOMY PARTIAL Left 2017    [facility]    TONSILLECTOMY      VASECTOMY         Family History   Problem Relation Age of Onset    Cancer Mother         breast    Prostate cancer Brother     Cancer Brother         prostate    Macular degeneration Maternal Grandmother     Cancer Other     Cancer Other     Amblyopia Neg Hx     Blindness Neg Hx     Cataracts Neg Hx     Glaucoma Neg Hx     Retinal detachment Neg Hx     Strabismus Neg Hx        Social History     Socioeconomic History    Marital status:      Spouse name: Not on file    Number of children: Not on file    Years of education: Not on file    Highest education level: Not on file   Occupational History    Not on file   Social Needs    Financial resource strain: Not on file    Food insecurity:     Worry: Not on file     Inability: Not on file    Transportation needs:     Medical: Not on file     Non-medical: Not on file   Tobacco Use    Smoking status: Former Smoker     Last attempt to quit: 2000     Years since quittin.3    Smokeless tobacco: Never Used   Substance and Sexual Activity    Alcohol use: Yes     Alcohol/week: 0.6 oz     Types: 1 Glasses of wine per week     Comment: 2 beers three times a week     Drug use: No    Sexual activity: Yes     Partners: Female   Lifestyle    Physical activity:     Days per week: Not on file     Minutes per session: Not on file    Stress: Not on file   Relationships    Social connections:     Talks on phone: Not on file     Gets together: Not on file     Attends [employer] service: Not on file     Active member of club or organization: Not on file     Attends meetings of clubs or organizations: Not on file     Relationship status: Not on file   Other Topics Concern    Not on file   Social History Narrative    Not on file       Allergies:  Patient has no known allergies.    Medications:    Current  Facility-Administered Medications:     0.9%  NaCl infusion, , Intravenous, Continuous, Darren Myrick MD, Last Rate: 10 mL/hr at 05/14/19 0719    ceFAZolin injection 2 g, 2 g, Intravenous, On Call Procedure, Darren Myrick MD    PHYSICAL EXAMINATION:    The patient generally appears in good health, is appropriately interactive, and is in no apparent distress.     Eyes: anicteric sclerae, moist conjunctivae; no lid-lag; PERRLA     HENT: Atraumatic; oropharynx clear with moist mucous membranes and no mucosal ulcerations;normal hard and soft palate.  No evidence of lymphadenopathy.    Neck: Trachea midline.  No thyromegaly.    Musculoskeletal: No abnormal gait.    Skin: No lesions.    Mental: Cooperative with normal affect.  Is oriented to time, place, and person.    Neuro: Grossly intact.    Chest: Normal inspiratory effort.   No accessory muscles.  No audible wheezes.  Respirations symmetric on inspiration and expiration.    Heart: Regular rhythm.      Abdomen:  Soft, non-tender. No masses or organomegaly. Bladder is not palpable. No evidence of flank discomfort. No evidence of inguinal hernia.    Genitourinary: The penis is not circumcised with no evidence of plaques or induration. The urethral meatus is normal. The testes, epididymides, and cord structures are normal in size and contour bilaterally. The scrotum is normal in size and contour.    Normal anal sphincter tone. No rectal mass.    The prostate is 40 g. Normal landmarks. Lateral sulci. Median furrow intact. There is a 1.5 cm x 1.5 cm nodule in the midportion of the gland. Stable.  Seminal vesicles are normal.    Extremities: No clubbing, cyanosis, or edema      LABS:    UA dipped negative today  Lab Results   Component Value Date    PSA 14.0 (H) 09/22/2014    PSA 15.48 (H) 08/16/2013    PSA 11.06 (H) 02/25/2013    PSADIAG 15.6 (H) 11/28/2018    PSADIAG 18.2 (H) 05/30/2018    PSADIAG 15.4 (H) 12/06/2017    PSATOTAL 6.8 (H) 07/12/2011    PSATOTAL 7.9 (H)  01/11/2011    PSAFREE 1.07 07/12/2011    PSAFREE 1.67 (H) 01/11/2011    PSAFREEPCT 15.74 07/12/2011    PSAFREEPCT 21.14 01/11/2011      Urine dipstick today - negative for blood, LE, and nitrites    IMPRESSION:    Encounter Diagnoses   Name Primary?    BPH with urinary obstruction Yes       PLAN:    1. OR today for Rezum. Patient consented.

## 2019-05-14 NOTE — TRANSFER OF CARE
Anesthesia Transfer of Care Note    Patient: Wero Spangler    Procedure(s) Performed: Procedure(s) (LRB):  DESTRUCTION, PROSTATE, TRANSURETHRAL (N/A)    Patient location: PACU    Anesthesia Type: general    Transport from OR: Transported from OR on room air with adequate spontaneous ventilation    Post pain: adequate analgesia    Post assessment: no apparent anesthetic complications and tolerated procedure well    Post vital signs: stable    Level of consciousness: awake, alert and oriented    Nausea/Vomiting: no nausea/vomiting    Complications: none          Last vitals:   Visit Vitals  BP (!) 144/76 (BP Location: Left arm, Patient Position: Lying)   Pulse 77   Temp 36.3 °C (97.3 °F) (Temporal)   Resp 16   Ht 6' (1.829 m)   Wt 96.2 kg (212 lb)   SpO2 99%   BMI 28.75 kg/m²

## 2019-05-14 NOTE — OP NOTE
[facility]  Operative Note     Date: 05/14/2019     Pre-Op Diagnosis: BPH with obstructive urinary symptoms    Patient Active Problem List   Diagnosis    Seasonal allergies    Elevated PSA    BPH with urinary obstruction    Corneal dystrophy - Both Eyes    Aftercataract - Both Eyes    Prostate nodule    HTN (hypertension)    Insomnia    Primary localized osteoarthrosis, lower leg    Chondromalacia patellae    Fuchs' corneal dystrophy    Fatty liver    GERD (gastroesophageal reflux disease)    Elevated bilirubin        Post-Op Diagnosis: same     Procedure(s) Performed:   1. Transurethral destruction of prostate; radiofrequency thermotherapy      Specimen(s): none     Staff Surgeon: Darren Yuen MD     Assistant Surgeon: Darren Myrick MD, Koko Conrad MD      Anesthesia:  Monitored Local Anesthesia with Sedation     Indications: Wero Spangler is a 63 y.o. male with BPH presenting for surgical intervention.         Findings:    1. Trilobar hyperplasia  2. 7 total treatments     Estimated Blood Loss: minimal     Drains:   1.  18 Fr sanchez catheter     Procedure in detail: After informed consent was obtained and all questions were answered, the patient was brought to the operating suite and placed in the lithotomy position. General anesthesia was administered. SCDs were applied and working prior to induction of anesthesia. That patient was then prepped and draped in the usual sterile fashion. Time out was performed and preoperative antibiotics were confirmed.       We began the case by inserting the Rezum scope into the bladder. No urethral strictures were seen and scope entered easily.     Next, we examined the prostate and found Trilobar hyperplasia. RF was then used to create thermal energy to selectively ablate prostate tissue 1 cm from the bladder neck to the proximal end of the veru spaced 1 cm apart.      7 total treatments were given. Specifically 3 treatments were given  in each the 3 and 9 o'clock positions, and 1 treatment to the median lobe.     At the completion of the procedure the device was removed. There was a careful check that there were no clots in the bladder or injury to the bladder, prostate or urethra.      18 fr sanchez catheter was placed with 10 mL of sterile water in the balloon     The patient tolerated the procedure well and was transferred to the PACU in stable condition.       Disposition:  The patient will be discharged home with 7 days of antibiotic therapy, 1 week of pyridium, 1 week of ditropan, 2 weeks of NSAIDS, and pain medications. He will follow up with an SYLVIA in clinic in 7 days to have his sanchez catheter removed.    Darren Myrick MD

## 2019-05-14 NOTE — DISCHARGE SUMMARY
[facility]  Discharge Note  Short Stay    Admit Date: 5/14/2019    Discharge Date and Time: 05/14/2019 7:58 AM      Attending Physician: Darren Yuen MD     Discharge Provider: Darren Myrick MD    Diagnoses:  Active Hospital Problems    Diagnosis  POA    *BPH with urinary obstruction [N40.1, N13.8]  Yes    Fatty liver [K76.0]  Yes    GERD (gastroesophageal reflux disease) [K21.9]  Yes    Elevated bilirubin [R17]  Yes    Fuchs' corneal dystrophy [H18.51]  Yes    Chondromalacia patellae [M22.40]  Yes    Primary localized osteoarthrosis, lower leg [M17.10]  Yes    HTN (hypertension) [I10]  Yes    Insomnia [G47.00]  Yes    Prostate nodule [N40.2]  Yes    Corneal dystrophy - Both Eyes [H18.50]  Yes    Aftercataract - Both Eyes [H26.40]  Yes    Elevated PSA [R97.20]  Yes    Seasonal allergies [J30.2]  Yes      Resolved Hospital Problems   No resolved problems to display.     Discharged Condition: stable    Hospital Course: Patient was admitted for rezum and tolerated the procedure well with no complications. The patient was discharged home in good condition on the same day.       Final Diagnoses: Same as principal problem.    Disposition: Home or Self Care    Follow up/Patient Instructions:    Medications:  Reconciled Home Medications:   Current Discharge Medication List      START taking these medications    Details   ibuprofen (ADVIL,MOTRIN) 400 MG tablet Take 1 tablet (400 mg total) by mouth 3 (three) times daily. for 14 days  Qty: 42 tablet, Refills: 0      oxybutynin (DITROPAN) 5 MG Tab Take 1 tablet (5 mg total) by mouth 3 (three) times daily as needed (bladder spasms).  Qty: 21 tablet, Refills: 0      phenazopyridine (AZO STANDARD MAXIMUM STRENGTH) 97.5 mg Tab Take 2 tablets by mouth 3 (three) times daily as needed (painful urination).  Qty: 21 each, Refills: 0      sulfamethoxazole-trimethoprim 800-160mg (BACTRIM DS) 800-160 mg Tab Take 1 tablet by mouth 2 (two) times daily. for 7  days  Qty: 14 tablet, Refills: 0         CONTINUE these medications which have NOT CHANGED    Details   ALPRAZolam (XANAX) 0.5 MG tablet Take 1 tablet (0.5 mg total) by mouth nightly.  Qty: 30 tablet, Refills: 5      fenofibrate (TRICOR) 54 MG tablet Take 1 tablet (54 mg total) by mouth once daily.  Qty: 30 tablet, Refills: 11      fluticasone (FLONASE) 50 mcg/actuation nasal spray 1 spray by Each Nare route daily as needed.       OMEPRAZOLE (PRILOSEC ORAL) Take by mouth every morning.       sodium chloride 2% (BARBI 128) 2 % ophthalmic solution 1 drop as needed (takes 3-4 times/day).      tamsulosin (FLOMAX) 0.4 mg Cap TAKE ONE CAPSULE BY MOUTH ONCE DAILY  Qty: 30 capsule, Refills: 11      triamterene-hydrochlorothiazide 37.5-25 mg (MAXZIDE-25) 37.5-25 mg per tablet Take 1 tablet by mouth once daily.  Qty: 30 tablet, Refills: 0           Discharge Procedure Orders   Call MD for:  temperature >100.4     Call MD for:  persistent nausea and vomiting or diarrhea     Call MD for:  severe uncontrolled pain     Call MD for:  difficulty breathing or increased cough     Call MD for:  severe persistent headache     Call MD for:  persistent dizziness, light-headedness, or visual disturbances     Call MD for:  increased confusion or weakness     No dressing needed     Activity as tolerated     Follow-up Information     [facility] 4th Floor In 1 week.    Specialty:  Urology  Why:  For sanchez removal and voiding trial  Contact information:  [facility]  [facility] [id number] [phone]  Additional information:  [facility] 4th Floor

## 2019-05-14 NOTE — DISCHARGE INSTRUCTIONS
Transurethral Resection of the Prostate (TURP): Home Recovery  Take it easy for the first month or so while you heal after transurethral resection of the prostate. During the first few weeks, you may feel burning when you pass urine. You may also feel like you have to urinate often. These sensations will go away. If your urine becomes bright red, it means that the treated area is bleeding. This may happen on and off for a month or so after a TURP. If this occurs, rest and drink plenty of fluids until the bleeding stops.    While you are healing  To help prevent problems during the first month after your surgery, follow these tips:  · Drink plenty of fluids.  · Avoid strenuous exercise.  · Dont lift anything over 10 pounds.  · Avoid sexual activity and strenuous exercise.  · Avoid straining at stool. If you are constipated, take stool softeners or laxatives for a few days.  · Talk to your doctor about when you can return to work.  · Ask your doctor when you can start driving again.  · Dont sit for more than 60 minutes without getting up.  · Check with your doctor before taking over-the-counter pain relievers. These include aspirin, ibuprofen, and naproxen.  Follow-up care  You will visit your doctor to make sure you are healing without problems. If tests were done on your prostate tissue your doctor will discuss the results with you.     When to call your healthcare provider  Call your healthcare provider right away if any of these occur:  · Youre not able to urinate, or notice a decrease in urine flow  · You have a fever of 100.4°F (38°C) or higher, or as directed by your doctor  · You have severe pain that is not relieved by prescription pain medicine  · You have bleeding that doesnt stop within 12 hours  · You have bleeding with clots, or blood plugs up the catheter. (A little blood in the urine is normal.)  · The catheter falls out   Getting back to sex  BPH and its treatments rarely cause problems with sex.  Even if you have retrograde ejaculation, your erection or orgasm shouldnt feel any different than it used to. Retrograde ejaculation can result in infertility, as the semen will not come out of the penis. If you notice any problems with sex, talk to your doctor. Help may be available.  Trouble controlling your urine  Some men will have trouble controlling their urine (urinary incontinence) after this surgery. This may last for a few days, weeks, or months, but it will improve with time. You may also pass your urine more often (urinary frequency), like you did before the surgery. This will also improve as you start to heal.  Date Last Reviewed: 1/1/2017 © 2000-2017 [employer]. [address] [place] [address], [place] [address]. All rights reserved. This information is not intended as a substitute for professional medical care. Always follow your healthcare professional's instructions.      Anesthesia: General Anesthesia     You are watched continuously during your procedure by your anesthesia provider.     Youre due to have surgery. During surgery, youll be given medicine called anesthesia or anesthetic. This will keep you comfortable and pain-free. Your anesthesia provider will use general anesthesia.  What is general anesthesia?  General anesthesia puts you into a state like deep sleep. It goes into the bloodstream (IV anesthetics), into the lungs (gas anesthetics), or both. You feel nothing during the procedure. You will not remember it. During the procedure, the anesthesia provider monitors you continuously. He or she checks your heart rate and rhythm, blood pressure, breathing, and blood oxygen.  · IV anesthetics. IV anesthetics are given through an IV line in your arm. Theyre often given first. This is so you are asleep before a gas anesthetic is started. Some kinds of IV anesthetics relieve pain. Others relax you. Your doctor will decide which kind is best in your case.  · Gas anesthetics. Gas  anesthetics are breathed into the lungs. They are often used to keep you asleep. They can be given through a facemask or a tube placed in your larynx or trachea (breathing tube).  ¨ If you have a facemask, your anesthesia provider will most likely place it over your nose and mouth while youre still awake. Youll breathe oxygen through the mask as your IV anesthetic is started. Gas anesthetic may be added through the mask.  ¨ If you have a tube in the larynx or trachea, it will be inserted into your throat after youre asleep.  Anesthesia tools and medicines  You will likely have:  · IV anesthetics. These are put into an IV line into your bloodstream.  · Gas anesthetics. You breathe these anesthetics into your lungs, where they pass into your bloodstream.  · Pulse oximeter. This is a small clip that is attached to the end of your finger. This measures your blood oxygen level.  · Electrocardiography leads (electrodes). These are small sticky pads that are placed on your chest. They record your heart rate and rhythm.  · Blood pressure cuff. This reads your blood pressure.  Risks and possible complications  General anesthesia has some risks. These include:  · Breathing problems  · Nausea and vomiting  · Sore throat or hoarseness (usually temporary)  · Allergic reaction to the anesthetic  · Irregular heartbeat (rare)  · Cardiac arrest (rare)   Anesthesia safety  · Follow all instructions you are given for how long not to eat or drink before your procedure.  · Be sure your doctor knows what medicines and drugs you take. This includes over-the-counter medicines, herbs, supplements, alcohol or other drugs. You will be asked when those were last taken.  · Have an adult family member or friend drive you home after the procedure.  · For the first 24 hours after your surgery:  ¨ Do not drive or use heavy equipment.  ¨ Do not make important decisions or sign legal documents. If important decisions or signing legal documents is  necessary during the first 24 hours after surgery, have a trusted family member or spouse act on your behalf.  ¨ Avoid alcohol.  ¨ Have a responsible adult stay with you. He or she can watch for problems and help keep you safe.  Date Last Reviewed: 12/1/2016 © 2000-2017 [employer]. [address] [place] [address], [place] [address]. All rights reserved. This information is not intended as a substitute for professional medical care. Always follow your healthcare professional's instructions.      Patricio Catheter Care    A Patricio catheter is a rubber tube that is placed through the urethra (opening where urine comes out) and into the bladder. This helps drain urine from the bladder. There is a small balloon on the end of the tube that is inflated after insertion. This keeps the catheter from sliding out of the bladder.  A Patricio catheter is used to treat urinary retention (unable to pass urine). It is also used when there is incontinence (loss of bladder control).  Home care  · Finish taking any prescribed antibiotic even if you are feeling better before then.  · It is important to keep bacteria from getting into the collection bag. Do not disconnect the catheter from the collection bag.  · Use a leg band to secure the drainage tube, so it does not pull on the catheter. Drain the collection bag when it becomes full using the drain spout at the bottom of the bag.  · Do not try to pull or remove your catheter. This will injure your urethra. It must be removed by your healthcare provider or nurse.  Follow-up care  Follow up with your healthcare provider as advised for repeat urine testing and catheter removal or replacement.  When to seek medical advice  Call your healthcare provider right away if any of these occur:  · Fever of 100.4ºF (38ºC) or higher, or as directed by your healthcare provider  · Bladder pain or fullness  · Abdominal swelling, nausea or vomiting, or back pain  · Blood or urine leakage around the  catheter  · Bloody urine coming from the catheter (if a new symptom)  · Catheter falls out  · Catheter stops draining for 6 hours  · Weakness, dizziness, or fainting  Date Last Reviewed: 10/1/2016  © 9253-4013 [employer]. [address] [place] [address], [place] [address]. All rights reserved. This information is not intended as a substitute for professional medical care. Always follow your healthcare professional's instructions.

## 2019-05-15 NOTE — ANESTHESIA POSTPROCEDURE EVALUATION
Anesthesia Post Evaluation    Patient: Wero Spangler    Procedure(s) Performed: Procedure(s) (LRB):  DESTRUCTION, PROSTATE, TRANSURETHRAL (N/A)    Final Anesthesia Type: general  Patient location during evaluation: PACU  Patient participation: Yes- Able to Participate  Level of consciousness: awake and alert  Post-procedure vital signs: reviewed and stable  Pain management: adequate  Airway patency: patent  PONV status at discharge: No PONV  Anesthetic complications: no      Cardiovascular status: stable  Respiratory status: unassisted and spontaneous ventilation  Hydration status: euvolemic  Follow-up not needed.          Vitals Value Taken Time   /89 5/14/2019  9:16 AM   Temp 36.2 °C (97.2 °F) 5/14/2019  9:15 AM   Pulse 73 5/14/2019  9:25 AM   Resp 18 5/14/2019  8:01 AM   SpO2 98 % 5/14/2019  9:25 AM   Vitals shown include unvalidated device data.      No case tracking events are documented in the log.      Pain/Hernandez Score: Pain Rating Prior to Med Admin: 4 (5/14/2019  9:07 AM)  Hernandez Score: 10 (5/14/2019  9:03 AM)

## 2019-05-20 NOTE — PROGRESS NOTES
CHIEF COMPLAINT:    Mr. Spangler is a 63 y.o. male presenting for sanchez catheter removal s/p Rezum.    PRESENTING ILLNESS:    Wero Spangler is a 63 y.o. male new patient to me (records of past medical, family and social history personally reviewed by me), w/ h/o HTN, elevated PSA, BPH w/ obstruction. Pt is s/p Rezum on 5/14/19 w/ Dr. Yuen.  Findings:    1. Trilobar hyperplasia  2. 7 total treatments.    Drains:   1.  18 Fr sanchez catheter.    Today pt returns to clinic for sanchez catheter removal. He reports feeling well. Sanchez catheter has been draining appropriately, denies blood/clots. Denies f/c, n/v, abd/pelvic/flank pain.    REVIEW OF SYSTEMS:    Wero Spangler denies headache, blurred vision, fever, nausea, vomiting, chills, abdominal pain, bleeding per rectum, cough, SOB, recent loss of consciousness, recent mental status changes, seizures, dizziness, or upper or lower extremity weakness.    JOSE MANUEL  1. 2  2. 2  3. 2  4. 3  5. 2    PATIENT HISTORY:    Past Medical History:   Diagnosis Date    Allergy     Cataract     Elevated PSA     Enlarged prostate     Gallstones     General anesthetics causing adverse effect in therapeutic use 2000    delayed emergence after back surgery    GERD (gastroesophageal reflux disease)     HTN (hypertension) 9/24/2013    Hypertension     Urinary tract infection        Past Surgical History:   Procedure Laterality Date    BACK SURGERY  4/2000    CATARACT EXTRACTION W/  INTRAOCULAR LENS IMPLANT      Both Eyes    CHONDROPLASTY-KNEE Left 5/14/2015    [facility]    DESTRUCTION, PROSTATE, TRANSURETHRAL N/A 5/14/2019    [facility]    EYE SURGERY      MENISCECTOMY-MEDIAL and LATERAL Left 5/14/2015    [facility]    PROSTATE SURGERY      prostate biopsy benign    ROBOTIC ASSISTED LAPAROSCOPIC NEPHRECTOMY PARTIAL Left 11/7/2017    [facility]    TONSILLECTOMY      VASECTOMY         Family History   Problem Relation Age of Onset    Cancer Mother         breast    Prostate cancer Brother     Cancer Brother         prostate    Macular degeneration Maternal Grandmother     Cancer Other     Cancer Other     Amblyopia Neg Hx     Blindness Neg Hx     Cataracts Neg Hx     Glaucoma Neg Hx     Retinal detachment Neg Hx     Strabismus Neg Hx        Social History     Socioeconomic History    Marital status:      Spouse name: Not on file    Number of children: Not on file    Years of education: Not on file    Highest education level: Not on file   Occupational History    Not on file   Social Needs    Financial resource strain: Not on file    Food insecurity:     Worry: Not on file     Inability: Not on file    Transportation needs:     Medical: Not on file     Non-medical: Not on file   Tobacco Use    Smoking status: Former Smoker     Last attempt to quit: 2000     Years since quittin.3    Smokeless tobacco: Never Used   Substance and Sexual Activity    Alcohol use: Yes     Alcohol/week: 0.6 oz     Types: 1 Glasses of wine per week     Comment: 2 beers three times a week     Drug use: No    Sexual activity: Yes     Partners: Female   Lifestyle    Physical activity:     Days per week: Not on file     Minutes per session: Not on file    Stress: Not on file   Relationships    Social connections:     Talks on phone: Not on file     Gets together: Not on file     Attends Jewish service: Not on file     Active member of club or organization: Not on file     Attends meetings of clubs or organizations: Not on file     Relationship status: Not on file   Other Topics Concern    Not on file   Social History Narrative    Not on file       Allergies:  Patient has no known allergies.    Medications:    Current Outpatient Medications:     ALPRAZolam (XANAX) 0.5 MG tablet, Take 1 tablet (0.5 mg total) by mouth  nightly., Disp: 30 tablet, Rfl: 5    fenofibrate (TRICOR) 54 MG tablet, Take 1 tablet (54 mg total) by mouth once daily., Disp: 30 tablet, Rfl: 11    fluticasone (FLONASE) 50 mcg/actuation nasal spray, 1 spray by Each Nare route daily as needed. , Disp: , Rfl:     ibuprofen (ADVIL,MOTRIN) 400 MG tablet, Take 1 tablet (400 mg total) by mouth 3 (three) times daily. for 14 days, Disp: 42 tablet, Rfl: 0    OMEPRAZOLE (PRILOSEC ORAL), Take by mouth every morning. , Disp: , Rfl:     oxybutynin (DITROPAN) 5 MG Tab, Take 1 tablet (5 mg total) by mouth 3 (three) times daily as needed (bladder spasms)., Disp: 21 tablet, Rfl: 0    phenazopyridine (AZO STANDARD MAXIMUM STRENGTH) 97.5 mg Tab, Take 2 tablets by mouth 3 (three) times daily as needed (painful urination)., Disp: 21 each, Rfl: 0    sodium chloride 2% (BARBI 128) 2 % ophthalmic solution, 1 drop as needed (takes 3-4 times/day)., Disp: , Rfl:     sulfamethoxazole-trimethoprim 800-160mg (BACTRIM DS) 800-160 mg Tab, Take 1 tablet by mouth 2 (two) times daily. for 7 days, Disp: 14 tablet, Rfl: 0    tamsulosin (FLOMAX) 0.4 mg Cap, TAKE ONE CAPSULE BY MOUTH ONCE DAILY, Disp: 30 capsule, Rfl: 11    triamterene-hydrochlorothiazide 37.5-25 mg (MAXZIDE-25) 37.5-25 mg per tablet, Take 1 tablet by mouth once daily. (Patient taking differently: Take 1 tablet by mouth every morning. ), Disp: 30 tablet, Rfl: 0    PHYSICAL EXAMINATION:    The patient generally appears in good health, is appropriately interactive, and is in no apparent distress.     Eyes: anicteric sclerae, moist conjunctivae; no lid-lag; PERRLA     HENT: Atraumatic; oropharynx clear with moist mucous membranes and no mucosal ulcerations;normal hard and soft palate.  No evidence of lymphadenopathy.    Neck: Trachea midline.  No thyromegaly.    Musculoskeletal: No abnormal gait.    Skin: No lesions.    Mental: Cooperative with normal affect.  Is oriented to time, place, and person.    Neuro: Grossly  intact.    Chest: Normal inspiratory effort.   No accessory muscles.  No audible wheezes.  Respirations symmetric on inspiration and expiration.    Heart: Regular rhythm.      Abdomen:  Soft, non-tender. No masses or organomegaly. Bladder is not palpable. No evidence of flank discomfort. No evidence of inguinal hernia.    Extremities: No clubbing, cyanosis, or edema.    LABS:    Lab Results   Component Value Date    CREATININE 1.0 05/06/2019       Lab Results   Component Value Date    PSA 14.0 (H) 09/22/2014    PSA 15.48 (H) 08/16/2013    PSA 11.06 (H) 02/25/2013    PSADIAG 15.6 (H) 11/28/2018    PSADIAG 18.2 (H) 05/30/2018    PSADIAG 15.4 (H) 12/06/2017    PSATOTAL 6.8 (H) 07/12/2011    PSATOTAL 7.9 (H) 01/11/2011    PSAFREE 1.07 07/12/2011    PSAFREE 1.67 (H) 01/11/2011    PSAFREEPCT 15.74 07/12/2011    PSAFREEPCT 21.14 01/11/2011     Hemoglobin A1C   Date Value Ref Range Status   06/29/2018 4.9 4.0 - 5.6 % Final     Comment:     ADA Screening Guidelines:  5.7-6.4%  Consistent with prediabetes  >or=6.5%  Consistent with diabetes  High levels of fetal hemoglobin interfere with the HbA1C  assay. Heterozygous hemoglobin variants (HbS, HgC, etc)do  not significantly interfere with this assay.   However, presence of multiple variants may affect accuracy.     03/07/2017 4.8 4.5 - 6.2 % Final     Comment:     According to ADA guidelines, hemoglobin A1C <7.0% represents  optimal control in non-pregnant diabetic patients.  Different  metrics may apply to specific populations.   Standards of Medical Care in Diabetes - 2016.  For the purpose of screening for the presence of diabetes:  <5.7%     Consistent with the absence of diabetes  5.7-6.4%  Consistent with increasing risk for diabetes   (prediabetes)  >or=6.5%  Consistent with diabetes  Currently no consensus exists for use of hemoglobin A1C  for diagnosis of diabetes for children.         IMPRESSION:    Encounter Diagnoses   Name Primary?    BPH with urinary  obstruction Yes    Elevated PSA     Post-operative state      PLAN:    I spent 30 minutes with the patient of which more than half was spent in direct consultation with the patient in regards to our treatment and plan.    Discussed and reviewed sanchez catheter removal process and procedure.   Sanchez catheter was removed.  Patient was instructed to drink plenty of fluids today.  Instructed patient to call to give an update on urine output.  Informed patient to return to clinic or emergency department (if after clinic hours) if he starts to experience bladder pressure/pain, decrease flow, straining/difficulty urinating.    F/u 1 month w/ PVR, and 3 months w/ Dr. Yuen.    Patient verbalized and expressed understanding, and agree w/ plan.

## 2019-05-21 ENCOUNTER — OFFICE VISIT (OUTPATIENT)
Dept: UROLOGY | Facility: CLINIC | Age: 64
End: 2019-05-21
Payer: MEDICARE

## 2019-05-21 VITALS
BODY MASS INDEX: 28.72 KG/M2 | WEIGHT: 212.06 LBS | DIASTOLIC BLOOD PRESSURE: 67 MMHG | HEIGHT: 72 IN | SYSTOLIC BLOOD PRESSURE: 135 MMHG | HEART RATE: 81 BPM

## 2019-05-21 DIAGNOSIS — N40.1 BPH WITH URINARY OBSTRUCTION: Primary | ICD-10-CM

## 2019-05-21 DIAGNOSIS — Z98.890 POST-OPERATIVE STATE: ICD-10-CM

## 2019-05-21 DIAGNOSIS — R97.20 ELEVATED PSA: ICD-10-CM

## 2019-05-21 DIAGNOSIS — Z46.6 ENCOUNTER FOR FOLEY CATHETER REMOVAL: ICD-10-CM

## 2019-05-21 DIAGNOSIS — N13.8 BPH WITH URINARY OBSTRUCTION: Primary | ICD-10-CM

## 2019-05-21 PROCEDURE — 99024 PR POST-OP FOLLOW-UP VISIT: ICD-10-PCS | Mod: POP,,, | Performed by: NURSE PRACTITIONER

## 2019-05-21 PROCEDURE — 99214 OFFICE O/P EST MOD 30 MIN: CPT | Mod: PBBFAC | Performed by: NURSE PRACTITIONER

## 2019-05-21 PROCEDURE — 99024 POSTOP FOLLOW-UP VISIT: CPT | Mod: POP,,, | Performed by: NURSE PRACTITIONER

## 2019-05-21 PROCEDURE — 99999 PR PBB SHADOW E&M-EST. PATIENT-LVL IV: ICD-10-PCS | Mod: PBBFAC,,, | Performed by: NURSE PRACTITIONER

## 2019-05-21 PROCEDURE — 99999 PR PBB SHADOW E&M-EST. PATIENT-LVL IV: CPT | Mod: PBBFAC,,, | Performed by: NURSE PRACTITIONER

## 2019-05-21 NOTE — PATIENT INSTRUCTIONS
Urinary Retention (Male)  Urinary retention is the medical term for difficulty or inability to pass urine, even though your bladder is full.  Causes  The most common cause of urinary retention in men is the bladder outlet being blocked. This can be due to an enlarged prostate gland or a bladder infection. Certain medicines can also cause this problem. This condition is more likely to occur as men get older.    This condition is treated by insertion of a catheter into the bladder to drain the urine. This provides immediate relief. The catheter may need to remain in place for a few days to prevent a recurrence. The catheter has a balloon on the tip which was inflated after insertion. This prevents the catheter from falling out.  Symptoms  Common symptoms of urinary retention include:  · Pain (not experienced by everyone)  · Frequent urination  · Feeling that the bladder is still full after urinating  · Incontinence (not being able to control the release of urine)  · Swollen abdomen  Treatment  This condition is treated by inserting a tube (catheter) into the bladder to drain the urine. This provides immediate relief. The catheter may need to stay in place for a few days. The catheter has a balloon on the tip, which is inflated after insertion. This prevents the catheter from falling out.  Home care  · If you were given antibiotics, take them until they are used up, or your healthcare provider tells you to stop. It is important to finish the antibiotics even though you feel better. This is to make sure your infection has cleared.  · If a catheter was left in place, it is important to keep bacteria from getting into the collection bag. Do not disconnect the catheter from the collection bag.  · Use a leg band to secure the drainage tube, so it does not pull on the catheter. Drain the collection bag when it becomes full using the drain spout at the bottom of the bag.  · Do not pull on or try to remove your catheter.  This will injure your urethra. The catheter must be removed by a healthcare provider.  Follow-up care  Follow up with your healthcare provider, or as advised.  If a catheter was left in place, it can usually be removed within 3 to 7 days. Some conditions require the catheter to stay in longer. Your healthcare provider will tell you when to return to have the catheter removed.  When to seek medical advice  Call your healthcare provider right away if any of these occur:  · Fever of 100.4ºF (38ºC) or higher, or as directed by your healthcare provider  · Bladder or lower-abdominal pain or fullness  · Abdominal swelling, nausea, vomiting, or back pain  · Blood or urine leakage around the catheter  · Bloody urine coming from the catheter (if a new symptom)  · Weakness, dizziness, or fainting  · Confusion or change in usual level of alertness  · If a catheter was left in place, return if:  ¨ Catheter falls out  ¨ Catheter stops draining for 6 hours  Date Last Reviewed: 7/26/2015  © 4904-6468 [employer]. [address] [place] [address]. All rights reserved. This information is not intended as a substitute for professional medical care. Always follow your healthcare professional's instructions.        BPH (Enlarged Prostate)  The prostate is a gland at the base of the bladder. As some men get older, the prostate may get bigger in size. This problem is called benign prostatic hyperplasia (BPH). BPH puts pressure on the urethra. This is the tube that carries urine from the bladder to the penis. It may interfere with the flow of urine. It may also keep the bladder from emptying fully.    Symptoms of BPH include trouble starting urination and feeling as though the bladder isnt emptying all the way. It also includes a weak urine stream, dribbling and leaking of urine, and frequent and urgent urination (especially at night). BPH can increase the risk of urinary infections. It can also block off urine flow  completely. If this occurs, a thin tube (catheter) may be passed into the bladder to help drain urine.  If symptoms are mild, no treatment may be needed right now. If symptoms are more severe, treatment is likely needed. The goal of treatment is to improve urine flow and reduce symptoms. Treatments can include medicine and procedures. Your healthcare provider will discuss treatment options with you as needed.  Home care  The following guidelines will help you care for yourself at home:  · Urinate as soon as you feel the urge. Don't try to hold your urine.  · Don't limit your fluid intake during the day. Drink 6 to 8 glasses of water or liquids a day. This prevents bacteria from building up in the bladder.  · Avoid drinking fluids after dinner to help reduce urination during the night.  · Avoid medicines that can worsen your symptoms. These include certain cold and allergy medicines and antidepressants. Diuretics used for high blood pressure can also worsen symptoms. Talk to your doctor about the medicines you take. Other choices may work better for you.  Prostate cancer screening  BPH does not increase the risk of prostate cancer. But because prostate cancer is a common cancer in men, screening is sometimes recommended. This may help detect the cancer in its early stages when treatment is most effective. Factors that can increase the risk of prostate cancer include being -American or having a father or brother who had prostate cancer. A high-fat diet may also increase the risk of prostate cancer. Talk to your healthcare provider to see whether you should be screened for prostate cancer.  Follow-up care  Follow up with your healthcare provider, or as advised  To learn more, go to:  · National Kidney & Urologic Diseases Information Clearinghouse  kidney.niddk.nih.gov, 146.308.5648  When to seek medical advice  Call your healthcare provider right away if any of these occur:  · Fever of 100.4°F (38.0°C) or  higher, or as advised  · Unable to pass urine for 8 hours  · Increasing pressure or pain in your bladder (lower abdomen)  · Blood in the urine  · Increasing low back pain, not related to injury  · Symptoms of urinary infection (increased urge to urinate, burning when passing urine, foul-smelling urine)  Date Last Reviewed: 7/1/2016  © 8623-1959 [employer]. [address] [place] [address], [place] [id number]. All rights reserved. This information is not intended as a substitute for professional medical care. Always follow your healthcare professional's instructions.

## 2019-05-28 ENCOUNTER — PATIENT MESSAGE (OUTPATIENT)
Dept: UROLOGY | Facility: CLINIC | Age: 64
End: 2019-05-28

## 2019-06-12 ENCOUNTER — PATIENT MESSAGE (OUTPATIENT)
Dept: INTERNAL MEDICINE | Facility: CLINIC | Age: 64
End: 2019-06-12

## 2019-06-19 ENCOUNTER — TELEPHONE (OUTPATIENT)
Dept: ORTHOPEDICS | Facility: CLINIC | Age: 64
End: 2019-06-19

## 2019-06-19 DIAGNOSIS — M17.11 PRIMARY OSTEOARTHRITIS OF RIGHT KNEE: Primary | ICD-10-CM

## 2019-06-19 NOTE — TELEPHONE ENCOUNTER
----- Message from Mary Ann Jimenez sent at 6/19/2019  9:17 AM CDT -----  Contact: Self   Pt would like to schedule Euflexxa injection.     [phone]

## 2019-06-20 ENCOUNTER — OFFICE VISIT (OUTPATIENT)
Dept: ORTHOPEDICS | Facility: CLINIC | Age: 64
End: 2019-06-20
Payer: MEDICARE

## 2019-06-20 ENCOUNTER — OFFICE VISIT (OUTPATIENT)
Dept: UROLOGY | Facility: CLINIC | Age: 64
End: 2019-06-20
Payer: MEDICARE

## 2019-06-20 VITALS
SYSTOLIC BLOOD PRESSURE: 129 MMHG | HEART RATE: 75 BPM | HEIGHT: 72 IN | DIASTOLIC BLOOD PRESSURE: 87 MMHG | WEIGHT: 209.19 LBS | BODY MASS INDEX: 28.33 KG/M2

## 2019-06-20 VITALS
HEIGHT: 72 IN | SYSTOLIC BLOOD PRESSURE: 129 MMHG | DIASTOLIC BLOOD PRESSURE: 87 MMHG | HEART RATE: 75 BPM | WEIGHT: 208.69 LBS | BODY MASS INDEX: 28.27 KG/M2

## 2019-06-20 DIAGNOSIS — Z98.890 POST-OPERATIVE STATE: Primary | ICD-10-CM

## 2019-06-20 DIAGNOSIS — N40.1 BPH WITH URINARY OBSTRUCTION: ICD-10-CM

## 2019-06-20 DIAGNOSIS — N13.8 BPH WITH URINARY OBSTRUCTION: ICD-10-CM

## 2019-06-20 DIAGNOSIS — M17.11 PRIMARY OSTEOARTHRITIS OF RIGHT KNEE: Primary | ICD-10-CM

## 2019-06-20 DIAGNOSIS — R97.20 ELEVATED PSA: ICD-10-CM

## 2019-06-20 LAB — POC RESIDUAL URINE VOLUME: 0 ML (ref 0–100)

## 2019-06-20 PROCEDURE — 99999 PR PBB SHADOW E&M-EST. PATIENT-LVL III: ICD-10-PCS | Mod: PBBFAC,,, | Performed by: PHYSICIAN ASSISTANT

## 2019-06-20 PROCEDURE — 99999 PR PBB SHADOW E&M-EST. PATIENT-LVL IV: CPT | Mod: PBBFAC,,, | Performed by: NURSE PRACTITIONER

## 2019-06-20 PROCEDURE — 99999 PR PBB SHADOW E&M-EST. PATIENT-LVL III: CPT | Mod: PBBFAC,,, | Performed by: PHYSICIAN ASSISTANT

## 2019-06-20 PROCEDURE — 99024 POSTOP FOLLOW-UP VISIT: CPT | Mod: POP,,, | Performed by: NURSE PRACTITIONER

## 2019-06-20 PROCEDURE — 99024 PR POST-OP FOLLOW-UP VISIT: ICD-10-PCS | Mod: POP,,, | Performed by: NURSE PRACTITIONER

## 2019-06-20 PROCEDURE — 51798 US URINE CAPACITY MEASURE: CPT | Mod: PBBFAC | Performed by: NURSE PRACTITIONER

## 2019-06-20 PROCEDURE — 99999 PR PBB SHADOW E&M-EST. PATIENT-LVL IV: ICD-10-PCS | Mod: PBBFAC,,, | Performed by: NURSE PRACTITIONER

## 2019-06-20 PROCEDURE — 99214 OFFICE O/P EST MOD 30 MIN: CPT | Mod: PBBFAC,27 | Performed by: NURSE PRACTITIONER

## 2019-06-20 PROCEDURE — 99499 NO LOS: ICD-10-PCS | Mod: S$PBB,,, | Performed by: PHYSICIAN ASSISTANT

## 2019-06-20 PROCEDURE — 99499 UNLISTED E&M SERVICE: CPT | Mod: S$PBB,,, | Performed by: PHYSICIAN ASSISTANT

## 2019-06-20 PROCEDURE — 20610 PR DRAIN/INJECT LARGE JOINT/BURSA: ICD-10-PCS | Mod: S$PBB,RT,, | Performed by: PHYSICIAN ASSISTANT

## 2019-06-20 PROCEDURE — 20610 DRAIN/INJ JOINT/BURSA W/O US: CPT | Mod: PBBFAC | Performed by: PHYSICIAN ASSISTANT

## 2019-06-20 PROCEDURE — 20610 DRAIN/INJ JOINT/BURSA W/O US: CPT | Mod: S$PBB,RT,, | Performed by: PHYSICIAN ASSISTANT

## 2019-06-20 PROCEDURE — 99213 OFFICE O/P EST LOW 20 MIN: CPT | Mod: PBBFAC,25 | Performed by: PHYSICIAN ASSISTANT

## 2019-06-20 RX ADMIN — Medication 20 MG: at 08:06

## 2019-06-20 NOTE — PROGRESS NOTES
CHIEF COMPLAINT:    Mr. Spangler is a 63 y.o. male presenting for 1 month f/u s/p Rezum.    PRESENTING ILLNESS:    Wero Spangler is a 63 y.o. male w/ h/o HTN, elevated PSA, BPH w/ obstruction. Pt is s/p Rezum on 5/14/19 w/ Dr. Yuen.  Findings:   1. Trilobar hyperplasia  2. 7 total treatments.    Today pt returns to clinic for 1 month f/u w/ PVR. He reports feeling well since last visit. Reports improved FOS. Nocturia decreased to 0-1. Denies GH, clots, dysuria, pelvic/flank pain.    PVR in clinic today: 0mL.    REVIEW OF SYSTEMS:    Wero Spangler denies headache, blurred vision, fever, nausea, vomiting, chills, abdominal pain, bleeding per rectum, cough, SOB, recent loss of consciousness, recent mental status changes, seizures, dizziness, or upper or lower extremity weakness.    PATIENT HISTORY:    Past Medical History:   Diagnosis Date    Allergy     Cataract     Elevated PSA     Enlarged prostate     Gallstones     General anesthetics causing adverse effect in therapeutic use 2000    delayed emergence after back surgery    GERD (gastroesophageal reflux disease)     HTN (hypertension) 9/24/2013    Hypertension     Urinary tract infection        Past Surgical History:   Procedure Laterality Date    BACK SURGERY  4/2000    CATARACT EXTRACTION W/  INTRAOCULAR LENS IMPLANT      Both Eyes    CHONDROPLASTY-KNEE Left 5/14/2015    [facility]    DESTRUCTION, PROSTATE, TRANSURETHRAL N/A 5/14/2019    [facility]    EYE SURGERY      MENISCECTOMY-MEDIAL and LATERAL Left 5/14/2015    [facility]    PROSTATE SURGERY      prostate biopsy benign    ROBOTIC ASSISTED LAPAROSCOPIC NEPHRECTOMY PARTIAL Left 11/7/2017    [facility]    TONSILLECTOMY      VASECTOMY         Family History   Problem Relation Age of Onset    Cancer Mother         breast    Prostate  cancer Brother     Cancer Brother         prostate    Macular degeneration Maternal Grandmother     Cancer Other     Cancer Other     Amblyopia Neg Hx     Blindness Neg Hx     Cataracts Neg Hx     Glaucoma Neg Hx     Retinal detachment Neg Hx     Strabismus Neg Hx        Social History     Socioeconomic History    Marital status:      Spouse name: Not on file    Number of children: Not on file    Years of education: Not on file    Highest education level: Not on file   Occupational History    Not on file   Social Needs    Financial resource strain: Not on file    Food insecurity:     Worry: Not on file     Inability: Not on file    Transportation needs:     Medical: Not on file     Non-medical: Not on file   Tobacco Use    Smoking status: Former Smoker     Last attempt to quit:      Years since quittin.4    Smokeless tobacco: Never Used   Substance and Sexual Activity    Alcohol use: Yes     Alcohol/week: 0.6 oz     Types: 1 Glasses of wine per week     Comment: 2 beers three times a week     Drug use: No    Sexual activity: Yes     Partners: Female   Lifestyle    Physical activity:     Days per week: Not on file     Minutes per session: Not on file    Stress: Not on file   Relationships    Social connections:     Talks on phone: Not on file     Gets together: Not on file     Attends Zoroastrian service: Not on file     Active member of club or organization: Not on file     Attends meetings of clubs or organizations: Not on file     Relationship status: Not on file   Other Topics Concern    Not on file   Social History Narrative    Not on file       Allergies:  Patient has no known allergies.    Medications:    Current Outpatient Medications:     ALPRAZolam (XANAX) 0.5 MG tablet, Take 1 tablet (0.5 mg total) by mouth nightly., Disp: 30 tablet, Rfl: 5    fenofibrate (TRICOR) 54 MG tablet, Take 1 tablet (54 mg total) by mouth once daily., Disp: 30 tablet, Rfl: 11     fluticasone (FLONASE) 50 mcg/actuation nasal spray, 1 spray by Each Nare route daily as needed. , Disp: , Rfl:     OMEPRAZOLE (PRILOSEC ORAL), Take by mouth every morning. , Disp: , Rfl:     oxybutynin (DITROPAN) 5 MG Tab, Take 1 tablet (5 mg total) by mouth 3 (three) times daily as needed (bladder spasms)., Disp: 21 tablet, Rfl: 0    sodium chloride 2% (BARBI 128) 2 % ophthalmic solution, 1 drop as needed (takes 3-4 times/day)., Disp: , Rfl:     tamsulosin (FLOMAX) 0.4 mg Cap, TAKE ONE CAPSULE BY MOUTH ONCE DAILY, Disp: 30 capsule, Rfl: 11    triamterene-hydrochlorothiazide 37.5-25 mg (MAXZIDE-25) 37.5-25 mg per tablet, Take 1 tablet by mouth once daily. (Patient taking differently: Take 1 tablet by mouth every morning. ), Disp: 30 tablet, Rfl: 0    Current Facility-Administered Medications:     sodium hyaluronate (EUFLEXXA) 10 mg/mL(mw 2.4 -3.6 million) Syrg 20 mg, 20 mg, Intra-articular, Weekly, Cesar Antonio REZA King, 20 mg at 06/20/19 0821    PHYSICAL EXAMINATION:    The patient generally appears in good health, is appropriately interactive, and is in no apparent distress.     Eyes: anicteric sclerae, moist conjunctivae; no lid-lag; PERRLA     HENT: Atraumatic; oropharynx clear with moist mucous membranes and no mucosal ulcerations;normal hard and soft palate.  No evidence of lymphadenopathy.    Neck: Trachea midline.  No thyromegaly.    Musculoskeletal: No abnormal gait.    Skin: No lesions.    Mental: Cooperative with normal affect.  Is oriented to time, place, and person.    Neuro: Grossly intact.    Chest: Normal inspiratory effort.   No accessory muscles.  No audible wheezes.  Respirations symmetric on inspiration and expiration.    Heart: Regular rhythm.      Abdomen:  Soft, non-tender. No masses or organomegaly. Bladder is not palpable. No evidence of flank discomfort. No evidence of inguinal hernia.    Extremities: No clubbing, cyanosis, or edema.    LABS:    Lab Results   Component Value Date     CREATININE 1.0 05/06/2019       Lab Results   Component Value Date    PSA 14.0 (H) 09/22/2014    PSA 15.48 (H) 08/16/2013    PSA 11.06 (H) 02/25/2013    PSADIAG 15.6 (H) 11/28/2018    PSADIAG 18.2 (H) 05/30/2018    PSADIAG 15.4 (H) 12/06/2017    PSATOTAL 6.8 (H) 07/12/2011    PSATOTAL 7.9 (H) 01/11/2011    PSAFREE 1.07 07/12/2011    PSAFREE 1.67 (H) 01/11/2011    PSAFREEPCT 15.74 07/12/2011    PSAFREEPCT 21.14 01/11/2011     Hemoglobin A1C   Date Value Ref Range Status   06/29/2018 4.9 4.0 - 5.6 % Final     Comment:     ADA Screening Guidelines:  5.7-6.4%  Consistent with prediabetes  >or=6.5%  Consistent with diabetes  High levels of fetal hemoglobin interfere with the HbA1C  assay. Heterozygous hemoglobin variants (HbS, HgC, etc)do  not significantly interfere with this assay.   However, presence of multiple variants may affect accuracy.     03/07/2017 4.8 4.5 - 6.2 % Final     Comment:     According to ADA guidelines, hemoglobin A1C <7.0% represents  optimal control in non-pregnant diabetic patients.  Different  metrics may apply to specific populations.   Standards of Medical Care in Diabetes - 2016.  For the purpose of screening for the presence of diabetes:  <5.7%     Consistent with the absence of diabetes  5.7-6.4%  Consistent with increasing risk for diabetes   (prediabetes)  >or=6.5%  Consistent with diabetes  Currently no consensus exists for use of hemoglobin A1C  for diagnosis of diabetes for children.         IMPRESSION:    Encounter Diagnoses   Name Primary?    Post-operative state Yes    BPH with urinary obstruction     Elevated PSA      PLAN:    I spent 25 minutes with the patient of which more than half was spent in direct consultation with the patient in regards to our treatment and plan.    Discussed and reviewed post-op expectations.  Informed patient to return to clinic or emergency department (if after clinic hours) if he starts to experience bladder pressure/pain, decrease flow,  straining/difficulty urinating.    Education sheets provided.    F/u w/ Dr. Yuen as scheduled for post-op.    Patient verbalized and expressed understanding, and agree w/ plan.

## 2019-06-20 NOTE — PATIENT INSTRUCTIONS
Urinary Retention (Male)  Urinary retention is the medical term for difficulty or inability to pass urine, even though your bladder is full.  Causes  The most common cause of urinary retention in men is the bladder outlet being blocked. This can be due to an enlarged prostate gland or a bladder infection. Certain medicines can also cause this problem. This condition is more likely to occur as men get older.    This condition is treated by insertion of a catheter into the bladder to drain the urine. This provides immediate relief. The catheter may need to remain in place for a few days to prevent a recurrence. The catheter has a balloon on the tip which was inflated after insertion. This prevents the catheter from falling out.  Symptoms  Common symptoms of urinary retention include:  · Pain (not experienced by everyone)  · Frequent urination  · Feeling that the bladder is still full after urinating  · Incontinence (not being able to control the release of urine)  · Swollen abdomen  Treatment  This condition is treated by inserting a tube (catheter) into the bladder to drain the urine. This provides immediate relief. The catheter may need to stay in place for a few days. The catheter has a balloon on the tip, which is inflated after insertion. This prevents the catheter from falling out.  Home care  · If you were given antibiotics, take them until they are used up, or your healthcare provider tells you to stop. It is important to finish the antibiotics even though you feel better. This is to make sure your infection has cleared.  · If a catheter was left in place, it is important to keep bacteria from getting into the collection bag. Do not disconnect the catheter from the collection bag.  · Use a leg band to secure the drainage tube, so it does not pull on the catheter. Drain the collection bag when it becomes full using the drain spout at the bottom of the bag.  · Do not pull on or try to remove your catheter.  This will injure your urethra. The catheter must be removed by a healthcare provider.  Follow-up care  Follow up with your healthcare provider, or as advised.  If a catheter was left in place, it can usually be removed within 3 to 7 days. Some conditions require the catheter to stay in longer. Your healthcare provider will tell you when to return to have the catheter removed.  When to seek medical advice  Call your healthcare provider right away if any of these occur:  · Fever of 100.4ºF (38ºC) or higher, or as directed by your healthcare provider  · Bladder or lower-abdominal pain or fullness  · Abdominal swelling, nausea, vomiting, or back pain  · Blood or urine leakage around the catheter  · Bloody urine coming from the catheter (if a new symptom)  · Weakness, dizziness, or fainting  · Confusion or change in usual level of alertness  · If a catheter was left in place, return if:  ¨ Catheter falls out  ¨ Catheter stops draining for 6 hours  Date Last Reviewed: 7/26/2015  © 4531-9045 [employer]. [address] [place] [address]. All rights reserved. This information is not intended as a substitute for professional medical care. Always follow your healthcare professional's instructions.        BPH (Enlarged Prostate)  The prostate is a gland at the base of the bladder. As some men get older, the prostate may get bigger in size. This problem is called benign prostatic hyperplasia (BPH). BPH puts pressure on the urethra. This is the tube that carries urine from the bladder to the penis. It may interfere with the flow of urine. It may also keep the bladder from emptying fully.    Symptoms of BPH include trouble starting urination and feeling as though the bladder isnt emptying all the way. It also includes a weak urine stream, dribbling and leaking of urine, and frequent and urgent urination (especially at night). BPH can increase the risk of urinary infections. It can also block off urine flow  completely. If this occurs, a thin tube (catheter) may be passed into the bladder to help drain urine.  If symptoms are mild, no treatment may be needed right now. If symptoms are more severe, treatment is likely needed. The goal of treatment is to improve urine flow and reduce symptoms. Treatments can include medicine and procedures. Your healthcare provider will discuss treatment options with you as needed.  Home care  The following guidelines will help you care for yourself at home:  · Urinate as soon as you feel the urge. Don't try to hold your urine.  · Don't limit your fluid intake during the day. Drink 6 to 8 glasses of water or liquids a day. This prevents bacteria from building up in the bladder.  · Avoid drinking fluids after dinner to help reduce urination during the night.  · Avoid medicines that can worsen your symptoms. These include certain cold and allergy medicines and antidepressants. Diuretics used for high blood pressure can also worsen symptoms. Talk to your doctor about the medicines you take. Other choices may work better for you.  Prostate cancer screening  BPH does not increase the risk of prostate cancer. But because prostate cancer is a common cancer in men, screening is sometimes recommended. This may help detect the cancer in its early stages when treatment is most effective. Factors that can increase the risk of prostate cancer include being -American or having a father or brother who had prostate cancer. A high-fat diet may also increase the risk of prostate cancer. Talk to your healthcare provider to see whether you should be screened for prostate cancer.  Follow-up care  Follow up with your healthcare provider, or as advised  To learn more, go to:  · National Kidney & Urologic Diseases Information Clearinghouse  kidney.niddk.nih.gov, 935.500.2237  When to seek medical advice  Call your healthcare provider right away if any of these occur:  · Fever of 100.4°F (38.0°C) or  higher, or as advised  · Unable to pass urine for 8 hours  · Increasing pressure or pain in your bladder (lower abdomen)  · Blood in the urine  · Increasing low back pain, not related to injury  · Symptoms of urinary infection (increased urge to urinate, burning when passing urine, foul-smelling urine)  Date Last Reviewed: 7/1/2016  © 6869-2032 [employer]. [address] [place] [address], [place] [id number]. All rights reserved. This information is not intended as a substitute for professional medical care. Always follow your healthcare professional's instructions.

## 2019-06-20 NOTE — PROGRESS NOTES
Wero Spangler is a 63 y.o. year old his here today for his 1st Euflexxa injection for degenerative changes of his right knee . he was last seen and treated in the clinic on 1/8/2019. There has been no significant change in his medical status since his last visit. No Fever, chills, malaise, or unexplained weight change.      Allergies, Medications, past medical and surgical history were reviewed .    Examination of the knee demonstrates  No evidence of edema, erythema , echymosis strength and range of motion are unchanged from previous visit.    The injection site was identified and the skin was prepared with a betadine solution. The  right knee  joint was injected with 2 ml of Euflexxa solution under sterile technique. Wero Spangler tolerated the procedure well, he was advised to rest the knee today, ice and elevation. I may take 3 -6 weeks following the last injection to get the full benefit of the medication.  I will see him back in 1 week. Sooner if he has any problems or concerns.           .     ICD-10-CM ICD-9-CM   1. Primary osteoarthritis of right knee M17.11 715.16

## 2019-06-21 ENCOUNTER — PATIENT OUTREACH (OUTPATIENT)
Dept: ADMINISTRATIVE | Facility: HOSPITAL | Age: 64
End: 2019-06-21

## 2019-06-27 ENCOUNTER — OFFICE VISIT (OUTPATIENT)
Dept: ORTHOPEDICS | Facility: CLINIC | Age: 64
End: 2019-06-27
Payer: MEDICARE

## 2019-06-27 VITALS
SYSTOLIC BLOOD PRESSURE: 128 MMHG | HEART RATE: 62 BPM | DIASTOLIC BLOOD PRESSURE: 79 MMHG | WEIGHT: 208.75 LBS | BODY MASS INDEX: 28.27 KG/M2 | HEIGHT: 72 IN

## 2019-06-27 DIAGNOSIS — M17.11 PRIMARY OSTEOARTHRITIS OF RIGHT KNEE: Primary | ICD-10-CM

## 2019-06-27 PROCEDURE — 20610 DRAIN/INJ JOINT/BURSA W/O US: CPT | Mod: PBBFAC | Performed by: PHYSICIAN ASSISTANT

## 2019-06-27 PROCEDURE — 99999 PR PBB SHADOW E&M-EST. PATIENT-LVL III: ICD-10-PCS | Mod: PBBFAC,,, | Performed by: PHYSICIAN ASSISTANT

## 2019-06-27 PROCEDURE — 20610 PR DRAIN/INJECT LARGE JOINT/BURSA: ICD-10-PCS | Mod: S$PBB,RT,, | Performed by: PHYSICIAN ASSISTANT

## 2019-06-27 PROCEDURE — 20610 DRAIN/INJ JOINT/BURSA W/O US: CPT | Mod: S$PBB,RT,, | Performed by: PHYSICIAN ASSISTANT

## 2019-06-27 PROCEDURE — 99499 UNLISTED E&M SERVICE: CPT | Mod: S$PBB,,, | Performed by: PHYSICIAN ASSISTANT

## 2019-06-27 PROCEDURE — 99999 PR PBB SHADOW E&M-EST. PATIENT-LVL III: CPT | Mod: PBBFAC,,, | Performed by: PHYSICIAN ASSISTANT

## 2019-06-27 PROCEDURE — 99213 OFFICE O/P EST LOW 20 MIN: CPT | Mod: PBBFAC | Performed by: PHYSICIAN ASSISTANT

## 2019-06-27 PROCEDURE — 99499 NO LOS: ICD-10-PCS | Mod: S$PBB,,, | Performed by: PHYSICIAN ASSISTANT

## 2019-06-27 RX ADMIN — Medication 20 MG: at 08:06

## 2019-06-27 NOTE — PROGRESS NOTES
Wero Spangler is a 63 y.o. year old his here today for his 2nd Euflexxa injection for degenerative changes of his right knee . he was last seen and treated in the clinic on 6/20/2019. There has been no significant change in his medical status since his last visit. No Fever, chills, malaise, or unexplained weight change.      Allergies, Medications, past medical and surgical history were reviewed .    Examination of the knee demonstrates  No evidence of edema, erythema , echymosis strength and range of motion are unchanged from previous visit.    The injection site was identified and the skin was prepared with a betadine solution. The  right knee  joint was injected with 2 ml of Euflexxa solution under sterile technique. Wero Spangler tolerated the procedure well, he was advised to rest the knee today, ice and elevation. I may take 3 -6 weeks following the last injection to get the full benefit of the medication.  I will see him back in 1 week. Sooner if he has any problems or concerns.           .     ICD-10-CM ICD-9-CM   1. Primary osteoarthritis of right knee M17.11 715.16

## 2019-07-03 ENCOUNTER — OFFICE VISIT (OUTPATIENT)
Dept: ORTHOPEDICS | Facility: CLINIC | Age: 64
End: 2019-07-03
Payer: MEDICARE

## 2019-07-03 VITALS
DIASTOLIC BLOOD PRESSURE: 84 MMHG | WEIGHT: 213.19 LBS | HEART RATE: 64 BPM | SYSTOLIC BLOOD PRESSURE: 129 MMHG | HEIGHT: 72 IN | BODY MASS INDEX: 28.88 KG/M2

## 2019-07-03 DIAGNOSIS — M17.11 PRIMARY OSTEOARTHRITIS OF RIGHT KNEE: Primary | ICD-10-CM

## 2019-07-03 PROCEDURE — 99499 UNLISTED E&M SERVICE: CPT | Mod: S$PBB,,, | Performed by: PHYSICIAN ASSISTANT

## 2019-07-03 PROCEDURE — 99213 OFFICE O/P EST LOW 20 MIN: CPT | Mod: PBBFAC,25 | Performed by: PHYSICIAN ASSISTANT

## 2019-07-03 PROCEDURE — 20610 PR DRAIN/INJECT LARGE JOINT/BURSA: ICD-10-PCS | Mod: S$PBB,RT,, | Performed by: PHYSICIAN ASSISTANT

## 2019-07-03 PROCEDURE — 99999 PR PBB SHADOW E&M-EST. PATIENT-LVL III: CPT | Mod: PBBFAC,,, | Performed by: PHYSICIAN ASSISTANT

## 2019-07-03 PROCEDURE — 20610 DRAIN/INJ JOINT/BURSA W/O US: CPT | Mod: PBBFAC | Performed by: PHYSICIAN ASSISTANT

## 2019-07-03 PROCEDURE — 99999 PR PBB SHADOW E&M-EST. PATIENT-LVL III: ICD-10-PCS | Mod: PBBFAC,,, | Performed by: PHYSICIAN ASSISTANT

## 2019-07-03 PROCEDURE — 99499 NO LOS: ICD-10-PCS | Mod: S$PBB,,, | Performed by: PHYSICIAN ASSISTANT

## 2019-07-03 PROCEDURE — 20610 DRAIN/INJ JOINT/BURSA W/O US: CPT | Mod: S$PBB,RT,, | Performed by: PHYSICIAN ASSISTANT

## 2019-07-03 RX ADMIN — Medication 20 MG: at 08:07

## 2019-07-03 NOTE — PROGRESS NOTES
Wero Spangler is a 63 y.o. year old his here today for his 3rd Euflexxa injection for degenerative changes of his right knee . he was last seen and treated in the clinic on 6/27/2019. There has been no significant change in his medical status since his last visit. No Fever, chills, malaise, or unexplained weight change.      Allergies, Medications, past medical and surgical history were reviewed .    Examination of the knee demonstrates  No evidence of edema, erythema , echymosis strength and range of motion are unchanged from previous visit.    The injection site was identified and the skin was prepared with a betadine solution. The  right knee  joint was injected with 2 ml of Euflexxa solution under sterile technique. Weor Spangler tolerated the procedure well, he was advised to rest the knee today, ice and elevation. I may take 3 -6 weeks following the last injection to get the full benefit of the medication.  I will see him back in 4-6 months. Sooner if he has any problems or concerns.           .     ICD-10-CM ICD-9-CM   1. Primary osteoarthritis of right knee M17.11 715.16

## 2019-07-09 ENCOUNTER — PATIENT MESSAGE (OUTPATIENT)
Dept: ORTHOPEDICS | Facility: CLINIC | Age: 64
End: 2019-07-09

## 2019-07-23 ENCOUNTER — PATIENT MESSAGE (OUTPATIENT)
Dept: ADMINISTRATIVE | Facility: HOSPITAL | Age: 64
End: 2019-07-23

## 2019-07-23 ENCOUNTER — PATIENT OUTREACH (OUTPATIENT)
Dept: ADMINISTRATIVE | Facility: HOSPITAL | Age: 64
End: 2019-07-23

## 2019-07-31 ENCOUNTER — TELEPHONE (OUTPATIENT)
Dept: OPTOMETRY | Facility: CLINIC | Age: 64
End: 2019-07-31

## 2019-07-31 ENCOUNTER — OFFICE VISIT (OUTPATIENT)
Dept: UROLOGY | Facility: CLINIC | Age: 64
End: 2019-07-31
Payer: MEDICARE

## 2019-07-31 VITALS
SYSTOLIC BLOOD PRESSURE: 136 MMHG | WEIGHT: 205 LBS | BODY MASS INDEX: 27.77 KG/M2 | HEIGHT: 72 IN | HEART RATE: 72 BPM | DIASTOLIC BLOOD PRESSURE: 86 MMHG

## 2019-07-31 DIAGNOSIS — N40.1 BPH WITH URINARY OBSTRUCTION: Primary | ICD-10-CM

## 2019-07-31 DIAGNOSIS — N40.2 PROSTATE NODULE: ICD-10-CM

## 2019-07-31 DIAGNOSIS — R97.20 ELEVATED PSA: ICD-10-CM

## 2019-07-31 DIAGNOSIS — N13.8 BPH WITH URINARY OBSTRUCTION: Primary | ICD-10-CM

## 2019-07-31 PROCEDURE — 99024 PR POST-OP FOLLOW-UP VISIT: ICD-10-PCS | Mod: POP,,, | Performed by: UROLOGY

## 2019-07-31 PROCEDURE — 99213 OFFICE O/P EST LOW 20 MIN: CPT | Mod: PBBFAC | Performed by: UROLOGY

## 2019-07-31 PROCEDURE — 99999 PR PBB SHADOW E&M-EST. PATIENT-LVL III: ICD-10-PCS | Mod: PBBFAC,,, | Performed by: UROLOGY

## 2019-07-31 PROCEDURE — 99999 PR PBB SHADOW E&M-EST. PATIENT-LVL III: CPT | Mod: PBBFAC,,, | Performed by: UROLOGY

## 2019-07-31 PROCEDURE — 99024 POSTOP FOLLOW-UP VISIT: CPT | Mod: POP,,, | Performed by: UROLOGY

## 2019-07-31 NOTE — PROGRESS NOTES
CHIEF COMPLAINT:    Mr. Spangler is a 63 y.o. male presenting with an elevated PSA.    PRESENTING ILLNESS:    Wero Spangler is a 63 y.o. male.  He has had multiple biopsies done.  One was in 2012 when his PSA was 10.14.  There was also a prostate nodule at that time.  Most recent one was 8/23/16 when his PSA was 18.1.  This was a cognitive fusion biopsy.    He is s/p robotic left partial nephrectomy 11/7/17.  Path returned an oncocytoma.    He also has LUTS.  He's s/p rezum on 5/14/19.  He's voiding much better.    He denies ED.    REVIEW OF SYSTEMS:    Wero Spangler denies any history of headache, blurred vision, fever, nausea, vomiting, chills, flank discomfort, abdominal pain, bleeding per rectum, cough, SOB, recent loss of consciousness, recent mental status changes, seizures, dizziness, or upper or lower extremity weakness.    JOSE MANUEL  1. 2  2. 3  3. 3  4. 3  5. 2     PATIENT HISTORY:    Past Medical History:   Diagnosis Date    Allergy     Cataract     Elevated PSA     Enlarged prostate     Gallstones     General anesthetics causing adverse effect in therapeutic use 2000    delayed emergence after back surgery    GERD (gastroesophageal reflux disease)     HTN (hypertension) 9/24/2013    Hypertension     Urinary tract infection        Past Surgical History:   Procedure Laterality Date    BACK SURGERY  4/2000    CATARACT EXTRACTION W/  INTRAOCULAR LENS IMPLANT      Both Eyes    CHONDROPLASTY-KNEE Left 5/14/2015    [facility]    DESTRUCTION, PROSTATE, TRANSURETHRAL N/A 5/14/2019    [facility] FLR    EYE SURGERY      MENISCECTOMY-MEDIAL and LATERAL Left 5/14/2015    [facility]    PROSTATE SURGERY      prostate biopsy benign    ROBOTIC ASSISTED LAPAROSCOPIC NEPHRECTOMY PARTIAL Left 11/7/2017    [facility] 2ND FLR    TONSILLECTOMY      VASECTOMY          Family History   Problem Relation Age of Onset    Cancer Mother         breast    Prostate cancer Brother     Cancer Brother         prostate    Macular degeneration Maternal Grandmother     Cancer Other     Cancer Other     Amblyopia Neg Hx     Blindness Neg Hx     Cataracts Neg Hx     Glaucoma Neg Hx     Retinal detachment Neg Hx     Strabismus Neg Hx        Social History     Socioeconomic History    Marital status:      Spouse name: Not on file    Number of children: Not on file    Years of education: Not on file    Highest education level: Not on file   Occupational History    Not on file   Social Needs    Financial resource strain: Not on file    Food insecurity:     Worry: Not on file     Inability: Not on file    Transportation needs:     Medical: Not on file     Non-medical: Not on file   Tobacco Use    Smoking status: Former Smoker     Last attempt to quit: 2000     Years since quittin.5    Smokeless tobacco: Never Used   Substance and Sexual Activity    Alcohol use: Yes     Alcohol/week: 0.6 oz     Types: 1 Glasses of wine per week     Comment: 2 beers three times a week     Drug use: No    Sexual activity: Yes     Partners: Female   Lifestyle    Physical activity:     Days per week: Not on file     Minutes per session: Not on file    Stress: Not on file   Relationships    Social connections:     Talks on phone: Not on file     Gets together: Not on file     Attends Voodoo service: Not on file     Active member of club or organization: Not on file     Attends meetings of clubs or organizations: Not on file     Relationship status: Not on file   Other Topics Concern    Not on file   Social History Narrative    Not on file       Allergies:  Patient has no known allergies.    Medications:    Current Outpatient Medications:     ALPRAZolam (XANAX) 0.5 MG tablet, Take 1 tablet (0.5 mg total) by mouth nightly., Disp: 30 tablet, Rfl: 5    fenofibrate (TRICOR)  54 MG tablet, Take 1 tablet (54 mg total) by mouth once daily., Disp: 30 tablet, Rfl: 11    fluticasone (FLONASE) 50 mcg/actuation nasal spray, 1 spray by Each Nare route daily as needed. , Disp: , Rfl:     OMEPRAZOLE (PRILOSEC ORAL), Take by mouth every morning. , Disp: , Rfl:     sodium chloride 2% (BARBI 128) 2 % ophthalmic solution, 1 drop as needed (takes 3-4 times/day)., Disp: , Rfl:     tamsulosin (FLOMAX) 0.4 mg Cap, TAKE ONE CAPSULE BY MOUTH ONCE DAILY, Disp: 30 capsule, Rfl: 11    PHYSICAL EXAMINATION:    The patient generally appears in good health, is appropriately interactive, and is in no apparent distress.     Eyes: anicteric sclerae, moist conjunctivae; no lid-lag; PERRLA     HENT: Atraumatic; oropharynx clear with moist mucous membranes and no mucosal ulcerations;normal hard and soft palate.  No evidence of lymphadenopathy.    Neck: Trachea midline.  No thyromegaly.    Musculoskeletal: No abnormal gait.    Skin: No lesions.    Mental: Cooperative with normal affect.  Is oriented to time, place, and person.    Neuro: Grossly intact.    Chest: Normal inspiratory effort.   No accessory muscles.  No audible wheezes.  Respirations symmetric on inspiration and expiration.    Heart: Regular rhythm.      Abdomen:  Soft, non-tender. No masses or organomegaly. Bladder is not palpable. No evidence of flank discomfort. No evidence of inguinal hernia.    Genitourinary: The penis is not circumcised with no evidence of plaques or induration. The urethral meatus is normal. The testes, epididymides, and cord structures are normal in size and contour bilaterally. The scrotum is normal in size and contour.    Normal anal sphincter tone. No rectal mass.    The prostate is 40 g. Normal landmarks. Lateral sulci. Median furrow intact. There is a 1.5 cm x 1.5 cm nodule in the midportion of the gland. Stable.  Seminal vesicles are normal.    Extremities: No clubbing, cyanosis, or edema      LABS:    UA dipped negative  today  Lab Results   Component Value Date    PSA 14.0 (H) 09/22/2014    PSA 15.48 (H) 08/16/2013    PSA 11.06 (H) 02/25/2013    PSADIAG 15.6 (H) 11/28/2018    PSADIAG 18.2 (H) 05/30/2018    PSADIAG 15.4 (H) 12/06/2017    PSATOTAL 6.8 (H) 07/12/2011    PSATOTAL 7.9 (H) 01/11/2011    PSAFREE 1.07 07/12/2011    PSAFREE 1.67 (H) 01/11/2011    PSAFREEPCT 15.74 07/12/2011    PSAFREEPCT 21.14 01/11/2011        IMPRESSION:    Encounter Diagnoses   Name Primary?    BPH with urinary obstruction Yes    Elevated PSA     Prostate nodule        PLAN:    1. Ok to stop flomax.  2. Will draw a PSA as he's due.  3. RTC 6 months with a PSA.      Copy to:

## 2019-07-31 NOTE — LETTER
July 31, 2019      Duke Cano MD  [address]  [facility] [address]           [facility] 4th [facility]  [address]  [facility] [address]  Phone: [phone]          Patient: Wero Spangler   MR Number: [id number]   YOB: 1955   Date of Visit: 7/31/2019       Dear Dr. Duke Cano:    Thank you for referring Wero Spangler to me for evaluation. Attached you will find relevant portions of my assessment and plan of care.    If you have questions, please do not hesitate to call me. I look forward to following Wero Spangler along with you.    Sincerely,    Darren Yuen MD    Enclosure  CC:  No Recipients    If you would like to receive this communication electronically, please contact [email or web address] or [phone] to request more information on [email or web address] Link access.    For providers and/or their staff who would like to refer a patient to [facility], please contact us through our one-stop-shop provider referral line, [facility], at [phone].    If you feel you have received this communication in error or would no longer like to receive these types of communications, please e-mail [email or web address]

## 2019-08-07 ENCOUNTER — PATIENT MESSAGE (OUTPATIENT)
Dept: INTERNAL MEDICINE | Facility: CLINIC | Age: 64
End: 2019-08-07

## 2019-08-07 RX ORDER — TRIAMTERENE/HYDROCHLOROTHIAZID 37.5-25 MG
1 TABLET ORAL DAILY
Qty: 30 TABLET | Refills: 11 | Status: SHIPPED | OUTPATIENT
Start: 2019-08-07 | End: 2020-08-23 | Stop reason: SDUPTHER

## 2019-08-13 ENCOUNTER — HOSPITAL ENCOUNTER (OUTPATIENT)
Dept: RADIOLOGY | Facility: HOSPITAL | Age: 64
Discharge: HOME | End: 2019-08-13
Attending: PHYSICIAN ASSISTANT
Payer: MEDICARE

## 2019-08-13 ENCOUNTER — OFFICE VISIT (OUTPATIENT)
Dept: ORTHOPEDICS | Facility: CLINIC | Age: 64
End: 2019-08-13
Payer: MEDICARE

## 2019-08-13 VITALS
BODY MASS INDEX: 28.33 KG/M2 | DIASTOLIC BLOOD PRESSURE: 77 MMHG | HEART RATE: 71 BPM | SYSTOLIC BLOOD PRESSURE: 120 MMHG | WEIGHT: 209.19 LBS | HEIGHT: 72 IN

## 2019-08-13 DIAGNOSIS — M17.11 PRIMARY OSTEOARTHRITIS OF RIGHT KNEE: Primary | ICD-10-CM

## 2019-08-13 DIAGNOSIS — M17.11 PRIMARY OSTEOARTHRITIS OF RIGHT KNEE: ICD-10-CM

## 2019-08-13 PROCEDURE — 99213 PR OFFICE/OUTPT VISIT, EST, LEVL III, 20-29 MIN: ICD-10-PCS | Mod: S$PBB,,, | Performed by: PHYSICIAN ASSISTANT

## 2019-08-13 PROCEDURE — 73560 X-RAY EXAM OF KNEE 1 OR 2: CPT | Mod: TC,LT

## 2019-08-13 PROCEDURE — 73560 X-RAY EXAM OF KNEE 1 OR 2: CPT | Mod: 26,LT,, | Performed by: RADIOLOGY

## 2019-08-13 PROCEDURE — 99999 PR PBB SHADOW E&M-EST. PATIENT-LVL IV: ICD-10-PCS | Mod: PBBFAC,,, | Performed by: PHYSICIAN ASSISTANT

## 2019-08-13 PROCEDURE — 73562 X-RAY EXAM OF KNEE 3: CPT | Mod: TC,RT

## 2019-08-13 PROCEDURE — 99213 OFFICE O/P EST LOW 20 MIN: CPT | Mod: S$PBB,,, | Performed by: PHYSICIAN ASSISTANT

## 2019-08-13 PROCEDURE — 99999 PR PBB SHADOW E&M-EST. PATIENT-LVL IV: CPT | Mod: PBBFAC,,, | Performed by: PHYSICIAN ASSISTANT

## 2019-08-13 PROCEDURE — 99214 OFFICE O/P EST MOD 30 MIN: CPT | Mod: PBBFAC,25 | Performed by: PHYSICIAN ASSISTANT

## 2019-08-13 PROCEDURE — 73560 XR KNEE ORTHO RIGHT: ICD-10-PCS | Mod: 26,LT,, | Performed by: RADIOLOGY

## 2019-08-13 RX ORDER — MELOXICAM 15 MG/1
15 TABLET ORAL DAILY
Qty: 30 TABLET | Refills: 1 | Status: SHIPPED | OUTPATIENT
Start: 2019-08-13 | End: 2019-09-12

## 2019-08-13 NOTE — PROGRESS NOTES
SUBJECTIVE:     Chief Complaint & History of Present Illness:  Wero Spangler is a Established patient 63 y.o. male who is seen here today with a complaint of  right knee pain .  He has patient well-known to me was last seen treated the clinic for these conditions 07/03/2019 at which time he had undergone his 3rd viscosupplementation injection of the right knee.  He has been doing very well until he suffered a slip and fall yesterday onto the anterior aspect of the right knee has had some swelling soreness and difficulty with weight-bearing since the time of injury although he states he is probably 25-30% better than he was yesterday  On a scale of 1-10, with 10 being worst pain imaginable, he rates this pain as 2 on good days and 5 on bad days.  he describes the pain as tender.    Review of patient's allergies indicates:  No Known Allergies      Current Outpatient Medications   Medication Sig Dispense Refill    ALPRAZolam (XANAX) 0.5 MG tablet Take 1 tablet (0.5 mg total) by mouth nightly. 30 tablet 5    fenofibrate (TRICOR) 54 MG tablet Take 1 tablet (54 mg total) by mouth once daily. 30 tablet 11    fluticasone (FLONASE) 50 mcg/actuation nasal spray 1 spray by Each Nare route daily as needed.       OMEPRAZOLE (PRILOSEC ORAL) Take by mouth every morning.       sodium chloride 2% (BARBI 128) 2 % ophthalmic solution 1 drop as needed (takes 3-4 times/day).      tamsulosin (FLOMAX) 0.4 mg Cap TAKE ONE CAPSULE BY MOUTH ONCE DAILY 30 capsule 11    triamterene-hydrochlorothiazide 37.5-25 mg (MAXZIDE-25) 37.5-25 mg per tablet Take 1 tablet by mouth once daily. 30 tablet 11     No current facility-administered medications for this visit.        Past Medical History:   Diagnosis Date    Allergy     Cataract     Elevated PSA     Enlarged prostate     Gallstones     General anesthetics causing adverse effect in therapeutic use 2000    delayed emergence after back surgery    GERD (gastroesophageal reflux  disease)     HTN (hypertension) 9/24/2013    Hypertension     Urinary tract infection        Past Surgical History:   Procedure Laterality Date    BACK SURGERY  4/2000    CATARACT EXTRACTION W/  INTRAOCULAR LENS IMPLANT      Both Eyes    CHONDROPLASTY-KNEE Left 5/14/2015    [facility]    DESTRUCTION, PROSTATE, TRANSURETHRAL N/A 5/14/2019    [facility] 1ST FLR    EYE SURGERY      MENISCECTOMY-MEDIAL and LATERAL Left 5/14/2015    [facility]    PROSTATE SURGERY      prostate biopsy benign    ROBOTIC ASSISTED LAPAROSCOPIC NEPHRECTOMY PARTIAL Left 11/7/2017    [facility] 2ND FLR    TONSILLECTOMY      VASECTOMY         Vital Signs (Most Recent)  Vitals:    08/13/19 1603   BP: 120/77   Pulse: 71           Review of Systems:  ROS:  Constitutional: no fever or chills  Eyes: no visual changes  ENT: no nasal congestion or sore throat  Respiratory: no cough or shortness of breath  Cardiovascular: no chest pain or palpitations  Gastrointestinal: reflux  Genitourinary: no hematuria or dysuria  Integument/Breast: no rash or pruritis  Hematologic/Lymphatic: no easy bruising or lymphadenopathy  Musculoskeletal: Right knee pain  Neurological: no seizures or tremors  Behavioral/Psych: no auditory or visual hallucinations  Endocrine: no heat or cold intolerance              OBJECTIVE:     PHYSICAL EXAM:  Height: 6' (182.9 cm) Weight: 94.9 kg (209 lb 3.5 oz), General Appearance: Well nourished, well developed, in no acute distress.  Neurological: Mood & affect are normal.  right  Knee Exam:  Knee Range of Motion:0-110 degrees flexion   Effusion:  Mild  Condition of skin:intact  Location of tenderness:Patella, Patellar tendon and globally   Strength:5 of 5  Stability:  Lachman: stable, LCL: stable, MCL: stable, PCL: stable and posteromedial (dial): stable  Varus /Valgus  stress:  normal  Jazz:   negative/negative    RADIOGRAPHS:  X-rays taken today films reviewed by me demonstrate no evidence of fracture dislocation or about the knee joint spaces are unchanged from previous visit    ASSESSMENT/PLAN:       ICD-10-CM ICD-9-CM   1. Primary osteoarthritis of right knee M17.11 715.16       Plan: We discussed with the patient at length all the different treatment options available for  the knee including anti-inflammatories, acetaminophen, rest, ice, knee strengthening exercise, occasional cortisone injections for temporary relief, Viscosupplimentation injections, arthroscopic debridement osteotomy, and finally knee arthroplasty.   Will begin conservative therapy meloxicam 15 mg q.d. with food times 10 days followed by p.r.n..  Follow-up in 2 weeks if symptoms not significantly improved to consider cortisone injection therapy

## 2019-08-14 ENCOUNTER — TELEPHONE (OUTPATIENT)
Dept: OPHTHALMOLOGY | Facility: CLINIC | Age: 64
End: 2019-08-14

## 2019-08-14 ENCOUNTER — OFFICE VISIT (OUTPATIENT)
Dept: OPTOMETRY | Facility: CLINIC | Age: 64
End: 2019-08-14
Payer: MEDICARE

## 2019-08-14 DIAGNOSIS — H18.509 CORNEAL DYSTROPHY: Primary | ICD-10-CM

## 2019-08-14 DIAGNOSIS — Z98.890 HISTORY OF LASER PHOTOCOAGULATION OF RETINA: ICD-10-CM

## 2019-08-14 DIAGNOSIS — H52.4 PRESBYOPIA: ICD-10-CM

## 2019-08-14 DIAGNOSIS — Z13.5 GLAUCOMA SCREENING: ICD-10-CM

## 2019-08-14 PROCEDURE — 99999 PR PBB SHADOW E&M-EST. PATIENT-LVL II: CPT | Mod: PBBFAC,,, | Performed by: OPTOMETRIST

## 2019-08-14 PROCEDURE — 92015 PR REFRACTION: ICD-10-PCS | Mod: ,,, | Performed by: OPTOMETRIST

## 2019-08-14 PROCEDURE — 92014 COMPRE OPH EXAM EST PT 1/>: CPT | Mod: S$PBB,,, | Performed by: OPTOMETRIST

## 2019-08-14 PROCEDURE — 99999 PR PBB SHADOW E&M-EST. PATIENT-LVL II: ICD-10-PCS | Mod: PBBFAC,,, | Performed by: OPTOMETRIST

## 2019-08-14 PROCEDURE — 92015 DETERMINE REFRACTIVE STATE: CPT | Mod: ,,, | Performed by: OPTOMETRIST

## 2019-08-14 PROCEDURE — 99212 OFFICE O/P EST SF 10 MIN: CPT | Mod: PBBFAC,PO | Performed by: OPTOMETRIST

## 2019-08-14 PROCEDURE — 92014 PR EYE EXAM, EST PATIENT,COMPREHESV: ICD-10-PCS | Mod: S$PBB,,, | Performed by: OPTOMETRIST

## 2019-08-14 NOTE — TELEPHONE ENCOUNTER
----- Message from Madiha Kruger MA sent at 8/14/2019 11:09 AM CDT -----  Dr. Pereyra would like this pt to bee seen for a corneal  Transplant, pt has corneal dystrophy ion both eyes. If you  could call this pt to make the appt with what ever testing is needed that would be great. Thank you.

## 2019-08-14 NOTE — PROGRESS NOTES
HPI     DLS: 7/18/18  Pt states he is getting harder to read small print, would also like to   discus corneal dystrophy today.  Always floater, no flashes   José Antonio 128 gtts--using 4 times a day      Last edited by Doron Pereyra, OD on 8/14/2019  9:32 AM. (History)        ROS     Positive for: Eyes (K dyst /cat surgery OU)    Negative for: Constitutional, Gastrointestinal, Neurological, Skin,   Genitourinary, Musculoskeletal, HENT, Endocrine, Respiratory, Psychiatric,   Allergic/Imm, Heme/Lymph    Last edited by Doron Pereyra, OD on 8/14/2019  9:22 AM. (History)        Assessment /Plan     For exam results, see Encounter Report.    Corneal dystrophy - Both Eyes    History of laser photocoagulation of retina    Glaucoma screening    Presbyopia      1. Mild pco sp pciol ou  2. Sp focal laser for periph hole OD.  Stable (hx high myopia prior to cat surgery)  3. 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  Pt using josé antonio 128 QID+ but still feels VA not sufficient OD.  Wishes surgery if is a candidate         Plan:    Surgical consult-_Dr Wilson

## 2019-09-03 ENCOUNTER — PATIENT MESSAGE (OUTPATIENT)
Dept: INTERNAL MEDICINE | Facility: CLINIC | Age: 64
End: 2019-09-03

## 2019-09-03 RX ORDER — ALPRAZOLAM 0.5 MG/1
TABLET ORAL
Qty: 30 TABLET | Refills: 0 | Status: SHIPPED | OUTPATIENT
Start: 2019-09-03 | End: 2019-09-18 | Stop reason: SDUPTHER

## 2019-09-04 ENCOUNTER — OFFICE VISIT (OUTPATIENT)
Dept: OPHTHALMOLOGY | Facility: CLINIC | Age: 64
End: 2019-09-04
Payer: MEDICARE

## 2019-09-04 DIAGNOSIS — H18.519 FUCHS' CORNEAL DYSTROPHY: Primary | ICD-10-CM

## 2019-09-04 PROCEDURE — 99999 PR PBB SHADOW E&M-EST. PATIENT-LVL III: ICD-10-PCS | Mod: PBBFAC,,, | Performed by: OPHTHALMOLOGY

## 2019-09-04 PROCEDURE — 99213 OFFICE O/P EST LOW 20 MIN: CPT | Mod: PBBFAC | Performed by: OPHTHALMOLOGY

## 2019-09-04 PROCEDURE — 99999 PR PBB SHADOW E&M-EST. PATIENT-LVL III: CPT | Mod: PBBFAC,,, | Performed by: OPHTHALMOLOGY

## 2019-09-04 PROCEDURE — 92014 COMPRE OPH EXAM EST PT 1/>: CPT | Mod: S$PBB,,, | Performed by: OPHTHALMOLOGY

## 2019-09-04 PROCEDURE — 92014 PR EYE EXAM, EST PATIENT,COMPREHESV: ICD-10-PCS | Mod: S$PBB,,, | Performed by: OPHTHALMOLOGY

## 2019-09-04 RX ORDER — PREDNISOLONE ACETATE-GATIFLOXACIN 5; 10 MG/ML; MG/ML
1 SUSPENSION/ DROPS OPHTHALMIC 4 TIMES DAILY
Qty: 3.5 ML | Refills: 1 | Status: SHIPPED | OUTPATIENT
Start: 2019-09-04 | End: 2020-07-21 | Stop reason: SDUPTHER

## 2019-09-04 RX ORDER — LIDOCAINE HYDROCHLORIDE 10 MG/ML
1 INJECTION, SOLUTION EPIDURAL; INFILTRATION; INTRACAUDAL; PERINEURAL ONCE
Status: CANCELLED | OUTPATIENT
Start: 2019-09-04 | End: 2019-09-04

## 2019-09-04 RX ORDER — MOXIFLOXACIN 5 MG/ML
1 SOLUTION/ DROPS OPHTHALMIC
Status: CANCELLED | OUTPATIENT
Start: 2019-09-04

## 2019-09-04 RX ORDER — TETRACAINE HYDROCHLORIDE 5 MG/ML
1 SOLUTION OPHTHALMIC
Status: CANCELLED | OUTPATIENT
Start: 2019-09-04

## 2019-09-04 NOTE — PROGRESS NOTES
HPI     Pt was referred back by Dr. Pereyra for Fuchs dystrophy OD>OS.  Pt states that he has had this problem for a few years and was told that   he would know when it was bad enough to see if it could be fixed. Pt   states that he noticed reading in OD is not as sharp. No pain or   discomfort OU.     Pt confirms using  Olya 128 QID OU    Last edited by Elvie Plummer on 9/4/2019  8:39 AM. (History)            Assessment /Plan     For exam results, see Encounter Report.    Fuchs' corneal dystrophy      Clinically significant corneal edema is present, which affects vision and activities of daily living. Risks, benefits, and alternatives to surgery were discussed and patient voices understanding.    DMEK right eye

## 2019-09-18 ENCOUNTER — PATIENT MESSAGE (OUTPATIENT)
Dept: INTERNAL MEDICINE | Facility: CLINIC | Age: 64
End: 2019-09-18

## 2019-09-18 RX ORDER — ALPRAZOLAM 0.5 MG/1
TABLET ORAL
Qty: 30 TABLET | Refills: 0 | Status: SHIPPED | OUTPATIENT
Start: 2019-09-18 | End: 2019-11-12 | Stop reason: SDUPTHER

## 2019-09-26 ENCOUNTER — PATIENT MESSAGE (OUTPATIENT)
Dept: OPHTHALMOLOGY | Facility: CLINIC | Age: 64
End: 2019-09-26

## 2019-10-07 ENCOUNTER — LAB VISIT (OUTPATIENT)
Dept: LAB | Facility: HOSPITAL | Age: 64
End: 2019-10-07
Attending: INTERNAL MEDICINE
Payer: MEDICARE

## 2019-10-07 DIAGNOSIS — Z12.11 COLON CANCER SCREENING: ICD-10-CM

## 2019-10-07 PROCEDURE — 82274 ASSAY TEST FOR BLOOD FECAL: CPT

## 2019-10-09 ENCOUNTER — TELEPHONE (OUTPATIENT)
Dept: OPHTHALMOLOGY | Facility: CLINIC | Age: 64
End: 2019-10-09

## 2019-10-09 DIAGNOSIS — H18.519 FUCHS' CORNEAL DYSTROPHY: Primary | ICD-10-CM

## 2019-10-15 LAB — HEMOCCULT STL QL IA: NEGATIVE

## 2019-11-12 ENCOUNTER — PATIENT MESSAGE (OUTPATIENT)
Dept: INTERNAL MEDICINE | Facility: CLINIC | Age: 64
End: 2019-11-12

## 2019-11-12 RX ORDER — ALPRAZOLAM 0.5 MG/1
TABLET ORAL
Qty: 30 TABLET | Refills: 0 | Status: SHIPPED | OUTPATIENT
Start: 2019-11-12 | End: 2019-12-19 | Stop reason: SDUPTHER

## 2019-11-12 NOTE — TELEPHONE ENCOUNTER
----- Message from Ginny Salazar sent at 11/12/2019  2:05 PM CST -----  Contact: [facility]  Patient is calling for an RX refill or new RX.  Is this a refill or new RX:  refill  RX name and strength: ALPRAZolam (XANAX) 0.5 MG tablet  Directions (copy/paste from chart):    Is this a 30 day or 90 day RX:    Local pharmacy or mail order pharmacy:  local  Pharmacy name and phone # (copy/paste from chart):   [facility] - [facility] - [place], [place] - [address] [phone] (Phone)  [phone] (Fax)      Comments:

## 2019-11-13 RX ORDER — ALPRAZOLAM 0.5 MG/1
TABLET ORAL
Qty: 30 TABLET | Refills: 0 | Status: CANCELLED | OUTPATIENT
Start: 2019-11-13

## 2019-11-14 ENCOUNTER — PATIENT MESSAGE (OUTPATIENT)
Dept: INTERNAL MEDICINE | Facility: CLINIC | Age: 64
End: 2019-11-14

## 2019-11-19 ENCOUNTER — TELEPHONE (OUTPATIENT)
Dept: OPHTHALMOLOGY | Facility: CLINIC | Age: 64
End: 2019-11-19

## 2019-11-19 NOTE — TELEPHONE ENCOUNTER
Spoke with patient confirmed sx date for 11/21/2019. Arrival time for 11am. No food after 9pm the night before sx. But, they may have water, gatorade, or powerade after 9pm until leaving home the morning of sx. Start eye drops on Tuesday, one drop TID into operative eye.    TR 11/19/2019

## 2019-11-21 ENCOUNTER — HOSPITAL ENCOUNTER (OUTPATIENT)
Facility: OTHER | Age: 64
Discharge: HOME | End: 2019-11-21
Attending: OPHTHALMOLOGY | Admitting: OPHTHALMOLOGY
Payer: MEDICARE

## 2019-11-21 ENCOUNTER — ANESTHESIA EVENT (OUTPATIENT)
Dept: SURGERY | Facility: OTHER | Age: 64
End: 2019-11-21
Payer: MEDICARE

## 2019-11-21 ENCOUNTER — ANESTHESIA (OUTPATIENT)
Dept: SURGERY | Facility: OTHER | Age: 64
End: 2019-11-21
Payer: MEDICARE

## 2019-11-21 VITALS
SYSTOLIC BLOOD PRESSURE: 124 MMHG | WEIGHT: 213 LBS | BODY MASS INDEX: 28.85 KG/M2 | HEIGHT: 72 IN | RESPIRATION RATE: 16 BRPM | OXYGEN SATURATION: 98 % | TEMPERATURE: 98 F | DIASTOLIC BLOOD PRESSURE: 68 MMHG | HEART RATE: 60 BPM

## 2019-11-21 DIAGNOSIS — H18.519 FUCHS' CORNEAL DYSTROPHY: ICD-10-CM

## 2019-11-21 PROCEDURE — 63600175 PHARM REV CODE 636 W HCPCS: Performed by: OPHTHALMOLOGY

## 2019-11-21 PROCEDURE — 37000008 HC ANESTHESIA 1ST 15 MINUTES: Performed by: OPHTHALMOLOGY

## 2019-11-21 PROCEDURE — 37000009 HC ANESTHESIA EA ADD 15 MINS: Performed by: OPHTHALMOLOGY

## 2019-11-21 PROCEDURE — 65757 PR PREP CORNEAL ENDOTHEL ALLOGRAFT: ICD-10-PCS | Mod: RT,,, | Performed by: OPHTHALMOLOGY

## 2019-11-21 PROCEDURE — 25000003 PHARM REV CODE 250: Performed by: OPHTHALMOLOGY

## 2019-11-21 PROCEDURE — 36000706: Performed by: OPHTHALMOLOGY

## 2019-11-21 PROCEDURE — 65756 CORNEAL TRNSPL ENDOTHELIAL: CPT | Mod: RT,,, | Performed by: OPHTHALMOLOGY

## 2019-11-21 PROCEDURE — 65756 PR CORNEAL TRANSPLANT,ENDOTHELIAL: ICD-10-PCS | Mod: RT,,, | Performed by: OPHTHALMOLOGY

## 2019-11-21 PROCEDURE — 71000016 HC POSTOP RECOV ADDL HR: Performed by: OPHTHALMOLOGY

## 2019-11-21 PROCEDURE — 36000707: Performed by: OPHTHALMOLOGY

## 2019-11-21 PROCEDURE — V2785 CORNEAL TISSUE PROCESSING: HCPCS | Performed by: OPHTHALMOLOGY

## 2019-11-21 PROCEDURE — 71000015 HC POSTOP RECOV 1ST HR: Performed by: OPHTHALMOLOGY

## 2019-11-21 PROCEDURE — 63600175 PHARM REV CODE 636 W HCPCS: Performed by: NURSE ANESTHETIST, CERTIFIED REGISTERED

## 2019-11-21 PROCEDURE — S0020 INJECTION, BUPIVICAINE HYDRO: HCPCS | Performed by: OPHTHALMOLOGY

## 2019-11-21 PROCEDURE — 65757 PREP CORNEAL ENDO ALLOGRAFT: CPT | Mod: RT,,, | Performed by: OPHTHALMOLOGY

## 2019-11-21 DEVICE — CORNEA TRANSPLANTABLE PRE CUT: Type: IMPLANTABLE DEVICE | Site: EYE | Status: FUNCTIONAL

## 2019-11-21 RX ORDER — MIDAZOLAM HYDROCHLORIDE 1 MG/ML
INJECTION INTRAMUSCULAR; INTRAVENOUS
Status: DISCONTINUED | OUTPATIENT
Start: 2019-11-21 | End: 2019-11-21

## 2019-11-21 RX ORDER — LIDOCAINE HYDROCHLORIDE 10 MG/ML
1 INJECTION, SOLUTION EPIDURAL; INFILTRATION; INTRACAUDAL; PERINEURAL ONCE
Status: DISCONTINUED | OUTPATIENT
Start: 2019-11-21 | End: 2019-11-21 | Stop reason: HOSPADM

## 2019-11-21 RX ORDER — ONDANSETRON 2 MG/ML
4 INJECTION INTRAMUSCULAR; INTRAVENOUS DAILY PRN
Status: DISCONTINUED | OUTPATIENT
Start: 2019-11-21 | End: 2019-11-21 | Stop reason: HOSPADM

## 2019-11-21 RX ORDER — LIDOCAINE HYDROCHLORIDE 20 MG/ML
INJECTION, SOLUTION INFILTRATION; PERINEURAL
Status: DISCONTINUED | OUTPATIENT
Start: 2019-11-21 | End: 2019-11-21 | Stop reason: HOSPADM

## 2019-11-21 RX ORDER — CEFAZOLIN SODIUM 1 G/3ML
INJECTION, POWDER, FOR SOLUTION INTRAMUSCULAR; INTRAVENOUS
Status: DISCONTINUED | OUTPATIENT
Start: 2019-11-21 | End: 2019-11-21 | Stop reason: HOSPADM

## 2019-11-21 RX ORDER — SODIUM CHLORIDE 0.9 % (FLUSH) 0.9 %
3 SYRINGE (ML) INJECTION
Status: DISCONTINUED | OUTPATIENT
Start: 2019-11-21 | End: 2019-11-21 | Stop reason: HOSPADM

## 2019-11-21 RX ORDER — MEPERIDINE HYDROCHLORIDE 25 MG/ML
12.5 INJECTION INTRAMUSCULAR; INTRAVENOUS; SUBCUTANEOUS ONCE AS NEEDED
Status: DISCONTINUED | OUTPATIENT
Start: 2019-11-21 | End: 2019-11-21 | Stop reason: HOSPADM

## 2019-11-21 RX ORDER — DEXAMETHASONE SODIUM PHOSPHATE 4 MG/ML
INJECTION, SOLUTION INTRA-ARTICULAR; INTRALESIONAL; INTRAMUSCULAR; INTRAVENOUS; SOFT TISSUE
Status: DISCONTINUED | OUTPATIENT
Start: 2019-11-21 | End: 2019-11-21 | Stop reason: HOSPADM

## 2019-11-21 RX ORDER — MOXIFLOXACIN 5 MG/ML
1 SOLUTION/ DROPS OPHTHALMIC
Status: COMPLETED | OUTPATIENT
Start: 2019-11-21 | End: 2019-11-21

## 2019-11-21 RX ORDER — ACETAMINOPHEN 325 MG/1
650 TABLET ORAL EVERY 4 HOURS PRN
Status: DISCONTINUED | OUTPATIENT
Start: 2019-11-21 | End: 2019-11-21 | Stop reason: HOSPADM

## 2019-11-21 RX ORDER — HYDROCODONE BITARTRATE AND ACETAMINOPHEN 5; 325 MG/1; MG/1
1 TABLET ORAL EVERY 4 HOURS PRN
Status: DISCONTINUED | OUTPATIENT
Start: 2019-11-21 | End: 2019-11-21 | Stop reason: HOSPADM

## 2019-11-21 RX ORDER — TETRACAINE HYDROCHLORIDE 5 MG/ML
1 SOLUTION OPHTHALMIC
Status: COMPLETED | OUTPATIENT
Start: 2019-11-21 | End: 2019-11-21

## 2019-11-21 RX ORDER — BUPIVACAINE HYDROCHLORIDE 7.5 MG/ML
INJECTION, SOLUTION EPIDURAL; RETROBULBAR
Status: DISCONTINUED | OUTPATIENT
Start: 2019-11-21 | End: 2019-11-21 | Stop reason: HOSPADM

## 2019-11-21 RX ORDER — FENTANYL CITRATE 50 UG/ML
INJECTION, SOLUTION INTRAMUSCULAR; INTRAVENOUS
Status: DISCONTINUED | OUTPATIENT
Start: 2019-11-21 | End: 2019-11-21

## 2019-11-21 RX ORDER — OXYCODONE HYDROCHLORIDE 5 MG/1
5 TABLET ORAL
Status: DISCONTINUED | OUTPATIENT
Start: 2019-11-21 | End: 2019-11-21 | Stop reason: HOSPADM

## 2019-11-21 RX ORDER — HYDROMORPHONE HYDROCHLORIDE 2 MG/ML
0.4 INJECTION, SOLUTION INTRAMUSCULAR; INTRAVENOUS; SUBCUTANEOUS EVERY 5 MIN PRN
Status: DISCONTINUED | OUTPATIENT
Start: 2019-11-21 | End: 2019-11-21 | Stop reason: HOSPADM

## 2019-11-21 RX ADMIN — FENTANYL CITRATE 50 MCG: 50 INJECTION, SOLUTION INTRAMUSCULAR; INTRAVENOUS at 01:11

## 2019-11-21 RX ADMIN — MOXIFLOXACIN HYDROCHLORIDE 1 DROP: 5 SOLUTION/ DROPS OPHTHALMIC at 12:11

## 2019-11-21 RX ADMIN — TETRACAINE HYDROCHLORIDE 1 DROP: 5 SOLUTION OPHTHALMIC at 12:11

## 2019-11-21 RX ADMIN — MIDAZOLAM HYDROCHLORIDE 1 MG: 1 INJECTION, SOLUTION INTRAMUSCULAR; INTRAVENOUS at 01:11

## 2019-11-21 RX ADMIN — MIDAZOLAM HYDROCHLORIDE 2 MG: 1 INJECTION, SOLUTION INTRAMUSCULAR; INTRAVENOUS at 01:11

## 2019-11-21 NOTE — ANESTHESIA POSTPROCEDURE EVALUATION
Anesthesia Post Evaluation    Patient: Wero Spangler    Procedure(s) Performed: Procedure(s) (LRB):  TRANSPLANT, PARTIAL-THICKNESS, CORNEA, USING DSAEK TECHNIQUE (Right)    Final Anesthesia Type: regional    Patient location during evaluation: [facility]  Patient participation: Yes- Able to Participate  Level of consciousness: awake and alert  Post-procedure vital signs: reviewed and stable  Pain management: adequate  Airway patency: patent    PONV status at discharge: No PONV  Anesthetic complications: no      Cardiovascular status: blood pressure returned to baseline  Respiratory status: unassisted and spontaneous ventilation  Hydration status: euvolemic  Follow-up not needed.          Vitals Value Taken Time   /69 11/21/2019 11:56 AM   Temp 36.4 °C (97.6 °F) 11/21/2019 11:56 AM   Pulse 59 11/21/2019 11:56 AM   Resp 16 11/21/2019 11:56 AM   SpO2 99 % 11/21/2019 11:56 AM         No case tracking events are documented in the log.      Pain/Hernandez Score: No data recorded

## 2019-11-21 NOTE — ANESTHESIA PREPROCEDURE EVALUATION
11/21/2019  Wero Spangler is a 64 y.o., male.    Anesthesia Evaluation    I have reviewed the Patient Summary Reports.    I have reviewed the Nursing Notes.   I have reviewed the Medications.     Review of Systems  Anesthesia Hx:  Hx of Anesthetic complications    EENT/Dental:EENT/Dental Normal   Cardiovascular:   Hypertension    Pulmonary:  Pulmonary Normal    Hepatic/GI:   GERD, well controlled Liver Disease,    Musculoskeletal:   Arthritis     Endocrine:  Endocrine Normal        Physical Exam  General:  Well nourished    Airway/Jaw/Neck:  Airway Findings: Mouth Opening: Normal Tongue: Normal  General Airway Assessment: Adult  Mallampati: II  TM Distance: Normal, at least 6 cm  Jaw/Neck Findings:     Neck ROM: Normal ROM      Dental:  Dental Findings: In tact             Anesthesia Plan  Type of Anesthesia, risks & benefits discussed:  Anesthesia Type:  MAC  Patient's Preference:   Intra-op Monitoring Plan:   Intra-op Monitoring Plan Comments:   Post Op Pain Control Plan:   Post Op Pain Control Plan Comments:   Induction:   IV  Beta Blocker:         Informed Consent: Patient understands risks and agrees with Anesthesia plan.  Questions answered. Anesthesia consent signed with patient.  ASA Score: 3     Day of Surgery Review of History & Physical:    H&P update referred to the surgeon.         Ready For Surgery From Anesthesia Perspective.

## 2019-11-21 NOTE — DISCHARGE INSTRUCTIONS
uV Wilson MD  [facility]  Department of Ophthalmology    DSEK CORNEAL TRANSPLANT Post-Operative Instructions:  Remain lying on your back , facing the ceiling for the first 48 hours after surgery, except for necessary breaks such as eating meals and restroom breaks.  AVOID watching TV or any sudden jerking movements of your head  If you experience pain or severe headache behind the eye with nausea, call Dr. Wilson or the on-call doctor IMMEDIATELY @ [phone].    Plan to see Dr. Wilson tomorrow at the eye clinic:   [address],10th floor     STARTING Tomorrow following your post-operative appointment with Dr. Wilson:   Place 1 (one) drop of each of the medications into the operative eye as directed, wait 2 (two) minutes between drops.   Remove the shield covering from your eye temporarily to instill the drops.     SHAKE BOTTLES WELL BEFORE USE:    OCUFLOX/ VIGAMOX:       4 (four) times a day  FOR 1 (one) WEEK.                PRED ACETATE:        4 (four) times a day for 1 (one)  month                  Precautions:  DO NOT rub your eye.  Remain on your back for the next 24 hours.   Do NOT exert yourself ( no heavy lifting, running, or swimming)  for 1 (One) Month.  You may shower, but do not allow water into your eye for 2 (two) weeks.  Wear protective sunglasses during the day and a shield at night for 1(one) week.        Anesthesia: Monitored Anesthesia Care (MAC)    Anesthesia Safety  · Have an adult family member or friend drive you home after the procedure.  · For the first 24 hours after your surgery:  ¨ Do not drive or use heavy equipment.  ¨ Do not make important decisions or sign documents.  ¨ Avoid alcohol.  ¨ Have someone stay with you, if possible. They can watch for problems and help keep you safe.    PLEASE FOLLOW ANY OTHER INSTRUCTIONS PROVIDED TO YOU BY DR. WILSON!

## 2019-11-21 NOTE — OR NURSING
Wero Spangler has met all discharge criteria from Phase II. Vital Signs are stable, ambulating  without difficulty. Discharge instructions given, patient verbalized understanding. Discharged from facility via wheelchair in stable condition.

## 2019-11-21 NOTE — OP NOTE
SURGEON:  Vu Wilson M.D.    PREOPERATIVE DIAGNOSES:  Corneal edema    POSTOPERATIVE DIAGNOSES:    Corneal edema    PROCEDURES PERFORMED:  Descement's Membrane Endothelial Keratoplasty  right eye (DMEK) (75134)    11/21/2019    ANESTHESIA:  MAC with retrobulbar block.    GRAFT SIZE:  7.5 mm    COMPLICATIONS:  None.    INDICATIONS:    The patient has a history poor vision secondary to corneal edema.  After a thorough discussion of the risks, benefits and alternatives to corneal transplantation using the DMEK technique, the patient voices understanding of the risks and benefits and wishes to proceed with surgery.    PROCEDURE IN DETAIL:    The patient was brought to the operating room in the supine position, where the eye was prepped and draped in standard sterile fashion, after having received a retrobulbar block consisting of a 50/50 mixture of lidocaine and bupivacaine under conscious sedation.  The procedure was begun by the creation of a paracentesis incision, through which viscoelastic was used to fill the anterior chamber.    Next, a 3mm temporal limbal tunnel incision was constructed.  An 8 mm montrell was made in the center of the cornea and then reverse Sinskey used to score and remove Descemet's membrane.  An inferior surgical PI was created with 30G needle and all remaining viscoelastics were removed from the eye.    Attention was then directed to the side table, where the previously stripped donor tissue was trephined with a Hessburg-Stover trephine, and stained with trypan blue. A modified injector system was used to implant this descemet's membrane tissue into the anterior chamber, and a 10-0 nylon suture placed in the wound. The graft was unfurled with a tapping technique, and 20% SF6 gas was used to fill the AC to the diameter of graft. The anterior chamber was reformed with BSS to a normal pressure.  The patient will remain supine in the postoperative recovery area for one hour to allow better adhesion  of the graft to the posterior aspect of the cornea.  The patient  will be seen tomorrow in the eye clinic.

## 2019-11-21 NOTE — DISCHARGE SUMMARY
Outcome: Successful outpatient ophthalmic surgical procedure  Preprinted Instructions given to patient.  Regular diet.  Activity: No restrictions  Meds: see Med Rec  Condition: stable  Follow up: 1 day with Dr Wilson  Disposition: Home  Diagnosis: s/p eye surgery

## 2019-11-22 ENCOUNTER — OFFICE VISIT (OUTPATIENT)
Dept: OPHTHALMOLOGY | Facility: CLINIC | Age: 64
End: 2019-11-22
Payer: MEDICARE

## 2019-11-22 DIAGNOSIS — Z94.7 STATUS POST CORNEAL TRANSPLANT: Primary | ICD-10-CM

## 2019-11-22 PROCEDURE — 99024 PR POST-OP FOLLOW-UP VISIT: ICD-10-PCS | Mod: POP,,, | Performed by: OPHTHALMOLOGY

## 2019-11-22 PROCEDURE — 99213 OFFICE O/P EST LOW 20 MIN: CPT | Mod: PBBFAC | Performed by: OPHTHALMOLOGY

## 2019-11-22 PROCEDURE — 99024 POSTOP FOLLOW-UP VISIT: CPT | Mod: POP,,, | Performed by: OPHTHALMOLOGY

## 2019-11-22 PROCEDURE — 99999 PR PBB SHADOW E&M-EST. PATIENT-LVL III: CPT | Mod: PBBFAC,,, | Performed by: OPHTHALMOLOGY

## 2019-11-22 PROCEDURE — 99999 PR PBB SHADOW E&M-EST. PATIENT-LVL III: ICD-10-PCS | Mod: PBBFAC,,, | Performed by: OPHTHALMOLOGY

## 2019-11-22 NOTE — PROGRESS NOTES
HPI     Post-op Evaluation      Additional comments: DMEK OD               Comments     One Day POST-OP     PROCEDURES PERFORMED:  Descement's Membrane Endothelial Keratoplasty - right eye (DMEK)      Date of Surgery: 11/21/2019    DROPS:  Prednisolone Acetate 1% OD QID   Olya 128 OD     Patient reports that he is doing well this morning no pain or discomfort.             Last edited by Gian Wiseman on 11/22/2019  8:24 AM. (History)            Assessment /Plan     For exam results, see Encounter Report.    Status post corneal transplant      DMEK POD1: DMEK Graft attached. 2+ MCE. Wound stable. 70% bubble with good PI.  Supine 4-5hrs daily till Monday.

## 2019-12-03 ENCOUNTER — OFFICE VISIT (OUTPATIENT)
Dept: OPHTHALMOLOGY | Facility: CLINIC | Age: 64
End: 2019-12-03
Attending: OPHTHALMOLOGY
Payer: MEDICARE

## 2019-12-03 DIAGNOSIS — H18.519 FUCHS' CORNEAL DYSTROPHY: ICD-10-CM

## 2019-12-03 DIAGNOSIS — Z94.7 STATUS POST CORNEAL TRANSPLANT: Primary | ICD-10-CM

## 2019-12-03 PROCEDURE — 99024 PR POST-OP FOLLOW-UP VISIT: ICD-10-PCS | Mod: POP,,, | Performed by: OPHTHALMOLOGY

## 2019-12-03 PROCEDURE — 99024 POSTOP FOLLOW-UP VISIT: CPT | Mod: POP,,, | Performed by: OPHTHALMOLOGY

## 2019-12-03 PROCEDURE — 99999 PR PBB SHADOW E&M-EST. PATIENT-LVL II: ICD-10-PCS | Mod: PBBFAC,,, | Performed by: OPHTHALMOLOGY

## 2019-12-03 PROCEDURE — 99212 OFFICE O/P EST SF 10 MIN: CPT | Mod: PBBFAC | Performed by: OPHTHALMOLOGY

## 2019-12-03 PROCEDURE — 99999 PR PBB SHADOW E&M-EST. PATIENT-LVL II: CPT | Mod: PBBFAC,,, | Performed by: OPHTHALMOLOGY

## 2019-12-03 RX ORDER — PREDNISOLONE ACETATE 10 MG/ML
1 SUSPENSION/ DROPS OPHTHALMIC 4 TIMES DAILY
Qty: 10 ML | Refills: 6 | Status: SHIPPED | OUTPATIENT
Start: 2019-12-03 | End: 2020-07-21 | Stop reason: SDUPTHER

## 2019-12-03 NOTE — PROGRESS NOTES
HPI     S/p DMEK OD  (11/21/19)    Pt states that vision has improved since last visit. Pt reports that OD   has been tearing a lot and he has been having a FB sensation in that OD.     Pt confirms using  Pred/gati QID OD      Last edited by Elvie Plummer on 12/3/2019  9:17 AM. (History)            Assessment /Plan     For exam results, see Encounter Report.    Status post corneal transplant    Fuchs' corneal dystrophy      DMEK POW1 mostly attached but still moderate peripheral edema, more inferiorly.  PF qid  REcheck in 2 weeks.

## 2019-12-19 ENCOUNTER — OFFICE VISIT (OUTPATIENT)
Dept: INTERNAL MEDICINE | Facility: CLINIC | Age: 64
End: 2019-12-19
Payer: MEDICARE

## 2019-12-19 VITALS
WEIGHT: 219.81 LBS | BODY MASS INDEX: 29.81 KG/M2 | OXYGEN SATURATION: 98 % | SYSTOLIC BLOOD PRESSURE: 120 MMHG | HEART RATE: 61 BPM | DIASTOLIC BLOOD PRESSURE: 80 MMHG

## 2019-12-19 DIAGNOSIS — R73.09 ELEVATED GLUCOSE: ICD-10-CM

## 2019-12-19 DIAGNOSIS — N13.8 BPH WITH URINARY OBSTRUCTION: ICD-10-CM

## 2019-12-19 DIAGNOSIS — G47.00 INSOMNIA, UNSPECIFIED TYPE: ICD-10-CM

## 2019-12-19 DIAGNOSIS — I10 ESSENTIAL HYPERTENSION: ICD-10-CM

## 2019-12-19 DIAGNOSIS — R17 ELEVATED BILIRUBIN: ICD-10-CM

## 2019-12-19 DIAGNOSIS — N40.1 BPH WITH URINARY OBSTRUCTION: ICD-10-CM

## 2019-12-19 DIAGNOSIS — Z00.00 ROUTINE PHYSICAL EXAMINATION: Primary | ICD-10-CM

## 2019-12-19 PROCEDURE — 1126F AMNT PAIN NOTED NONE PRSNT: CPT | Mod: ,,, | Performed by: INTERNAL MEDICINE

## 2019-12-19 PROCEDURE — 99214 PR OFFICE/OUTPT VISIT, EST, LEVL IV, 30-39 MIN: ICD-10-PCS | Mod: S$PBB,,, | Performed by: INTERNAL MEDICINE

## 2019-12-19 PROCEDURE — 1159F PR MEDICATION LIST DOCUMENTED IN MEDICAL RECORD: ICD-10-PCS | Mod: ,,, | Performed by: INTERNAL MEDICINE

## 2019-12-19 PROCEDURE — 99999 PR PBB SHADOW E&M-EST. PATIENT-LVL III: ICD-10-PCS | Mod: PBBFAC,,, | Performed by: INTERNAL MEDICINE

## 2019-12-19 PROCEDURE — 99214 OFFICE O/P EST MOD 30 MIN: CPT | Mod: S$PBB,,, | Performed by: INTERNAL MEDICINE

## 2019-12-19 PROCEDURE — 1159F MED LIST DOCD IN RCRD: CPT | Mod: ,,, | Performed by: INTERNAL MEDICINE

## 2019-12-19 PROCEDURE — 1126F PR PAIN SEVERITY QUANTIFIED, NO PAIN PRESENT: ICD-10-PCS | Mod: ,,, | Performed by: INTERNAL MEDICINE

## 2019-12-19 PROCEDURE — 99213 OFFICE O/P EST LOW 20 MIN: CPT | Mod: PBBFAC | Performed by: INTERNAL MEDICINE

## 2019-12-19 PROCEDURE — 99999 PR PBB SHADOW E&M-EST. PATIENT-LVL III: CPT | Mod: PBBFAC,,, | Performed by: INTERNAL MEDICINE

## 2019-12-19 RX ORDER — ALPRAZOLAM 0.5 MG/1
TABLET ORAL
Qty: 30 TABLET | Refills: 5 | Status: SHIPPED | OUTPATIENT
Start: 2019-12-19 | End: 2020-12-03 | Stop reason: SDUPTHER

## 2019-12-19 NOTE — PROGRESS NOTES
Subjective:       Patient ID: Wero Spangler is a 64 y.o. male.    Chief Complaint: Annual Exam    Patient in for annual follow-up in review of medical problems. He recently had a cornea transplant and he is slowly recovering from that.  Lights continue to bother his eyes.  He is getting injections from Orthopedics for his knees.  He had a Urology prostate procedure which has helped his urination and he has been able to get off some meds.  Still has some anxiety and insomnia issues any like a refill on his alprazolam.  I reviewed the  and his prescription refills, discussed side effects and will refill the medicine.  Digestion is stable.  Up-to-date with colon screening.      Review of Systems   Constitutional: Negative for activity change, chills, fatigue, fever and unexpected weight change.   HENT: Positive for hearing loss. Negative for nosebleeds, rhinorrhea and trouble swallowing.    Eyes: Positive for visual disturbance. Negative for pain and discharge.        Increased tearing right eye    Respiratory: Negative for cough, chest tightness, shortness of breath and wheezing.    Cardiovascular: Negative for chest pain and palpitations.   Gastrointestinal: Negative for abdominal pain, blood in stool, constipation, diarrhea, nausea and vomiting.   Endocrine: Negative for polydipsia and polyuria.   Genitourinary: Negative for difficulty urinating, hematuria and urgency.   Musculoskeletal: Positive for arthralgias. Negative for joint swelling and neck pain.   Skin: Negative for rash.   Neurological: Negative for dizziness, weakness and headaches.   Hematological: Does not bruise/bleed easily.   Psychiatric/Behavioral: Negative for confusion, dysphoric mood, sleep disturbance and suicidal ideas.       Objective:      Physical Exam   Constitutional: He is oriented to person, place, and time. He appears well-developed and well-nourished. No distress.   HENT:   Head: Normocephalic and atraumatic.   Right Ear:  External ear normal.   Left Ear: External ear normal.   Mouth/Throat: Oropharynx is clear and moist. No oropharyngeal exudate.   TM's clear, pharynx clear   Eyes: Pupils are equal, round, and reactive to light. EOM are normal. Right eye exhibits discharge (Increased tearing from the right eye). No scleral icterus.   Neck: Normal range of motion. Neck supple. No thyromegaly present.   No supraclavicular nodes palpated   Cardiovascular: Normal rate, regular rhythm and normal heart sounds.   No murmur heard.  Pulmonary/Chest: Effort normal and breath sounds normal. He has no wheezes.   Abdominal: Soft. Bowel sounds are normal. He exhibits no mass. There is no tenderness.   Musculoskeletal: He exhibits no edema.   Lymphadenopathy:     He has no cervical adenopathy.   Neurological: He is alert and oriented to person, place, and time.   Skin: No rash noted. No erythema. No pallor.   Psychiatric: He has a normal mood and affect. His behavior is normal.       Assessment:       1. Routine physical examination    2. Essential hypertension    3. Elevated bilirubin    4. Insomnia, unspecified type    5. BPH with urinary obstruction    6. Elevated glucose        Plan:       Wero was seen today for annual exam.    Diagnoses and all orders for this visit:    Routine physical examination  -     Lipid panel; Future  -     CBC auto differential; Future  -     Comprehensive metabolic panel; Future  -     Hemoglobin A1c; Future    Essential hypertension  -     Lipid panel; Future  -     CBC auto differential; Future  -     Comprehensive metabolic panel; Future  -     Hemoglobin A1c; Future    Elevated bilirubin  -     Lipid panel; Future  -     CBC auto differential; Future  -     Comprehensive metabolic panel; Future  -     Hemoglobin A1c; Future    Insomnia, unspecified type  -     Lipid panel; Future  -     CBC auto differential; Future  -     Comprehensive metabolic panel; Future  -     Hemoglobin A1c; Future    BPH with urinary  obstruction  -     Lipid panel; Future  -     CBC auto differential; Future  -     Comprehensive metabolic panel; Future  -     Hemoglobin A1c; Future    Elevated glucose  -     Lipid panel; Future  -     CBC auto differential; Future  -     Comprehensive metabolic panel; Future  -     Hemoglobin A1c; Future    Other orders  -     ALPRAZolam (XANAX) 0.5 MG tablet; TAKE 1 TABLET BY MOUTH EVERY NIGHT

## 2019-12-24 ENCOUNTER — OFFICE VISIT (OUTPATIENT)
Dept: OPHTHALMOLOGY | Facility: CLINIC | Age: 64
End: 2019-12-24
Attending: OPHTHALMOLOGY
Payer: MEDICARE

## 2019-12-24 DIAGNOSIS — Z94.7 STATUS POST CORNEAL TRANSPLANT: Primary | ICD-10-CM

## 2019-12-24 PROCEDURE — 99024 POSTOP FOLLOW-UP VISIT: CPT | Mod: POP,,, | Performed by: OPHTHALMOLOGY

## 2019-12-24 PROCEDURE — 99212 OFFICE O/P EST SF 10 MIN: CPT | Mod: PBBFAC | Performed by: OPHTHALMOLOGY

## 2019-12-24 PROCEDURE — 99999 PR PBB SHADOW E&M-EST. PATIENT-LVL II: ICD-10-PCS | Mod: PBBFAC,,, | Performed by: OPHTHALMOLOGY

## 2019-12-24 PROCEDURE — 99999 PR PBB SHADOW E&M-EST. PATIENT-LVL II: CPT | Mod: PBBFAC,,, | Performed by: OPHTHALMOLOGY

## 2019-12-24 PROCEDURE — 99024 PR POST-OP FOLLOW-UP VISIT: ICD-10-PCS | Mod: POP,,, | Performed by: OPHTHALMOLOGY

## 2019-12-24 NOTE — PROGRESS NOTES
HPI     S/P DMEK OD  (11/21/19)    Patient states that vision has improved since last visit, but is not   totally where he would like it to be. Notes some photophobia but has   gotten better.     Pred QID OD      Last edited by Maribeth Franks, PCT on 12/24/2019  9:37 AM. (History)            Assessment /Plan     For exam results, see Encounter Report.    Status post corneal transplant      DMEK graft attached and clear. Signs and symptoms of graft rejection reviewed.  1 month, 100% attached, still sl thick,   PF qid

## 2020-01-21 ENCOUNTER — PATIENT MESSAGE (OUTPATIENT)
Dept: OPHTHALMOLOGY | Facility: CLINIC | Age: 65
End: 2020-01-21

## 2020-01-23 ENCOUNTER — TELEPHONE (OUTPATIENT)
Dept: OPHTHALMOLOGY | Facility: CLINIC | Age: 65
End: 2020-01-23

## 2020-01-23 NOTE — TELEPHONE ENCOUNTER
----- Message from Blanquita Vera sent at 1/22/2020 12:49 PM CST -----  Type:  Needs Medical Advice    Who Called: Pt    Would the patient rather a call back or a response via [facility]? Call back    Best Call Back Number: [phone]     Additional Information: Need to speak w/ you regarding a f/u appt w/ his regular eye doctor

## 2020-01-27 ENCOUNTER — PATIENT MESSAGE (OUTPATIENT)
Dept: INTERNAL MEDICINE | Facility: CLINIC | Age: 65
End: 2020-01-27

## 2020-01-30 ENCOUNTER — LAB VISIT (OUTPATIENT)
Dept: LAB | Facility: HOSPITAL | Age: 65
End: 2020-01-30
Attending: UROLOGY
Payer: MEDICARE

## 2020-01-30 DIAGNOSIS — G47.00 INSOMNIA, UNSPECIFIED TYPE: ICD-10-CM

## 2020-01-30 DIAGNOSIS — R73.09 ELEVATED GLUCOSE: ICD-10-CM

## 2020-01-30 DIAGNOSIS — I10 ESSENTIAL HYPERTENSION: ICD-10-CM

## 2020-01-30 DIAGNOSIS — N13.8 BPH WITH URINARY OBSTRUCTION: ICD-10-CM

## 2020-01-30 DIAGNOSIS — N40.1 BPH WITH URINARY OBSTRUCTION: ICD-10-CM

## 2020-01-30 DIAGNOSIS — R17 ELEVATED BILIRUBIN: ICD-10-CM

## 2020-01-30 DIAGNOSIS — Z00.00 ROUTINE PHYSICAL EXAMINATION: ICD-10-CM

## 2020-01-30 DIAGNOSIS — R97.20 ELEVATED PSA: ICD-10-CM

## 2020-01-30 LAB
ALBUMIN SERPL BCP-MCNC: 4.2 G/DL (ref 3.5–5.2)
ALP SERPL-CCNC: 59 U/L (ref 55–135)
ALT SERPL W/O P-5'-P-CCNC: 25 U/L (ref 10–44)
ANION GAP SERPL CALC-SCNC: 7 MMOL/L (ref 8–16)
AST SERPL-CCNC: 21 U/L (ref 10–40)
BASOPHILS # BLD AUTO: 0.04 K/UL (ref 0–0.2)
BASOPHILS NFR BLD: 0.7 % (ref 0–1.9)
BILIRUB SERPL-MCNC: 1.2 MG/DL (ref 0.1–1)
BUN SERPL-MCNC: 16 MG/DL (ref 8–23)
CALCIUM SERPL-MCNC: 9.4 MG/DL (ref 8.7–10.5)
CHLORIDE SERPL-SCNC: 103 MMOL/L (ref 95–110)
CHOLEST SERPL-MCNC: 192 MG/DL (ref 120–199)
CHOLEST/HDLC SERPL: 4.6 {RATIO} (ref 2–5)
CO2 SERPL-SCNC: 30 MMOL/L (ref 23–29)
COMPLEXED PSA SERPL-MCNC: 10.1 NG/ML (ref 0–4)
CREAT SERPL-MCNC: 1.1 MG/DL (ref 0.5–1.4)
DIFFERENTIAL METHOD: NORMAL
EOSINOPHIL # BLD AUTO: 0.1 K/UL (ref 0–0.5)
EOSINOPHIL NFR BLD: 1.6 % (ref 0–8)
ERYTHROCYTE [DISTWIDTH] IN BLOOD BY AUTOMATED COUNT: 14.3 % (ref 11.5–14.5)
EST. GFR  (AFRICAN AMERICAN): >60 ML/MIN/1.73 M^2
EST. GFR  (NON AFRICAN AMERICAN): >60 ML/MIN/1.73 M^2
ESTIMATED AVG GLUCOSE: 97 MG/DL (ref 68–131)
GLUCOSE SERPL-MCNC: 110 MG/DL (ref 70–110)
HBA1C MFR BLD HPLC: 5 % (ref 4–5.6)
HCT VFR BLD AUTO: 42.5 % (ref 40–54)
HDLC SERPL-MCNC: 42 MG/DL (ref 40–75)
HDLC SERPL: 21.9 % (ref 20–50)
HGB BLD-MCNC: 14.3 G/DL (ref 14–18)
IMM GRANULOCYTES # BLD AUTO: 0.03 K/UL (ref 0–0.04)
IMM GRANULOCYTES NFR BLD AUTO: 0.5 % (ref 0–0.5)
LDLC SERPL CALC-MCNC: 111 MG/DL (ref 63–159)
LYMPHOCYTES # BLD AUTO: 1.4 K/UL (ref 1–4.8)
LYMPHOCYTES NFR BLD: 24.7 % (ref 18–48)
MCH RBC QN AUTO: 30.4 PG (ref 27–31)
MCHC RBC AUTO-ENTMCNC: 33.6 G/DL (ref 32–36)
MCV RBC AUTO: 90 FL (ref 82–98)
MONOCYTES # BLD AUTO: 0.6 K/UL (ref 0.3–1)
MONOCYTES NFR BLD: 10.1 % (ref 4–15)
NEUTROPHILS # BLD AUTO: 3.5 K/UL (ref 1.8–7.7)
NEUTROPHILS NFR BLD: 62.4 % (ref 38–73)
NONHDLC SERPL-MCNC: 150 MG/DL
NRBC BLD-RTO: 0 /100 WBC
PLATELET # BLD AUTO: 198 K/UL (ref 150–350)
PMV BLD AUTO: 9.9 FL (ref 9.2–12.9)
POTASSIUM SERPL-SCNC: 3.7 MMOL/L (ref 3.5–5.1)
PROT SERPL-MCNC: 7.2 G/DL (ref 6–8.4)
RBC # BLD AUTO: 4.7 M/UL (ref 4.6–6.2)
SODIUM SERPL-SCNC: 140 MMOL/L (ref 136–145)
TRIGL SERPL-MCNC: 195 MG/DL (ref 30–150)
WBC # BLD AUTO: 5.63 K/UL (ref 3.9–12.7)

## 2020-01-30 PROCEDURE — 36415 COLL VENOUS BLD VENIPUNCTURE: CPT

## 2020-01-30 PROCEDURE — 83036 HEMOGLOBIN GLYCOSYLATED A1C: CPT

## 2020-01-30 PROCEDURE — 84153 ASSAY OF PSA TOTAL: CPT

## 2020-01-30 PROCEDURE — 85025 COMPLETE CBC W/AUTO DIFF WBC: CPT

## 2020-01-30 PROCEDURE — 80061 LIPID PANEL: CPT

## 2020-01-30 PROCEDURE — 80053 COMPREHEN METABOLIC PANEL: CPT

## 2020-02-02 ENCOUNTER — PATIENT MESSAGE (OUTPATIENT)
Dept: SURGERY | Facility: CLINIC | Age: 65
End: 2020-02-02

## 2020-02-04 ENCOUNTER — PATIENT OUTREACH (OUTPATIENT)
Dept: ADMINISTRATIVE | Facility: OTHER | Age: 65
End: 2020-02-04

## 2020-02-05 ENCOUNTER — OFFICE VISIT (OUTPATIENT)
Dept: SURGERY | Facility: CLINIC | Age: 65
End: 2020-02-05
Payer: MEDICARE

## 2020-02-05 VITALS
BODY MASS INDEX: 28.6 KG/M2 | HEIGHT: 72 IN | WEIGHT: 211.19 LBS | DIASTOLIC BLOOD PRESSURE: 75 MMHG | SYSTOLIC BLOOD PRESSURE: 148 MMHG | HEART RATE: 70 BPM | TEMPERATURE: 98 F

## 2020-02-05 DIAGNOSIS — L72.0 EPIDERMOID CYST OF SKIN: Primary | ICD-10-CM

## 2020-02-05 DIAGNOSIS — L91.0 HYPERTROPHIC SCAR: ICD-10-CM

## 2020-02-05 PROCEDURE — 99999 PR PBB SHADOW E&M-EST. PATIENT-LVL III: CPT | Mod: PBBFAC,,, | Performed by: SURGERY

## 2020-02-05 PROCEDURE — 99999 PR PBB SHADOW E&M-EST. PATIENT-LVL III: ICD-10-PCS | Mod: PBBFAC,,, | Performed by: SURGERY

## 2020-02-05 PROCEDURE — 99213 OFFICE O/P EST LOW 20 MIN: CPT | Mod: S$PBB,,, | Performed by: SURGERY

## 2020-02-05 PROCEDURE — 99213 OFFICE O/P EST LOW 20 MIN: CPT | Mod: PBBFAC | Performed by: SURGERY

## 2020-02-05 PROCEDURE — 99213 PR OFFICE/OUTPT VISIT, EST, LEVL III, 20-29 MIN: ICD-10-PCS | Mod: S$PBB,,, | Performed by: SURGERY

## 2020-02-05 NOTE — PROGRESS NOTES
"History of presenting issue  Mr. Spangler presented to clinic today with concerns regarding his incision scar from a chest cyst removal in September 2018. He had no concerns at his original post-operative visit and had none until a few months ago. Around November he began to slowly notice that his incision scar was bothering him. It is itching and becomes irritated by fabric during exercise. He also noticed the incision site was no longer "flush" with the skin and had risen/protrudged. There is also a small "cyst" portion on the edge of the scar.    Assessment  Examination of the incision site revealed a hypertrophic/keloid scar with a small cyst portion on the left side of the scar. Dr. Witt was able to excise the small cyst portion with a suturing kit. The patient is not concerned about the appearance of the scar and just wanted to make sure that nothing sinister was happening with the incision site. There are no signs of infection or severe inflammation. Dr. Witt consulted the patient on options for the scar (surgical removal, plastic surgery consult) but the patient didn't think it was necessary.    Plan  Mr. Spangler will monitor the area where the small "cyst" portion of the scar was removed. If it recurs or changes color he will return to clinic. He will continue to monitor the scar and come back if it keeps growing to an intolerable size.    Physical Exam   Constitutional: He is oriented to person, place, and time. He appears well-developed and well-nourished. No distress.   Cardiovascular: Normal rate, regular rhythm and normal heart sounds.   Pulmonary/Chest: Effort normal and breath sounds normal. No respiratory distress.   Neurological: He is alert and oriented to person, place, and time.   Skin: Skin is warm and dry. He is not diaphoretic.   Psychiatric: He has a normal mood and affect. His behavior is normal. Judgment and thought content normal.       Review of Systems   Constitutional: Negative " for chills, fever, malaise/fatigue and weight loss.   HENT: Negative for congestion, sinus pain and sore throat.    Eyes: Negative for pain and redness.   Respiratory: Negative for cough, hemoptysis, sputum production and shortness of breath.    Cardiovascular: Negative for chest pain, palpitations and leg swelling.   Gastrointestinal: Positive for heartburn. Negative for abdominal pain, blood in stool, constipation, diarrhea, nausea and vomiting.   Genitourinary: Negative for frequency and urgency.   Musculoskeletal: Negative for joint pain and myalgias.   Skin: Positive for itching (On the incision site (occassional)). Negative for rash.   Neurological: Negative for dizziness, seizures and weakness.   Endo/Heme/Allergies: Negative for polydipsia. Does not bruise/bleed easily.   Psychiatric/Behavioral: Negative for depression. The patient has insomnia (occassional). The patient is not nervous/anxious.      Past Medical History:   Diagnosis Date    Allergy     Cataract     Elevated PSA     Enlarged prostate     Gallstones     General anesthetics causing adverse effect in therapeutic use 2000    delayed emergence after back surgery    GERD (gastroesophageal reflux disease)     HTN (hypertension) 9/24/2013    Hypertension     Urinary tract infection        Past Surgical History:   Procedure Laterality Date    BACK SURGERY  4/2000    CATARACT EXTRACTION W/  INTRAOCULAR LENS IMPLANT      Both Eyes    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    Procedure: TRANSPLANT, CORNEA;  Surgeon: Vu Wilson MD;  [facility];  Service: Ophthalmology;  Laterality: Right;  DMEK    EYE SURGERY      LAPAROSCOPIC MARSUPIALIZATION OF CYST OF KIDNEY      PROSTATE SURGERY      prostate biopsy benign    TONSILLECTOMY      VASECTOMY         Family History   Problem Relation Age of Onset    Cancer Mother         breast    Prostate cancer Brother     Cancer Brother         prostate    Macular degeneration Maternal  Grandmother     Cancer Other     Cancer Other     Amblyopia Neg Hx     Blindness Neg Hx     Cataracts Neg Hx     Glaucoma Neg Hx     Retinal detachment Neg Hx     Strabismus Neg Hx        Social History     Socioeconomic History    Marital status:      Spouse name: Not on file    Number of children: Not on file    Years of education: Not on file    Highest education level: Not on file   Occupational History    Not on file   Social Needs    Financial resource strain: Not hard at all    Food insecurity:     Worry: Never true     Inability: Never true    Transportation needs:     Medical: No     Non-medical: No   Tobacco Use    Smoking status: Former Smoker     Last attempt to quit: 2000     Years since quittin.1    Smokeless tobacco: Never Used   Substance and Sexual Activity    Alcohol use: Yes     Alcohol/week: 1.0 standard drinks     Types: 1 Glasses of wine per week     Frequency: 2-3 times a week     Drinks per session: 1 or 2     Binge frequency: Never     Comment: 2 beers three times a week     Drug use: No    Sexual activity: Yes     Partners: Female   Lifestyle    Physical activity:     Days per week: 3 days     Minutes per session: 50 min    Stress: Only a little   Relationships    Social connections:     Talks on phone: More than three times a week     Gets together: Once a week     Attends Shinto service: Not on file     Active member of club or organization: No     Attends meetings of clubs or organizations: Never     Relationship status: Living with partner   Other Topics Concern    Not on file   Social History Narrative    Not on file       Current Outpatient Medications   Medication Sig Dispense Refill    ALPRAZolam (XANAX) 0.5 MG tablet TAKE 1 TABLET BY MOUTH EVERY NIGHT 30 tablet 5    fenofibrate (TRICOR) 54 MG tablet Take 1 tablet (54 mg total) by mouth once daily. 30 tablet 11    fluticasone (FLONASE) 50 mcg/actuation nasal spray 1 spray by Each Nare  route daily as needed.       OMEPRAZOLE (PRILOSEC ORAL) Take 20 mg by mouth every morning.       prednisoLONE acetate (PRED FORTE) 1 % DrpS Place 1 drop into the right eye 4 (four) times daily. 10 mL 6    sodium chloride 2% (BARBI 128) 2 % ophthalmic solution 1 drop as needed (takes 3-4 times/day).      triamterene-hydrochlorothiazide 37.5-25 mg (MAXZIDE-25) 37.5-25 mg per tablet Take 1 tablet by mouth once daily. 30 tablet 11    prednisolone acet-gatifloxacin 1-0.5 % DrpS Apply 1 drop to eye 4 (four) times daily. (Patient not taking: Reported on 12/24/2019) 3.5 mL 1     No current facility-administered medications for this visit.        Review of patient's allergies indicates:  No Known Allergies     A/P:    Hypertrophic Scar  Punctum without cyst    Pt not interested in revision of hypertrophis scar  Small punctum removed.  Fu if returns.

## 2020-02-10 ENCOUNTER — PATIENT OUTREACH (OUTPATIENT)
Dept: ADMINISTRATIVE | Facility: OTHER | Age: 65
End: 2020-02-10

## 2020-02-12 ENCOUNTER — OFFICE VISIT (OUTPATIENT)
Dept: UROLOGY | Facility: CLINIC | Age: 65
End: 2020-02-12
Payer: MEDICARE

## 2020-02-12 VITALS
HEART RATE: 73 BPM | DIASTOLIC BLOOD PRESSURE: 81 MMHG | BODY MASS INDEX: 27.98 KG/M2 | WEIGHT: 206.56 LBS | HEIGHT: 72 IN | SYSTOLIC BLOOD PRESSURE: 128 MMHG

## 2020-02-12 DIAGNOSIS — R97.20 ELEVATED PSA: Primary | ICD-10-CM

## 2020-02-12 DIAGNOSIS — N40.1 BPH WITH URINARY OBSTRUCTION: ICD-10-CM

## 2020-02-12 DIAGNOSIS — N40.2 PROSTATE NODULE: ICD-10-CM

## 2020-02-12 DIAGNOSIS — N13.8 BPH WITH URINARY OBSTRUCTION: ICD-10-CM

## 2020-02-12 PROCEDURE — 99213 OFFICE O/P EST LOW 20 MIN: CPT | Mod: PBBFAC | Performed by: UROLOGY

## 2020-02-12 PROCEDURE — 99999 PR PBB SHADOW E&M-EST. PATIENT-LVL III: ICD-10-PCS | Mod: PBBFAC,,, | Performed by: UROLOGY

## 2020-02-12 PROCEDURE — 99999 PR PBB SHADOW E&M-EST. PATIENT-LVL III: CPT | Mod: PBBFAC,,, | Performed by: UROLOGY

## 2020-02-12 PROCEDURE — 99213 OFFICE O/P EST LOW 20 MIN: CPT | Mod: S$PBB,,, | Performed by: UROLOGY

## 2020-02-12 PROCEDURE — 99213 PR OFFICE/OUTPT VISIT, EST, LEVL III, 20-29 MIN: ICD-10-PCS | Mod: S$PBB,,, | Performed by: UROLOGY

## 2020-02-12 NOTE — PROGRESS NOTES
CHIEF COMPLAINT:    Mr. Spangler is a 64 y.o. male presenting with an elevated PSA.    PRESENTING ILLNESS:    Wero Spangler is a 64 y.o. male with an elevated PSA.  He has had multiple biopsies done.  One was in 2012 when his PSA was 10.14.  There was also a prostate nodule at that time.  Most recent one was 8/23/16 when his PSA was 18.1.  This was a cognitive fusion biopsy.    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  Path returned an oncocytoma.    He also has LUTS.  He's s/p rezum on 5/14/19.  He's voiding much better.    He denies ED.    REVIEW OF SYSTEMS:    Wero Spangler denies chest pain,sore throat, headache, blurred vision, fever, nausea, vomiting, chills, flank discomfort, abdominal pain, bleeding per rectum, cough, SOB, recent loss of consciousness, recent mental status changes, seizures, dizziness, or upper or lower extremity weakness.    JOSE MANUEL  1. 3  2. 4  3. 3  4. 4  5. 4     PATIENT HISTORY:    Past Medical History:   Diagnosis Date    Allergy     Cataract     Elevated PSA     Enlarged prostate     Gallstones     General anesthetics causing adverse effect in therapeutic use 2000    delayed emergence after back surgery    GERD (gastroesophageal reflux disease)     HTN (hypertension) 9/24/2013    Hypertension     Urinary tract infection        Past Surgical History:   Procedure Laterality Date    BACK SURGERY  4/2000    CATARACT EXTRACTION W/  INTRAOCULAR LENS IMPLANT      Both Eyes    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    Procedure: TRANSPLANT, CORNEA;  Surgeon: Vu Wilson MD;  Location: [facility];  Service: Ophthalmology;  Laterality: Right;  DMEK    EYE SURGERY      LAPAROSCOPIC MARSUPIALIZATION OF CYST OF KIDNEY      PROSTATE SURGERY      prostate biopsy benign    TONSILLECTOMY      VASECTOMY         Family History   Problem Relation Age of Onset    Cancer Mother         breast    Prostate cancer Brother     Cancer Brother         prostate    Macular  degeneration Maternal Grandmother     Cancer Other     Cancer Other     Amblyopia Neg Hx     Blindness Neg Hx     Cataracts Neg Hx     Glaucoma Neg Hx     Retinal detachment Neg Hx     Strabismus Neg Hx        Social History     Socioeconomic History    Marital status:      Spouse name: Not on file    Number of children: Not on file    Years of education: Not on file    Highest education level: Not on file   Occupational History    Not on file   Social Needs    Financial resource strain: Not hard at all    Food insecurity:     Worry: Never true     Inability: Never true    Transportation needs:     Medical: No     Non-medical: No   Tobacco Use    Smoking status: Former Smoker     Last attempt to quit: 2000     Years since quittin.1    Smokeless tobacco: Never Used   Substance and Sexual Activity    Alcohol use: Yes     Alcohol/week: 1.0 standard drinks     Types: 1 Glasses of wine per week     Frequency: 2-3 times a week     Drinks per session: 1 or 2     Binge frequency: Never     Comment: 2 beers three times a week     Drug use: No    Sexual activity: Yes     Partners: Female   Lifestyle    Physical activity:     Days per week: 3 days     Minutes per session: 50 min    Stress: Only a little   Relationships    Social connections:     Talks on phone: More than three times a week     Gets together: Once a week     Attends Scientologist service: Not on file     Active member of club or organization: No     Attends meetings of clubs or organizations: Never     Relationship status: Living with partner   Other Topics Concern    Not on file   Social History Narrative    Not on file       Allergies:  Patient has no known allergies.    Medications:    Current Outpatient Medications:     ALPRAZolam (XANAX) 0.5 MG tablet, TAKE 1 TABLET BY MOUTH EVERY NIGHT, Disp: 30 tablet, Rfl: 5    fenofibrate (TRICOR) 54 MG tablet, Take 1 tablet (54 mg total) by mouth once daily., Disp: 30 tablet, Rfl:  11    fluticasone (FLONASE) 50 mcg/actuation nasal spray, 1 spray by Each Nare route daily as needed. , Disp: , Rfl:     OMEPRAZOLE (PRILOSEC ORAL), Take 20 mg by mouth every morning. , Disp: , Rfl:     prednisolone acet-gatifloxacin 1-0.5 % DrpS, Apply 1 drop to eye 4 (four) times daily., Disp: 3.5 mL, Rfl: 1    prednisoLONE acetate (PRED FORTE) 1 % DrpS, Place 1 drop into the right eye 4 (four) times daily., Disp: 10 mL, Rfl: 6    sodium chloride 2% (BARBI 128) 2 % ophthalmic solution, 1 drop as needed (takes 3-4 times/day)., Disp: , Rfl:     triamterene-hydrochlorothiazide 37.5-25 mg (MAXZIDE-25) 37.5-25 mg per tablet, Take 1 tablet by mouth once daily., Disp: 30 tablet, Rfl: 11    PHYSICAL EXAMINATION:    The patient generally appears in good health, is appropriately interactive, and is in no apparent distress.     Eyes: anicteric sclerae, moist conjunctivae; no lid-lag; PERRLA     HENT: Atraumatic; oropharynx clear with moist mucous membranes and no mucosal ulcerations;normal hard and soft palate.  No evidence of lymphadenopathy.    Neck: Trachea midline.  No thyromegaly.    Musculoskeletal: No abnormal gait.    Skin: No lesions.    Mental: Cooperative with normal affect.  Is oriented to time, place, and person.    Neuro: Grossly intact.    Chest: Normal inspiratory effort.   No accessory muscles.  No audible wheezes.  Respirations symmetric on inspiration and expiration.    Heart: Regular rhythm.      Abdomen:  Soft, non-tender. No masses or organomegaly. Bladder is not palpable. No evidence of flank discomfort. No evidence of inguinal hernia.    Genitourinary: The penis is not circumcised with no evidence of plaques or induration. The urethral meatus is normal. The testes, epididymides, and cord structures are normal in size and contour bilaterally. The scrotum is normal in size and contour.    Normal anal sphincter tone. No rectal mass.    The prostate is 40 g. Normal landmarks. Lateral sulci. Median  furrow intact. There is a 1.5 cm x 1.5 cm nodule in the midportion of the gland. Stable.  Seminal vesicles are normal.    Extremities: No clubbing, cyanosis, or edema      LABS:    UA dipped negative today  Lab Results   Component Value Date    PSA 14.0 (H) 09/22/2014    PSA 15.48 (H) 08/16/2013    PSA 11.06 (H) 02/25/2013    PSADIAG 10.1 (H) 01/30/2020    PSADIAG 15.3 (H) 07/31/2019    PSADIAG 15.6 (H) 11/28/2018    PSATOTAL 6.8 (H) 07/12/2011    PSATOTAL 7.9 (H) 01/11/2011    PSAFREE 1.07 07/12/2011    PSAFREE 1.67 (H) 01/11/2011    PSAFREEPCT 15.74 07/12/2011    PSAFREEPCT 21.14 01/11/2011        IMPRESSION:    Encounter Diagnoses   Name Primary?    Elevated PSA Yes    BPH with urinary obstruction     Prostate nodule        PLAN:    1. Will observe his LUTS as they don't bother him.   2. RTC 6 months with a PSA.      Copy to:

## 2020-02-14 ENCOUNTER — OFFICE VISIT (OUTPATIENT)
Dept: OPTOMETRY | Facility: CLINIC | Age: 65
End: 2020-02-14
Payer: MEDICARE

## 2020-02-14 DIAGNOSIS — H18.509 CORNEAL DYSTROPHY: Primary | ICD-10-CM

## 2020-02-14 PROCEDURE — 99999 PR PBB SHADOW E&M-EST. PATIENT-LVL II: CPT | Mod: PBBFAC,,, | Performed by: OPTOMETRIST

## 2020-02-14 PROCEDURE — 99999 PR PBB SHADOW E&M-EST. PATIENT-LVL II: ICD-10-PCS | Mod: PBBFAC,,, | Performed by: OPTOMETRIST

## 2020-02-14 PROCEDURE — 92012 INTRM OPH EXAM EST PATIENT: CPT | Mod: S$PBB,,, | Performed by: OPTOMETRIST

## 2020-02-14 PROCEDURE — 99212 OFFICE O/P EST SF 10 MIN: CPT | Mod: PBBFAC,PO | Performed by: OPTOMETRIST

## 2020-02-14 PROCEDURE — 92015 PR REFRACTION: ICD-10-PCS | Mod: ,,, | Performed by: OPTOMETRIST

## 2020-02-14 PROCEDURE — 92015 DETERMINE REFRACTIVE STATE: CPT | Mod: ,,, | Performed by: OPTOMETRIST

## 2020-02-14 PROCEDURE — 92012 PR EYE EXAM, EST PATIENT,INTERMED: ICD-10-PCS | Mod: S$PBB,,, | Performed by: OPTOMETRIST

## 2020-02-14 NOTE — PROGRESS NOTES
HPI     DLS: 12/24/19 pati   Pt states he had corneal transplant, pt states he reads with out  his    glasses on.   S/p DMEK OD  (11/21/19)   Occ. Floaters ,no flashes   josé antonio 128 left eye  Prednisolone gtts     Last edited by Madiha Kruger MA on 2/14/2020  8:50 AM. (History)        ROS     Positive for: Eyes (K dyst /cat surgery OU/DMEK OD)    Negative for: Constitutional, Gastrointestinal, Neurological, Skin,   Genitourinary, Musculoskeletal, HENT, Endocrine, Respiratory, Psychiatric,   Allergic/Imm, Heme/Lymph    Last edited by Doron Pereyra, OD on 2/14/2020 10:35 AM. (History)        Assessment /Plan     For exam results, see Encounter Report.    Corneal dystrophy - Both Eyes      1. Mild pco sp pciol ou  2. Sp focal laser for periph hole OD.  Stable (hx high myopia prior to cat surgery)  3. Fuchs dyst OD>OS sp DMEK OD.  Wrote new spex Rx         Plan:    rtc as sched in April w Dr Wilson

## 2020-02-21 RX ORDER — FENOFIBRATE 54 MG/1
54 TABLET ORAL DAILY
Qty: 30 TABLET | Refills: 11 | Status: SHIPPED | OUTPATIENT
Start: 2020-02-21 | End: 2021-03-08

## 2020-05-06 ENCOUNTER — PATIENT MESSAGE (OUTPATIENT)
Dept: INTERNAL MEDICINE | Facility: CLINIC | Age: 65
End: 2020-05-06

## 2020-05-07 RX ORDER — MELOXICAM 15 MG/1
15 TABLET ORAL DAILY
Qty: 30 TABLET | Refills: 1 | Status: SHIPPED | OUTPATIENT
Start: 2020-05-07 | End: 2020-07-21

## 2020-05-13 ENCOUNTER — PATIENT OUTREACH (OUTPATIENT)
Dept: ADMINISTRATIVE | Facility: OTHER | Age: 65
End: 2020-05-13

## 2020-05-15 ENCOUNTER — TELEPHONE (OUTPATIENT)
Dept: OPHTHALMOLOGY | Facility: CLINIC | Age: 65
End: 2020-05-15

## 2020-05-15 ENCOUNTER — OFFICE VISIT (OUTPATIENT)
Dept: OPHTHALMOLOGY | Facility: CLINIC | Age: 65
End: 2020-05-15
Payer: MEDICARE

## 2020-05-15 DIAGNOSIS — Z94.7 STATUS POST CORNEAL TRANSPLANT: ICD-10-CM

## 2020-05-15 DIAGNOSIS — H18.519 FUCHS' CORNEAL DYSTROPHY: Primary | ICD-10-CM

## 2020-05-15 PROCEDURE — 92014 COMPRE OPH EXAM EST PT 1/>: CPT | Mod: S$PBB,,, | Performed by: OPHTHALMOLOGY

## 2020-05-15 PROCEDURE — 92014 PR EYE EXAM, EST PATIENT,COMPREHESV: ICD-10-PCS | Mod: S$PBB,,, | Performed by: OPHTHALMOLOGY

## 2020-05-15 PROCEDURE — 99999 PR PBB SHADOW E&M-EST. PATIENT-LVL III: CPT | Mod: PBBFAC,,, | Performed by: OPHTHALMOLOGY

## 2020-05-15 PROCEDURE — 99213 OFFICE O/P EST LOW 20 MIN: CPT | Mod: PBBFAC | Performed by: OPHTHALMOLOGY

## 2020-05-15 PROCEDURE — 99999 PR PBB SHADOW E&M-EST. PATIENT-LVL III: ICD-10-PCS | Mod: PBBFAC,,, | Performed by: OPHTHALMOLOGY

## 2020-05-15 RX ORDER — TIMOLOL MALEATE 2.5 MG/ML
1 SOLUTION/ DROPS OPHTHALMIC 2 TIMES DAILY
Qty: 5 ML | Refills: 3 | Status: SHIPPED | OUTPATIENT
Start: 2020-05-15 | End: 2021-01-08 | Stop reason: SDUPTHER

## 2020-05-15 RX ORDER — FLUOROMETHOLONE 1 MG/ML
1 SUSPENSION/ DROPS OPHTHALMIC 2 TIMES DAILY
Qty: 10 ML | Refills: 6 | Status: SHIPPED | OUTPATIENT
Start: 2020-05-15 | End: 2020-05-25

## 2020-05-15 NOTE — TELEPHONE ENCOUNTER
Spoke to pharmacy regarding drops the FML was 78.00 copayment told hdz. That was the cheapest steroid that we could put him on and they were out of stock of the timolol 0.25 so changed the drop to the timolol 0.5% as alternative     ----- Message from Ortega Hammond sent at 5/15/2020 12:00 PM CDT -----  Contact: [facility]  [facility] do not have one of Rx's in stock and one expensive.    [facility]#[phone]

## 2020-05-15 NOTE — PROGRESS NOTES
HPI     Follow-up      Additional comments: dmek              Comments     RITESH:12/24/2019 Dr. Wilson  RITESH: 02/14/2020 Dr. Pereyra     S/P DMEK OD  (11/21/19)    Patient here today for a Follow-up (6 months) post DMEAK OD    Patient reports that he has been doing fine. No concerns or complaint at   this time     Pred QID OD            Last edited by Gian Wiseman on 5/15/2020 10:29 AM. (History)            Assessment /Plan     For exam results, see Encounter Report.    Fuchs' corneal dystrophy    Status post corneal transplant      DMEK graft attached and clear. Signs and symptoms of graft rejection reviewed.  Peripheral bullae, clear centrally   11/2019  IOP elevated, so FML bid, Timolol

## 2020-05-27 ENCOUNTER — PATIENT OUTREACH (OUTPATIENT)
Dept: ADMINISTRATIVE | Facility: OTHER | Age: 65
End: 2020-05-27

## 2020-05-29 ENCOUNTER — OFFICE VISIT (OUTPATIENT)
Dept: OPHTHALMOLOGY | Facility: CLINIC | Age: 65
End: 2020-05-29
Payer: MEDICARE

## 2020-05-29 ENCOUNTER — TELEPHONE (OUTPATIENT)
Dept: OPHTHALMOLOGY | Facility: CLINIC | Age: 65
End: 2020-05-29

## 2020-05-29 DIAGNOSIS — H40.043 STEROID RESPONDERS, BILATERAL: ICD-10-CM

## 2020-05-29 DIAGNOSIS — H18.519 FUCHS' CORNEAL DYSTROPHY: Primary | ICD-10-CM

## 2020-05-29 PROCEDURE — 92014 PR EYE EXAM, EST PATIENT,COMPREHESV: ICD-10-PCS | Mod: S$GLB,,, | Performed by: OPHTHALMOLOGY

## 2020-05-29 PROCEDURE — 99999 PR PBB SHADOW E&M-EST. PATIENT-LVL II: ICD-10-PCS | Mod: PBBFAC,,, | Performed by: OPHTHALMOLOGY

## 2020-05-29 PROCEDURE — 99212 OFFICE O/P EST SF 10 MIN: CPT | Mod: PBBFAC | Performed by: OPHTHALMOLOGY

## 2020-05-29 PROCEDURE — 99999 PR PBB SHADOW E&M-EST. PATIENT-LVL II: CPT | Mod: PBBFAC,,, | Performed by: OPHTHALMOLOGY

## 2020-05-29 PROCEDURE — 92014 COMPRE OPH EXAM EST PT 1/>: CPT | Mod: S$GLB,,, | Performed by: OPHTHALMOLOGY

## 2020-07-15 ENCOUNTER — TELEPHONE (OUTPATIENT)
Dept: ORTHOPEDICS | Facility: CLINIC | Age: 65
End: 2020-07-15

## 2020-07-15 DIAGNOSIS — Z71.89 COMPLEX CARE COORDINATION: ICD-10-CM

## 2020-07-15 NOTE — TELEPHONE ENCOUNTER
----- Message from Yamileth Francis sent at 7/15/2020  8:59 AM CDT -----  Contact: pt  Please call pt at [phone]    Patient is requesting a knee injection appt    Thank you

## 2020-07-20 ENCOUNTER — PATIENT OUTREACH (OUTPATIENT)
Dept: ADMINISTRATIVE | Facility: OTHER | Age: 65
End: 2020-07-20

## 2020-07-20 DIAGNOSIS — M17.11 PRIMARY OSTEOARTHRITIS OF RIGHT KNEE: Primary | ICD-10-CM

## 2020-07-21 ENCOUNTER — OFFICE VISIT (OUTPATIENT)
Dept: ORTHOPEDICS | Facility: CLINIC | Age: 65
End: 2020-07-21
Payer: MEDICARE

## 2020-07-21 VITALS
BODY MASS INDEX: 27.73 KG/M2 | DIASTOLIC BLOOD PRESSURE: 79 MMHG | HEART RATE: 60 BPM | HEIGHT: 72 IN | WEIGHT: 204.69 LBS | SYSTOLIC BLOOD PRESSURE: 125 MMHG

## 2020-07-21 DIAGNOSIS — M17.11 PRIMARY OSTEOARTHRITIS OF RIGHT KNEE: Primary | ICD-10-CM

## 2020-07-21 PROCEDURE — 99499 UNLISTED E&M SERVICE: CPT | Mod: S$PBB,,, | Performed by: PHYSICIAN ASSISTANT

## 2020-07-21 PROCEDURE — 99999 PR PBB SHADOW E&M-EST. PATIENT-LVL III: ICD-10-PCS | Mod: PBBFAC,,, | Performed by: PHYSICIAN ASSISTANT

## 2020-07-21 PROCEDURE — 99499 NO LOS: ICD-10-PCS | Mod: S$PBB,,, | Performed by: PHYSICIAN ASSISTANT

## 2020-07-21 PROCEDURE — 20610 DRAIN/INJ JOINT/BURSA W/O US: CPT | Mod: S$PBB,RT,, | Performed by: PHYSICIAN ASSISTANT

## 2020-07-21 PROCEDURE — 20610 DRAIN/INJ JOINT/BURSA W/O US: CPT | Mod: PBBFAC | Performed by: PHYSICIAN ASSISTANT

## 2020-07-21 PROCEDURE — 20610 PR DRAIN/INJECT LARGE JOINT/BURSA: ICD-10-PCS | Mod: S$PBB,RT,, | Performed by: PHYSICIAN ASSISTANT

## 2020-07-21 PROCEDURE — 99999 PR PBB SHADOW E&M-EST. PATIENT-LVL III: CPT | Mod: PBBFAC,,, | Performed by: PHYSICIAN ASSISTANT

## 2020-07-21 PROCEDURE — 99213 OFFICE O/P EST LOW 20 MIN: CPT | Mod: PBBFAC,25 | Performed by: PHYSICIAN ASSISTANT

## 2020-07-21 RX ORDER — MELOXICAM 15 MG/1
15 TABLET ORAL DAILY
Qty: 30 TABLET | Refills: 1 | Status: SHIPPED | OUTPATIENT
Start: 2020-07-21 | End: 2020-08-20

## 2020-07-21 RX ORDER — FLUOROMETHOLONE 1 MG/ML
SUSPENSION/ DROPS OPHTHALMIC
COMMUNITY
Start: 2020-06-11 | End: 2021-06-04

## 2020-07-21 RX ADMIN — Medication 20 MG: at 09:07

## 2020-07-21 NOTE — LETTER
July 21, 2020      Ketty Winslow PA-C  [address]  [facility] [address]           [facility] - Orthopedics  [address]  [facility] [address]  Phone: [phone]          Patient: Wero Spangler   MR Number: [id number]   YOB: 1955   Date of Visit: 7/21/2020       Dear Ketty Winslow:    Thank you for referring Wero Spangler to me for evaluation. Attached you will find relevant portions of my assessment and plan of care.    If you have questions, please do not hesitate to call me. I look forward to following Wero Spangler along with you.    Sincerely,    Cesar King PA-C    Enclosure  CC:  No Recipients    If you would like to receive this communication electronically, please contact [email or web address] or [phone] to request more information on [employer] Link access.    For providers and/or their staff who would like to refer a patient to [facility], please contact us through our one-stop-shop provider referral line, [facility] , at [phone].    If you feel you have received this communication in error or would no longer like to receive these types of communications, please e-mail [email or web address]

## 2020-07-21 NOTE — PROGRESS NOTES
Wero Spangler is a 64 y.o. year old his here today for his 1st Euflexxa injection for degenerative changes of his right knee . he was last seen and treated in the clinic on 8/13/2019. There has been no significant change in his medical status since his last visit. No Fever, chills, malaise, or unexplained weight change.      Allergies, Medications, past medical and surgical history were reviewed .    Examination of the knee demonstrates  No evidence of edema, erythema , echymosis strength and range of motion are unchanged from previous visit.    The injection site was identified and the skin was prepared with a betadine solution. The  right knee  joint was injected with 2 ml of Euflexxa solution under sterile technique. Wero Spangler tolerated the procedure well, he was advised to rest the knee today, ice and elevation. I may take 3 -6 weeks following the last injection to get the full benefit of the medication.  I will see him back in 1 week. Sooner if he has any problems or concerns.           .     ICD-10-CM ICD-9-CM   1. Primary osteoarthritis of right knee  M17.11 715.16

## 2020-07-28 ENCOUNTER — OFFICE VISIT (OUTPATIENT)
Dept: ORTHOPEDICS | Facility: CLINIC | Age: 65
End: 2020-07-28
Payer: MEDICARE

## 2020-07-28 ENCOUNTER — TELEPHONE (OUTPATIENT)
Dept: OPHTHALMOLOGY | Facility: CLINIC | Age: 65
End: 2020-07-28

## 2020-07-28 VITALS
HEIGHT: 72 IN | WEIGHT: 204.56 LBS | SYSTOLIC BLOOD PRESSURE: 155 MMHG | BODY MASS INDEX: 27.71 KG/M2 | HEART RATE: 67 BPM | DIASTOLIC BLOOD PRESSURE: 83 MMHG

## 2020-07-28 DIAGNOSIS — M17.11 PRIMARY OSTEOARTHRITIS OF RIGHT KNEE: Primary | ICD-10-CM

## 2020-07-28 PROCEDURE — 99499 NO LOS: ICD-10-PCS | Mod: S$PBB,,, | Performed by: PHYSICIAN ASSISTANT

## 2020-07-28 PROCEDURE — 20610 PR DRAIN/INJECT LARGE JOINT/BURSA: ICD-10-PCS | Mod: S$PBB,RT,, | Performed by: PHYSICIAN ASSISTANT

## 2020-07-28 PROCEDURE — 99499 UNLISTED E&M SERVICE: CPT | Mod: S$PBB,,, | Performed by: PHYSICIAN ASSISTANT

## 2020-07-28 PROCEDURE — 20610 DRAIN/INJ JOINT/BURSA W/O US: CPT | Mod: S$PBB,RT,, | Performed by: PHYSICIAN ASSISTANT

## 2020-07-28 PROCEDURE — 20610 DRAIN/INJ JOINT/BURSA W/O US: CPT | Mod: PBBFAC | Performed by: PHYSICIAN ASSISTANT

## 2020-07-28 PROCEDURE — 99999 PR PBB SHADOW E&M-EST. PATIENT-LVL III: CPT | Mod: PBBFAC,,, | Performed by: PHYSICIAN ASSISTANT

## 2020-07-28 PROCEDURE — 99213 OFFICE O/P EST LOW 20 MIN: CPT | Mod: PBBFAC,25 | Performed by: PHYSICIAN ASSISTANT

## 2020-07-28 PROCEDURE — 99999 PR PBB SHADOW E&M-EST. PATIENT-LVL III: ICD-10-PCS | Mod: PBBFAC,,, | Performed by: PHYSICIAN ASSISTANT

## 2020-07-28 RX ADMIN — Medication 20 MG: at 01:07

## 2020-07-28 NOTE — LETTER
July 28, 2020      Ketty Winslow PA-C  [address]  [facility] [address]           [facility] - Orthopedics  [address]  [facility] [address]  Phone: [phone]          Patient: Wero Spangler   MR Number: [id number]   YOB: 1955   Date of Visit: 7/28/2020       Dear Ketty Winslow:    Thank you for referring Wero Spangler to me for evaluation. Attached you will find relevant portions of my assessment and plan of care.    If you have questions, please do not hesitate to call me. I look forward to following Wero Spangler along with you.    Sincerely,    Cesar King PA-C    Enclosure  CC:  No Recipients    If you would like to receive this communication electronically, please contact [email or web address] or [phone] to request more information on [employer] Link access.    For providers and/or their staff who would like to refer a patient to [facility], please contact us through our one-stop-shop provider referral line, [facility] , at [phone].    If you feel you have received this communication in error or would no longer like to receive these types of communications, please e-mail [email or web address]

## 2020-07-28 NOTE — PROGRESS NOTES
Wero Spangler is a 64 y.o. year old his here today for his 2nd Euflexxa injection for degenerative changes of his right knee . he was last seen and treated in the clinic on 7/21/2020. There has been no significant change in his medical status since his last visit. No Fever, chills, malaise, or unexplained weight change.      Allergies, Medications, past medical and surgical history were reviewed .    Examination of the knee demonstrates  No evidence of edema, erythema , echymosis strength and range of motion are unchanged from previous visit.    The injection site was identified and the skin was prepared with a betadine solution. The  right knee  joint was injected with 2 ml of Euflexxa solution under sterile technique. Wero Spangler tolerated the procedure well, he was advised to rest the knee today, ice and elevation. I may take 3 -6 weeks following the last injection to get the full benefit of the medication.  I will see him back in 1 week. Sooner if he has any problems or concerns.           .     ICD-10-CM ICD-9-CM   1. Primary osteoarthritis of right knee  M17.11 715.16

## 2020-08-03 ENCOUNTER — PATIENT OUTREACH (OUTPATIENT)
Dept: ADMINISTRATIVE | Facility: OTHER | Age: 65
End: 2020-08-03

## 2020-08-04 ENCOUNTER — OFFICE VISIT (OUTPATIENT)
Dept: ORTHOPEDICS | Facility: CLINIC | Age: 65
End: 2020-08-04
Payer: MEDICARE

## 2020-08-04 VITALS
HEIGHT: 72 IN | HEART RATE: 60 BPM | DIASTOLIC BLOOD PRESSURE: 75 MMHG | WEIGHT: 204.56 LBS | BODY MASS INDEX: 27.71 KG/M2 | SYSTOLIC BLOOD PRESSURE: 118 MMHG

## 2020-08-04 DIAGNOSIS — M17.11 PRIMARY OSTEOARTHRITIS OF RIGHT KNEE: Primary | ICD-10-CM

## 2020-08-04 PROCEDURE — 99499 NO LOS: ICD-10-PCS | Mod: S$PBB,,, | Performed by: PHYSICIAN ASSISTANT

## 2020-08-04 PROCEDURE — 20610 DRAIN/INJ JOINT/BURSA W/O US: CPT | Mod: PBBFAC | Performed by: PHYSICIAN ASSISTANT

## 2020-08-04 PROCEDURE — 99213 OFFICE O/P EST LOW 20 MIN: CPT | Mod: PBBFAC,25 | Performed by: PHYSICIAN ASSISTANT

## 2020-08-04 PROCEDURE — 99499 UNLISTED E&M SERVICE: CPT | Mod: S$PBB,,, | Performed by: PHYSICIAN ASSISTANT

## 2020-08-04 PROCEDURE — 99999 PR PBB SHADOW E&M-EST. PATIENT-LVL III: ICD-10-PCS | Mod: PBBFAC,,, | Performed by: PHYSICIAN ASSISTANT

## 2020-08-04 PROCEDURE — 99999 PR PBB SHADOW E&M-EST. PATIENT-LVL III: CPT | Mod: PBBFAC,,, | Performed by: PHYSICIAN ASSISTANT

## 2020-08-04 PROCEDURE — 20610 DRAIN/INJ JOINT/BURSA W/O US: CPT | Mod: S$PBB,RT,, | Performed by: PHYSICIAN ASSISTANT

## 2020-08-04 PROCEDURE — 20610 PR DRAIN/INJECT LARGE JOINT/BURSA: ICD-10-PCS | Mod: S$PBB,RT,, | Performed by: PHYSICIAN ASSISTANT

## 2020-08-04 RX ADMIN — Medication 20 MG: at 12:08

## 2020-08-04 NOTE — LETTER
August 4, 2020      Ketty Winslow PA-C  [address]  [facility] [address]           [facility] - Orthopedics  [address]  [facility] [address]  Phone: [phone]          Patient: Wero Spangler   MR Number: [id number]   YOB: 1955   Date of Visit: 8/4/2020       Dear Ketty Winslow:    Thank you for referring Wero Spangler to me for evaluation. Attached you will find relevant portions of my assessment and plan of care.    If you have questions, please do not hesitate to call me. I look forward to following Wero Spangler along with you.    Sincerely,    Cesar King PA-C    Enclosure  CC:  No Recipients    If you would like to receive this communication electronically, please contact [email or web address] or [phone] to request more information on [employer] Link access.    For providers and/or their staff who would like to refer a patient to [facility], please contact us through our one-stop-shop provider referral line, [facility] , at [phone].    If you feel you have received this communication in error or would no longer like to receive these types of communications, please e-mail [email or web address]

## 2020-08-04 NOTE — PROGRESS NOTES
Wero Spangler is a 64 y.o. year old his here today for his 3rd Euflexxa injection for degenerative changes of his right knee . he was last seen and treated in the clinic on 7/28/2020. There has been no significant change in his medical status since his last visit. No Fever, chills, malaise, or unexplained weight change.      Allergies, Medications, past medical and surgical history were reviewed .    Examination of the knee demonstrates  No evidence of edema, erythema , echymosis strength and range of motion are unchanged from previous visit.    The injection site was identified and the skin was prepared with a betadine solution. The  right knee  joint was injected with 2 ml of Euflexxa solution under sterile technique. Wero Spangler tolerated the procedure well, he was advised to rest the knee today, ice and elevation. I may take 3 -6 weeks following the last injection to get the full benefit of the medication.  I will see him back in 4-6 months. Sooner if he has any problems or concerns.           .     ICD-10-CM ICD-9-CM   1. Primary osteoarthritis of right knee  M17.11 715.16

## 2020-08-12 ENCOUNTER — LAB VISIT (OUTPATIENT)
Dept: LAB | Facility: HOSPITAL | Age: 65
End: 2020-08-12
Attending: UROLOGY
Payer: MEDICARE

## 2020-08-12 DIAGNOSIS — R97.20 ELEVATED PSA: ICD-10-CM

## 2020-08-12 LAB — COMPLEXED PSA SERPL-MCNC: 11.8 NG/ML (ref 0–4)

## 2020-08-12 PROCEDURE — 36415 COLL VENOUS BLD VENIPUNCTURE: CPT

## 2020-08-12 PROCEDURE — 84153 ASSAY OF PSA TOTAL: CPT

## 2020-08-16 ENCOUNTER — PATIENT OUTREACH (OUTPATIENT)
Dept: ADMINISTRATIVE | Facility: OTHER | Age: 65
End: 2020-08-16

## 2020-08-17 ENCOUNTER — OFFICE VISIT (OUTPATIENT)
Dept: UROLOGY | Facility: CLINIC | Age: 65
End: 2020-08-17
Payer: MEDICARE

## 2020-08-17 VITALS
SYSTOLIC BLOOD PRESSURE: 136 MMHG | HEART RATE: 64 BPM | WEIGHT: 202.81 LBS | HEIGHT: 72 IN | DIASTOLIC BLOOD PRESSURE: 85 MMHG | BODY MASS INDEX: 27.47 KG/M2

## 2020-08-17 DIAGNOSIS — R97.20 ELEVATED PSA: Primary | ICD-10-CM

## 2020-08-17 DIAGNOSIS — N13.8 BPH WITH URINARY OBSTRUCTION: ICD-10-CM

## 2020-08-17 DIAGNOSIS — N40.2 PROSTATE NODULE: ICD-10-CM

## 2020-08-17 DIAGNOSIS — N40.1 BPH WITH URINARY OBSTRUCTION: ICD-10-CM

## 2020-08-17 PROCEDURE — 99999 PR PBB SHADOW E&M-EST. PATIENT-LVL IV: ICD-10-PCS | Mod: PBBFAC,,, | Performed by: UROLOGY

## 2020-08-17 PROCEDURE — 99213 PR OFFICE/OUTPT VISIT, EST, LEVL III, 20-29 MIN: ICD-10-PCS | Mod: S$PBB,,, | Performed by: UROLOGY

## 2020-08-17 PROCEDURE — 99999 PR PBB SHADOW E&M-EST. PATIENT-LVL IV: CPT | Mod: PBBFAC,,, | Performed by: UROLOGY

## 2020-08-17 PROCEDURE — 99214 OFFICE O/P EST MOD 30 MIN: CPT | Mod: PBBFAC | Performed by: UROLOGY

## 2020-08-17 PROCEDURE — 99213 OFFICE O/P EST LOW 20 MIN: CPT | Mod: S$PBB,,, | Performed by: UROLOGY

## 2020-08-17 RX ORDER — TIMOLOL MALEATE 5 MG/ML
SOLUTION/ DROPS OPHTHALMIC
COMMUNITY
Start: 2020-08-08 | End: 2021-01-08 | Stop reason: SDUPTHER

## 2020-08-17 NOTE — PROGRESS NOTES
CHIEF COMPLAINT:    Mr. Spangler is a 64 y.o. male presenting with an elevated PSA.    PRESENTING ILLNESS:    Wero Spangler is a 64 y.o. male with an elevated PSA.  He has had multiple biopsies done.  One was in 2012 when his PSA was 10.14.  There was also a prostate nodule at that time.  Most recent one was 8/23/16 when his PSA was 18.1.  This was a cognitive fusion biopsy.    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  Path returned an oncocytoma.    He also has LUTS.  He's s/p rezum on 5/14/19.  He's voiding much better.  Good FOS. No hematuria.  No dysuria.    He denies ED.    REVIEW OF SYSTEMS:    Wero Spangler denies chest pain,sore throat, headache, blurred vision, fever, nausea, vomiting, chills, flank discomfort, abdominal pain, bleeding per rectum, cough, SOB, recent loss of consciousness, recent mental status changes, seizures, dizziness, or upper or lower extremity weakness.    JOSE MANUEL  1. 3  2. 3  3. 3  4. 4  5. 3     PATIENT HISTORY:    Past Medical History:   Diagnosis Date    Allergy     Cataract     Elevated PSA     Enlarged prostate     Gallstones     General anesthetics causing adverse effect in therapeutic use 2000    delayed emergence after back surgery    GERD (gastroesophageal reflux disease)     HTN (hypertension) 9/24/2013    Hypertension     Urinary tract infection        Past Surgical History:   Procedure Laterality Date    BACK SURGERY  4/2000    CATARACT EXTRACTION W/  INTRAOCULAR LENS IMPLANT      Both Eyes    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    Procedure: TRANSPLANT, CORNEA;  Surgeon: Vu Wilson MD;  [facility];  Service: Ophthalmology;  Laterality: Right;  DMEK    EYE SURGERY      LAPAROSCOPIC MARSUPIALIZATION OF CYST OF KIDNEY      PROSTATE SURGERY      prostate biopsy benign    TONSILLECTOMY      VASECTOMY         Family History   Problem Relation Age of Onset    Cancer Mother         breast    Prostate cancer Brother     Cancer  Brother         prostate    Macular degeneration Maternal Grandmother     Cancer Other     Cancer Other     Amblyopia Neg Hx     Blindness Neg Hx     Cataracts Neg Hx     Glaucoma Neg Hx     Retinal detachment Neg Hx     Strabismus Neg Hx        Social History     Socioeconomic History    Marital status:      Spouse name: Not on file    Number of children: Not on file    Years of education: Not on file    Highest education level: Not on file   Occupational History    Not on file   Social Needs    Financial resource strain: Not hard at all    Food insecurity     Worry: Never true     Inability: Never true    Transportation needs     Medical: No     Non-medical: No   Tobacco Use    Smoking status: Former Smoker     Quit date:      Years since quittin.6    Smokeless tobacco: Never Used   Substance and Sexual Activity    Alcohol use: Yes     Alcohol/week: 1.0 standard drinks     Types: 1 Glasses of wine per week     Frequency: 2-3 times a week     Drinks per session: 1 or 2     Binge frequency: Never     Comment: 2 beers three times a week     Drug use: No    Sexual activity: Yes     Partners: Female   Lifestyle    Physical activity     Days per week: 3 days     Minutes per session: 50 min    Stress: Only a little   Relationships    Social connections     Talks on phone: More than three times a week     Gets together: Once a week     Attends Islam service: Not on file     Active member of club or organization: No     Attends meetings of clubs or organizations: Never     Relationship status: Living with partner   Other Topics Concern    Not on file   Social History Narrative    Not on file       Allergies:  Patient has no known allergies.    Medications:    Current Outpatient Medications:     ALPRAZolam (XANAX) 0.5 MG tablet, TAKE 1 TABLET BY MOUTH EVERY NIGHT, Disp: 30 tablet, Rfl: 5    fenofibrate (TRICOR) 54 MG tablet, Take 1 tablet (54 mg total) by mouth once daily.,  Disp: 30 tablet, Rfl: 11    fluorometholone 0.1% (FML) 0.1 % DrpS, PLACE 1 DROP INTO THE RIGHT EYE TWICE DAILY FOR 10 DAYS, Disp: , Rfl:     fluticasone (FLONASE) 50 mcg/actuation nasal spray, 1 spray by Each Nare route daily as needed. , Disp: , Rfl:     meloxicam (MOBIC) 15 MG tablet, Take 1 tablet (15 mg total) by mouth once daily., Disp: 30 tablet, Rfl: 1    OMEPRAZOLE (PRILOSEC ORAL), Take 20 mg by mouth every morning. , Disp: , Rfl:     sodium chloride 2% (BARBI 128) 2 % ophthalmic solution, 1 drop as needed (takes 3-4 times/day)., Disp: , Rfl:     timolol maleate 0.25% (TIMOPTIC) 0.25 % Drop, Place 1 drop into the right eye 2 (two) times daily., Disp: 5 mL, Rfl: 3    timolol maleate 0.5% (TIMOPTIC) 0.5 % Drop, PLACE 1 DROP INTO THE RIGHT EYE TWICE DAILY, Disp: , Rfl:     triamterene-hydrochlorothiazide 37.5-25 mg (MAXZIDE-25) 37.5-25 mg per tablet, Take 1 tablet by mouth once daily., Disp: 30 tablet, Rfl: 11    PHYSICAL EXAMINATION:    The patient generally appears in good health, is appropriately interactive, and is in no apparent distress.     Eyes: anicteric sclerae, moist conjunctivae; no lid-lag; PERRLA     HENT: Atraumatic; oropharynx clear with moist mucous membranes and no mucosal ulcerations;normal hard and soft palate.  No evidence of lymphadenopathy.    Neck: Trachea midline.  No thyromegaly.    Musculoskeletal: No abnormal gait.    Skin: No lesions.    Mental: Cooperative with normal affect.  Is oriented to time, place, and person.    Neuro: Grossly intact.    Chest: Normal inspiratory effort.   No accessory muscles.  No audible wheezes.  Respirations symmetric on inspiration and expiration.    Heart: Regular rhythm.      Abdomen:  Soft, non-tender. No masses or organomegaly. Bladder is not palpable. No evidence of flank discomfort. No evidence of inguinal hernia.    Genitourinary: The penis is not circumcised with no evidence of plaques or induration. The urethral meatus is normal. The  testes, epididymides, and cord structures are normal in size and contour bilaterally. The scrotum is normal in size and contour.    Normal anal sphincter tone. No rectal mass.    The prostate is 40 g. Normal landmarks. Lateral sulci. Median furrow intact. There is a 1.5 cm x 1.5 cm nodule in the midportion of the gland. Stable.  Seminal vesicles are normal.    Extremities: No clubbing, cyanosis, or edema      LABS:    UA dipped negative today  Lab Results   Component Value Date    PSA 14.0 (H) 09/22/2014    PSA 15.48 (H) 08/16/2013    PSA 11.06 (H) 02/25/2013    PSADIAG 11.8 (H) 08/12/2020    PSADIAG 10.1 (H) 01/30/2020    PSADIAG 15.3 (H) 07/31/2019    PSATOTAL 6.8 (H) 07/12/2011    PSATOTAL 7.9 (H) 01/11/2011    PSAFREE 1.07 07/12/2011    PSAFREE 1.67 (H) 01/11/2011    PSAFREEPCT 15.74 07/12/2011    PSAFREEPCT 21.14 01/11/2011        IMPRESSION:    Encounter Diagnoses   Name Primary?    Elevated PSA Yes    BPH with urinary obstruction     Prostate nodule        PLAN:    1. Will observe his LUTS as they don't bother him.   2. Went over his PSA.    3. RTC 6 months with a PSA.      Copy to:

## 2020-08-18 ENCOUNTER — TELEPHONE (OUTPATIENT)
Dept: ORTHOPEDICS | Facility: CLINIC | Age: 65
End: 2020-08-18

## 2020-08-19 ENCOUNTER — OFFICE VISIT (OUTPATIENT)
Dept: OPHTHALMOLOGY | Facility: CLINIC | Age: 65
End: 2020-08-19
Payer: MEDICARE

## 2020-08-19 DIAGNOSIS — Z94.7 STATUS POST CORNEAL TRANSPLANT: ICD-10-CM

## 2020-08-19 DIAGNOSIS — H18.519 FUCHS' CORNEAL DYSTROPHY: Primary | ICD-10-CM

## 2020-08-19 PROCEDURE — 92014 PR EYE EXAM, EST PATIENT,COMPREHESV: ICD-10-PCS | Mod: S$PBB,,, | Performed by: OPHTHALMOLOGY

## 2020-08-19 PROCEDURE — 92014 COMPRE OPH EXAM EST PT 1/>: CPT | Mod: S$PBB,,, | Performed by: OPHTHALMOLOGY

## 2020-08-19 PROCEDURE — 99213 OFFICE O/P EST LOW 20 MIN: CPT | Mod: PBBFAC | Performed by: OPHTHALMOLOGY

## 2020-08-19 PROCEDURE — 99999 PR PBB SHADOW E&M-EST. PATIENT-LVL III: CPT | Mod: PBBFAC,,, | Performed by: OPHTHALMOLOGY

## 2020-08-19 PROCEDURE — 99999 PR PBB SHADOW E&M-EST. PATIENT-LVL III: ICD-10-PCS | Mod: PBBFAC,,, | Performed by: OPHTHALMOLOGY

## 2020-08-19 NOTE — PROGRESS NOTES
HPI     64 YO male presents today for a DMEK F/U OD. He states he is doing well,   notes that he does have some issues with sinus drip that causes OD to   water unsure what this is related to but wanted to mention.     S/P DMEK OD 11/21/2019.    FML BID OD  Olya 128 QID OS   Timolol BID OD       Last edited by Maribeth Franks, PCT on 8/19/2020  8:17 AM. (History)            Assessment /Plan     For exam results, see Encounter Report.    Fuchs' corneal dystrophy    Status post corneal transplant      DMEK graft attached and clear. Signs and symptoms of graft rejection reviewed.  9 mos po OD    OS Fuchs Visual loss from corneal edema and its slowly progressive clinical course were discussed. We will monitor the clinical condition for now, repeat measurements as needed, and possibly consider surgery at a later date

## 2020-10-01 ENCOUNTER — PATIENT MESSAGE (OUTPATIENT)
Dept: OTHER | Facility: OTHER | Age: 65
End: 2020-10-01

## 2020-10-30 ENCOUNTER — PATIENT MESSAGE (OUTPATIENT)
Dept: ADMINISTRATIVE | Facility: HOSPITAL | Age: 65
End: 2020-10-30

## 2020-12-07 ENCOUNTER — PATIENT MESSAGE (OUTPATIENT)
Dept: INTERNAL MEDICINE | Facility: CLINIC | Age: 65
End: 2020-12-07

## 2020-12-09 DIAGNOSIS — Z12.11 COLON CANCER SCREENING: ICD-10-CM

## 2020-12-09 NOTE — PROGRESS NOTES
Subjective:       Patient ID: Wero Spangler is a 65 y.o. male.    Chief Complaint: Follow-up    Here to follow-up minor anxiety and insomnia for medication refill.  He continues to take alprazolam.   reviewed.  No problems.  He says he primarily wakes to urinate because of large BPH.  He sees urology regularly and his PSA remains very high.  He did take a flu shot.  Labs from earlier this year reviewed.  He will need new labs in the spring  He did have a cardiac transplant which went well.  No other problems    Review of Systems   Constitutional: Negative for chills, fatigue and fever.   HENT: Negative for nosebleeds and trouble swallowing.    Eyes: Positive for visual disturbance (Stable). Negative for pain.   Respiratory: Negative for cough, shortness of breath and wheezing.    Cardiovascular: Negative for chest pain and palpitations.   Gastrointestinal: Negative for abdominal pain, constipation, diarrhea, nausea and vomiting.   Genitourinary: Negative for difficulty urinating and hematuria.   Musculoskeletal: Positive for arthralgias ( right knee). Negative for back pain and neck pain.   Integumentary:  Negative for rash.   Neurological: Negative for dizziness and headaches.   Hematological: Does not bruise/bleed easily.   Psychiatric/Behavioral: Positive for sleep disturbance. Negative for dysphoric mood.         Objective:      Physical Exam  Constitutional:       General: He is not in acute distress.     Appearance: He is well-developed.   HENT:      Head: Normocephalic and atraumatic.      Right Ear: Tympanic membrane, ear canal and external ear normal.      Left Ear: Tympanic membrane, ear canal and external ear normal.      Mouth/Throat:      Pharynx: No oropharyngeal exudate or posterior oropharyngeal erythema.   Eyes:      General: No scleral icterus.     Conjunctiva/sclera: Conjunctivae normal.      Pupils: Pupils are equal, round, and reactive to light.   Neck:      Musculoskeletal: Normal range  of motion and neck supple.      Thyroid: No thyromegaly.      Comments: No supraclavicular nodes palpated  Cardiovascular:      Rate and Rhythm: Normal rate and regular rhythm.      Pulses: Normal pulses.      Heart sounds: Normal heart sounds. No murmur.   Pulmonary:      Effort: Pulmonary effort is normal.      Breath sounds: Normal breath sounds. No wheezing.   Abdominal:      General: Bowel sounds are normal.      Palpations: Abdomen is soft. There is no mass.      Tenderness: There is no abdominal tenderness.   Musculoskeletal:         General: Tenderness (Minimal right knee) present.      Right lower leg: No edema.      Left lower leg: No edema.   Lymphadenopathy:      Cervical: No cervical adenopathy.   Skin:     Coloration: Skin is not jaundiced or pale.   Neurological:      General: No focal deficit present.      Mental Status: He is alert and oriented to person, place, and time.   Psychiatric:         Mood and Affect: Mood normal.         Behavior: Behavior normal.         Assessment:       1. Essential hypertension    2. Insomnia, unspecified type    3. Elevated glucose    4. Fuchs' corneal dystrophy    5. Gastroesophageal reflux disease, unspecified whether esophagitis present    6. BPH with urinary obstruction        Plan:       Wero was seen today for follow-up.    Diagnoses and all orders for this visit:    Essential hypertension  -     Comprehensive Metabolic Panel; Future  -     Hemoglobin A1C; Future  -     Lipid Panel; Future  -     CBC Auto Differential; Future    Insomnia, unspecified type  -     Comprehensive Metabolic Panel; Future  -     Hemoglobin A1C; Future  -     Lipid Panel; Future  -     CBC Auto Differential; Future    Elevated glucose  -     Comprehensive Metabolic Panel; Future  -     Hemoglobin A1C; Future  -     Lipid Panel; Future  -     CBC Auto Differential; Future    Fuchs' corneal dystrophy    Gastroesophageal reflux disease, unspecified whether esophagitis present    BPH with  urinary obstruction        follow-up in 4-6 months with labs

## 2020-12-10 ENCOUNTER — OFFICE VISIT (OUTPATIENT)
Dept: INTERNAL MEDICINE | Facility: CLINIC | Age: 65
End: 2020-12-10
Payer: MEDICARE

## 2020-12-10 VITALS
OXYGEN SATURATION: 97 % | HEIGHT: 72 IN | HEART RATE: 65 BPM | WEIGHT: 205 LBS | SYSTOLIC BLOOD PRESSURE: 124 MMHG | BODY MASS INDEX: 27.77 KG/M2 | DIASTOLIC BLOOD PRESSURE: 84 MMHG

## 2020-12-10 DIAGNOSIS — R73.09 ELEVATED GLUCOSE: ICD-10-CM

## 2020-12-10 DIAGNOSIS — I10 ESSENTIAL HYPERTENSION: Primary | ICD-10-CM

## 2020-12-10 DIAGNOSIS — G47.00 INSOMNIA, UNSPECIFIED TYPE: ICD-10-CM

## 2020-12-10 DIAGNOSIS — N40.1 BPH WITH URINARY OBSTRUCTION: ICD-10-CM

## 2020-12-10 DIAGNOSIS — K21.9 GASTROESOPHAGEAL REFLUX DISEASE, UNSPECIFIED WHETHER ESOPHAGITIS PRESENT: ICD-10-CM

## 2020-12-10 DIAGNOSIS — H18.519 FUCHS' CORNEAL DYSTROPHY: ICD-10-CM

## 2020-12-10 DIAGNOSIS — N13.8 BPH WITH URINARY OBSTRUCTION: ICD-10-CM

## 2020-12-10 PROCEDURE — 99999 PR PBB SHADOW E&M-EST. PATIENT-LVL IV: CPT | Mod: PBBFAC,,, | Performed by: INTERNAL MEDICINE

## 2020-12-10 PROCEDURE — 99214 OFFICE O/P EST MOD 30 MIN: CPT | Mod: S$PBB,,, | Performed by: INTERNAL MEDICINE

## 2020-12-10 PROCEDURE — 99999 PR PBB SHADOW E&M-EST. PATIENT-LVL IV: ICD-10-PCS | Mod: PBBFAC,,, | Performed by: INTERNAL MEDICINE

## 2020-12-10 PROCEDURE — 99214 OFFICE O/P EST MOD 30 MIN: CPT | Mod: PBBFAC | Performed by: INTERNAL MEDICINE

## 2020-12-10 PROCEDURE — 99214 PR OFFICE/OUTPT VISIT, EST, LEVL IV, 30-39 MIN: ICD-10-PCS | Mod: S$PBB,,, | Performed by: INTERNAL MEDICINE

## 2020-12-11 ENCOUNTER — PATIENT MESSAGE (OUTPATIENT)
Dept: OTHER | Facility: OTHER | Age: 65
End: 2020-12-11

## 2021-01-04 ENCOUNTER — PATIENT MESSAGE (OUTPATIENT)
Dept: ADMINISTRATIVE | Facility: HOSPITAL | Age: 66
End: 2021-01-04

## 2021-01-05 ENCOUNTER — PATIENT OUTREACH (OUTPATIENT)
Dept: ADMINISTRATIVE | Facility: OTHER | Age: 66
End: 2021-01-05

## 2021-01-06 ENCOUNTER — OFFICE VISIT (OUTPATIENT)
Dept: OPHTHALMOLOGY | Facility: CLINIC | Age: 66
End: 2021-01-06
Payer: MEDICARE

## 2021-01-06 DIAGNOSIS — H40.043 STEROID RESPONDERS, BILATERAL: ICD-10-CM

## 2021-01-06 DIAGNOSIS — Z94.7 STATUS POST CORNEAL TRANSPLANT: ICD-10-CM

## 2021-01-06 DIAGNOSIS — H18.519 FUCHS' CORNEAL DYSTROPHY: Primary | ICD-10-CM

## 2021-01-06 DIAGNOSIS — Z12.11 SPECIAL SCREENING FOR MALIGNANT NEOPLASMS, COLON: Primary | ICD-10-CM

## 2021-01-06 PROCEDURE — 99999 PR PBB SHADOW E&M-EST. PATIENT-LVL III: ICD-10-PCS | Mod: PBBFAC,,, | Performed by: OPHTHALMOLOGY

## 2021-01-06 PROCEDURE — 99213 OFFICE O/P EST LOW 20 MIN: CPT | Mod: PBBFAC | Performed by: OPHTHALMOLOGY

## 2021-01-06 PROCEDURE — 92014 PR EYE EXAM, EST PATIENT,COMPREHESV: ICD-10-PCS | Mod: S$PBB,,, | Performed by: OPHTHALMOLOGY

## 2021-01-06 PROCEDURE — 92014 COMPRE OPH EXAM EST PT 1/>: CPT | Mod: S$PBB,,, | Performed by: OPHTHALMOLOGY

## 2021-01-06 PROCEDURE — 99999 PR PBB SHADOW E&M-EST. PATIENT-LVL III: CPT | Mod: PBBFAC,,, | Performed by: OPHTHALMOLOGY

## 2021-01-08 ENCOUNTER — PATIENT MESSAGE (OUTPATIENT)
Dept: OPHTHALMOLOGY | Facility: CLINIC | Age: 66
End: 2021-01-08

## 2021-01-08 RX ORDER — TIMOLOL MALEATE 2.5 MG/ML
1 SOLUTION/ DROPS OPHTHALMIC 2 TIMES DAILY
Qty: 5 ML | Refills: 3 | Status: SHIPPED | OUTPATIENT
Start: 2021-01-08 | End: 2021-09-13

## 2021-01-15 ENCOUNTER — PATIENT MESSAGE (OUTPATIENT)
Dept: INTERNAL MEDICINE | Facility: CLINIC | Age: 66
End: 2021-01-15

## 2021-01-15 ENCOUNTER — HOSPITAL ENCOUNTER (EMERGENCY)
Facility: HOSPITAL | Age: 66
Discharge: HOME | End: 2021-01-15
Attending: EMERGENCY MEDICINE
Payer: MEDICARE

## 2021-01-15 ENCOUNTER — OFFICE VISIT (OUTPATIENT)
Dept: URGENT CARE | Facility: CLINIC | Age: 66
End: 2021-01-15
Payer: MEDICARE

## 2021-01-15 ENCOUNTER — INFUSION (OUTPATIENT)
Dept: INFECTIOUS DISEASES | Facility: HOSPITAL | Age: 66
End: 2021-01-15
Attending: EMERGENCY MEDICINE
Payer: MEDICARE

## 2021-01-15 ENCOUNTER — TELEPHONE (OUTPATIENT)
Dept: ADMINISTRATIVE | Facility: CLINIC | Age: 66
End: 2021-01-15

## 2021-01-15 VITALS
BODY MASS INDEX: 27.77 KG/M2 | HEIGHT: 72 IN | TEMPERATURE: 98 F | WEIGHT: 205 LBS | DIASTOLIC BLOOD PRESSURE: 74 MMHG | HEART RATE: 75 BPM | RESPIRATION RATE: 16 BRPM | SYSTOLIC BLOOD PRESSURE: 134 MMHG | OXYGEN SATURATION: 97 %

## 2021-01-15 VITALS
BODY MASS INDEX: 27.77 KG/M2 | HEIGHT: 72 IN | TEMPERATURE: 98 F | SYSTOLIC BLOOD PRESSURE: 125 MMHG | DIASTOLIC BLOOD PRESSURE: 60 MMHG | HEART RATE: 75 BPM | RESPIRATION RATE: 18 BRPM | OXYGEN SATURATION: 97 % | WEIGHT: 205 LBS

## 2021-01-15 VITALS
BODY MASS INDEX: 27.77 KG/M2 | WEIGHT: 205 LBS | TEMPERATURE: 99 F | RESPIRATION RATE: 15 BRPM | HEART RATE: 79 BPM | HEIGHT: 72 IN | DIASTOLIC BLOOD PRESSURE: 81 MMHG | SYSTOLIC BLOOD PRESSURE: 125 MMHG | OXYGEN SATURATION: 98 %

## 2021-01-15 DIAGNOSIS — I49.3 FREQUENT PVCS: ICD-10-CM

## 2021-01-15 DIAGNOSIS — R09.81 NASAL CONGESTION: ICD-10-CM

## 2021-01-15 DIAGNOSIS — U07.1 COVID-19: Primary | ICD-10-CM

## 2021-01-15 DIAGNOSIS — E87.6 HYPOKALEMIA: ICD-10-CM

## 2021-01-15 DIAGNOSIS — I49.9 IRREGULAR HEART BEAT: ICD-10-CM

## 2021-01-15 DIAGNOSIS — I49.8 VENTRICULAR BIGEMINY: ICD-10-CM

## 2021-01-15 DIAGNOSIS — U07.1 COVID-19: ICD-10-CM

## 2021-01-15 LAB
ANION GAP SERPL CALC-SCNC: 10 MMOL/L (ref 8–16)
BASOPHILS # BLD AUTO: 0.01 K/UL (ref 0–0.2)
BASOPHILS NFR BLD: 0.3 % (ref 0–1.9)
BUN SERPL-MCNC: 15 MG/DL (ref 8–23)
CALCIUM SERPL-MCNC: 8.8 MG/DL (ref 8.7–10.5)
CHLORIDE SERPL-SCNC: 98 MMOL/L (ref 95–110)
CO2 SERPL-SCNC: 31 MMOL/L (ref 23–29)
CREAT SERPL-MCNC: 1.1 MG/DL (ref 0.5–1.4)
CTP QC/QA: YES
DIFFERENTIAL METHOD: ABNORMAL
EOSINOPHIL # BLD AUTO: 0 K/UL (ref 0–0.5)
EOSINOPHIL NFR BLD: 0 % (ref 0–8)
ERYTHROCYTE [DISTWIDTH] IN BLOOD BY AUTOMATED COUNT: 14.7 % (ref 11.5–14.5)
EST. GFR  (AFRICAN AMERICAN): >60 ML/MIN/1.73 M^2
EST. GFR  (NON AFRICAN AMERICAN): >60 ML/MIN/1.73 M^2
GLUCOSE SERPL-MCNC: 115 MG/DL (ref 70–110)
HCT VFR BLD AUTO: 40.9 % (ref 40–54)
HGB BLD-MCNC: 13.8 G/DL (ref 14–18)
IMM GRANULOCYTES # BLD AUTO: 0.01 K/UL (ref 0–0.04)
IMM GRANULOCYTES NFR BLD AUTO: 0.3 % (ref 0–0.5)
LYMPHOCYTES # BLD AUTO: 1.3 K/UL (ref 1–4.8)
LYMPHOCYTES NFR BLD: 37 % (ref 18–48)
MAGNESIUM SERPL-MCNC: 2 MG/DL (ref 1.6–2.6)
MCH RBC QN AUTO: 29.1 PG (ref 27–31)
MCHC RBC AUTO-ENTMCNC: 33.7 G/DL (ref 32–36)
MCV RBC AUTO: 86 FL (ref 82–98)
MONOCYTES # BLD AUTO: 0.6 K/UL (ref 0.3–1)
MONOCYTES NFR BLD: 17.8 % (ref 4–15)
NEUTROPHILS # BLD AUTO: 1.5 K/UL (ref 1.8–7.7)
NEUTROPHILS NFR BLD: 44.6 % (ref 38–73)
NRBC BLD-RTO: 0 /100 WBC
PLATELET # BLD AUTO: 154 K/UL (ref 150–350)
PMV BLD AUTO: 10 FL (ref 9.2–12.9)
POTASSIUM SERPL-SCNC: 3.2 MMOL/L (ref 3.5–5.1)
RBC # BLD AUTO: 4.75 M/UL (ref 4.6–6.2)
SARS-COV-2 RDRP RESP QL NAA+PROBE: POSITIVE
SODIUM SERPL-SCNC: 139 MMOL/L (ref 136–145)
WBC # BLD AUTO: 3.38 K/UL (ref 3.9–12.7)

## 2021-01-15 PROCEDURE — U0002 COVID-19 LAB TEST NON-CDC: HCPCS | Mod: QW,CR,S$GLB, | Performed by: STUDENT IN AN ORGANIZED HEALTH CARE EDUCATION/TRAINING PROGRAM

## 2021-01-15 PROCEDURE — 83735 ASSAY OF MAGNESIUM: CPT

## 2021-01-15 PROCEDURE — 99215 PR OFFICE/OUTPT VISIT, EST, LEVL V, 40-54 MIN: ICD-10-PCS | Mod: CR,S$GLB,, | Performed by: STUDENT IN AN ORGANIZED HEALTH CARE EDUCATION/TRAINING PROGRAM

## 2021-01-15 PROCEDURE — 93010 EKG 12-LEAD: ICD-10-PCS | Mod: S$GLB,,, | Performed by: INTERNAL MEDICINE

## 2021-01-15 PROCEDURE — 25000003 PHARM REV CODE 250: Performed by: EMERGENCY MEDICINE

## 2021-01-15 PROCEDURE — 63600175 PHARM REV CODE 636 W HCPCS: Performed by: EMERGENCY MEDICINE

## 2021-01-15 PROCEDURE — 85025 COMPLETE CBC W/AUTO DIFF WBC: CPT

## 2021-01-15 PROCEDURE — 93010 ELECTROCARDIOGRAM REPORT: CPT | Mod: S$GLB,,, | Performed by: INTERNAL MEDICINE

## 2021-01-15 PROCEDURE — 93005 EKG 12-LEAD: ICD-10-PCS | Mod: S$GLB,,, | Performed by: STUDENT IN AN ORGANIZED HEALTH CARE EDUCATION/TRAINING PROGRAM

## 2021-01-15 PROCEDURE — 99284 EMERGENCY DEPT VISIT MOD MDM: CPT | Mod: 25

## 2021-01-15 PROCEDURE — 99285 PR EMERGENCY DEPT VISIT,LEVEL V: ICD-10-PCS | Mod: CR,,, | Performed by: EMERGENCY MEDICINE

## 2021-01-15 PROCEDURE — 71046 X-RAY EXAM CHEST 2 VIEWS: CPT | Mod: CR,S$GLB,, | Performed by: RADIOLOGY

## 2021-01-15 PROCEDURE — 99285 EMERGENCY DEPT VISIT HI MDM: CPT | Mod: CR,,, | Performed by: EMERGENCY MEDICINE

## 2021-01-15 PROCEDURE — 99215 OFFICE O/P EST HI 40 MIN: CPT | Mod: CR,S$GLB,, | Performed by: STUDENT IN AN ORGANIZED HEALTH CARE EDUCATION/TRAINING PROGRAM

## 2021-01-15 PROCEDURE — 80048 BASIC METABOLIC PNL TOTAL CA: CPT

## 2021-01-15 PROCEDURE — U0002: ICD-10-PCS | Mod: QW,CR,S$GLB, | Performed by: STUDENT IN AN ORGANIZED HEALTH CARE EDUCATION/TRAINING PROGRAM

## 2021-01-15 PROCEDURE — 63600175 PHARM REV CODE 636 W HCPCS: Performed by: INTERNAL MEDICINE

## 2021-01-15 PROCEDURE — M0239 BAMLANIVIMAB-XXXX INFUSION: HCPCS | Performed by: INTERNAL MEDICINE

## 2021-01-15 PROCEDURE — 71046 XR CHEST PA AND LATERAL: ICD-10-PCS | Mod: CR,S$GLB,, | Performed by: RADIOLOGY

## 2021-01-15 PROCEDURE — 93005 ELECTROCARDIOGRAM TRACING: CPT | Mod: S$GLB,,, | Performed by: STUDENT IN AN ORGANIZED HEALTH CARE EDUCATION/TRAINING PROGRAM

## 2021-01-15 PROCEDURE — 25000003 PHARM REV CODE 250: Performed by: INTERNAL MEDICINE

## 2021-01-15 RX ORDER — MELOXICAM 15 MG/1
15 TABLET ORAL DAILY
COMMUNITY
Start: 2020-10-19 | End: 2021-02-24

## 2021-01-15 RX ORDER — ACETAMINOPHEN 325 MG/1
650 TABLET ORAL ONCE AS NEEDED
Status: DISCONTINUED | OUTPATIENT
Start: 2021-01-15 | End: 2022-07-22

## 2021-01-15 RX ORDER — IPRATROPIUM BROMIDE 42 UG/1
2 SPRAY, METERED NASAL 3 TIMES DAILY
Qty: 15 ML | Refills: 0 | Status: SHIPPED | OUTPATIENT
Start: 2021-01-15 | End: 2021-01-20

## 2021-01-15 RX ORDER — SODIUM CHLORIDE 0.9 % (FLUSH) 0.9 %
10 SYRINGE (ML) INJECTION
Status: DISCONTINUED | OUTPATIENT
Start: 2021-01-15 | End: 2022-07-22

## 2021-01-15 RX ORDER — EPINEPHRINE 0.1 MG/ML
0.3 INJECTION INTRAVENOUS
Status: DISCONTINUED | OUTPATIENT
Start: 2021-01-15 | End: 2022-07-22

## 2021-01-15 RX ORDER — DIPHENHYDRAMINE HYDROCHLORIDE 50 MG/ML
25 INJECTION INTRAMUSCULAR; INTRAVENOUS ONCE AS NEEDED
Status: DISCONTINUED | OUTPATIENT
Start: 2021-01-15 | End: 2022-07-22

## 2021-01-15 RX ORDER — ONDANSETRON 4 MG/1
4 TABLET, ORALLY DISINTEGRATING ORAL ONCE AS NEEDED
Status: DISCONTINUED | OUTPATIENT
Start: 2021-01-15 | End: 2022-07-22

## 2021-01-15 RX ORDER — ALBUTEROL SULFATE 90 UG/1
2 AEROSOL, METERED RESPIRATORY (INHALATION)
Status: DISCONTINUED | OUTPATIENT
Start: 2021-01-15 | End: 2022-07-22

## 2021-01-15 RX ADMIN — SODIUM CHLORIDE, SODIUM LACTATE, POTASSIUM CHLORIDE, AND CALCIUM CHLORIDE 1000 ML: .6; .31; .03; .02 INJECTION, SOLUTION INTRAVENOUS at 12:01

## 2021-01-15 RX ADMIN — SODIUM CHLORIDE 700 MG: 0.9 INJECTION, SOLUTION INTRAVENOUS at 02:01

## 2021-01-15 RX ADMIN — POTASSIUM BICARBONATE 40 MEQ: 391 TABLET, EFFERVESCENT ORAL at 01:01

## 2021-01-16 ENCOUNTER — TELEPHONE (OUTPATIENT)
Dept: ADMINISTRATIVE | Facility: OTHER | Age: 66
End: 2021-01-16

## 2021-01-20 ENCOUNTER — TELEPHONE (OUTPATIENT)
Dept: ELECTROPHYSIOLOGY | Facility: CLINIC | Age: 66
End: 2021-01-20

## 2021-01-29 DIAGNOSIS — I49.8 VENTRICULAR BIGEMINY: Primary | ICD-10-CM

## 2021-01-30 ENCOUNTER — PATIENT MESSAGE (OUTPATIENT)
Dept: INTERNAL MEDICINE | Facility: CLINIC | Age: 66
End: 2021-01-30

## 2021-02-01 ENCOUNTER — HOSPITAL ENCOUNTER (OUTPATIENT)
Dept: CARDIOLOGY | Facility: CLINIC | Age: 66
Discharge: HOME | End: 2021-02-01
Payer: MEDICARE

## 2021-02-01 ENCOUNTER — OFFICE VISIT (OUTPATIENT)
Dept: ELECTROPHYSIOLOGY | Facility: CLINIC | Age: 66
End: 2021-02-01
Payer: MEDICARE

## 2021-02-01 VITALS
SYSTOLIC BLOOD PRESSURE: 129 MMHG | HEIGHT: 72 IN | HEART RATE: 64 BPM | WEIGHT: 212.94 LBS | BODY MASS INDEX: 28.84 KG/M2 | DIASTOLIC BLOOD PRESSURE: 68 MMHG

## 2021-02-01 DIAGNOSIS — I49.8 VENTRICULAR BIGEMINY: ICD-10-CM

## 2021-02-01 DIAGNOSIS — I49.3 PVCS (PREMATURE VENTRICULAR CONTRACTIONS): Primary | ICD-10-CM

## 2021-02-01 DIAGNOSIS — I49.3 PVCS (PREMATURE VENTRICULAR CONTRACTIONS): ICD-10-CM

## 2021-02-01 PROCEDURE — 93010 ELECTROCARDIOGRAM REPORT: CPT | Mod: S$PBB,,, | Performed by: INTERNAL MEDICINE

## 2021-02-01 PROCEDURE — 99205 PR OFFICE/OUTPT VISIT, NEW, LEVL V, 60-74 MIN: ICD-10-PCS | Mod: S$PBB,,, | Performed by: STUDENT IN AN ORGANIZED HEALTH CARE EDUCATION/TRAINING PROGRAM

## 2021-02-01 PROCEDURE — 99999 PR PBB SHADOW E&M-EST. PATIENT-LVL IV: CPT | Mod: PBBFAC,,, | Performed by: STUDENT IN AN ORGANIZED HEALTH CARE EDUCATION/TRAINING PROGRAM

## 2021-02-01 PROCEDURE — 99205 OFFICE O/P NEW HI 60 MIN: CPT | Mod: S$PBB,,, | Performed by: STUDENT IN AN ORGANIZED HEALTH CARE EDUCATION/TRAINING PROGRAM

## 2021-02-01 PROCEDURE — 93010 RHYTHM STRIP: ICD-10-PCS | Mod: S$PBB,,, | Performed by: INTERNAL MEDICINE

## 2021-02-01 PROCEDURE — 99999 PR PBB SHADOW E&M-EST. PATIENT-LVL IV: ICD-10-PCS | Mod: PBBFAC,,, | Performed by: STUDENT IN AN ORGANIZED HEALTH CARE EDUCATION/TRAINING PROGRAM

## 2021-02-01 PROCEDURE — 93005 ELECTROCARDIOGRAM TRACING: CPT | Mod: PBBFAC | Performed by: INTERNAL MEDICINE

## 2021-02-01 PROCEDURE — 99214 OFFICE O/P EST MOD 30 MIN: CPT | Mod: PBBFAC,25 | Performed by: STUDENT IN AN ORGANIZED HEALTH CARE EDUCATION/TRAINING PROGRAM

## 2021-02-02 ENCOUNTER — CLINICAL SUPPORT (OUTPATIENT)
Dept: CARDIOLOGY | Facility: HOSPITAL | Age: 66
End: 2021-02-02
Attending: STUDENT IN AN ORGANIZED HEALTH CARE EDUCATION/TRAINING PROGRAM
Payer: MEDICARE

## 2021-02-02 ENCOUNTER — PATIENT MESSAGE (OUTPATIENT)
Dept: UROLOGY | Facility: CLINIC | Age: 66
End: 2021-02-02

## 2021-02-02 DIAGNOSIS — I49.3 PVCS (PREMATURE VENTRICULAR CONTRACTIONS): ICD-10-CM

## 2021-02-02 PROCEDURE — 93227 HOLTER MONITOR - 48 HOUR (CUPID ONLY): ICD-10-PCS | Mod: ,,, | Performed by: INTERNAL MEDICINE

## 2021-02-02 PROCEDURE — 93227 XTRNL ECG REC<48 HR R&I: CPT | Mod: ,,, | Performed by: INTERNAL MEDICINE

## 2021-02-02 PROCEDURE — 93225 XTRNL ECG REC<48 HRS REC: CPT

## 2021-02-03 ENCOUNTER — HOSPITAL ENCOUNTER (OUTPATIENT)
Dept: CARDIOLOGY | Facility: HOSPITAL | Age: 66
Discharge: HOME | End: 2021-02-03
Attending: STUDENT IN AN ORGANIZED HEALTH CARE EDUCATION/TRAINING PROGRAM
Payer: MEDICARE

## 2021-02-03 VITALS
BODY MASS INDEX: 28.71 KG/M2 | DIASTOLIC BLOOD PRESSURE: 70 MMHG | HEIGHT: 72 IN | SYSTOLIC BLOOD PRESSURE: 130 MMHG | WEIGHT: 212 LBS | HEART RATE: 70 BPM

## 2021-02-03 DIAGNOSIS — I49.3 PVCS (PREMATURE VENTRICULAR CONTRACTIONS): ICD-10-CM

## 2021-02-03 LAB
ASCENDING AORTA: 3.09 CM
AV INDEX (PROSTH): 0.72
AV MEAN GRADIENT: 4 MMHG
AV PEAK GRADIENT: 6 MMHG
AV VALVE AREA: 2.77 CM2
AV VELOCITY RATIO: 0.8
BSA FOR ECHO PROCEDURE: 2.21 M2
CV ECHO LV RWT: 0.29 CM
DOP CALC AO PEAK VEL: 1.25 M/S
DOP CALC AO VTI: 29.74 CM
DOP CALC LVOT AREA: 3.9 CM2
DOP CALC LVOT DIAMETER: 2.22 CM
DOP CALC LVOT PEAK VEL: 1 M/S
DOP CALC LVOT STROKE VOLUME: 82.52 CM3
DOP CALCLVOT PEAK VEL VTI: 21.33 CM
E WAVE DECELERATION TIME: 151.21 MSEC
E/A RATIO: 0.81
E/E' RATIO: 10.57 M/S
ECHO LV POSTERIOR WALL: 0.8 CM (ref 0.6–1.1)
FRACTIONAL SHORTENING: 25 % (ref 28–44)
INTERVENTRICULAR SEPTUM: 0.9 CM (ref 0.6–1.1)
LA MAJOR: 5.82 CM
LA MINOR: 5.77 CM
LA WIDTH: 3.98 CM
LEFT ATRIUM SIZE: 4.62 CM
LEFT ATRIUM VOLUME INDEX MOD: 28.7 ML/M2
LEFT ATRIUM VOLUME INDEX: 41.5 ML/M2
LEFT ATRIUM VOLUME MOD: 62.67 CM3
LEFT ATRIUM VOLUME: 90.57 CM3
LEFT INTERNAL DIMENSION IN SYSTOLE: 4.1 CM (ref 2.1–4)
LEFT VENTRICLE DIASTOLIC VOLUME INDEX: 63.91 ML/M2
LEFT VENTRICLE DIASTOLIC VOLUME: 139.32 ML
LEFT VENTRICLE MASS INDEX: 79 G/M2
LEFT VENTRICLE SYSTOLIC VOLUME INDEX: 30.6 ML/M2
LEFT VENTRICLE SYSTOLIC VOLUME: 66.69 ML
LEFT VENTRICULAR INTERNAL DIMENSION IN DIASTOLE: 5.5 CM (ref 3.5–6)
LEFT VENTRICULAR MASS: 172.72 G
LV LATERAL E/E' RATIO: 9.25 M/S
LV SEPTAL E/E' RATIO: 12.33 M/S
MV A" WAVE DURATION": 12.18 MSEC
MV PEAK A VEL: 0.91 M/S
MV PEAK E VEL: 0.74 M/S
PISA MRMAX VEL: 0.04 M/S
PISA TR MAX VEL: 2.32 M/S
PULM VEIN S/D RATIO: 1.45
PV PEAK D VEL: 0.4 M/S
PV PEAK S VEL: 0.58 M/S
RA MAJOR: 5.35 CM
RA PRESSURE: 15 MMHG
RA WIDTH: 3.89 CM
RIGHT VENTRICULAR END-DIASTOLIC DIMENSION: 3.84 CM
SINUS: 3.45 CM
STJ: 3.21 CM
TDI LATERAL: 0.08 M/S
TDI SEPTAL: 0.06 M/S
TDI: 0.07 M/S
TR MAX PG: 22 MMHG
TRICUSPID ANNULAR PLANE SYSTOLIC EXCURSION: 1.64 CM
TV REST PULMONARY ARTERY PRESSURE: 37 MMHG

## 2021-02-03 PROCEDURE — 93306 TTE W/DOPPLER COMPLETE: CPT | Mod: 26,,, | Performed by: INTERNAL MEDICINE

## 2021-02-03 PROCEDURE — 93306 TTE W/DOPPLER COMPLETE: CPT

## 2021-02-03 PROCEDURE — 93306 ECHO (CUPID ONLY): ICD-10-PCS | Mod: 26,,, | Performed by: INTERNAL MEDICINE

## 2021-02-08 LAB
OHS CV EVENT MONITOR DAY: 0
OHS CV HOLTER LENGTH DECIMAL HOURS: 48
OHS CV HOLTER LENGTH HOURS: 48
OHS CV HOLTER LENGTH MINUTES: 0

## 2021-02-19 ENCOUNTER — PATIENT OUTREACH (OUTPATIENT)
Dept: ADMINISTRATIVE | Facility: OTHER | Age: 66
End: 2021-02-19

## 2021-02-24 ENCOUNTER — OFFICE VISIT (OUTPATIENT)
Dept: UROLOGY | Facility: CLINIC | Age: 66
End: 2021-02-24
Payer: MEDICARE

## 2021-02-24 VITALS
HEIGHT: 72 IN | WEIGHT: 204.13 LBS | HEART RATE: 69 BPM | DIASTOLIC BLOOD PRESSURE: 80 MMHG | SYSTOLIC BLOOD PRESSURE: 136 MMHG | BODY MASS INDEX: 27.65 KG/M2

## 2021-02-24 DIAGNOSIS — R97.20 ELEVATED PSA: Primary | ICD-10-CM

## 2021-02-24 DIAGNOSIS — N40.1 BPH WITH URINARY OBSTRUCTION: ICD-10-CM

## 2021-02-24 DIAGNOSIS — N40.2 PROSTATE NODULE: ICD-10-CM

## 2021-02-24 DIAGNOSIS — N13.8 BPH WITH URINARY OBSTRUCTION: ICD-10-CM

## 2021-02-24 PROCEDURE — 99999 PR PBB SHADOW E&M-EST. PATIENT-LVL IV: ICD-10-PCS | Mod: PBBFAC,,, | Performed by: UROLOGY

## 2021-02-24 PROCEDURE — 99214 OFFICE O/P EST MOD 30 MIN: CPT | Mod: S$PBB,,, | Performed by: UROLOGY

## 2021-02-24 PROCEDURE — 99999 PR PBB SHADOW E&M-EST. PATIENT-LVL IV: CPT | Mod: PBBFAC,,, | Performed by: UROLOGY

## 2021-02-24 PROCEDURE — 99214 PR OFFICE/OUTPT VISIT, EST, LEVL IV, 30-39 MIN: ICD-10-PCS | Mod: S$PBB,,, | Performed by: UROLOGY

## 2021-02-24 PROCEDURE — 99214 OFFICE O/P EST MOD 30 MIN: CPT | Mod: PBBFAC | Performed by: UROLOGY

## 2021-02-24 RX ORDER — OXYBUTYNIN CHLORIDE 10 MG/1
10 TABLET, EXTENDED RELEASE ORAL DAILY
Qty: 30 TABLET | Refills: 11 | Status: SHIPPED | OUTPATIENT
Start: 2021-02-24 | End: 2021-06-10

## 2021-03-23 ENCOUNTER — TELEPHONE (OUTPATIENT)
Dept: ENDOSCOPY | Facility: HOSPITAL | Age: 66
End: 2021-03-23

## 2021-03-31 ENCOUNTER — PES CALL (OUTPATIENT)
Dept: ADMINISTRATIVE | Facility: CLINIC | Age: 66
End: 2021-03-31

## 2021-04-05 ENCOUNTER — PATIENT MESSAGE (OUTPATIENT)
Dept: UROLOGY | Facility: CLINIC | Age: 66
End: 2021-04-05

## 2021-04-05 ENCOUNTER — PATIENT MESSAGE (OUTPATIENT)
Dept: ADMINISTRATIVE | Facility: HOSPITAL | Age: 66
End: 2021-04-05

## 2021-04-09 ENCOUNTER — PES CALL (OUTPATIENT)
Dept: ADMINISTRATIVE | Facility: CLINIC | Age: 66
End: 2021-04-09

## 2021-04-27 ENCOUNTER — PATIENT OUTREACH (OUTPATIENT)
Dept: ADMINISTRATIVE | Facility: OTHER | Age: 66
End: 2021-04-27

## 2021-04-30 ENCOUNTER — DOCUMENTATION ONLY (OUTPATIENT)
Dept: ADMINISTRATIVE | Facility: HOSPITAL | Age: 66
End: 2021-04-30

## 2021-04-30 ENCOUNTER — PATIENT OUTREACH (OUTPATIENT)
Dept: ADMINISTRATIVE | Facility: HOSPITAL | Age: 66
End: 2021-04-30

## 2021-05-26 ENCOUNTER — TELEPHONE (OUTPATIENT)
Dept: INTERNAL MEDICINE | Facility: CLINIC | Age: 66
End: 2021-05-26

## 2021-06-03 ENCOUNTER — PES CALL (OUTPATIENT)
Dept: ADMINISTRATIVE | Facility: CLINIC | Age: 66
End: 2021-06-03

## 2021-06-09 ENCOUNTER — PATIENT OUTREACH (OUTPATIENT)
Dept: ADMINISTRATIVE | Facility: OTHER | Age: 66
End: 2021-06-09

## 2021-06-10 ENCOUNTER — HOSPITAL ENCOUNTER (OUTPATIENT)
Dept: CARDIOLOGY | Facility: CLINIC | Age: 66
Discharge: HOME | End: 2021-06-10
Payer: MEDICARE

## 2021-06-10 ENCOUNTER — OFFICE VISIT (OUTPATIENT)
Dept: ELECTROPHYSIOLOGY | Facility: CLINIC | Age: 66
End: 2021-06-10
Payer: MEDICARE

## 2021-06-10 VITALS
WEIGHT: 207 LBS | BODY MASS INDEX: 28.04 KG/M2 | SYSTOLIC BLOOD PRESSURE: 132 MMHG | DIASTOLIC BLOOD PRESSURE: 68 MMHG | HEIGHT: 72 IN | HEART RATE: 61 BPM

## 2021-06-10 DIAGNOSIS — H26.9 CATARACT, UNSPECIFIED CATARACT TYPE, UNSPECIFIED LATERALITY: ICD-10-CM

## 2021-06-10 DIAGNOSIS — I10 ESSENTIAL HYPERTENSION: ICD-10-CM

## 2021-06-10 DIAGNOSIS — M17.0 OSTEOARTHRITIS OF BOTH KNEES, UNSPECIFIED OSTEOARTHRITIS TYPE: ICD-10-CM

## 2021-06-10 DIAGNOSIS — K21.9 GASTROESOPHAGEAL REFLUX DISEASE WITHOUT ESOPHAGITIS: ICD-10-CM

## 2021-06-10 DIAGNOSIS — E78.2 MIXED HYPERLIPIDEMIA: ICD-10-CM

## 2021-06-10 DIAGNOSIS — N40.0 BENIGN PROSTATIC HYPERPLASIA, UNSPECIFIED WHETHER LOWER URINARY TRACT SYMPTOMS PRESENT: ICD-10-CM

## 2021-06-10 DIAGNOSIS — Z87.19 HISTORY OF GALLSTONES: ICD-10-CM

## 2021-06-10 DIAGNOSIS — I49.8 VENTRICULAR BIGEMINY: ICD-10-CM

## 2021-06-10 DIAGNOSIS — I49.3 PVCS (PREMATURE VENTRICULAR CONTRACTIONS): Primary | ICD-10-CM

## 2021-06-10 PROCEDURE — 99214 OFFICE O/P EST MOD 30 MIN: CPT | Mod: S$PBB,,, | Performed by: STUDENT IN AN ORGANIZED HEALTH CARE EDUCATION/TRAINING PROGRAM

## 2021-06-10 PROCEDURE — 99213 OFFICE O/P EST LOW 20 MIN: CPT | Mod: PBBFAC,25 | Performed by: STUDENT IN AN ORGANIZED HEALTH CARE EDUCATION/TRAINING PROGRAM

## 2021-06-10 PROCEDURE — 99999 PR PBB SHADOW E&M-EST. PATIENT-LVL III: ICD-10-PCS | Mod: PBBFAC,,, | Performed by: STUDENT IN AN ORGANIZED HEALTH CARE EDUCATION/TRAINING PROGRAM

## 2021-06-10 PROCEDURE — 93010 RHYTHM STRIP: ICD-10-PCS | Mod: S$PBB,,, | Performed by: INTERNAL MEDICINE

## 2021-06-10 PROCEDURE — 93005 ELECTROCARDIOGRAM TRACING: CPT | Mod: PBBFAC | Performed by: INTERNAL MEDICINE

## 2021-06-10 PROCEDURE — 99999 PR PBB SHADOW E&M-EST. PATIENT-LVL III: CPT | Mod: PBBFAC,,, | Performed by: STUDENT IN AN ORGANIZED HEALTH CARE EDUCATION/TRAINING PROGRAM

## 2021-06-10 PROCEDURE — 99214 PR OFFICE/OUTPT VISIT, EST, LEVL IV, 30-39 MIN: ICD-10-PCS | Mod: S$PBB,,, | Performed by: STUDENT IN AN ORGANIZED HEALTH CARE EDUCATION/TRAINING PROGRAM

## 2021-06-10 PROCEDURE — 93010 ELECTROCARDIOGRAM REPORT: CPT | Mod: S$PBB,,, | Performed by: INTERNAL MEDICINE

## 2021-06-24 ENCOUNTER — LAB VISIT (OUTPATIENT)
Dept: LAB | Facility: HOSPITAL | Age: 66
End: 2021-06-24
Attending: INTERNAL MEDICINE
Payer: MEDICARE

## 2021-06-24 DIAGNOSIS — Z12.11 COLON CANCER SCREENING: ICD-10-CM

## 2021-06-24 PROCEDURE — 82274 ASSAY TEST FOR BLOOD FECAL: CPT | Performed by: INTERNAL MEDICINE

## 2021-06-30 ENCOUNTER — PATIENT MESSAGE (OUTPATIENT)
Dept: INTERNAL MEDICINE | Facility: CLINIC | Age: 66
End: 2021-06-30

## 2021-06-30 LAB — HEMOCCULT STL QL IA: NEGATIVE

## 2021-07-06 ENCOUNTER — PATIENT OUTREACH (OUTPATIENT)
Dept: ADMINISTRATIVE | Facility: HOSPITAL | Age: 66
End: 2021-07-06

## 2021-07-12 ENCOUNTER — PATIENT OUTREACH (OUTPATIENT)
Dept: ADMINISTRATIVE | Facility: OTHER | Age: 66
End: 2021-07-12

## 2021-07-14 ENCOUNTER — OFFICE VISIT (OUTPATIENT)
Dept: OPHTHALMOLOGY | Facility: CLINIC | Age: 66
End: 2021-07-14
Payer: MEDICARE

## 2021-07-14 DIAGNOSIS — H18.519 FUCHS' CORNEAL DYSTROPHY: ICD-10-CM

## 2021-07-14 DIAGNOSIS — Z94.7 STATUS POST CORNEAL TRANSPLANT: Primary | ICD-10-CM

## 2021-07-14 DIAGNOSIS — H40.043 STEROID RESPONDERS, BILATERAL: ICD-10-CM

## 2021-07-14 PROCEDURE — 99999 PR PBB SHADOW E&M-EST. PATIENT-LVL II: CPT | Mod: PBBFAC,,, | Performed by: OPHTHALMOLOGY

## 2021-07-14 PROCEDURE — 92014 COMPRE OPH EXAM EST PT 1/>: CPT | Mod: S$PBB,,, | Performed by: OPHTHALMOLOGY

## 2021-07-14 PROCEDURE — 99999 PR PBB SHADOW E&M-EST. PATIENT-LVL II: ICD-10-PCS | Mod: PBBFAC,,, | Performed by: OPHTHALMOLOGY

## 2021-07-14 PROCEDURE — 99212 OFFICE O/P EST SF 10 MIN: CPT | Mod: PBBFAC | Performed by: OPHTHALMOLOGY

## 2021-07-14 PROCEDURE — 92014 PR EYE EXAM, EST PATIENT,COMPREHESV: ICD-10-PCS | Mod: S$PBB,,, | Performed by: OPHTHALMOLOGY

## 2021-07-21 ENCOUNTER — OFFICE VISIT (OUTPATIENT)
Dept: INTERNAL MEDICINE | Facility: CLINIC | Age: 66
End: 2021-07-21
Payer: MEDICARE

## 2021-07-21 VITALS
BODY MASS INDEX: 27.56 KG/M2 | WEIGHT: 203.5 LBS | DIASTOLIC BLOOD PRESSURE: 78 MMHG | OXYGEN SATURATION: 98 % | SYSTOLIC BLOOD PRESSURE: 110 MMHG | HEART RATE: 32 BPM | HEIGHT: 72 IN

## 2021-07-21 DIAGNOSIS — Z86.16 HISTORY OF 2019 NOVEL CORONAVIRUS DISEASE (COVID-19): ICD-10-CM

## 2021-07-21 DIAGNOSIS — G47.00 INSOMNIA, UNSPECIFIED TYPE: ICD-10-CM

## 2021-07-21 DIAGNOSIS — R97.20 ELEVATED PSA: ICD-10-CM

## 2021-07-21 DIAGNOSIS — R73.09 ELEVATED GLUCOSE LEVEL: ICD-10-CM

## 2021-07-21 DIAGNOSIS — R17 ELEVATED BILIRUBIN: ICD-10-CM

## 2021-07-21 DIAGNOSIS — Z00.00 ROUTINE PHYSICAL EXAMINATION: ICD-10-CM

## 2021-07-21 DIAGNOSIS — M17.10 PRIMARY LOCALIZED OSTEOARTHROSIS OF LOWER LEG, UNSPECIFIED LATERALITY: ICD-10-CM

## 2021-07-21 DIAGNOSIS — K21.9 GASTROESOPHAGEAL REFLUX DISEASE, UNSPECIFIED WHETHER ESOPHAGITIS PRESENT: ICD-10-CM

## 2021-07-21 DIAGNOSIS — I10 ESSENTIAL HYPERTENSION: Primary | ICD-10-CM

## 2021-07-21 PROCEDURE — 99999 PR PBB SHADOW E&M-EST. PATIENT-LVL IV: ICD-10-PCS | Mod: PBBFAC,,, | Performed by: INTERNAL MEDICINE

## 2021-07-21 PROCEDURE — 99214 PR OFFICE/OUTPT VISIT, EST, LEVL IV, 30-39 MIN: ICD-10-PCS | Mod: S$PBB,,, | Performed by: INTERNAL MEDICINE

## 2021-07-21 PROCEDURE — 99214 OFFICE O/P EST MOD 30 MIN: CPT | Mod: S$PBB,,, | Performed by: INTERNAL MEDICINE

## 2021-07-21 PROCEDURE — 99999 PR PBB SHADOW E&M-EST. PATIENT-LVL IV: CPT | Mod: PBBFAC,,, | Performed by: INTERNAL MEDICINE

## 2021-07-21 PROCEDURE — 99214 OFFICE O/P EST MOD 30 MIN: CPT | Mod: PBBFAC | Performed by: INTERNAL MEDICINE

## 2021-07-21 RX ORDER — TRIAMTERENE/HYDROCHLOROTHIAZID 37.5-25 MG
1 TABLET ORAL DAILY
Qty: 30 TABLET | Refills: 11 | Status: SHIPPED | OUTPATIENT
Start: 2021-07-21 | End: 2021-08-17

## 2021-07-21 RX ORDER — FENOFIBRATE 54 MG/1
54 TABLET ORAL DAILY
Qty: 30 TABLET | Refills: 11 | Status: SHIPPED | OUTPATIENT
Start: 2021-07-21 | End: 2022-07-22 | Stop reason: SDUPTHER

## 2021-07-22 ENCOUNTER — LAB VISIT (OUTPATIENT)
Dept: LAB | Facility: HOSPITAL | Age: 66
End: 2021-07-22
Attending: INTERNAL MEDICINE
Payer: MEDICARE

## 2021-07-22 DIAGNOSIS — K21.9 GASTROESOPHAGEAL REFLUX DISEASE, UNSPECIFIED WHETHER ESOPHAGITIS PRESENT: ICD-10-CM

## 2021-07-22 DIAGNOSIS — R73.09 ELEVATED GLUCOSE LEVEL: ICD-10-CM

## 2021-07-22 DIAGNOSIS — I10 ESSENTIAL HYPERTENSION: ICD-10-CM

## 2021-07-22 DIAGNOSIS — R17 ELEVATED BILIRUBIN: ICD-10-CM

## 2021-07-22 DIAGNOSIS — Z00.00 ROUTINE PHYSICAL EXAMINATION: ICD-10-CM

## 2021-07-22 DIAGNOSIS — R97.20 ELEVATED PSA: ICD-10-CM

## 2021-07-22 DIAGNOSIS — M17.10 PRIMARY LOCALIZED OSTEOARTHROSIS OF LOWER LEG, UNSPECIFIED LATERALITY: ICD-10-CM

## 2021-07-22 DIAGNOSIS — G47.00 INSOMNIA, UNSPECIFIED TYPE: ICD-10-CM

## 2021-07-22 LAB
ALBUMIN SERPL BCP-MCNC: 3.8 G/DL (ref 3.5–5.2)
ALP SERPL-CCNC: 51 U/L (ref 55–135)
ALT SERPL W/O P-5'-P-CCNC: 16 U/L (ref 10–44)
ANION GAP SERPL CALC-SCNC: 6 MMOL/L (ref 8–16)
AST SERPL-CCNC: 15 U/L (ref 10–40)
BASOPHILS # BLD AUTO: 0.04 K/UL (ref 0–0.2)
BASOPHILS NFR BLD: 0.8 % (ref 0–1.9)
BILIRUB SERPL-MCNC: 1.3 MG/DL (ref 0.1–1)
BUN SERPL-MCNC: 19 MG/DL (ref 8–23)
CALCIUM SERPL-MCNC: 9.2 MG/DL (ref 8.7–10.5)
CHLORIDE SERPL-SCNC: 106 MMOL/L (ref 95–110)
CHOLEST SERPL-MCNC: 161 MG/DL (ref 120–199)
CHOLEST/HDLC SERPL: 4.7 {RATIO} (ref 2–5)
CO2 SERPL-SCNC: 29 MMOL/L (ref 23–29)
CREAT SERPL-MCNC: 1.1 MG/DL (ref 0.5–1.4)
DIFFERENTIAL METHOD: ABNORMAL
EOSINOPHIL # BLD AUTO: 0 K/UL (ref 0–0.5)
EOSINOPHIL NFR BLD: 0.8 % (ref 0–8)
ERYTHROCYTE [DISTWIDTH] IN BLOOD BY AUTOMATED COUNT: 14.4 % (ref 11.5–14.5)
EST. GFR  (AFRICAN AMERICAN): >60 ML/MIN/1.73 M^2
EST. GFR  (NON AFRICAN AMERICAN): >60 ML/MIN/1.73 M^2
ESTIMATED AVG GLUCOSE: 94 MG/DL (ref 68–131)
GLUCOSE SERPL-MCNC: 106 MG/DL (ref 70–110)
HBA1C MFR BLD: 4.9 % (ref 4–5.6)
HCT VFR BLD AUTO: 40.9 % (ref 40–54)
HDLC SERPL-MCNC: 34 MG/DL (ref 40–75)
HDLC SERPL: 21.1 % (ref 20–50)
HGB BLD-MCNC: 13.9 G/DL (ref 14–18)
IMM GRANULOCYTES # BLD AUTO: 0.02 K/UL (ref 0–0.04)
IMM GRANULOCYTES NFR BLD AUTO: 0.4 % (ref 0–0.5)
LDLC SERPL CALC-MCNC: 99.6 MG/DL (ref 63–159)
LYMPHOCYTES # BLD AUTO: 1.3 K/UL (ref 1–4.8)
LYMPHOCYTES NFR BLD: 26.4 % (ref 18–48)
MCH RBC QN AUTO: 29.5 PG (ref 27–31)
MCHC RBC AUTO-ENTMCNC: 34 G/DL (ref 32–36)
MCV RBC AUTO: 87 FL (ref 82–98)
MONOCYTES # BLD AUTO: 0.5 K/UL (ref 0.3–1)
MONOCYTES NFR BLD: 8.9 % (ref 4–15)
NEUTROPHILS # BLD AUTO: 3.2 K/UL (ref 1.8–7.7)
NEUTROPHILS NFR BLD: 62.7 % (ref 38–73)
NONHDLC SERPL-MCNC: 127 MG/DL
NRBC BLD-RTO: 0 /100 WBC
PLATELET # BLD AUTO: 212 K/UL (ref 150–450)
PMV BLD AUTO: 10.4 FL (ref 9.2–12.9)
POTASSIUM SERPL-SCNC: 3.9 MMOL/L (ref 3.5–5.1)
PROT SERPL-MCNC: 6.6 G/DL (ref 6–8.4)
RBC # BLD AUTO: 4.71 M/UL (ref 4.6–6.2)
SODIUM SERPL-SCNC: 141 MMOL/L (ref 136–145)
TRIGL SERPL-MCNC: 137 MG/DL (ref 30–150)
WBC # BLD AUTO: 5.08 K/UL (ref 3.9–12.7)

## 2021-07-22 PROCEDURE — 36415 COLL VENOUS BLD VENIPUNCTURE: CPT | Performed by: INTERNAL MEDICINE

## 2021-07-22 PROCEDURE — 85025 COMPLETE CBC W/AUTO DIFF WBC: CPT | Performed by: INTERNAL MEDICINE

## 2021-07-22 PROCEDURE — 83036 HEMOGLOBIN GLYCOSYLATED A1C: CPT | Performed by: INTERNAL MEDICINE

## 2021-07-22 PROCEDURE — 80061 LIPID PANEL: CPT | Performed by: INTERNAL MEDICINE

## 2021-07-22 PROCEDURE — 80053 COMPREHEN METABOLIC PANEL: CPT | Performed by: INTERNAL MEDICINE

## 2021-08-17 RX ORDER — TRIAMTERENE/HYDROCHLOROTHIAZID 37.5-25 MG
TABLET ORAL
Qty: 90 TABLET | Refills: 3 | Status: SHIPPED | OUTPATIENT
Start: 2021-08-17 | End: 2022-05-11 | Stop reason: SDUPTHER

## 2021-08-20 ENCOUNTER — LAB VISIT (OUTPATIENT)
Dept: LAB | Facility: HOSPITAL | Age: 66
End: 2021-08-20
Attending: UROLOGY
Payer: MEDICARE

## 2021-08-20 DIAGNOSIS — R97.20 ELEVATED PSA: ICD-10-CM

## 2021-08-20 LAB — COMPLEXED PSA SERPL-MCNC: 13.3 NG/ML (ref 0–4)

## 2021-08-20 PROCEDURE — 36415 COLL VENOUS BLD VENIPUNCTURE: CPT | Performed by: UROLOGY

## 2021-08-20 PROCEDURE — 84153 ASSAY OF PSA TOTAL: CPT | Performed by: UROLOGY

## 2021-08-25 ENCOUNTER — OFFICE VISIT (OUTPATIENT)
Dept: UROLOGY | Facility: CLINIC | Age: 66
End: 2021-08-25
Payer: MEDICARE

## 2021-08-25 VITALS
WEIGHT: 200.38 LBS | HEIGHT: 72 IN | BODY MASS INDEX: 27.14 KG/M2 | DIASTOLIC BLOOD PRESSURE: 68 MMHG | SYSTOLIC BLOOD PRESSURE: 120 MMHG | HEART RATE: 66 BPM

## 2021-08-25 DIAGNOSIS — N13.8 BPH WITH URINARY OBSTRUCTION: ICD-10-CM

## 2021-08-25 DIAGNOSIS — N40.2 PROSTATE NODULE: ICD-10-CM

## 2021-08-25 DIAGNOSIS — R97.20 ELEVATED PSA: Primary | ICD-10-CM

## 2021-08-25 DIAGNOSIS — N40.1 BPH WITH URINARY OBSTRUCTION: ICD-10-CM

## 2021-08-25 PROCEDURE — 99214 OFFICE O/P EST MOD 30 MIN: CPT | Mod: PBBFAC | Performed by: UROLOGY

## 2021-08-25 PROCEDURE — 99999 PR PBB SHADOW E&M-EST. PATIENT-LVL IV: ICD-10-PCS | Mod: PBBFAC,,, | Performed by: UROLOGY

## 2021-08-25 PROCEDURE — 99214 OFFICE O/P EST MOD 30 MIN: CPT | Mod: S$PBB,,, | Performed by: UROLOGY

## 2021-08-25 PROCEDURE — 51798 US URINE CAPACITY MEASURE: CPT | Mod: PBBFAC | Performed by: UROLOGY

## 2021-08-25 PROCEDURE — 99214 PR OFFICE/OUTPT VISIT, EST, LEVL IV, 30-39 MIN: ICD-10-PCS | Mod: S$PBB,,, | Performed by: UROLOGY

## 2021-08-25 PROCEDURE — 99999 PR PBB SHADOW E&M-EST. PATIENT-LVL IV: CPT | Mod: PBBFAC,,, | Performed by: UROLOGY

## 2021-09-05 ENCOUNTER — PATIENT MESSAGE (OUTPATIENT)
Dept: OPHTHALMOLOGY | Facility: CLINIC | Age: 66
End: 2021-09-05

## 2021-09-07 RX ORDER — FLUOROMETHOLONE 1 MG/ML
1 SUSPENSION/ DROPS OPHTHALMIC DAILY
Qty: 5 ML | Refills: 2 | Status: SHIPPED | OUTPATIENT
Start: 2021-09-07 | End: 2021-10-07

## 2021-09-08 ENCOUNTER — PATIENT MESSAGE (OUTPATIENT)
Dept: INTERNAL MEDICINE | Facility: CLINIC | Age: 66
End: 2021-09-08

## 2021-09-08 RX ORDER — ALPRAZOLAM 0.5 MG/1
TABLET ORAL
Qty: 30 TABLET | Refills: 5 | Status: SHIPPED | OUTPATIENT
Start: 2021-09-08 | End: 2021-11-02 | Stop reason: SDUPTHER

## 2021-11-02 ENCOUNTER — PATIENT MESSAGE (OUTPATIENT)
Dept: INTERNAL MEDICINE | Facility: CLINIC | Age: 66
End: 2021-11-02
Payer: MEDICARE

## 2021-11-07 RX ORDER — ALPRAZOLAM 0.5 MG/1
TABLET ORAL
Qty: 30 TABLET | Refills: 1 | Status: SHIPPED | OUTPATIENT
Start: 2021-11-07 | End: 2022-04-11 | Stop reason: SDUPTHER

## 2021-12-17 ENCOUNTER — PATIENT MESSAGE (OUTPATIENT)
Dept: INTERNAL MEDICINE | Facility: CLINIC | Age: 66
End: 2021-12-17
Payer: MEDICARE

## 2022-02-17 ENCOUNTER — PATIENT MESSAGE (OUTPATIENT)
Dept: UROLOGY | Facility: CLINIC | Age: 67
End: 2022-02-17
Payer: MEDICARE

## 2022-02-17 DIAGNOSIS — R97.20 ELEVATED PSA: Primary | ICD-10-CM

## 2022-03-16 ENCOUNTER — LAB VISIT (OUTPATIENT)
Dept: LAB | Facility: HOSPITAL | Age: 67
End: 2022-03-16
Attending: UROLOGY
Payer: MEDICARE

## 2022-03-16 DIAGNOSIS — R97.20 ELEVATED PSA: ICD-10-CM

## 2022-03-16 LAB — COMPLEXED PSA SERPL-MCNC: 16.5 NG/ML (ref 0–4)

## 2022-03-16 PROCEDURE — 84153 ASSAY OF PSA TOTAL: CPT | Performed by: UROLOGY

## 2022-03-16 PROCEDURE — 36415 COLL VENOUS BLD VENIPUNCTURE: CPT | Performed by: UROLOGY

## 2022-03-23 ENCOUNTER — OFFICE VISIT (OUTPATIENT)
Dept: UROLOGY | Facility: CLINIC | Age: 67
End: 2022-03-23
Payer: MEDICARE

## 2022-03-23 VITALS
DIASTOLIC BLOOD PRESSURE: 49 MMHG | HEART RATE: 66 BPM | BODY MASS INDEX: 25.89 KG/M2 | HEIGHT: 72 IN | WEIGHT: 191.13 LBS | SYSTOLIC BLOOD PRESSURE: 91 MMHG

## 2022-03-23 DIAGNOSIS — N40.1 BPH WITH URINARY OBSTRUCTION: ICD-10-CM

## 2022-03-23 DIAGNOSIS — N13.8 BPH WITH URINARY OBSTRUCTION: ICD-10-CM

## 2022-03-23 DIAGNOSIS — N40.2 PROSTATE NODULE: ICD-10-CM

## 2022-03-23 DIAGNOSIS — R97.20 ELEVATED PSA: Primary | ICD-10-CM

## 2022-03-23 PROCEDURE — 99213 PR OFFICE/OUTPT VISIT, EST, LEVL III, 20-29 MIN: ICD-10-PCS | Mod: S$PBB,,, | Performed by: UROLOGY

## 2022-03-23 PROCEDURE — 99999 PR PBB SHADOW E&M-EST. PATIENT-LVL IV: ICD-10-PCS | Mod: PBBFAC,,, | Performed by: UROLOGY

## 2022-03-23 PROCEDURE — 99214 OFFICE O/P EST MOD 30 MIN: CPT | Mod: PBBFAC | Performed by: UROLOGY

## 2022-03-23 PROCEDURE — 99999 PR PBB SHADOW E&M-EST. PATIENT-LVL IV: CPT | Mod: PBBFAC,,, | Performed by: UROLOGY

## 2022-03-23 PROCEDURE — 99213 OFFICE O/P EST LOW 20 MIN: CPT | Mod: S$PBB,,, | Performed by: UROLOGY

## 2022-03-23 NOTE — PROGRESS NOTES
CHIEF COMPLAINT:    Mr. Spangler is a 66 y.o. male presenting with an elevated PSA.    PRESENTING ILLNESS:    Wero Spangler is a 66 y.o. male with an elevated PSA.  He has had multiple biopsies done.  One was in 2012 when his PSA was 10.14.  There was also a prostate nodule at that time.  Most recent one was 8/23/16 when his PSA was 18.1.  This was a cognitive fusion biopsy.    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  Path returned an oncocytoma.    He also has LUTS.  He's s/p rezum on 5/14/19.  He's voiding much better.  Good FOS. However, he does c/o irritative symptoms that are worsened by caffeine and alcohol. He was given ditropan at our last visit.  However, he didn't take it.  He reduced his caffeine and eliminated alcohol, and his LUTS decreased.  Now pleased with how he voids.  Nocturia x 2.    He denies ED.    REVIEW OF SYSTEMS:    Wero Spangler denies chest pain,sore throat, headache, blurred vision, fever, nausea, vomiting, chills, flank discomfort, abdominal pain, bleeding per rectum, cough, SOB, recent loss of consciousness, recent mental status changes, seizures, dizziness, or upper or lower extremity weakness.    JOSE MANUEL  1. 3  2. 3  3. 3  4. 4  5. 4     PATIENT HISTORY:    Past Medical History:   Diagnosis Date    Aftercataract     Allergy     BPH (benign prostatic hyperplasia)     Cataract     Corneal dystrophy     Elevated PSA     Enlarged prostate     Fatty liver     Fuchs' corneal dystrophy     Gallstones     General anesthetics causing adverse effect in therapeutic use 2000    delayed emergence after back surgery    GERD (gastroesophageal reflux disease)     HTN (hypertension) 9/24/2013    Hypertension     Insomnia     Prostate nodule     Urinary tract infection        Past Surgical History:   Procedure Laterality Date    BACK SURGERY  4/2000    CATARACT EXTRACTION W/  INTRAOCULAR LENS IMPLANT      Both Eyes    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     Procedure: TRANSPLANT, CORNEA;  Surgeon: Vu Wilson MD;  Location: [facility];  Service: Ophthalmology;  Laterality: Right;  DMEK    EYE SURGERY      LAPAROSCOPIC MARSUPIALIZATION OF CYST OF KIDNEY      PROSTATE SURGERY      prostate biopsy benign    TONSILLECTOMY      VASECTOMY         Family History   Problem Relation Age of Onset    Cancer Mother         breast    Prostate cancer Brother     Cancer Brother         prostate    Macular degeneration Maternal Grandmother     Cancer Other     Cancer Other     Amblyopia Neg Hx     Blindness Neg Hx     Cataracts Neg Hx     Glaucoma Neg Hx     Retinal detachment Neg Hx     Strabismus Neg Hx        Social History     Socioeconomic History    Marital status:    Tobacco Use    Smoking status: Former Smoker     Quit date:      Years since quittin.    Smokeless tobacco: Never Used   Substance and Sexual Activity    Alcohol use: Yes     Alcohol/week: 1.0 standard drink     Types: 1 Glasses of wine per week     Comment: 2 beers three times a week     Drug use: No    Sexual activity: Yes     Partners: Female     Social Determinants of Health     Financial Resource Strain: Low Risk     Difficulty of Paying Living Expenses: Not hard at all   Food Insecurity: No Food Insecurity    Worried About Running Out of Food in the Last Year: Never true    Ran Out of Food in the Last Year: Never true   Transportation Needs: No Transportation Needs    Lack of Transportation (Medical): No    Lack of Transportation (Non-Medical): No   Physical Activity: Insufficiently Active    Days of Exercise per Week: 3 days    Minutes of Exercise per Session: 30 min   Stress: No Stress Concern Present    Feeling of Stress : Only a little   Social Connections: Unknown    Frequency of Communication with Friends and Family: More than three times a week    Frequency of Social Gatherings with Friends and Family: Once a week    Active Member of Clubs or  Organizations: No    Attends Club or Organization Meetings: Never    Marital Status: Living with partner       Allergies:  Patient has no known allergies.    Medications:    Current Outpatient Medications:     ALPRAZolam (XANAX) 0.5 MG tablet, TAKE 1 TABLET BY MOUTH EVERY NIGHT, Disp: 30 tablet, Rfl: 1    fenofibrate (TRICOR) 54 MG tablet, Take 1 tablet (54 mg total) by mouth once daily., Disp: 30 tablet, Rfl: 11    OMEPRAZOLE (PRILOSEC ORAL), Take 20 mg by mouth every morning. , Disp: , Rfl:     sodium chloride 2% (BARBI 128) 2 % ophthalmic solution, 1 drop as needed (takes 3-4 times/day)., Disp: , Rfl:     timolol maleate 0.5% (TIMOPTIC) 0.5 % Drop, PLACE 1 DROP INTO THE RIGHT EYE TWICE DAILY, Disp: 5 mL, Rfl: 3    triamterene-hydrochlorothiazide 37.5-25 mg (MAXZIDE-25) 37.5-25 mg per tablet, TAKE 1 TABLET BY MOUTH EVERY DAY, Disp: 90 tablet, Rfl: 3    Current Facility-Administered Medications:     acetaminophen tablet 650 mg, 650 mg, Oral, Once PRN, Darren Sawant MD    albuterol inhaler 2 puff, 2 puff, Inhalation, Q20 Min PRN, Darren Sawant MD    diphenhydrAMINE injection 25 mg, 25 mg, Intravenous, Once PRN, Darren Sawant MD    EPINEPHrine 0.1 mg/mL injection 0.3 mg, 0.3 mg, Intramuscular, PRN, Darren Sawant MD    methylPREDNISolone sodium succinate injection 40 mg, 40 mg, Intravenous, Once PRN, Darren Sawant MD    ondansetron disintegrating tablet 4 mg, 4 mg, Oral, Once PRN, Darren Sawant MD    sodium chloride 0.9% 500 mL flush bag, , Intravenous, PRN, Darren Sawant MD    sodium chloride 0.9% flush 10 mL, 10 mL, Intravenous, PRN, Darren Sawant MD    PHYSICAL EXAMINATION:    The patient generally appears in good health, is appropriately interactive, and is in no apparent distress.     Eyes: anicteric sclerae, moist conjunctivae; no lid-lag; PERRLA     HENT: Atraumatic; oropharynx clear with moist mucous membranes and no mucosal ulcerations;normal hard and  soft palate.  No evidence of lymphadenopathy.    Neck: Trachea midline.  No thyromegaly.    Musculoskeletal: No abnormal gait.    Skin: No lesions.    Mental: Cooperative with normal affect.  Is oriented to time, place, and person.    Neuro: Grossly intact.    Chest: Normal inspiratory effort.   No accessory muscles.  No audible wheezes.  Respirations symmetric on inspiration and expiration.    Heart: Regular rhythm.      Abdomen:  Soft, non-tender. No masses or organomegaly. Bladder is not palpable. No evidence of flank discomfort. No evidence of inguinal hernia.    Genitourinary: The penis is not circumcised with no evidence of plaques or induration. The urethral meatus is normal. The testes, epididymides, and cord structures are normal in size and contour bilaterally. The scrotum is normal in size and contour.    Normal anal sphincter tone. No rectal mass.    The prostate is 40 g. Normal landmarks. Lateral sulci. Median furrow intact. There is a 1.5 cm x 1.5 cm nodule in the midportion of the gland. Stable.  Seminal vesicles are normal.    Extremities: No clubbing, cyanosis, or edema      LABS:    PVR done immediately after voiding by my nurse is 21 cc.   Lab Results   Component Value Date    PSA 14.0 (H) 09/22/2014    PSA 15.48 (H) 08/16/2013    PSA 11.06 (H) 02/25/2013    PSADIAG 16.5 (H) 03/16/2022    PSADIAG 13.3 (H) 08/20/2021    PSADIAG 13.7 (H) 02/03/2021    PSATOTAL 6.8 (H) 07/12/2011    PSATOTAL 7.9 (H) 01/11/2011    PSAFREE 1.07 07/12/2011    PSAFREE 1.67 (H) 01/11/2011    PSAFREEPCT 15.74 07/12/2011    PSAFREEPCT 21.14 01/11/2011        IMPRESSION:    Encounter Diagnoses   Name Primary?    Elevated PSA Yes    BPH with urinary obstruction     Prostate nodule        PLAN:    1. Will observe his LUTS as they don't bother him.   2. Went over his PSA.   Discussed that it has increased.  He'd like to watch it for now.  3. RTC 6 months with a PSA.      Copy to:

## 2022-04-01 ENCOUNTER — PATIENT MESSAGE (OUTPATIENT)
Dept: UROLOGY | Facility: CLINIC | Age: 67
End: 2022-04-01
Payer: MEDICARE

## 2022-04-01 ENCOUNTER — TELEPHONE (OUTPATIENT)
Dept: UROLOGY | Facility: CLINIC | Age: 67
End: 2022-04-01
Payer: MEDICARE

## 2022-04-01 NOTE — TELEPHONE ENCOUNTER
----- Message from Paola Dumont sent at 4/1/2022 11:34 AM CDT -----  Contact: @[phone]  Pt requesting a call back regarding something being wrong with him since he woke up at 4am he is urinating every 10 minutes. Please call to discuss further as patient has no idea why this is happening.

## 2022-04-11 RX ORDER — ALPRAZOLAM 0.5 MG/1
TABLET ORAL
Qty: 30 TABLET | Refills: 1 | Status: SHIPPED | OUTPATIENT
Start: 2022-04-11 | End: 2022-07-22 | Stop reason: SDUPTHER

## 2022-04-11 NOTE — TELEPHONE ENCOUNTER
No new care gaps identified.  Powered by [employer]. Reference number: [id number].   4/11/2022 8:12:23 AM CDT

## 2022-04-18 ENCOUNTER — TELEPHONE (OUTPATIENT)
Dept: OPHTHALMOLOGY | Facility: CLINIC | Age: 67
End: 2022-04-18
Payer: MEDICARE

## 2022-04-21 ENCOUNTER — TELEPHONE (OUTPATIENT)
Dept: OPHTHALMOLOGY | Facility: CLINIC | Age: 67
End: 2022-04-21
Payer: MEDICARE

## 2022-05-06 ENCOUNTER — PES CALL (OUTPATIENT)
Dept: ADMINISTRATIVE | Facility: CLINIC | Age: 67
End: 2022-05-06
Payer: MEDICARE

## 2022-05-12 ENCOUNTER — PATIENT MESSAGE (OUTPATIENT)
Dept: INTERNAL MEDICINE | Facility: CLINIC | Age: 67
End: 2022-05-12
Payer: MEDICARE

## 2022-05-12 RX ORDER — TRIAMTERENE/HYDROCHLOROTHIAZID 37.5-25 MG
1 TABLET ORAL DAILY
Qty: 4 TABLET | Refills: 0 | Status: SHIPPED | OUTPATIENT
Start: 2022-05-12 | End: 2022-05-12 | Stop reason: SDUPTHER

## 2022-05-12 RX ORDER — TRIAMTERENE/HYDROCHLOROTHIAZID 37.5-25 MG
1 TABLET ORAL DAILY
Qty: 90 TABLET | Refills: 3 | Status: SHIPPED | OUTPATIENT
Start: 2022-05-12 | End: 2022-08-08 | Stop reason: SDUPTHER

## 2022-05-12 NOTE — TELEPHONE ENCOUNTER
No new care gaps identified.  [facility] Embedded Care Gaps. Reference number: [id number]. 5/12/2022   2:08:28 PM CDT

## 2022-05-13 ENCOUNTER — PATIENT MESSAGE (OUTPATIENT)
Dept: INTERNAL MEDICINE | Facility: CLINIC | Age: 67
End: 2022-05-13
Payer: MEDICARE

## 2022-05-31 ENCOUNTER — PATIENT MESSAGE (OUTPATIENT)
Dept: INTERNAL MEDICINE | Facility: CLINIC | Age: 67
End: 2022-05-31
Payer: MEDICARE

## 2022-06-01 ENCOUNTER — TELEPHONE (OUTPATIENT)
Dept: ELECTROPHYSIOLOGY | Facility: CLINIC | Age: 67
End: 2022-06-01
Payer: MEDICARE

## 2022-06-01 NOTE — TELEPHONE ENCOUNTER
Called pt to confirm appt on tomorrow starting at 8 am. No answer, left message and call back number.

## 2022-06-02 ENCOUNTER — HOSPITAL ENCOUNTER (OUTPATIENT)
Dept: CARDIOLOGY | Facility: CLINIC | Age: 67
Discharge: HOME | End: 2022-06-02
Payer: MEDICARE

## 2022-06-02 ENCOUNTER — OFFICE VISIT (OUTPATIENT)
Dept: ELECTROPHYSIOLOGY | Facility: CLINIC | Age: 67
End: 2022-06-02
Payer: MEDICARE

## 2022-06-02 VITALS
SYSTOLIC BLOOD PRESSURE: 137 MMHG | DIASTOLIC BLOOD PRESSURE: 67 MMHG | HEIGHT: 72 IN | WEIGHT: 187.38 LBS | BODY MASS INDEX: 25.38 KG/M2 | HEART RATE: 57 BPM

## 2022-06-02 DIAGNOSIS — I49.8 VENTRICULAR BIGEMINY: ICD-10-CM

## 2022-06-02 DIAGNOSIS — K21.9 GASTROESOPHAGEAL REFLUX DISEASE WITHOUT ESOPHAGITIS: ICD-10-CM

## 2022-06-02 DIAGNOSIS — J30.2 SEASONAL ALLERGIES: ICD-10-CM

## 2022-06-02 DIAGNOSIS — R97.20 ELEVATED PSA: ICD-10-CM

## 2022-06-02 DIAGNOSIS — E78.2 MIXED HYPERLIPIDEMIA: ICD-10-CM

## 2022-06-02 DIAGNOSIS — I10 ESSENTIAL HYPERTENSION: ICD-10-CM

## 2022-06-02 DIAGNOSIS — I49.3 PVCS (PREMATURE VENTRICULAR CONTRACTIONS): Primary | ICD-10-CM

## 2022-06-02 DIAGNOSIS — K21.9 GASTROESOPHAGEAL REFLUX DISEASE, UNSPECIFIED WHETHER ESOPHAGITIS PRESENT: ICD-10-CM

## 2022-06-02 DIAGNOSIS — M17.0 OSTEOARTHRITIS OF BOTH KNEES, UNSPECIFIED OSTEOARTHRITIS TYPE: ICD-10-CM

## 2022-06-02 DIAGNOSIS — M17.10 PRIMARY LOCALIZED OSTEOARTHROSIS OF LOWER LEG, UNSPECIFIED LATERALITY: ICD-10-CM

## 2022-06-02 DIAGNOSIS — Z87.19 HISTORY OF GALLSTONES: ICD-10-CM

## 2022-06-02 DIAGNOSIS — N40.1 BPH WITH URINARY OBSTRUCTION: ICD-10-CM

## 2022-06-02 DIAGNOSIS — H26.9 CATARACT, UNSPECIFIED CATARACT TYPE, UNSPECIFIED LATERALITY: ICD-10-CM

## 2022-06-02 DIAGNOSIS — N13.8 BPH WITH URINARY OBSTRUCTION: ICD-10-CM

## 2022-06-02 DIAGNOSIS — N40.0 BENIGN PROSTATIC HYPERPLASIA, UNSPECIFIED WHETHER LOWER URINARY TRACT SYMPTOMS PRESENT: ICD-10-CM

## 2022-06-02 PROCEDURE — 99999 PR PBB SHADOW E&M-EST. PATIENT-LVL III: ICD-10-PCS | Mod: PBBFAC,,, | Performed by: STUDENT IN AN ORGANIZED HEALTH CARE EDUCATION/TRAINING PROGRAM

## 2022-06-02 PROCEDURE — 99213 OFFICE O/P EST LOW 20 MIN: CPT | Mod: PBBFAC | Performed by: STUDENT IN AN ORGANIZED HEALTH CARE EDUCATION/TRAINING PROGRAM

## 2022-06-02 PROCEDURE — 99214 PR OFFICE/OUTPT VISIT, EST, LEVL IV, 30-39 MIN: ICD-10-PCS | Mod: S$PBB,,, | Performed by: STUDENT IN AN ORGANIZED HEALTH CARE EDUCATION/TRAINING PROGRAM

## 2022-06-02 PROCEDURE — 99999 PR PBB SHADOW E&M-EST. PATIENT-LVL III: CPT | Mod: PBBFAC,,, | Performed by: STUDENT IN AN ORGANIZED HEALTH CARE EDUCATION/TRAINING PROGRAM

## 2022-06-02 PROCEDURE — 99214 OFFICE O/P EST MOD 30 MIN: CPT | Mod: S$PBB,,, | Performed by: STUDENT IN AN ORGANIZED HEALTH CARE EDUCATION/TRAINING PROGRAM

## 2022-06-02 PROCEDURE — 93005 ELECTROCARDIOGRAM TRACING: CPT | Mod: PBBFAC | Performed by: INTERNAL MEDICINE

## 2022-06-02 PROCEDURE — 93010 RHYTHM STRIP: ICD-10-PCS | Mod: S$PBB,,, | Performed by: INTERNAL MEDICINE

## 2022-06-02 PROCEDURE — 93010 ELECTROCARDIOGRAM REPORT: CPT | Mod: S$PBB,,, | Performed by: INTERNAL MEDICINE

## 2022-06-02 NOTE — PROGRESS NOTES
Electrophysiology Clinic Note    Reason for follow-up patient visit: Ongoing evaluation and recommendations regarding frequent PVCs noted on telemetry in the Emergency Department during a prior presentation with COVID.     PRESENTING HISTORY:     History of Present Illness:  Mr. Wero Spangler is a cherelle 66-year-old gentleman who returns to clinic today for ongoing evaluation and recommendations regarding frequent PVCs noted on telemetry in the Emergency Department during a prior presentation with COVID-related symptoms. He was diagnosed with COVID 1/15/2021, with symptoms of shortness of breath, cough, fevers, nausea, and severe fatigue. These symptoms prompted him to visit the ED, where he was noted to have occasional PVCs on telemetry, and was referred to see EP for further evaluation. He has a past medical history significant for hypertension, hyperlipidemia, prostate hyperplasia with elevated PSA, bilateral knee osteoarthritis, cataract, history of gallstones, and GERD.     In discussion with Mr. Spangler, he initially felt symptoms of cough and shortness of breath on 1/9/2021 and was fairly certain he had caught COVID, as many people in his surrounding area had become COVID positive. He stayed home and was quarantining when his cough became progressively worse, and he became concerned with the progression of his shortness of breath. He presented to Urgent Care for evaluation 1/15/2021, and was then transferred to the Emergency Department when he was noted to be COVID positive for ongoing assessment. At that visit, he was noted to have periods of bigeminy on telemetry, with frequent PVCs when not in a bigeminal pattern. He was determined to have appropriate oxygenation and was recommended to return to his home quarantine, and was referred into EP clinic for ongoing assessment of his PVCs. At our prior appointments, we ordered a resting echocardiogram that revealed preserved systolic function with LVEF 55%,  in addition to a Holter monitor that revealed a 4% burden of PVCs. Since he has recovered from COVID, he is no longer experiencing PVCs.     In discussion with Mr. Spangler, he tells me that he is feeling significantly better since recovering from COVID. Early in the course of his COVID infection, he was experiencing chest discomfort, shortness of breath, significant dyspnea on exertion, productive cough, fevers, and fatigue. He has since returned to his baseline level of health. As the weather has gotten warmer, he has been able to be more active outside. He reports that his right knee has been bothering him recently; he previously underwent knee injections with significant improvement in his knee pain and is scheduled to meet again with his orthopod for considerations for repeat injections. He denies any dizziness, lightheadedness, syncope/presyncope, chest pain or discomfort, palpitations, nausea/vomiting, C/D, lower extremity edema, PND, or orthopnea. He has gradually resumed physical activity, and is working up to his prior baseline of walking 2-3 miles each day.          Review of Systems:  Review of Systems   Constitutional: Negative for activity change.   HENT: Negative for nasal congestion, nosebleeds, postnasal drip, rhinorrhea, sinus pressure/congestion, sneezing and sore throat.    Eyes: Negative for visual disturbance.   Respiratory: Positive for shortness of breath. Negative for apnea, cough, chest tightness and wheezing.    Cardiovascular: Negative for chest pain, palpitations, leg swelling and claudication.   Gastrointestinal: Negative for abdominal distention, abdominal pain, blood in stool, change in bowel habit, constipation, diarrhea, nausea, vomiting and change in bowel habit.   Genitourinary: Negative for dysuria and hematuria.   Musculoskeletal: Positive for arthralgias. Negative for gait problem.        Occasional right knee pain.   Neurological: Negative for dizziness, seizures, syncope,  weakness, light-headedness, headaches, disturbances in coordination and coordination difficulties.        PAST HISTORY:     Past Medical History:   Diagnosis Date    Aftercataract     Allergy     BPH (benign prostatic hyperplasia)     Cataract     Corneal dystrophy     Elevated PSA     Enlarged prostate     Fatty liver     Fuchs' corneal dystrophy     Gallstones     General anesthetics causing adverse effect in therapeutic use 2000    delayed emergence after back surgery    GERD (gastroesophageal reflux disease)     HTN (hypertension) 9/24/2013    Hypertension     Insomnia     Prostate nodule     Urinary tract infection        Past Surgical History:   Procedure Laterality Date    BACK SURGERY  4/2000    CATARACT EXTRACTION W/  INTRAOCULAR LENS IMPLANT      Both Eyes    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    Procedure: TRANSPLANT, CORNEA;  Surgeon: Vu Wilson MD;  Location: [facility];  Service: Ophthalmology;  Laterality: Right;  DMEK    EYE SURGERY      LAPAROSCOPIC MARSUPIALIZATION OF CYST OF KIDNEY      PROSTATE SURGERY      prostate biopsy benign    TONSILLECTOMY      VASECTOMY         Family History:  Family History   Problem Relation Age of Onset    Cancer Mother         breast    Prostate cancer Brother     Cancer Brother         prostate    Macular degeneration Maternal Grandmother     Cancer Other     Cancer Other     Amblyopia Neg Hx     Blindness Neg Hx     Cataracts Neg Hx     Glaucoma Neg Hx     Retinal detachment Neg Hx     Strabismus Neg Hx        Social History:  He  reports that he quit smoking about 22 years ago. He has never used smokeless tobacco. He reports current alcohol use of about 1.0 standard drink of alcohol per week. He reports that he does not use drugs.      MEDICATIONS & ALLERGIES:     Review of patient's allergies indicates:  No Known Allergies    Current Outpatient Medications on File Prior to Visit   Medication Sig Dispense Refill     ALPRAZolam (XANAX) 0.5 MG tablet TAKE 1 TABLET BY MOUTH EVERY NIGHT 30 tablet 1    fenofibrate (TRICOR) 54 MG tablet Take 1 tablet (54 mg total) by mouth once daily. 30 tablet 11    OMEPRAZOLE (PRILOSEC ORAL) Take 20 mg by mouth every morning.       sodium chloride 2% (BARBI 128) 2 % ophthalmic solution 1 drop as needed (takes 3-4 times/day).      timolol maleate 0.5% (TIMOPTIC) 0.5 % Drop PLACE 1 DROP INTO THE RIGHT EYE TWICE DAILY 5 mL 3    triamterene-hydrochlorothiazide 37.5-25 mg (MAXZIDE-25) 37.5-25 mg per tablet Take 1 tablet by mouth once daily. 90 tablet 3     Current Facility-Administered Medications on File Prior to Visit   Medication Dose Route Frequency Provider Last Rate Last Admin    acetaminophen tablet 650 mg  650 mg Oral Once PRN Darren Sawant MD        albuterol inhaler 2 puff  2 puff Inhalation Q20 Min PRN Darren Sawant MD        diphenhydrAMINE injection 25 mg  25 mg Intravenous Once PRN Darren Sawant MD        EPINEPHrine 0.1 mg/mL injection 0.3 mg  0.3 mg Intramuscular PRN Darren Sawant MD        methylPREDNISolone sodium succinate injection 40 mg  40 mg Intravenous Once PRN Darren Sawant MD        ondansetron disintegrating tablet 4 mg  4 mg Oral Once PRN Darren Sawant MD        sodium chloride 0.9% 500 mL flush bag   Intravenous PRN Darren Sawant MD        sodium chloride 0.9% flush 10 mL  10 mL Intravenous PRN Darren Sawant MD            OBJECTIVE:     Vital Signs:  /67   Pulse (!) 57   Ht 6' (1.829 m)   Wt 85 kg (187 lb 6.3 oz)   BMI 25.41 kg/m²     Physical Exam:  Physical Exam  Constitutional:       General: He is not in acute distress.     Appearance: Normal appearance. He is not ill-appearing or diaphoretic.      Comments: Well-appearing man in NAD.   HENT:      Head: Normocephalic and atraumatic.      Comments: Mask worn in clinic in the setting of COVID protocol.   Eyes:      Pupils: Pupils are equal, round, and reactive  to light.   Cardiovascular:      Rate and Rhythm: Regular rhythm. Bradycardia present.      Pulses: Normal pulses.      Heart sounds: Normal heart sounds. No murmur heard.    No friction rub. No gallop.   Pulmonary:      Effort: Pulmonary effort is normal. No respiratory distress.      Breath sounds: Normal breath sounds. No wheezing, rhonchi or rales.   Chest:      Chest wall: No tenderness.   Abdominal:      General: There is no distension.      Palpations: Abdomen is soft.      Tenderness: There is no abdominal tenderness.   Musculoskeletal:         General: No swelling or tenderness.      Cervical back: Normal range of motion.      Right lower leg: No edema.      Left lower leg: No edema.   Skin:     General: Skin is warm and dry.      Findings: No erythema, lesion or rash.   Neurological:      General: No focal deficit present.      Mental Status: He is alert and oriented to person, place, and time. Mental status is at baseline.      Motor: No weakness.      Gait: Gait normal.   Psychiatric:         Mood and Affect: Mood normal.         Behavior: Behavior normal.        Laboratory Data:  Lab Results   Component Value Date    WBC 5.08 07/22/2021    HGB 13.9 (L) 07/22/2021    HCT 40.9 07/22/2021    MCV 87 07/22/2021     07/22/2021     Lab Results   Component Value Date     07/22/2021     07/22/2021    K 3.9 07/22/2021     07/22/2021    CO2 29 07/22/2021    BUN 19 07/22/2021    CREATININE 1.1 07/22/2021    CALCIUM 9.2 07/22/2021    MG 2.0 01/15/2021     Lab Results   Component Value Date    INR 1.1 11/02/2017     Pertinent Cardiac Data:  ECG: Sinus bradycardia with rate of 57 bpm,  ms, RBBB with  ms, QT/QTc 446/434 ms.    ECG while in the ED - 1/15/2021: Sinus rhythm with rate of 81 bpm, OR 142ms,  ms with RBBB morphology, QT/QTc 448/520ms. Frequent monomorphic PVCs are noted, at times in bigeminal pattern.     Resting 2D Transthoracic Echocardiogram - 2/3/2021:    · The left ventricle is normal in size with normal systolic function. The estimated ejection fraction is 55%  · Moderate left atrial enlargement.  · Normal right ventricular size with normal right ventricular systolic function.  · Indeterminate left ventricular diastolic function.  · The estimated PA systolic pressure is 37 mmHg.  · Elevated central venous pressure (15 mmHg).    48-Hour Holter Monitor - 2/2/2021:   Sinus rhythm with 4.2% PVC burden and no symptoms.    ASSESSMENT & PLAN:   Mr. Wero Spangler is a cherelle 66-year-old gentleman who returns to clinic today for ongoing evaluation and recommendations regarding frequent PVCs noted on telemetry in the Emergency Department during a prior presentation with COVID-related symptoms. He was diagnosed with COVID 1/15/2021, with symptoms of shortness of breath, cough, fevers, nausea, and severe fatigue. These symptoms prompted him to visit the ED, where he was noted to have occasional PVCs on telemetry, and was referred to see EP for further evaluation. He has a past medical history significant for hypertension, hyperlipidemia, prostate hyperplasia with elevated PSA, bilateral knee osteoarthritis, cataract, history of gallstones, and GERD. On ECG review, his PVCs were likely outflow in origin, positive in II, III, aVF, positive in V1, negative in I, aVL, and aVR, and positive across the precordial leads, most likely consistent with localization to the left coronary cusp or AMC. He remains entirely asymptomatic from his PVCs, and feels that as he has recovered from COVID he is no longer having PVCs. His systolic function remains preserved with LVEF 55%, and he has no recorded PVCs on an in-office rhythm strip today.     - We continue to discuss the pathophysiology of premature ventricular contractions and the potential deleterious effects of ventricular ectopy. We discussed the possibility that should they occur with a great enough frequency (generally greater  than 25% burden), they may decrease the overall systolic function of the heart. With his previous burden of 4.2%, this would be unlikely to cause cardiomyopathy, and his resting echocardiogram confirms preservation of his systolic function.   - We discussed that his PVCs may have increased in frequency in the setting of his prior COVID infection. He is no longer having frequent PVCs after recovery from his COVID infection.  - We reviewed the results of his prior resting 2D transthoracic echocardiogram an Holter monitoring in clinic today. He declines undergoing a subsequent monitor, with serial ECGs and in-office rhythm strips revealing no further PVCs.   - Should he evidence future systolic compromise, a higher burden of PVCs, or development of symptoms related to his PVCs, we may need to consider medication for PVC suppression (verapamil) versus consideration for catheter ablation. Presently, he does not wish to start medication, as he remains asymptomatic with no further PVCs. He voices understanding.    This gentleman will return to clinic with me PRN in the event his PVCs return. He was informed that should he develop recurrent PVCs, or any symptoms in relation to his PVCs, that he should call into the clinic to schedule an appointment. All questions and concerns were addressed at this visit.     Signing Physician:       JULIA Warner MD  Electrophysiology Attending

## 2022-06-05 PROBLEM — I49.3 PVCS (PREMATURE VENTRICULAR CONTRACTIONS): Status: RESOLVED | Noted: 2021-02-01 | Resolved: 2022-06-05

## 2022-06-15 ENCOUNTER — OFFICE VISIT (OUTPATIENT)
Dept: OPHTHALMOLOGY | Facility: CLINIC | Age: 67
End: 2022-06-15
Attending: OPHTHALMOLOGY
Payer: MEDICARE

## 2022-06-15 DIAGNOSIS — Z94.7 STATUS POST CORNEAL TRANSPLANT: ICD-10-CM

## 2022-06-15 DIAGNOSIS — H18.513 FUCHS' CORNEAL DYSTROPHY OF BOTH EYES: Primary | ICD-10-CM

## 2022-06-15 PROCEDURE — 99999 PR PBB SHADOW E&M-EST. PATIENT-LVL II: CPT | Mod: PBBFAC,,, | Performed by: OPHTHALMOLOGY

## 2022-06-15 PROCEDURE — 99999 PR PBB SHADOW E&M-EST. PATIENT-LVL II: ICD-10-PCS | Mod: PBBFAC,,, | Performed by: OPHTHALMOLOGY

## 2022-06-15 PROCEDURE — 92014 PR EYE EXAM, EST PATIENT,COMPREHESV: ICD-10-PCS | Mod: S$PBB,,, | Performed by: OPHTHALMOLOGY

## 2022-06-15 PROCEDURE — 92014 COMPRE OPH EXAM EST PT 1/>: CPT | Mod: S$PBB,,, | Performed by: OPHTHALMOLOGY

## 2022-06-15 PROCEDURE — 99212 OFFICE O/P EST SF 10 MIN: CPT | Mod: PBBFAC | Performed by: OPHTHALMOLOGY

## 2022-06-15 NOTE — PROGRESS NOTES
HPI     Patient here to discuss DMEK OS  Feels like VA not as sharp OS  No pain or discomfort    Olya 128 QID OS  FML BID OD  Timolol BID OD      DMEK OD 11/21/2019.   Fuch's K dystrophy     Last edited by Tati Kumar on 6/15/2022  9:33 AM. (History)            Assessment /Plan     For exam results, see Encounter Report.    Fuchs' corneal dystrophy of both eyes    Status post corneal transplant        OD DMEK graft attached and clear 2019. Signs and symptoms of graft rejection reviewed.  Myopic with distance only Rx...    OS Fuchs stable  IOP good on meds, so Continue current treatment/medications

## 2022-06-16 ENCOUNTER — PATIENT MESSAGE (OUTPATIENT)
Dept: UROLOGY | Facility: CLINIC | Age: 67
End: 2022-06-16
Payer: MEDICARE

## 2022-06-20 ENCOUNTER — LAB VISIT (OUTPATIENT)
Dept: LAB | Facility: HOSPITAL | Age: 67
End: 2022-06-20
Attending: UROLOGY
Payer: MEDICARE

## 2022-06-20 ENCOUNTER — OFFICE VISIT (OUTPATIENT)
Dept: UROLOGY | Facility: CLINIC | Age: 67
End: 2022-06-20
Payer: MEDICARE

## 2022-06-20 VITALS
BODY MASS INDEX: 24.18 KG/M2 | SYSTOLIC BLOOD PRESSURE: 123 MMHG | DIASTOLIC BLOOD PRESSURE: 71 MMHG | HEART RATE: 70 BPM | WEIGHT: 178.56 LBS | HEIGHT: 72 IN

## 2022-06-20 DIAGNOSIS — N40.2 PROSTATE NODULE: ICD-10-CM

## 2022-06-20 DIAGNOSIS — N40.1 BPH WITH URINARY OBSTRUCTION: ICD-10-CM

## 2022-06-20 DIAGNOSIS — N52.01 ERECTILE DYSFUNCTION DUE TO ARTERIAL INSUFFICIENCY: ICD-10-CM

## 2022-06-20 DIAGNOSIS — N13.8 BPH WITH URINARY OBSTRUCTION: ICD-10-CM

## 2022-06-20 DIAGNOSIS — N52.01 ERECTILE DYSFUNCTION DUE TO ARTERIAL INSUFFICIENCY: Primary | ICD-10-CM

## 2022-06-20 DIAGNOSIS — R97.20 ELEVATED PSA: ICD-10-CM

## 2022-06-20 LAB — TESTOST SERPL-MCNC: 496 NG/DL (ref 304–1227)

## 2022-06-20 PROCEDURE — 84403 ASSAY OF TOTAL TESTOSTERONE: CPT | Performed by: UROLOGY

## 2022-06-20 PROCEDURE — 99999 PR PBB SHADOW E&M-EST. PATIENT-LVL IV: ICD-10-PCS | Mod: PBBFAC,,, | Performed by: UROLOGY

## 2022-06-20 PROCEDURE — 99999 PR PBB SHADOW E&M-EST. PATIENT-LVL IV: CPT | Mod: PBBFAC,,, | Performed by: UROLOGY

## 2022-06-20 PROCEDURE — 99214 PR OFFICE/OUTPT VISIT, EST, LEVL IV, 30-39 MIN: ICD-10-PCS | Mod: S$PBB,,, | Performed by: UROLOGY

## 2022-06-20 PROCEDURE — 99214 OFFICE O/P EST MOD 30 MIN: CPT | Mod: S$PBB,,, | Performed by: UROLOGY

## 2022-06-20 PROCEDURE — 99214 OFFICE O/P EST MOD 30 MIN: CPT | Mod: PBBFAC | Performed by: UROLOGY

## 2022-06-20 PROCEDURE — 36415 COLL VENOUS BLD VENIPUNCTURE: CPT | Performed by: UROLOGY

## 2022-06-20 RX ORDER — TADALAFIL 20 MG/1
20 TABLET ORAL DAILY
Qty: 10 TABLET | Refills: 11 | Status: SHIPPED | OUTPATIENT
Start: 2022-06-20 | End: 2022-11-02 | Stop reason: SDUPTHER

## 2022-06-20 NOTE — PROGRESS NOTES
CHIEF COMPLAINT:    Mr. Spangler is a 66 y.o. male presenting with an elevated PSA.    PRESENTING ILLNESS:    Wero Spangler is a 66 y.o. male with an elevated PSA.  He has had multiple biopsies done.  One was in 2012 when his PSA was 10.14.  There was also a prostate nodule at that time.  Most recent one was 8/23/16 when his PSA was 18.1.  This was a cognitive fusion biopsy.    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  Path returned an oncocytoma.    He also has LUTS.  He's s/p rezum on 5/14/19.  He's voiding much better.  Good FOS. However, he does c/o irritative symptoms that are worsened by caffeine and alcohol. He was given ditropan at our last visit.  However, he didn't take it.  He reduced his caffeine and eliminated alcohol, and his LUTS decreased.  Now pleased with how he voids.  Nocturia x 2.    He c/o ED.  He's never tried a PDE-5i.  Hasn't had a T drawn.    REVIEW OF SYSTEMS:    Wero Spangler denies chest pain,sore throat, headache, blurred vision, fever, nausea, vomiting, chills, flank discomfort, abdominal pain, bleeding per rectum, cough, SOB, recent loss of consciousness, recent mental status changes, seizures, dizziness, or upper or lower extremity weakness.    JOSE MANUEL  1. 3  2. 2  3. 2  4. 3  5. 2     PATIENT HISTORY:    Past Medical History:   Diagnosis Date    Aftercataract     Allergy     BPH (benign prostatic hyperplasia)     Cataract     Corneal dystrophy     Elevated PSA     Enlarged prostate     Fatty liver     Fuchs' corneal dystrophy     Gallstones     General anesthetics causing adverse effect in therapeutic use 2000    delayed emergence after back surgery    GERD (gastroesophageal reflux disease)     HTN (hypertension) 9/24/2013    Hypertension     Insomnia     Prostate nodule     Urinary tract infection        Past Surgical History:   Procedure Laterality Date    BACK SURGERY  4/2000    CATARACT EXTRACTION W/  INTRAOCULAR LENS IMPLANT      Both Eyes     CORNEAL TRANSPLANT Right 2019    Procedure: TRANSPLANT, CORNEA;  Surgeon: Vu Wilson MD;  Location: [facility];  Service: Ophthalmology;  Laterality: Right;  DMEK    EYE SURGERY      LAPAROSCOPIC MARSUPIALIZATION OF CYST OF KIDNEY      PROSTATE SURGERY      prostate biopsy benign    TONSILLECTOMY      VASECTOMY         Family History   Problem Relation Age of Onset    Cancer Mother         breast    Prostate cancer Brother     Cancer Brother         prostate    Macular degeneration Maternal Grandmother     Cancer Other     Cancer Other     Amblyopia Neg Hx     Blindness Neg Hx     Cataracts Neg Hx     Glaucoma Neg Hx     Retinal detachment Neg Hx     Strabismus Neg Hx        Social History     Socioeconomic History    Marital status:    Tobacco Use    Smoking status: Former Smoker     Quit date:      Years since quittin.4    Smokeless tobacco: Never Used   Substance and Sexual Activity    Alcohol use: Yes     Alcohol/week: 1.0 standard drink     Types: 1 Glasses of wine per week     Comment: 2 beers three times a week     Drug use: No    Sexual activity: Yes     Partners: Female     Social Determinants of Health     Financial Resource Strain: Low Risk     Difficulty of Paying Living Expenses: Not hard at all   Food Insecurity: No Food Insecurity    Worried About Running Out of Food in the Last Year: Never true    Ran Out of Food in the Last Year: Never true   Transportation Needs: No Transportation Needs    Lack of Transportation (Medical): No    Lack of Transportation (Non-Medical): No   Physical Activity: Insufficiently Active    Days of Exercise per Week: 3 days    Minutes of Exercise per Session: 40 min   Stress: No Stress Concern Present    Feeling of Stress : Only a little   Social Connections: Unknown    Frequency of Communication with Friends and Family: More than three times a week    Frequency of Social Gatherings with Friends and Family: Twice a  week    Active Member of Clubs or Organizations: No    Attends Club or Organization Meetings: Never    Marital Status:    Housing Stability: High Risk    Unable to Pay for Housing in the Last Year: No    Number of Places Lived in the Last Year: 5    Unstable Housing in the Last Year: No       Allergies:  Patient has no known allergies.    Medications:    Current Outpatient Medications:     ALPRAZolam (XANAX) 0.5 MG tablet, TAKE 1 TABLET BY MOUTH EVERY NIGHT, Disp: 30 tablet, Rfl: 1    fenofibrate (TRICOR) 54 MG tablet, Take 1 tablet (54 mg total) by mouth once daily., Disp: 30 tablet, Rfl: 11    OMEPRAZOLE (PRILOSEC ORAL), Take 20 mg by mouth every morning. , Disp: , Rfl:     sodium chloride 2% (BARBI 128) 2 % ophthalmic solution, 1 drop as needed (takes 3-4 times/day)., Disp: , Rfl:     timolol maleate 0.5% (TIMOPTIC) 0.5 % Drop, PLACE 1 DROP INTO THE RIGHT EYE TWICE DAILY, Disp: 5 mL, Rfl: 3    triamterene-hydrochlorothiazide 37.5-25 mg (MAXZIDE-25) 37.5-25 mg per tablet, Take 1 tablet by mouth once daily., Disp: 90 tablet, Rfl: 3    Current Facility-Administered Medications:     acetaminophen tablet 650 mg, 650 mg, Oral, Once PRN, Darren Sawant MD    albuterol inhaler 2 puff, 2 puff, Inhalation, Q20 Min PRN, Darren Sawant MD    diphenhydrAMINE injection 25 mg, 25 mg, Intravenous, Once PRN, Darren Sawant MD    EPINEPHrine 0.1 mg/mL injection 0.3 mg, 0.3 mg, Intramuscular, PRN, Darren Sawant MD    methylPREDNISolone sodium succinate injection 40 mg, 40 mg, Intravenous, Once PRN, Darren Sawant MD    ondansetron disintegrating tablet 4 mg, 4 mg, Oral, Once PRN, Darren Sawant MD    sodium chloride 0.9% 500 mL flush bag, , Intravenous, PRN, Darren Sawant MD    sodium chloride 0.9% flush 10 mL, 10 mL, Intravenous, PRN, Darren Sawant MD    PHYSICAL EXAMINATION:    The patient generally appears in good health, is appropriately interactive, and is in no  apparent distress.     Eyes: anicteric sclerae, moist conjunctivae; no lid-lag; PERRLA     HENT: Atraumatic; oropharynx clear with moist mucous membranes and no mucosal ulcerations;normal hard and soft palate.  No evidence of lymphadenopathy.    Neck: Trachea midline.  No thyromegaly.    Musculoskeletal: No abnormal gait.    Skin: No lesions.    Mental: Cooperative with normal affect.  Is oriented to time, place, and person.    Neuro: Grossly intact.    Chest: Normal inspiratory effort.   No accessory muscles.  No audible wheezes.  Respirations symmetric on inspiration and expiration.    Heart: Regular rhythm.      Abdomen:  Soft, non-tender. No masses or organomegaly. Bladder is not palpable. No evidence of flank discomfort. No evidence of inguinal hernia.    Genitourinary: The penis is not circumcised with no evidence of plaques or induration. The urethral meatus is normal. The testes, epididymides, and cord structures are normal in size and contour bilaterally. The scrotum is normal in size and contour.    Normal anal sphincter tone. No rectal mass.    The prostate is 40 g. Normal landmarks. Lateral sulci. Median furrow intact. There is a 1.5 cm x 1.5 cm nodule in the midportion of the gland. Stable.  Seminal vesicles are normal.    Extremities: No clubbing, cyanosis, or edema      LABS:    PVR done immediately after voiding by my nurse is 21 cc.   Lab Results   Component Value Date    PSA 14.0 (H) 09/22/2014    PSA 15.48 (H) 08/16/2013    PSA 11.06 (H) 02/25/2013    PSADIAG 16.5 (H) 03/16/2022    PSADIAG 13.3 (H) 08/20/2021    PSADIAG 13.7 (H) 02/03/2021    PSATOTAL 6.8 (H) 07/12/2011    PSATOTAL 7.9 (H) 01/11/2011    PSAFREE 1.07 07/12/2011    PSAFREE 1.67 (H) 01/11/2011    PSAFREEPCT 15.74 07/12/2011    PSAFREEPCT 21.14 01/11/2011        IMPRESSION:    Encounter Diagnoses   Name Primary?    Erectile dysfunction due to arterial insufficiency Yes    BPH with urinary obstruction     Elevated PSA     Prostate  nodule    HTN, stable    PLAN:    1. Will observe his LUTS as they don't bother him.   2. Discussed options for his ED (affected by above comorbidities). .  He'd like Cialis.  Side effects discussed.  A new Rx was given   3. Keep his appt to RTC  with a PSA.  4. Will check a T today.      Copy to:

## 2022-07-20 NOTE — PROGRESS NOTES
Subjective:       Patient ID: Wero Spangler is a 66 y.o. male.    Chief Complaint: Annual Exam    Patient here for annual assessment of medical problems including hypertension, history of BPH with elevated PSA, anxiety and insomnia.  66-year-old retired  comes in for follow-up of these problems.  Prescriptions renewed,  reviewed.  Medication refilled appropriately.  We will order labs and review. Walking for exercise, trying to lose weight.   Lost about 15 pounds in 1-2 years.     Review of Systems   Constitutional: Positive for unexpected weight change. Negative for activity change.   HENT: Negative for hearing loss, rhinorrhea and trouble swallowing.    Eyes: Negative for discharge and visual disturbance.   Respiratory: Negative for chest tightness and wheezing.    Cardiovascular: Negative for chest pain and palpitations.   Gastrointestinal: Negative for blood in stool, constipation, diarrhea and vomiting.   Endocrine: Negative for polydipsia and polyuria.   Genitourinary: Positive for urgency. Negative for difficulty urinating and hematuria.   Musculoskeletal: Positive for arthralgias. Negative for joint swelling and neck pain.   Neurological: Negative for weakness and headaches.   Psychiatric/Behavioral: Negative for confusion and dysphoric mood.           Past Medical History:   Diagnosis Date    Aftercataract     Allergy     BPH (benign prostatic hyperplasia)     Cataract     Corneal dystrophy     Elevated PSA     Enlarged prostate     Fatty liver     Fuchs' corneal dystrophy     Gallstones     General anesthetics causing adverse effect in therapeutic use 2000    delayed emergence after back surgery    GERD (gastroesophageal reflux disease)     HTN (hypertension) 9/24/2013    Hypertension     Insomnia     Prostate nodule     Urinary tract infection      Past Surgical History:   Procedure Laterality Date    BACK SURGERY  4/2000    CATARACT EXTRACTION W/  INTRAOCULAR  LENS IMPLANT      Both Eyes    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    Procedure: TRANSPLANT, CORNEA;  Surgeon: Vu Wilson MD;  Location: [facility];  Service: Ophthalmology;  Laterality: Right;  DMEK    EYE SURGERY      LAPAROSCOPIC MARSUPIALIZATION OF CYST OF KIDNEY      PROSTATE SURGERY      prostate biopsy benign    TONSILLECTOMY      VASECTOMY        Patient Active Problem List   Diagnosis    Seasonal allergies    Elevated PSA    BPH with urinary obstruction    Corneal dystrophy - Both Eyes    Aftercataract - Both Eyes    Prostate nodule    HTN (hypertension)    Insomnia    Primary localized osteoarthrosis, lower leg    Chondromalacia patellae    Fuchs' corneal dystrophy    Fatty liver    GERD (gastroesophageal reflux disease)    Elevated bilirubin        Objective:      Physical Exam  Constitutional:       General: He is not in acute distress.     Appearance: He is well-developed.   HENT:      Head: Normocephalic and atraumatic.      Right Ear: Tympanic membrane, ear canal and external ear normal.      Left Ear: Tympanic membrane, ear canal and external ear normal.      Mouth/Throat:      Pharynx: No oropharyngeal exudate or posterior oropharyngeal erythema.   Eyes:      General: No scleral icterus.     Conjunctiva/sclera: Conjunctivae normal.      Pupils: Pupils are equal, round, and reactive to light.   Neck:      Thyroid: No thyromegaly.      Comments: No supraclavicular nodes palpated  Cardiovascular:      Rate and Rhythm: Normal rate and regular rhythm.      Pulses: Normal pulses.      Heart sounds: Normal heart sounds. No murmur heard.  Pulmonary:      Effort: Pulmonary effort is normal.      Breath sounds: Normal breath sounds. No wheezing.   Abdominal:      General: Bowel sounds are normal.      Palpations: Abdomen is soft. There is no mass.      Tenderness: There is no abdominal tenderness.   Musculoskeletal:         General: No tenderness.      Cervical back: Normal range of  motion and neck supple.      Right lower leg: No edema.      Left lower leg: No edema.   Lymphadenopathy:      Cervical: No cervical adenopathy.   Skin:     Coloration: Skin is not jaundiced or pale.   Neurological:      General: No focal deficit present.      Mental Status: He is alert and oriented to person, place, and time.   Psychiatric:         Mood and Affect: Mood normal.         Behavior: Behavior normal.         Assessment:       Problem List Items Addressed This Visit        Ophtho    Fuchs' corneal dystrophy    Relevant Orders    CBC Auto Differential       Cardiac/Vascular    HTN (hypertension) - Primary    Relevant Orders    CBC Auto Differential       Renal/    Elevated PSA    Relevant Orders    CBC Auto Differential    BPH with urinary obstruction    Relevant Orders    CBC Auto Differential       GI    GERD (gastroesophageal reflux disease)    Relevant Orders    CBC Auto Differential       Other    Insomnia    Relevant Orders    CBC Auto Differential      Other Visit Diagnoses     Routine physical examination        Relevant Orders    Lipid Panel    CBC Auto Differential    Comprehensive Metabolic Panel    Hemoglobin A1C    Fecal Immunochemical Test (iFOBT)    History of COVID-19        Jan 2021, Feb 2022    Relevant Orders    CBC Auto Differential    Elevated glucose level        Relevant Orders    CBC Auto Differential    Hemoglobin A1C    Hyperlipidemia, unspecified hyperlipidemia type        Relevant Medications    fenofibrate (TRICOR) 54 MG tablet    Other Relevant Orders    Lipid Panel    CBC Auto Differential    Comprehensive Metabolic Panel          Plan:         Wero was seen today for annual exam.    Diagnoses and all orders for this visit:    Primary hypertension  -     CBC Auto Differential; Future    Routine physical examination  -     Lipid Panel; Future  -     CBC Auto Differential; Future  -     Comprehensive Metabolic Panel; Future  -     Hemoglobin A1C; Future  -     Fecal  "Immunochemical Test (iFOBT); Future    History of COVID-19  Comments:  Jan 2021, Feb 2022  Orders:  -     CBC Auto Differential; Future    BPH with urinary obstruction  -     CBC Auto Differential; Future    Fuchs' corneal dystrophy of both eyes  -     CBC Auto Differential; Future    Insomnia, unspecified type  -     CBC Auto Differential; Future    Elevated PSA  -     CBC Auto Differential; Future    Gastroesophageal reflux disease, unspecified whether esophagitis present  -     CBC Auto Differential; Future    Elevated glucose level  -     CBC Auto Differential; Future  -     Hemoglobin A1C; Future    Hyperlipidemia, unspecified hyperlipidemia type  -     fenofibrate (TRICOR) 54 MG tablet; Take 1 tablet (54 mg total) by mouth once daily.  -     Lipid Panel; Future  -     CBC Auto Differential; Future  -     Comprehensive Metabolic Panel; Future    Other orders  -     ALPRAZolam (XANAX) 0.5 MG tablet; TAKE 1 TABLET BY MOUTH EVERY NIGHT              reviewed.  Continue to see Urology.  Call with any other changes or problems.  Continue to monitor weight.  Patient feels he is exercising more by walking and trying to eat healthy but certainly if at any point he felt weight loss was unintentional he would let me know.  Follow-up in 6-12 months sooner p.r.n.         Portions of this note may have been created with voice recognition software. Occasional "wrong-word" or "sound-a-like" substitutions may have occurred due to the inherent limitations of voice recognition software. Please, read the note carefully and recognize, using context, where substitutions have occurred.  "

## 2022-07-22 ENCOUNTER — OFFICE VISIT (OUTPATIENT)
Dept: INTERNAL MEDICINE | Facility: CLINIC | Age: 67
End: 2022-07-22
Payer: MEDICARE

## 2022-07-22 VITALS
HEART RATE: 58 BPM | HEIGHT: 72 IN | WEIGHT: 187.81 LBS | SYSTOLIC BLOOD PRESSURE: 120 MMHG | DIASTOLIC BLOOD PRESSURE: 80 MMHG | BODY MASS INDEX: 25.44 KG/M2 | OXYGEN SATURATION: 99 %

## 2022-07-22 DIAGNOSIS — N40.1 BPH WITH URINARY OBSTRUCTION: ICD-10-CM

## 2022-07-22 DIAGNOSIS — G47.00 INSOMNIA, UNSPECIFIED TYPE: ICD-10-CM

## 2022-07-22 DIAGNOSIS — H18.513 FUCHS' CORNEAL DYSTROPHY OF BOTH EYES: ICD-10-CM

## 2022-07-22 DIAGNOSIS — I10 PRIMARY HYPERTENSION: Primary | ICD-10-CM

## 2022-07-22 DIAGNOSIS — K21.9 GASTROESOPHAGEAL REFLUX DISEASE, UNSPECIFIED WHETHER ESOPHAGITIS PRESENT: ICD-10-CM

## 2022-07-22 DIAGNOSIS — R73.09 ELEVATED GLUCOSE LEVEL: ICD-10-CM

## 2022-07-22 DIAGNOSIS — R97.20 ELEVATED PSA: ICD-10-CM

## 2022-07-22 DIAGNOSIS — E78.5 HYPERLIPIDEMIA, UNSPECIFIED HYPERLIPIDEMIA TYPE: ICD-10-CM

## 2022-07-22 DIAGNOSIS — Z00.00 ROUTINE PHYSICAL EXAMINATION: ICD-10-CM

## 2022-07-22 DIAGNOSIS — Z86.16 HISTORY OF COVID-19: ICD-10-CM

## 2022-07-22 DIAGNOSIS — N13.8 BPH WITH URINARY OBSTRUCTION: ICD-10-CM

## 2022-07-22 PROCEDURE — 99999 PR PBB SHADOW E&M-EST. PATIENT-LVL III: ICD-10-PCS | Mod: PBBFAC,,, | Performed by: INTERNAL MEDICINE

## 2022-07-22 PROCEDURE — 99213 OFFICE O/P EST LOW 20 MIN: CPT | Mod: PBBFAC | Performed by: INTERNAL MEDICINE

## 2022-07-22 PROCEDURE — 99397 PR PREVENTIVE VISIT,EST,65 & OVER: ICD-10-PCS | Mod: S$PBB,GZ,, | Performed by: INTERNAL MEDICINE

## 2022-07-22 PROCEDURE — 99999 PR PBB SHADOW E&M-EST. PATIENT-LVL III: CPT | Mod: PBBFAC,,, | Performed by: INTERNAL MEDICINE

## 2022-07-22 PROCEDURE — 99397 PER PM REEVAL EST PAT 65+ YR: CPT | Mod: S$PBB,GZ,, | Performed by: INTERNAL MEDICINE

## 2022-07-22 RX ORDER — ALPRAZOLAM 0.5 MG/1
TABLET ORAL
Qty: 30 TABLET | Refills: 5 | Status: SHIPPED | OUTPATIENT
Start: 2022-07-22 | End: 2023-07-24 | Stop reason: SDUPTHER

## 2022-07-22 RX ORDER — FENOFIBRATE 54 MG/1
54 TABLET ORAL DAILY
Qty: 90 TABLET | Refills: 11 | Status: SHIPPED | OUTPATIENT
Start: 2022-07-22 | End: 2022-08-16 | Stop reason: SDUPTHER

## 2022-07-25 ENCOUNTER — LAB VISIT (OUTPATIENT)
Dept: LAB | Facility: HOSPITAL | Age: 67
End: 2022-07-25
Attending: INTERNAL MEDICINE
Payer: MEDICARE

## 2022-07-25 ENCOUNTER — IMMUNIZATION (OUTPATIENT)
Dept: PHARMACY | Facility: CLINIC | Age: 67
End: 2022-07-25
Payer: MEDICARE

## 2022-07-25 DIAGNOSIS — R73.09 ELEVATED GLUCOSE LEVEL: ICD-10-CM

## 2022-07-25 DIAGNOSIS — E78.5 HYPERLIPIDEMIA, UNSPECIFIED HYPERLIPIDEMIA TYPE: ICD-10-CM

## 2022-07-25 DIAGNOSIS — N40.1 BPH WITH URINARY OBSTRUCTION: ICD-10-CM

## 2022-07-25 DIAGNOSIS — I10 PRIMARY HYPERTENSION: ICD-10-CM

## 2022-07-25 DIAGNOSIS — Z00.00 ROUTINE PHYSICAL EXAMINATION: ICD-10-CM

## 2022-07-25 DIAGNOSIS — Z86.16 HISTORY OF COVID-19: ICD-10-CM

## 2022-07-25 DIAGNOSIS — K21.9 GASTROESOPHAGEAL REFLUX DISEASE, UNSPECIFIED WHETHER ESOPHAGITIS PRESENT: ICD-10-CM

## 2022-07-25 DIAGNOSIS — H18.513 FUCHS' CORNEAL DYSTROPHY OF BOTH EYES: ICD-10-CM

## 2022-07-25 DIAGNOSIS — N13.8 BPH WITH URINARY OBSTRUCTION: ICD-10-CM

## 2022-07-25 DIAGNOSIS — G47.00 INSOMNIA, UNSPECIFIED TYPE: ICD-10-CM

## 2022-07-25 DIAGNOSIS — R97.20 ELEVATED PSA: ICD-10-CM

## 2022-07-25 DIAGNOSIS — Z23 NEED FOR VACCINATION: Primary | ICD-10-CM

## 2022-07-25 LAB
ALBUMIN SERPL BCP-MCNC: 4.1 G/DL (ref 3.5–5.2)
ALP SERPL-CCNC: 55 U/L (ref 55–135)
ALT SERPL W/O P-5'-P-CCNC: 20 U/L (ref 10–44)
ANION GAP SERPL CALC-SCNC: 4 MMOL/L (ref 8–16)
AST SERPL-CCNC: 19 U/L (ref 10–40)
BASOPHILS # BLD AUTO: 0.05 K/UL (ref 0–0.2)
BASOPHILS NFR BLD: 0.9 % (ref 0–1.9)
BILIRUB SERPL-MCNC: 1.3 MG/DL (ref 0.1–1)
BUN SERPL-MCNC: 15 MG/DL (ref 8–23)
CALCIUM SERPL-MCNC: 9.3 MG/DL (ref 8.7–10.5)
CHLORIDE SERPL-SCNC: 103 MMOL/L (ref 95–110)
CHOLEST SERPL-MCNC: 192 MG/DL (ref 120–199)
CHOLEST/HDLC SERPL: 4.5 {RATIO} (ref 2–5)
CO2 SERPL-SCNC: 31 MMOL/L (ref 23–29)
CREAT SERPL-MCNC: 1 MG/DL (ref 0.5–1.4)
DIFFERENTIAL METHOD: NORMAL
EOSINOPHIL # BLD AUTO: 0.1 K/UL (ref 0–0.5)
EOSINOPHIL NFR BLD: 2 % (ref 0–8)
ERYTHROCYTE [DISTWIDTH] IN BLOOD BY AUTOMATED COUNT: 14.1 % (ref 11.5–14.5)
EST. GFR  (AFRICAN AMERICAN): >60 ML/MIN/1.73 M^2
EST. GFR  (NON AFRICAN AMERICAN): >60 ML/MIN/1.73 M^2
ESTIMATED AVG GLUCOSE: 94 MG/DL (ref 68–131)
GLUCOSE SERPL-MCNC: 99 MG/DL (ref 70–110)
HBA1C MFR BLD: 4.9 % (ref 4–5.6)
HCT VFR BLD AUTO: 41.6 % (ref 40–54)
HDLC SERPL-MCNC: 43 MG/DL (ref 40–75)
HDLC SERPL: 22.4 % (ref 20–50)
HGB BLD-MCNC: 14.4 G/DL (ref 14–18)
IMM GRANULOCYTES # BLD AUTO: 0.02 K/UL (ref 0–0.04)
IMM GRANULOCYTES NFR BLD AUTO: 0.4 % (ref 0–0.5)
LDLC SERPL CALC-MCNC: 116.4 MG/DL (ref 63–159)
LYMPHOCYTES # BLD AUTO: 1.5 K/UL (ref 1–4.8)
LYMPHOCYTES NFR BLD: 27.2 % (ref 18–48)
MCH RBC QN AUTO: 30.7 PG (ref 27–31)
MCHC RBC AUTO-ENTMCNC: 34.6 G/DL (ref 32–36)
MCV RBC AUTO: 89 FL (ref 82–98)
MONOCYTES # BLD AUTO: 0.5 K/UL (ref 0.3–1)
MONOCYTES NFR BLD: 9.1 % (ref 4–15)
NEUTROPHILS # BLD AUTO: 3.3 K/UL (ref 1.8–7.7)
NEUTROPHILS NFR BLD: 60.4 % (ref 38–73)
NONHDLC SERPL-MCNC: 149 MG/DL
NRBC BLD-RTO: 0 /100 WBC
PLATELET # BLD AUTO: 205 K/UL (ref 150–450)
PMV BLD AUTO: 10.4 FL (ref 9.2–12.9)
POTASSIUM SERPL-SCNC: 3.4 MMOL/L (ref 3.5–5.1)
PROT SERPL-MCNC: 6.9 G/DL (ref 6–8.4)
RBC # BLD AUTO: 4.69 M/UL (ref 4.6–6.2)
SODIUM SERPL-SCNC: 138 MMOL/L (ref 136–145)
TRIGL SERPL-MCNC: 163 MG/DL (ref 30–150)
WBC # BLD AUTO: 5.51 K/UL (ref 3.9–12.7)

## 2022-07-25 PROCEDURE — 80061 LIPID PANEL: CPT | Performed by: INTERNAL MEDICINE

## 2022-07-25 PROCEDURE — 83036 HEMOGLOBIN GLYCOSYLATED A1C: CPT | Performed by: INTERNAL MEDICINE

## 2022-07-25 PROCEDURE — 36415 COLL VENOUS BLD VENIPUNCTURE: CPT | Performed by: INTERNAL MEDICINE

## 2022-07-25 PROCEDURE — 85025 COMPLETE CBC W/AUTO DIFF WBC: CPT | Performed by: INTERNAL MEDICINE

## 2022-07-25 PROCEDURE — 80053 COMPREHEN METABOLIC PANEL: CPT | Performed by: INTERNAL MEDICINE

## 2022-07-26 ENCOUNTER — PATIENT MESSAGE (OUTPATIENT)
Dept: INTERNAL MEDICINE | Facility: CLINIC | Age: 67
End: 2022-07-26
Payer: MEDICARE

## 2022-08-04 ENCOUNTER — HOSPITAL ENCOUNTER (OUTPATIENT)
Dept: RADIOLOGY | Facility: HOSPITAL | Age: 67
Discharge: HOME | End: 2022-08-04
Attending: PHYSICIAN ASSISTANT
Payer: MEDICARE

## 2022-08-04 ENCOUNTER — OFFICE VISIT (OUTPATIENT)
Dept: ORTHOPEDICS | Facility: CLINIC | Age: 67
End: 2022-08-04
Payer: MEDICARE

## 2022-08-04 VITALS — HEIGHT: 72 IN | WEIGHT: 186.5 LBS | BODY MASS INDEX: 25.26 KG/M2

## 2022-08-04 DIAGNOSIS — R52 PAIN: ICD-10-CM

## 2022-08-04 DIAGNOSIS — M17.11 PRIMARY OSTEOARTHRITIS OF RIGHT KNEE: Primary | ICD-10-CM

## 2022-08-04 PROCEDURE — 99499 UNLISTED E&M SERVICE: CPT | Mod: S$PBB,,, | Performed by: PHYSICIAN ASSISTANT

## 2022-08-04 PROCEDURE — 99499 NO LOS: ICD-10-PCS | Mod: S$PBB,,, | Performed by: PHYSICIAN ASSISTANT

## 2022-08-04 PROCEDURE — 20610 DRAIN/INJ JOINT/BURSA W/O US: CPT | Mod: S$PBB,RT,, | Performed by: PHYSICIAN ASSISTANT

## 2022-08-04 PROCEDURE — 20610 DRAIN/INJ JOINT/BURSA W/O US: CPT | Mod: PBBFAC,RT | Performed by: PHYSICIAN ASSISTANT

## 2022-08-04 PROCEDURE — 20610 PR DRAIN/INJECT LARGE JOINT/BURSA: ICD-10-PCS | Mod: S$PBB,RT,, | Performed by: PHYSICIAN ASSISTANT

## 2022-08-04 PROCEDURE — 73560 X-RAY EXAM OF KNEE 1 OR 2: CPT | Mod: 26,LT,, | Performed by: RADIOLOGY

## 2022-08-04 PROCEDURE — 73562 X-RAY EXAM OF KNEE 3: CPT | Mod: TC,RT

## 2022-08-04 PROCEDURE — 73562 X-RAY EXAM OF KNEE 3: CPT | Mod: 26,RT,, | Performed by: RADIOLOGY

## 2022-08-04 PROCEDURE — 73562 XR KNEE ORTHO RIGHT: ICD-10-PCS | Mod: 26,RT,, | Performed by: RADIOLOGY

## 2022-08-04 PROCEDURE — 99999 PR PBB SHADOW E&M-EST. PATIENT-LVL III: CPT | Mod: PBBFAC,,, | Performed by: PHYSICIAN ASSISTANT

## 2022-08-04 PROCEDURE — 73560 X-RAY EXAM OF KNEE 1 OR 2: CPT | Mod: TC,LT

## 2022-08-04 PROCEDURE — 99213 OFFICE O/P EST LOW 20 MIN: CPT | Mod: PBBFAC,25 | Performed by: PHYSICIAN ASSISTANT

## 2022-08-04 PROCEDURE — 99999 PR PBB SHADOW E&M-EST. PATIENT-LVL III: ICD-10-PCS | Mod: PBBFAC,,, | Performed by: PHYSICIAN ASSISTANT

## 2022-08-04 PROCEDURE — 73560 XR KNEE ORTHO RIGHT: ICD-10-PCS | Mod: 26,LT,, | Performed by: RADIOLOGY

## 2022-08-04 RX ADMIN — Medication 20 MG: at 09:08

## 2022-08-04 NOTE — PROGRESS NOTES
Wero Spangler is a 66 y.o. year old his here today for his 1st Euflexxa injection for degenerative changes of his right knee . he was last seen and treated in the clinic on 8/4/2020. There has been no significant change in his medical status since his last visit. No Fever, chills, malaise, or unexplained weight change.      Allergies, Medications, past medical and surgical history were reviewed .    Examination of the knee demonstrates  No evidence of edema, erythema , echymosis strength and range of motion are unchanged from previous visit.    The injection site was identified and the skin was prepared with a betadine solution. The  right knee  joint was injected with 2 ml of Euflexxa solution under sterile technique. Wero Spangler tolerated the procedure well, he was advised to rest the knee today, ice and elevation. I may take 3 -6 weeks following the last injection to get the full benefit of the medication.  I will see him back in 1 week. Sooner if he has any problems or concerns.           .     ICD-10-CM ICD-9-CM   1. Primary osteoarthritis of right knee  M17.11 715.16   2. Pain  R52 780.96

## 2022-08-07 ENCOUNTER — PATIENT MESSAGE (OUTPATIENT)
Dept: INTERNAL MEDICINE | Facility: CLINIC | Age: 67
End: 2022-08-07
Payer: MEDICARE

## 2022-08-07 NOTE — TELEPHONE ENCOUNTER
No new care gaps identified.  [facility] Embedded Care Gaps. Reference number: [id number]. 8/07/2022   4:55:29 PM CDT

## 2022-08-08 RX ORDER — TRIAMTERENE/HYDROCHLOROTHIAZID 37.5-25 MG
1 TABLET ORAL DAILY
Qty: 90 TABLET | Refills: 3 | Status: SHIPPED | OUTPATIENT
Start: 2022-08-08 | End: 2023-07-24 | Stop reason: SDUPTHER

## 2022-08-08 RX ORDER — TRIAMTERENE/HYDROCHLOROTHIAZID 37.5-25 MG
1 TABLET ORAL DAILY
Qty: 30 TABLET | OUTPATIENT
Start: 2022-08-08

## 2022-08-08 NOTE — TELEPHONE ENCOUNTER
Refill Decision Note   Wero Crys  is requesting a refill authorization.  Brief Assessment and Rationale for Refill:  Quick Discontinue     Medication Therapy Plan:       Medication Reconciliation Completed: No   Comments:     No Care Gaps recommended.     Note composed:1:48 PM 08/08/2022

## 2022-08-08 NOTE — TELEPHONE ENCOUNTER
No new care gaps identified.  [facility] Embedded Care Gaps. Reference number: [id number]. 8/08/2022   10:24:41 AM HINAT

## 2022-08-11 ENCOUNTER — OFFICE VISIT (OUTPATIENT)
Dept: ORTHOPEDICS | Facility: CLINIC | Age: 67
End: 2022-08-11
Payer: MEDICARE

## 2022-08-11 VITALS
WEIGHT: 180.88 LBS | DIASTOLIC BLOOD PRESSURE: 67 MMHG | SYSTOLIC BLOOD PRESSURE: 110 MMHG | BODY MASS INDEX: 24.5 KG/M2 | HEIGHT: 72 IN

## 2022-08-11 DIAGNOSIS — M17.11 PRIMARY OSTEOARTHRITIS OF RIGHT KNEE: Primary | ICD-10-CM

## 2022-08-11 PROCEDURE — 20610 DRAIN/INJ JOINT/BURSA W/O US: CPT | Mod: PBBFAC | Performed by: PHYSICIAN ASSISTANT

## 2022-08-11 PROCEDURE — 99999 PR PBB SHADOW E&M-EST. PATIENT-LVL III: CPT | Mod: PBBFAC,,, | Performed by: PHYSICIAN ASSISTANT

## 2022-08-11 PROCEDURE — 99499 NO LOS: ICD-10-PCS | Mod: S$PBB,,, | Performed by: PHYSICIAN ASSISTANT

## 2022-08-11 PROCEDURE — 20610 PR DRAIN/INJECT LARGE JOINT/BURSA: ICD-10-PCS | Mod: S$PBB,RT,, | Performed by: PHYSICIAN ASSISTANT

## 2022-08-11 PROCEDURE — 99213 OFFICE O/P EST LOW 20 MIN: CPT | Mod: PBBFAC,25 | Performed by: PHYSICIAN ASSISTANT

## 2022-08-11 PROCEDURE — 99999 PR PBB SHADOW E&M-EST. PATIENT-LVL III: ICD-10-PCS | Mod: PBBFAC,,, | Performed by: PHYSICIAN ASSISTANT

## 2022-08-11 PROCEDURE — 99499 UNLISTED E&M SERVICE: CPT | Mod: S$PBB,,, | Performed by: PHYSICIAN ASSISTANT

## 2022-08-11 PROCEDURE — 20610 DRAIN/INJ JOINT/BURSA W/O US: CPT | Mod: S$PBB,RT,, | Performed by: PHYSICIAN ASSISTANT

## 2022-08-11 RX ADMIN — Medication 20 MG: at 08:08

## 2022-08-11 NOTE — PROGRESS NOTES
Wero Spangler is a 66 y.o. year old his here today for his 2nd Euflexxa injection for degenerative changes of his right knee . he was last seen and treated in the clinic on 8/4/2022. There has been no significant change in his medical status since his last visit. No Fever, chills, malaise, or unexplained weight change.      Allergies, Medications, past medical and surgical history were reviewed .    Examination of the knee demonstrates  No evidence of edema, erythema , echymosis strength and range of motion are unchanged from previous visit.    The injection site was identified and the skin was prepared with a betadine solution. The  right knee  joint was injected with 2 ml of Euflexxa solution under sterile technique. Wero Spangler tolerated the procedure well, he was advised to rest the knee today, ice and elevation. I may take 3 -6 weeks following the last injection to get the full benefit of the medication.  I will see him back in 1 week. Sooner if he has any problems or concerns.           .     ICD-10-CM ICD-9-CM   1. Primary osteoarthritis of right knee  M17.11 715.16

## 2022-08-18 ENCOUNTER — OFFICE VISIT (OUTPATIENT)
Dept: ORTHOPEDICS | Facility: CLINIC | Age: 67
End: 2022-08-18
Payer: MEDICARE

## 2022-08-18 VITALS — HEART RATE: 63 BPM | DIASTOLIC BLOOD PRESSURE: 74 MMHG | SYSTOLIC BLOOD PRESSURE: 116 MMHG

## 2022-08-18 DIAGNOSIS — M17.11 PRIMARY OSTEOARTHRITIS OF RIGHT KNEE: Primary | ICD-10-CM

## 2022-08-18 PROCEDURE — 99213 OFFICE O/P EST LOW 20 MIN: CPT | Mod: PBBFAC,25 | Performed by: PHYSICIAN ASSISTANT

## 2022-08-18 PROCEDURE — 99499 NO LOS: ICD-10-PCS | Mod: S$PBB,,, | Performed by: PHYSICIAN ASSISTANT

## 2022-08-18 PROCEDURE — 20610 PR DRAIN/INJECT LARGE JOINT/BURSA: ICD-10-PCS | Mod: S$PBB,RT,, | Performed by: PHYSICIAN ASSISTANT

## 2022-08-18 PROCEDURE — 20610 DRAIN/INJ JOINT/BURSA W/O US: CPT | Mod: PBBFAC,RT | Performed by: PHYSICIAN ASSISTANT

## 2022-08-18 PROCEDURE — 99999 PR PBB SHADOW E&M-EST. PATIENT-LVL III: CPT | Mod: PBBFAC,,, | Performed by: PHYSICIAN ASSISTANT

## 2022-08-18 PROCEDURE — 20610 DRAIN/INJ JOINT/BURSA W/O US: CPT | Mod: S$PBB,RT,, | Performed by: PHYSICIAN ASSISTANT

## 2022-08-18 PROCEDURE — 99999 PR PBB SHADOW E&M-EST. PATIENT-LVL III: ICD-10-PCS | Mod: PBBFAC,,, | Performed by: PHYSICIAN ASSISTANT

## 2022-08-18 PROCEDURE — 99499 UNLISTED E&M SERVICE: CPT | Mod: S$PBB,,, | Performed by: PHYSICIAN ASSISTANT

## 2022-08-18 RX ADMIN — Medication 20 MG: at 01:08

## 2022-08-18 NOTE — PROGRESS NOTES
Wero Spangler is a 67 y.o. year old his here today for his 3rd Euflexxa injection for degenerative changes of his right knee . he was last seen and treated in the clinic on 8/11/2022. There has been no significant change in his medical status since his last visit. No Fever, chills, malaise, or unexplained weight change.      Allergies, Medications, past medical and surgical history were reviewed .    Examination of the knee demonstrates  No evidence of edema, erythema , echymosis strength and range of motion are unchanged from previous visit.    The injection site was identified and the skin was prepared with a betadine solution. The  right knee  joint was injected with 2 ml of Euflexxa solution under sterile technique. Wero Spangler tolerated the procedure well, he was advised to rest the knee today, ice and elevation. I may take 3 -6 weeks following the last injection to get the full benefit of the medication.  I will see him back in 6 months. Sooner if he has any problems or concerns.           .   No diagnosis found.

## 2022-11-01 ENCOUNTER — LAB VISIT (OUTPATIENT)
Dept: LAB | Facility: HOSPITAL | Age: 67
End: 2022-11-01
Attending: UROLOGY
Payer: MEDICARE

## 2022-11-01 DIAGNOSIS — R97.20 ELEVATED PSA: ICD-10-CM

## 2022-11-01 LAB — COMPLEXED PSA SERPL-MCNC: 13.1 NG/ML (ref 0–4)

## 2022-11-01 PROCEDURE — 84153 ASSAY OF PSA TOTAL: CPT | Performed by: UROLOGY

## 2022-11-01 PROCEDURE — 36415 COLL VENOUS BLD VENIPUNCTURE: CPT | Performed by: UROLOGY

## 2022-11-02 ENCOUNTER — OFFICE VISIT (OUTPATIENT)
Dept: UROLOGY | Facility: CLINIC | Age: 67
End: 2022-11-02
Payer: MEDICARE

## 2022-11-02 VITALS
DIASTOLIC BLOOD PRESSURE: 68 MMHG | HEART RATE: 70 BPM | HEIGHT: 72 IN | BODY MASS INDEX: 25.53 KG/M2 | SYSTOLIC BLOOD PRESSURE: 114 MMHG | WEIGHT: 188.5 LBS

## 2022-11-02 DIAGNOSIS — N13.8 BPH WITH URINARY OBSTRUCTION: ICD-10-CM

## 2022-11-02 DIAGNOSIS — N40.1 BPH WITH URINARY OBSTRUCTION: ICD-10-CM

## 2022-11-02 DIAGNOSIS — R97.20 ELEVATED PSA: Primary | ICD-10-CM

## 2022-11-02 DIAGNOSIS — N40.2 PROSTATE NODULE: ICD-10-CM

## 2022-11-02 PROCEDURE — 99214 PR OFFICE/OUTPT VISIT, EST, LEVL IV, 30-39 MIN: ICD-10-PCS | Mod: S$PBB,,, | Performed by: UROLOGY

## 2022-11-02 PROCEDURE — 99999 PR PBB SHADOW E&M-EST. PATIENT-LVL III: ICD-10-PCS | Mod: PBBFAC,,, | Performed by: UROLOGY

## 2022-11-02 PROCEDURE — 99214 OFFICE O/P EST MOD 30 MIN: CPT | Mod: S$PBB,,, | Performed by: UROLOGY

## 2022-11-02 PROCEDURE — 99999 PR PBB SHADOW E&M-EST. PATIENT-LVL III: CPT | Mod: PBBFAC,,, | Performed by: UROLOGY

## 2022-11-02 PROCEDURE — 99213 OFFICE O/P EST LOW 20 MIN: CPT | Mod: PBBFAC | Performed by: UROLOGY

## 2022-11-02 RX ORDER — TADALAFIL 20 MG/1
20 TABLET ORAL DAILY
Qty: 10 TABLET | Refills: 11 | Status: SHIPPED | OUTPATIENT
Start: 2022-11-02 | End: 2023-05-10 | Stop reason: SDUPTHER

## 2022-11-02 NOTE — PROGRESS NOTES
CHIEF COMPLAINT:    Mr. Spangler is a 67 y.o. male presenting with an elevated PSA.    PRESENTING ILLNESS:    Wero Spangler is a 67 y.o. male with an elevated PSA.  He has had multiple biopsies done.  One was in 2012 when his PSA was 10.14.  There was also a prostate nodule at that time.  Most recent one was 8/23/16 when his PSA was 18.1.  This was a cognitive fusion biopsy.    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  Path returned an oncocytoma.    He also has LUTS.  He's s/p rezum on 5/14/19.  He's voiding much better.  Good FOS. However, he does c/o irritative symptoms that are worsened by caffeine and alcohol. He was given ditropan at our last visit.  However, he didn't take it.  He reduced his caffeine and eliminated alcohol, and his LUTS decreased.  Now pleased with how he voids.  Nocturia x 2.    He c/o ED.  He's tried Cialis with good results, but he c/o some decreased sensation.  His T is normal.    REVIEW OF SYSTEMS:    Wero Spangler denies chest pain,sore throat, headache, blurred vision, fever, nausea, vomiting, chills, flank discomfort, abdominal pain, bleeding per rectum, cough, SOB, recent loss of consciousness, recent mental status changes, seizures, dizziness, or upper or lower extremity weakness.    JOSE MANUEL  1. 3  2. 2  3. 2  4. 3  5. 2     PATIENT HISTORY:    Past Medical History:   Diagnosis Date    Aftercataract     Allergy     BPH (benign prostatic hyperplasia)     Cataract     Corneal dystrophy     Elevated PSA     Enlarged prostate     Fatty liver     Fuchs' corneal dystrophy     Gallstones     General anesthetics causing adverse effect in therapeutic use 2000    delayed emergence after back surgery    GERD (gastroesophageal reflux disease)     HTN (hypertension) 9/24/2013    Hypertension     Insomnia     Prostate nodule     Urinary tract infection        Past Surgical History:   Procedure Laterality Date    BACK SURGERY  4/2000    CATARACT EXTRACTION W/  INTRAOCULAR LENS  IMPLANT      Both Eyes    CORNEAL TRANSPLANT Right 2019    Procedure: TRANSPLANT, CORNEA;  Surgeon: Vu Wilson MD;  Location: [facility];  Service: Ophthalmology;  Laterality: Right;  DMEK    EYE SURGERY      LAPAROSCOPIC MARSUPIALIZATION OF CYST OF KIDNEY      PROSTATE SURGERY      prostate biopsy benign    TONSILLECTOMY      VASECTOMY         Family History   Problem Relation Age of Onset    Cancer Mother         breast    Prostate cancer Brother     Cancer Brother         prostate    Macular degeneration Maternal Grandmother     Cancer Other     Cancer Other     Amblyopia Neg Hx     Blindness Neg Hx     Cataracts Neg Hx     Glaucoma Neg Hx     Retinal detachment Neg Hx     Strabismus Neg Hx        Social History     Socioeconomic History    Marital status:    Tobacco Use    Smoking status: Former     Types: Cigarettes     Quit date:      Years since quittin.8    Smokeless tobacco: Never   Substance and Sexual Activity    Alcohol use: Yes     Alcohol/week: 1.0 standard drink     Types: 1 Glasses of wine per week     Comment: 2 beers three times a week     Drug use: No    Sexual activity: Yes     Partners: Female     Social Determinants of Health     Financial Resource Strain: Low Risk     Difficulty of Paying Living Expenses: Not hard at all   Food Insecurity: No Food Insecurity    Worried About Running Out of Food in the Last Year: Never true    Ran Out of Food in the Last Year: Never true   Transportation Needs: No Transportation Needs    Lack of Transportation (Medical): No    Lack of Transportation (Non-Medical): No   Physical Activity: Insufficiently Active    Days of Exercise per Week: 3 days    Minutes of Exercise per Session: 40 min   Stress: Stress Concern Present    Feeling of Stress : To some extent   Social Connections: Unknown    Frequency of Communication with Friends and Family: Three times a week    Frequency of Social Gatherings with Friends and Family: Once a week     Active Member of Clubs or Organizations: No    Attends Club or Organization Meetings: Never    Marital Status:    Housing Stability: High Risk    Unable to Pay for Housing in the Last Year: No    Number of Places Lived in the Last Year: 4    Unstable Housing in the Last Year: No       Allergies:  Patient has no known allergies.    Medications:    Current Outpatient Medications:     ALPRAZolam (XANAX) 0.5 MG tablet, TAKE 1 TABLET BY MOUTH EVERY NIGHT, Disp: 30 tablet, Rfl: 5    fenofibrate (TRICOR) 54 MG tablet, TAKE 1 TABLET BY MOUTH ONCE DAILY, Disp: 90 tablet, Rfl: 3    OMEPRAZOLE (PRILOSEC ORAL), Take 20 mg by mouth every morning. , Disp: , Rfl:     sodium chloride 2% (BARBI 128) 2 % ophthalmic solution, 1 drop as needed (takes 3-4 times/day)., Disp: , Rfl:     tadalafiL (CIALIS) 20 MG Tab, Take 1 tablet (20 mg total) by mouth once daily. Take 1 hour before intercourse, Disp: 10 tablet, Rfl: 11    timolol maleate 0.5% (TIMOPTIC) 0.5 % Drop, PLACE 1 DROP INTO THE RIGHT EYE TWICE DAILY, Disp: 5 mL, Rfl: 3    triamterene-hydrochlorothiazide 37.5-25 mg (MAXZIDE-25) 37.5-25 mg per tablet, Take 1 tablet by mouth once daily., Disp: 90 tablet, Rfl: 3    PHYSICAL EXAMINATION:    The patient generally appears in good health, is appropriately interactive, and is in no apparent distress.     Eyes: anicteric sclerae, moist conjunctivae; no lid-lag; PERRLA     HENT: Atraumatic; oropharynx clear with moist mucous membranes and no mucosal ulcerations;normal hard and soft palate.  No evidence of lymphadenopathy.    Neck: Trachea midline.  No thyromegaly.    Musculoskeletal: No abnormal gait.    Skin: No lesions.    Mental: Cooperative with normal affect.  Is oriented to time, place, and person.    Neuro: Grossly intact.    Chest: Normal inspiratory effort.   No accessory muscles.  No audible wheezes.  Respirations symmetric on inspiration and expiration.    Heart: Regular rhythm.      Abdomen:  Soft, non-tender. No masses  or organomegaly. Bladder is not palpable. No evidence of flank discomfort. No evidence of inguinal hernia.    Genitourinary: The penis is not circumcised with no evidence of plaques or induration. The urethral meatus is normal. The testes, epididymides, and cord structures are normal in size and contour bilaterally. The scrotum is normal in size and contour.    Normal anal sphincter tone. No rectal mass.    The prostate is 40 g. Normal landmarks. Lateral sulci. Median furrow intact. There is a 1.5 cm x 1.5 cm nodule in the midportion of the gland. Stable.  Seminal vesicles are normal.    Extremities: No clubbing, cyanosis, or edema      LABS:    PVR done immediately after voiding by my nurse is 21 cc.   Lab Results   Component Value Date    PSA 14.0 (H) 09/22/2014    PSA 15.48 (H) 08/16/2013    PSA 11.06 (H) 02/25/2013    PSADIAG 13.1 (H) 11/01/2022    PSADIAG 16.5 (H) 03/16/2022    PSADIAG 13.3 (H) 08/20/2021    PSATOTAL 6.8 (H) 07/12/2011    PSATOTAL 7.9 (H) 01/11/2011    PSAFREE 1.07 07/12/2011    PSAFREE 1.67 (H) 01/11/2011    PSAFREEPCT 15.74 07/12/2011    PSAFREEPCT 21.14 01/11/2011        IMPRESSION:    Encounter Diagnoses   Name Primary?    Elevated PSA Yes    BPH with urinary obstruction     Prostate nodule    HTN, stable    PLAN:    1. Will observe his LUTS as they don't bother him.   2. Discussed options for his ED (affected by above comorbidities). Continue Cialis.  Refilled.  3.  Went over his PSA.  4. RTC 6 months with a PSA.        Copy to:

## 2023-02-16 ENCOUNTER — OFFICE VISIT (OUTPATIENT)
Dept: INTERNAL MEDICINE | Facility: CLINIC | Age: 68
End: 2023-02-16
Payer: MEDICARE

## 2023-02-16 VITALS
BODY MASS INDEX: 25.12 KG/M2 | SYSTOLIC BLOOD PRESSURE: 102 MMHG | OXYGEN SATURATION: 100 % | HEART RATE: 66 BPM | WEIGHT: 185.44 LBS | HEIGHT: 72 IN | DIASTOLIC BLOOD PRESSURE: 70 MMHG

## 2023-02-16 DIAGNOSIS — R10.31 RIGHT LOWER QUADRANT PAIN: Primary | ICD-10-CM

## 2023-02-16 DIAGNOSIS — I10 PRIMARY HYPERTENSION: ICD-10-CM

## 2023-02-16 DIAGNOSIS — B35.1 ONYCHOMYCOSIS: ICD-10-CM

## 2023-02-16 DIAGNOSIS — K21.9 GASTROESOPHAGEAL REFLUX DISEASE, UNSPECIFIED WHETHER ESOPHAGITIS PRESENT: ICD-10-CM

## 2023-02-16 DIAGNOSIS — M79.18 MUSCULAR ABDOMINAL PAIN IN RIGHT LOWER QUADRANT: ICD-10-CM

## 2023-02-16 PROCEDURE — 99999 PR PBB SHADOW E&M-EST. PATIENT-LVL III: CPT | Mod: PBBFAC,,, | Performed by: INTERNAL MEDICINE

## 2023-02-16 PROCEDURE — 99214 PR OFFICE/OUTPT VISIT, EST, LEVL IV, 30-39 MIN: ICD-10-PCS | Mod: S$PBB,,, | Performed by: INTERNAL MEDICINE

## 2023-02-16 PROCEDURE — 99999 PR PBB SHADOW E&M-EST. PATIENT-LVL III: ICD-10-PCS | Mod: PBBFAC,,, | Performed by: INTERNAL MEDICINE

## 2023-02-16 PROCEDURE — 99214 OFFICE O/P EST MOD 30 MIN: CPT | Mod: S$PBB,,, | Performed by: INTERNAL MEDICINE

## 2023-02-16 PROCEDURE — 99213 OFFICE O/P EST LOW 20 MIN: CPT | Mod: PBBFAC | Performed by: INTERNAL MEDICINE

## 2023-02-16 RX ORDER — EFINACONAZOLE 100 MG/ML
SOLUTION TOPICAL
Qty: 8 ML | Refills: 1 | Status: SHIPPED | OUTPATIENT
Start: 2023-02-16 | End: 2023-10-26

## 2023-02-16 NOTE — PROGRESS NOTES
Subjective:       Patient ID: Wero Spangler is a 67 y.o. male.    Chief Complaint: Abdominal Pain    Patient states he has had an intermittent right lower abdomen pain for a few weeks but has felt this in the past as well.  He has a history of gallstones but does not think this relates to that.  He has had a kidney mass removed that was noncancerous.  He said this feels more muscular.  He feels it when he rolls over in bed or twists at the waist.  He can exercise for the most part without any problems and says this mostly gives him trouble at night rather than during the day.  Bowel movements in urination do not affected although he has an enlarged prostate and frequently urinates based on that.  It is getting better over the last week or so but he wanted to review it as his normal physical is not until July. He has not tried treating it.     Abdominal Pain  This is a recurrent problem. The current episode started more than 1 year ago. The onset quality is undetermined. The problem occurs daily. The most recent episode lasted 2 Hours. The problem has been gradually improving. The pain is located in the RLQ. The pain is at a severity of 3/10. The quality of the pain is aching. The abdominal pain does not radiate. Associated symptoms include belching, flatus, frequency and myalgias. Pertinent negatives include no anorexia, arthralgias, constipation, diarrhea, dysuria, fever, headaches, hematochezia, hematuria, melena, nausea, vomiting or weight loss. The pain is aggravated by certain positions. The pain is relieved by Activity and movement. He has tried nothing for the symptoms. The treatment provided no relief. His past medical history is significant for abdominal surgery, gallstones and GERD.   Review of Systems   Constitutional:  Negative for fever and weight loss.   Gastrointestinal:  Positive for abdominal pain and flatus. Negative for anorexia, constipation, diarrhea, hematochezia, melena, nausea and  vomiting.   Genitourinary:  Positive for frequency. Negative for dysuria and hematuria.   Musculoskeletal:  Positive for myalgias. Negative for arthralgias.   Integumentary:         Toenail fungus changes-asking to try Jublia   Neurological:  Negative for headaches.         Past Medical History:   Diagnosis Date    Aftercataract     Allergy     BPH (benign prostatic hyperplasia)     Cataract     Corneal dystrophy     Elevated PSA     Enlarged prostate     Fatty liver     Fuchs' corneal dystrophy     Gallstones     General anesthetics causing adverse effect in therapeutic use 2000    delayed emergence after back surgery    GERD (gastroesophageal reflux disease)     HTN (hypertension) 9/24/2013    Hypertension     Insomnia     Prostate nodule     Urinary tract infection      Past Surgical History:   Procedure Laterality Date    BACK SURGERY  4/2000    CATARACT EXTRACTION W/  INTRAOCULAR LENS IMPLANT      Both Eyes    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    Procedure: TRANSPLANT, CORNEA;  Surgeon: Vu Wilson MD;  Location: [facility];  Service: Ophthalmology;  Laterality: Right;  DMEK    EYE SURGERY      LAPAROSCOPIC MARSUPIALIZATION OF CYST OF KIDNEY      PROSTATE SURGERY      prostate biopsy benign    TONSILLECTOMY      VASECTOMY        Patient Active Problem List   Diagnosis    Seasonal allergies    Elevated PSA    BPH with urinary obstruction    Corneal dystrophy - Both Eyes    Aftercataract - Both Eyes    Prostate nodule    HTN (hypertension)    Insomnia    Primary localized osteoarthrosis, lower leg    Chondromalacia patellae    Fuchs' corneal dystrophy    Fatty liver    GERD (gastroesophageal reflux disease)    Elevated bilirubin        Objective:      Physical Exam  Constitutional:       General: He is not in acute distress.     Appearance: He is well-developed.   HENT:      Head: Normocephalic and atraumatic.      Right Ear: Tympanic membrane, ear canal and external ear normal.      Left Ear: Tympanic  membrane, ear canal and external ear normal.      Mouth/Throat:      Pharynx: No oropharyngeal exudate or posterior oropharyngeal erythema.   Eyes:      General: No scleral icterus.     Conjunctiva/sclera: Conjunctivae normal.      Pupils: Pupils are equal, round, and reactive to light.   Neck:      Thyroid: No thyromegaly.      Comments: No supraclavicular nodes palpated  Cardiovascular:      Rate and Rhythm: Normal rate and regular rhythm.      Pulses: Normal pulses.      Heart sounds: Normal heart sounds. No murmur heard.  Pulmonary:      Effort: Pulmonary effort is normal.      Breath sounds: Normal breath sounds. No wheezing.   Abdominal:      General: Bowel sounds are normal. There is no distension.      Palpations: Abdomen is soft. There is no mass.      Tenderness: There is abdominal tenderness (with movement).      Hernia: No hernia is present.   Musculoskeletal:         General: No tenderness.      Cervical back: Normal range of motion and neck supple.      Right lower leg: No edema.      Left lower leg: No edema.   Lymphadenopathy:      Cervical: No cervical adenopathy.   Skin:     Coloration: Skin is not jaundiced or pale.   Neurological:      General: No focal deficit present.      Mental Status: He is alert and oriented to person, place, and time.   Psychiatric:         Mood and Affect: Mood normal.         Behavior: Behavior normal.       Assessment:       Problem List Items Addressed This Visit          Cardiac/Vascular    HTN (hypertension)       GI    GERD (gastroesophageal reflux disease)     Other Visit Diagnoses       Right lower quadrant pain    -  Primary    Muscular abdominal pain in right lower quadrant        Onychomycosis                Plan:         Wero was seen today for abdominal pain.    Diagnoses and all orders for this visit:    Right lower quadrant pain    Muscular abdominal pain in right lower quadrant    Onychomycosis    Primary hypertension    Gastroesophageal reflux disease,  "unspecified whether esophagitis present    Other orders  -     efinaconazole (JUBLIA) 10 % Faviola; Apply daily to affected nail daily. Remove weekly.           Update me for any changes or if this does not resolve with heat and anti-inflammatory p.r.n.  Otherwise keep July physical          Portions of this note may have been created with voice recognition software. Occasional "wrong-word" or "sound-a-like" substitutions may have occurred due to the inherent limitations of voice recognition software. Please, read the note carefully and recognize, using context, where substitutions have occurred.  "

## 2023-03-29 ENCOUNTER — PATIENT MESSAGE (OUTPATIENT)
Dept: ORTHOPEDICS | Facility: CLINIC | Age: 68
End: 2023-03-29
Payer: MEDICARE

## 2023-03-30 NOTE — PROGRESS NOTES
DATE: 4/3/2023  PATIENT: Wero Spangler    Supervising Physician: José Miguel Dickinson M.D.    CHIEF COMPLAINT: right upper back pain    HISTORY:  Wero Spangler is a 67 y.o. male here for initial evaluation of neck and right shoulder pain (Neck - 1, shoulder - 5). The pain has been present for about 4 weeks. The patient describes the pain as dull. The pain is worse with rest and improved by heat and stretch. There is no associated numbness and tingling. There is no subjective weakness. Prior treatments have included tylenol and massage, but no PT, SAJI or surgery.     The patient denies myelopathic symptoms such as handwriting changes or difficulty with buttons/coins/keys. Denies perineal paresthesias, bowel/bladder dysfunction.    PAST MEDICAL/SURGICAL HISTORY:  Past Medical History:   Diagnosis Date    Aftercataract     Allergy     BPH (benign prostatic hyperplasia)     Cataract     Corneal dystrophy     Elevated PSA     Enlarged prostate     Fatty liver     Fuchs' corneal dystrophy     Gallstones     General anesthetics causing adverse effect in therapeutic use 2000    delayed emergence after back surgery    GERD (gastroesophageal reflux disease)     HTN (hypertension) 9/24/2013    Hypertension     Insomnia     Prostate nodule     Urinary tract infection      Past Surgical History:   Procedure Laterality Date    BACK SURGERY  4/2000    CATARACT EXTRACTION W/  INTRAOCULAR LENS IMPLANT      Both Eyes    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    Procedure: TRANSPLANT, CORNEA;  Surgeon: Vu Wilson MD;  Location: [facility];  Service: Ophthalmology;  Laterality: Right;  DMEK    EYE SURGERY      LAPAROSCOPIC MARSUPIALIZATION OF CYST OF KIDNEY      PROSTATE SURGERY      prostate biopsy benign    TONSILLECTOMY      VASECTOMY         Medications:  Current Outpatient Medications on File Prior to Visit   Medication Sig Dispense Refill    ALPRAZolam (XANAX) 0.5 MG tablet TAKE 1 TABLET BY MOUTH EVERY NIGHT 30 tablet 5     efinaconazole (JUBLIA) 10 % Faviola Apply daily to affected nail daily. Remove weekly. 8 mL 1    fenofibrate (TRICOR) 54 MG tablet TAKE 1 TABLET BY MOUTH ONCE DAILY 90 tablet 3    OMEPRAZOLE (PRILOSEC ORAL) Take 20 mg by mouth every morning.       sodium chloride 2% (BARBI 128) 2 % ophthalmic solution 1 drop as needed (takes 3-4 times/day).      tadalafiL (CIALIS) 20 MG Tab Take 1 tablet (20 mg total) by mouth once daily. Take 1 hour before intercourse 10 tablet 11    triamterene-hydrochlorothiazide 37.5-25 mg (MAXZIDE-25) 37.5-25 mg per tablet Take 1 tablet by mouth once daily. 90 tablet 3    timolol maleate 0.5% (TIMOPTIC) 0.5 % Drop PLACE 1 DROP INTO THE RIGHT EYE TWICE DAILY (Patient not taking: Reported on 2022) 5 mL 3     No current facility-administered medications on file prior to visit.       Social History:   Social History     Socioeconomic History    Marital status:    Tobacco Use    Smoking status: Former     Types: Cigarettes     Quit date:      Years since quittin.2    Smokeless tobacco: Never   Substance and Sexual Activity    Alcohol use: Yes     Alcohol/week: 1.0 standard drink     Types: 1 Glasses of wine per week     Comment: 2 beers three times a week     Drug use: No    Sexual activity: Yes     Partners: Female     Social Determinants of Health     Financial Resource Strain: Low Risk     Difficulty of Paying Living Expenses: Not hard at all   Food Insecurity: No Food Insecurity    Worried About Running Out of Food in the Last Year: Never true    Ran Out of Food in the Last Year: Never true   Transportation Needs: No Transportation Needs    Lack of Transportation (Medical): No    Lack of Transportation (Non-Medical): No   Physical Activity: Sufficiently Active    Days of Exercise per Week: 4 days    Minutes of Exercise per Session: 50 min   Stress: No Stress Concern Present    Feeling of Stress : Not at all   Social Connections: Unknown    Frequency of Communication with  Friends and Family: More than three times a week    Frequency of Social Gatherings with Friends and Family: Once a week    Active Member of Clubs or Organizations: No    Attends Club or Organization Meetings: Never    Marital Status:    Housing Stability: Low Risk     Unable to Pay for Housing in the Last Year: No    Number of Places Lived in the Last Year: 1    Unstable Housing in the Last Year: No       REVIEW OF SYSTEMS:  Constitution: Negative. Negative for chills, fever and night sweats.   Cardiovascular: Negative for chest pain and syncope.   Respiratory: Negative for cough and shortness of breath.   Gastrointestinal: See HPI. Negative for nausea/vomiting. Negative for abdominal pain.  Genitourinary: See HPI. Negative for discoloration or dysuria.  Skin: Negative for dry skin, itching and rash.   Hematologic/Lymphatic: Negative for bleeding problem. Does not bruise/bleed easily.   Musculoskeletal: Negative for falls and muscle weakness.   Neurological: See HPI. No seizures.   Endocrine: Negative for polydipsia, polyphagia and polyuria.   Allergic/Immunologic: Negative for hives and persistent infections.  Psychiatric/Behavioral: Negative for depression and insomnia.         EXAM:  Ht 6' (1.829 m)   Wt 82.2 kg (181 lb 5.3 oz)   BMI 24.59 kg/m²     General: The patient is a very pleasant 67 y.o. male in no apparent distress, the patient is oriented to person, place and time.  Psych: Normal mood and affect  HEENT: Vision grossly intact, hearing intact to the spoken word.  Lungs: Respirations unlabored.  Gait: Normal station and gait, no difficulty with toe or heel walk.   Skin: Cervical skin negative for rashes, lesions, hairy patches and surgical scars.  Range of motion: Cervical range of motion is acceptable. There is mild tenderness to palpation.  Spinal Balance: Global saggital and coronal spinal balance acceptable, no significant for scoliosis and kyphosis.  Musculoskeletal: No pain with the range  of motion of the bilateral shoulders and elbows. Normal bulk and contour of the bilateral hands.  Vascular: Bilateral hands warm and well perfused, radial pulses 2+ bilaterally.  Neurological: Normal strength and tone in all major motor groups in the bilateral upper and lower extremities. Normal sensation to light touch in the C5-T1 and L2-S1 dermatomes bilaterally.  Deep tendon reflexes symmetric 2+ in the bilateral upper and lower extremities.  Negative Inverted Radial Reflex and Peralta's bilaterally. Negative Babinski bilaterally.     IMAGING:   Today I personally reviewed AP, Lat and Flex/Ex  upright C-spine films that demonstrate mild DDD at C4/5.      Body mass index is 24.59 kg/m².    Hemoglobin A1C   Date Value Ref Range Status   07/25/2022 4.9 4.0 - 5.6 % Final     Comment:     ADA Screening Guidelines:  5.7-6.4%  Consistent with prediabetes  >or=6.5%  Consistent with diabetes    High levels of fetal hemoglobin interfere with the HbA1C  assay. Heterozygous hemoglobin variants (HbS, HgC, etc)do  not significantly interfere with this assay.   However, presence of multiple variants may affect accuracy.     07/22/2021 4.9 4.0 - 5.6 % Final     Comment:     ADA Screening Guidelines:  5.7-6.4%  Consistent with prediabetes  >or=6.5%  Consistent with diabetes    High levels of fetal hemoglobin interfere with the HbA1C  assay. Heterozygous hemoglobin variants (HbS, HgC, etc)do  not significantly interfere with this assay.   However, presence of multiple variants may affect accuracy.     01/30/2020 5.0 4.0 - 5.6 % Final     Comment:     ADA Screening Guidelines:  5.7-6.4%  Consistent with prediabetes  >or=6.5%  Consistent with diabetes  High levels of fetal hemoglobin interfere with the HbA1C  assay. Heterozygous hemoglobin variants (HbS, HgC, etc)do  not significantly interfere with this assay.   However, presence of multiple variants may affect accuracy.             ASSESSMENT/PLAN:    Wero was seen today for  shoulder pain.    Diagnoses and all orders for this visit:    Cervical radiculopathy  -     celecoxib (CELEBREX) 100 MG capsule; Take 1 capsule (100 mg total) by mouth 2 (two) times daily as needed for Pain.  -     gabapentin (NEURONTIN) 300 MG capsule; Take 1 capsule (300 mg total) by mouth every evening.  -     tiZANidine (ZANAFLEX) 4 MG tablet; Take 1 tablet (4 mg total) by mouth every 8 (eight) hours as needed (muscle spasms).      Today we discussed at length all of the different treatment options including anti-inflammatories, acetaminophen, rest, ice, heat, physical therapy including strengthening and stretching exercises, home exercises, ROM, aerobic conditioning, aqua therapy, other modalities including ultrasound, massage, and dry needling, epidural steroid injections and finally surgical intervention.    Medications sent to the pharmacy. The patient may follow up if his pain persists, I will order an MRI at that time.

## 2023-03-31 ENCOUNTER — OFFICE VISIT (OUTPATIENT)
Dept: OPTOMETRY | Facility: CLINIC | Age: 68
End: 2023-03-31
Payer: MEDICARE

## 2023-03-31 DIAGNOSIS — H18.513 FUCHS' CORNEAL DYSTROPHY OF BOTH EYES: Primary | ICD-10-CM

## 2023-03-31 DIAGNOSIS — H02.831 DERMATOCHALASIS OF BOTH UPPER EYELIDS: ICD-10-CM

## 2023-03-31 DIAGNOSIS — H52.4 PRESBYOPIA: ICD-10-CM

## 2023-03-31 DIAGNOSIS — H02.834 DERMATOCHALASIS OF BOTH UPPER EYELIDS: ICD-10-CM

## 2023-03-31 DIAGNOSIS — Z13.5 GLAUCOMA SCREENING: ICD-10-CM

## 2023-03-31 PROCEDURE — 99999 PR PBB SHADOW E&M-EST. PATIENT-LVL III: ICD-10-PCS | Mod: PBBFAC,,, | Performed by: OPTOMETRIST

## 2023-03-31 PROCEDURE — 92014 COMPRE OPH EXAM EST PT 1/>: CPT | Mod: S$PBB,,, | Performed by: OPTOMETRIST

## 2023-03-31 PROCEDURE — 92015 DETERMINE REFRACTIVE STATE: CPT | Mod: ,,, | Performed by: OPTOMETRIST

## 2023-03-31 PROCEDURE — 92014 PR EYE EXAM, EST PATIENT,COMPREHESV: ICD-10-PCS | Mod: S$PBB,,, | Performed by: OPTOMETRIST

## 2023-03-31 PROCEDURE — 99999 PR PBB SHADOW E&M-EST. PATIENT-LVL III: CPT | Mod: PBBFAC,,, | Performed by: OPTOMETRIST

## 2023-03-31 PROCEDURE — 99213 OFFICE O/P EST LOW 20 MIN: CPT | Mod: PBBFAC,PO | Performed by: OPTOMETRIST

## 2023-03-31 PROCEDURE — 92015 PR REFRACTION: ICD-10-PCS | Mod: ,,, | Performed by: OPTOMETRIST

## 2023-03-31 NOTE — PROGRESS NOTES
HPI     Concerns About Ocular Health            Comments: DLS: 6/15/2022 - Dr. Wilson          Comments    Presents today for routine eye exam. Pt reports trouble with night   driving. Reports floaters--no flashes. Pt denies eye pain. Use Olya 128   OS.          Last edited by Isadora Darnell MA on 3/31/2023  8:36 AM.            Assessment /Plan     For exam results, see Encounter Report.    Fuchs' corneal dystrophy of both eyes    Dermatochalasis of both upper eyelids    Glaucoma screening    Presbyopia        1. Mild pco sp pciol ou--wrote new spex Rx  2. Sp focal laser for periph hole OD.  Stable (hx high myopia prior to cat surgery)  3. Fuchs dyst OD>OS sp DMEK OD.  Pt to cont w OLYA 128 QID OS  4. D-chalasis OU--pt may wish surgery         Plan:    Gave pt names of eyelid surgeons for blepharoplasty consult  Rtc 1 yr to me, and as sched w Dr Wilson

## 2023-04-03 ENCOUNTER — OFFICE VISIT (OUTPATIENT)
Dept: ORTHOPEDICS | Facility: CLINIC | Age: 68
End: 2023-04-03
Payer: MEDICARE

## 2023-04-03 ENCOUNTER — HOSPITAL ENCOUNTER (OUTPATIENT)
Dept: RADIOLOGY | Facility: HOSPITAL | Age: 68
Discharge: HOME | End: 2023-04-03
Attending: ORTHOPAEDIC SURGERY
Payer: MEDICARE

## 2023-04-03 VITALS — WEIGHT: 181.31 LBS | BODY MASS INDEX: 24.56 KG/M2 | HEIGHT: 72 IN

## 2023-04-03 DIAGNOSIS — M54.6 CHRONIC BILATERAL THORACIC BACK PAIN: ICD-10-CM

## 2023-04-03 DIAGNOSIS — M54.6 CHRONIC BILATERAL THORACIC BACK PAIN: Primary | ICD-10-CM

## 2023-04-03 DIAGNOSIS — G89.29 CHRONIC BILATERAL THORACIC BACK PAIN: ICD-10-CM

## 2023-04-03 DIAGNOSIS — G89.29 CHRONIC BILATERAL THORACIC BACK PAIN: Primary | ICD-10-CM

## 2023-04-03 DIAGNOSIS — M50.30 DDD (DEGENERATIVE DISC DISEASE), CERVICAL: ICD-10-CM

## 2023-04-03 DIAGNOSIS — M54.12 CERVICAL RADICULOPATHY: Primary | ICD-10-CM

## 2023-04-03 PROCEDURE — 99999 PR PBB SHADOW E&M-EST. PATIENT-LVL III: ICD-10-PCS | Mod: PBBFAC,,, | Performed by: REGISTERED NURSE

## 2023-04-03 PROCEDURE — 72070 X-RAY EXAM THORAC SPINE 2VWS: CPT | Mod: 26,,, | Performed by: RADIOLOGY

## 2023-04-03 PROCEDURE — 99213 OFFICE O/P EST LOW 20 MIN: CPT | Mod: PBBFAC | Performed by: REGISTERED NURSE

## 2023-04-03 PROCEDURE — 72050 X-RAY EXAM NECK SPINE 4/5VWS: CPT | Mod: 26,,, | Performed by: RADIOLOGY

## 2023-04-03 PROCEDURE — 72070 XR THORACIC SPINE AP LATERAL: ICD-10-PCS | Mod: 26,,, | Performed by: RADIOLOGY

## 2023-04-03 PROCEDURE — 72070 X-RAY EXAM THORAC SPINE 2VWS: CPT | Mod: TC

## 2023-04-03 PROCEDURE — 99214 OFFICE O/P EST MOD 30 MIN: CPT | Mod: S$PBB,,, | Performed by: REGISTERED NURSE

## 2023-04-03 PROCEDURE — 99999 PR PBB SHADOW E&M-EST. PATIENT-LVL III: CPT | Mod: PBBFAC,,, | Performed by: REGISTERED NURSE

## 2023-04-03 PROCEDURE — 99214 PR OFFICE/OUTPT VISIT, EST, LEVL IV, 30-39 MIN: ICD-10-PCS | Mod: S$PBB,,, | Performed by: REGISTERED NURSE

## 2023-04-03 PROCEDURE — 72050 X-RAY EXAM NECK SPINE 4/5VWS: CPT | Mod: TC

## 2023-04-03 PROCEDURE — 72050 XR CERVICAL SPINE AP LAT WITH FLEX EXTEN: ICD-10-PCS | Mod: 26,,, | Performed by: RADIOLOGY

## 2023-04-03 RX ORDER — CELECOXIB 100 MG/1
100 CAPSULE ORAL 2 TIMES DAILY PRN
Qty: 30 CAPSULE | Refills: 4 | Status: SHIPPED | OUTPATIENT
Start: 2023-04-03 | End: 2023-05-10

## 2023-04-03 RX ORDER — GABAPENTIN 300 MG/1
300 CAPSULE ORAL NIGHTLY
Qty: 60 CAPSULE | Refills: 1 | Status: SHIPPED | OUTPATIENT
Start: 2023-04-03 | End: 2023-05-10

## 2023-04-03 RX ORDER — TIZANIDINE 4 MG/1
4 TABLET ORAL EVERY 8 HOURS PRN
Qty: 30 TABLET | Refills: 2 | Status: SHIPPED | OUTPATIENT
Start: 2023-04-03 | End: 2023-05-10

## 2023-04-12 ENCOUNTER — PATIENT MESSAGE (OUTPATIENT)
Dept: UROLOGY | Facility: CLINIC | Age: 68
End: 2023-04-12
Payer: MEDICARE

## 2023-04-12 ENCOUNTER — TELEPHONE (OUTPATIENT)
Dept: UROLOGY | Facility: CLINIC | Age: 68
End: 2023-04-12
Payer: MEDICARE

## 2023-04-12 NOTE — TELEPHONE ENCOUNTER
----- Message from [facility] LPN sent at 4/11/2023  4:56 PM CDT -----  Regarding: FW: pt call back    ----- Message -----  From: Maegan Nunes  Sent: 4/11/2023   4:52 PM CDT  To: [facility] Staff  Subject: pt call back                                     Name of Who is Calling:JAYASHREE HUTSON [[id number]]          What is the request in detail: pt call back concerning appointment           Can the clinic reply by [facility]: no           [facility] [phone] (home)

## 2023-05-09 ENCOUNTER — LAB VISIT (OUTPATIENT)
Dept: LAB | Facility: HOSPITAL | Age: 68
End: 2023-05-09
Attending: UROLOGY
Payer: MEDICARE

## 2023-05-09 DIAGNOSIS — R97.20 ELEVATED PSA: ICD-10-CM

## 2023-05-09 LAB — COMPLEXED PSA SERPL-MCNC: 13.3 NG/ML (ref 0–4)

## 2023-05-09 PROCEDURE — 36415 COLL VENOUS BLD VENIPUNCTURE: CPT | Performed by: UROLOGY

## 2023-05-09 PROCEDURE — 84153 ASSAY OF PSA TOTAL: CPT | Performed by: UROLOGY

## 2023-05-10 ENCOUNTER — TELEPHONE (OUTPATIENT)
Dept: UROLOGY | Facility: CLINIC | Age: 68
End: 2023-05-10

## 2023-05-10 ENCOUNTER — OFFICE VISIT (OUTPATIENT)
Dept: UROLOGY | Facility: CLINIC | Age: 68
End: 2023-05-10
Payer: MEDICARE

## 2023-05-10 ENCOUNTER — PATIENT MESSAGE (OUTPATIENT)
Dept: UROLOGY | Facility: CLINIC | Age: 68
End: 2023-05-10

## 2023-05-10 VITALS
DIASTOLIC BLOOD PRESSURE: 64 MMHG | SYSTOLIC BLOOD PRESSURE: 118 MMHG | HEIGHT: 72 IN | WEIGHT: 189.63 LBS | HEART RATE: 42 BPM | BODY MASS INDEX: 25.68 KG/M2

## 2023-05-10 DIAGNOSIS — R35.0 URINARY FREQUENCY: ICD-10-CM

## 2023-05-10 DIAGNOSIS — N40.2 PROSTATE NODULE: ICD-10-CM

## 2023-05-10 DIAGNOSIS — R97.20 ELEVATED PSA: Primary | ICD-10-CM

## 2023-05-10 DIAGNOSIS — N40.1 BPH WITH URINARY OBSTRUCTION: ICD-10-CM

## 2023-05-10 DIAGNOSIS — N52.01 ERECTILE DYSFUNCTION DUE TO ARTERIAL INSUFFICIENCY: ICD-10-CM

## 2023-05-10 DIAGNOSIS — N13.8 BPH WITH URINARY OBSTRUCTION: ICD-10-CM

## 2023-05-10 PROCEDURE — 99213 OFFICE O/P EST LOW 20 MIN: CPT | Mod: PBBFAC | Performed by: UROLOGY

## 2023-05-10 PROCEDURE — 99999 PR PBB SHADOW E&M-EST. PATIENT-LVL III: CPT | Mod: PBBFAC,,, | Performed by: UROLOGY

## 2023-05-10 PROCEDURE — 99214 OFFICE O/P EST MOD 30 MIN: CPT | Mod: S$PBB,,, | Performed by: UROLOGY

## 2023-05-10 PROCEDURE — 99999 PR PBB SHADOW E&M-EST. PATIENT-LVL III: ICD-10-PCS | Mod: PBBFAC,,, | Performed by: UROLOGY

## 2023-05-10 PROCEDURE — 99214 PR OFFICE/OUTPT VISIT, EST, LEVL IV, 30-39 MIN: ICD-10-PCS | Mod: S$PBB,,, | Performed by: UROLOGY

## 2023-05-10 RX ORDER — TADALAFIL 20 MG/1
20 TABLET ORAL DAILY
Qty: 10 TABLET | Refills: 11 | Status: SHIPPED | OUTPATIENT
Start: 2023-05-10 | End: 2023-11-06 | Stop reason: SINTOL

## 2023-05-10 RX ORDER — OXYBUTYNIN CHLORIDE 10 MG/1
10 TABLET, EXTENDED RELEASE ORAL DAILY
Qty: 30 TABLET | Refills: 11 | Status: SHIPPED | OUTPATIENT
Start: 2023-05-10 | End: 2024-05-09

## 2023-05-10 NOTE — TELEPHONE ENCOUNTER
----- Message from Ce Cisneros sent at 5/10/2023  2:40 PM CDT -----  Contact: pt  Pt requesting call back RE: returning call    Confirmed contact below:  Contact Name:Wero Conteley  Phone Number: [phone]

## 2023-05-10 NOTE — TELEPHONE ENCOUNTER
Call placed to patient. Name and date of birth verified. Patient stated he did not contact our office. Patient instructed to contact our office for any questions or concerns. Office contact provided. Patient verbalized understanding.

## 2023-05-10 NOTE — PROGRESS NOTES
CHIEF COMPLAINT:    Mr. Spangler is a 67 y.o. male presenting with an elevated PSA.    PRESENTING ILLNESS:    Wero Spangler is a 67 y.o. male with an elevated PSA.  He has had multiple biopsies done.  One was in 2012 when his PSA was 10.14.  There was also a prostate nodule at that time.  Most recent one was 8/23/16 when his PSA was 18.1.  This was a cognitive fusion biopsy.    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  Path returned an oncocytoma.    He also has LUTS.  He's s/p rezum on 5/14/19.  He's voiding much better.  Good FOS. However, he does c/o irritative symptoms that are worsened by caffeine and alcohol. He was given ditropan at our last visit.  However, he didn't take it.  He reduced his caffeine and eliminated alcohol, and his LUTS decreased.   However, he presents stating that his irritative symptoms have returned.  Is having nocturia every 2 hours.    He c/o ED.  He's tried Cialis with good results, but he c/o some decreased sensation.  His T is normal.    REVIEW OF SYSTEMS:    Wero Spangler denies chest pain,sore throat, headache, blurred vision, fever, nausea, vomiting, chills, flank discomfort, abdominal pain, bleeding per rectum, cough, SOB, recent loss of consciousness, recent mental status changes, seizures, dizziness, or upper or lower extremity weakness.    JOSE MANUEL  1. 3  2. 3  3. 3  4. 3  5. 3     PATIENT HISTORY:    Past Medical History:   Diagnosis Date    Aftercataract     Allergy     BPH (benign prostatic hyperplasia)     Cataract     Corneal dystrophy     Elevated PSA     Enlarged prostate     Fatty liver     Fuchs' corneal dystrophy     Gallstones     General anesthetics causing adverse effect in therapeutic use 2000    delayed emergence after back surgery    GERD (gastroesophageal reflux disease)     HTN (hypertension) 9/24/2013    Hypertension     Insomnia     Prostate nodule     Urinary tract infection        Past Surgical History:   Procedure Laterality Date    BACK  SURGERY  2000    CATARACT EXTRACTION W/  INTRAOCULAR LENS IMPLANT      Both Eyes    CORNEAL TRANSPLANT Right 2019    Procedure: TRANSPLANT, CORNEA;  Surgeon: Vu Wilson MD;  Location: [facility];  Service: Ophthalmology;  Laterality: Right;  DMEK    EYE SURGERY      LAPAROSCOPIC MARSUPIALIZATION OF CYST OF KIDNEY      PROSTATE SURGERY      prostate biopsy benign    TONSILLECTOMY      VASECTOMY         Family History   Problem Relation Age of Onset    Cancer Mother         breast    Prostate cancer Brother     Cancer Brother         prostate    Macular degeneration Maternal Grandmother     Cancer Other     Cancer Other     Amblyopia Neg Hx     Blindness Neg Hx     Cataracts Neg Hx     Glaucoma Neg Hx     Retinal detachment Neg Hx     Strabismus Neg Hx        Social History     Socioeconomic History    Marital status:    Tobacco Use    Smoking status: Former     Types: Cigarettes     Quit date:      Years since quittin.3    Smokeless tobacco: Never   Substance and Sexual Activity    Alcohol use: Yes     Alcohol/week: 1.0 standard drink     Types: 1 Glasses of wine per week     Comment: 2 beers three times a week     Drug use: No    Sexual activity: Yes     Partners: Female     Social Determinants of Health     Financial Resource Strain: Low Risk     Difficulty of Paying Living Expenses: Not hard at all   Food Insecurity: No Food Insecurity    Worried About Running Out of Food in the Last Year: Never true    Ran Out of Food in the Last Year: Never true   Transportation Needs: No Transportation Needs    Lack of Transportation (Medical): No    Lack of Transportation (Non-Medical): No   Physical Activity: Sufficiently Active    Days of Exercise per Week: 4 days    Minutes of Exercise per Session: 50 min   Stress: No Stress Concern Present    Feeling of Stress : Not at all   Social Connections: Unknown    Frequency of Communication with Friends and Family: More than three times a week    Frequency  of Social Gatherings with Friends and Family: Once a week    Active Member of Clubs or Organizations: No    Attends Club or Organization Meetings: Never    Marital Status:    Housing Stability: Low Risk     Unable to Pay for Housing in the Last Year: No    Number of Places Lived in the Last Year: 1    Unstable Housing in the Last Year: No       Allergies:  Patient has no known allergies.    Medications:    Current Outpatient Medications:     ALPRAZolam (XANAX) 0.5 MG tablet, TAKE 1 TABLET BY MOUTH EVERY NIGHT, Disp: 30 tablet, Rfl: 5    efinaconazole (JUBLIA) 10 % Faviola, Apply daily to affected nail daily. Remove weekly., Disp: 8 mL, Rfl: 1    fenofibrate (TRICOR) 54 MG tablet, TAKE 1 TABLET BY MOUTH ONCE DAILY, Disp: 90 tablet, Rfl: 3    OMEPRAZOLE (PRILOSEC ORAL), Take 20 mg by mouth every morning. , Disp: , Rfl:     sodium chloride 2% (BARBI 128) 2 % ophthalmic solution, 1 drop as needed (takes 3-4 times/day)., Disp: , Rfl:     tadalafiL (CIALIS) 20 MG Tab, Take 1 tablet (20 mg total) by mouth once daily. Take 1 hour before intercourse, Disp: 10 tablet, Rfl: 11    triamterene-hydrochlorothiazide 37.5-25 mg (MAXZIDE-25) 37.5-25 mg per tablet, Take 1 tablet by mouth once daily., Disp: 90 tablet, Rfl: 3    PHYSICAL EXAMINATION:    The patient generally appears in good health, is appropriately interactive, and is in no apparent distress.     Eyes: anicteric sclerae, moist conjunctivae; no lid-lag; PERRLA     HENT: Atraumatic; oropharynx clear with moist mucous membranes and no mucosal ulcerations;normal hard and soft palate.  No evidence of lymphadenopathy.    Neck: Trachea midline.  No thyromegaly.    Musculoskeletal: No abnormal gait.    Skin: No lesions.    Mental: Cooperative with normal affect.  Is oriented to time, place, and person.    Neuro: Grossly intact.    Chest: Normal inspiratory effort.   No accessory muscles.  No audible wheezes.  Respirations symmetric on inspiration and expiration.    Heart:  Regular rhythm.      Abdomen:  Soft, non-tender. No masses or organomegaly. Bladder is not palpable. No evidence of flank discomfort. No evidence of inguinal hernia.    Genitourinary: The penis is not circumcised with no evidence of plaques or induration. The urethral meatus is normal. The testes, epididymides, and cord structures are normal in size and contour bilaterally. The scrotum is normal in size and contour.    Normal anal sphincter tone. No rectal mass.    The prostate is 40 g. Normal landmarks. Lateral sulci. Median furrow intact. There is a 1.5 cm x 1.5 cm nodule in the midportion of the gland. Stable.  Seminal vesicles are normal.    Extremities: No clubbing, cyanosis, or edema      LABS:      Lab Results   Component Value Date    PSA 14.0 (H) 09/22/2014    PSA 15.48 (H) 08/16/2013    PSA 11.06 (H) 02/25/2013    PSADIAG 13.3 (H) 05/09/2023    PSADIAG 13.1 (H) 11/01/2022    PSADIAG 16.5 (H) 03/16/2022    PSATOTAL 6.8 (H) 07/12/2011    PSATOTAL 7.9 (H) 01/11/2011    PSAFREE 1.07 07/12/2011    PSAFREE 1.67 (H) 01/11/2011    PSAFREEPCT 15.74 07/12/2011    PSAFREEPCT 21.14 01/11/2011        IMPRESSION:    Encounter Diagnoses   Name Primary?    Elevated PSA Yes    BPH with urinary obstruction     Prostate nodule    HTN, stable    PLAN:    1. Discussed options for his LUTS.  Will start ditropan. Side effects discussed.  A new Rx was given   2. Discussed options for his ED (affected by above comorbidities). Continue Cialis.  Refilled.  3.  Went over his PSA.  4. RTC 6 months with a PSA.        Copy to:

## 2023-05-10 NOTE — TELEPHONE ENCOUNTER
----- Message from Cassidy Aquino sent at 5/10/2023 10:19 AM CDT -----  Regarding: confrim medication  Contact: [phone] shaheen Perry calling in to confirm medication system was done oxybutynin (DITROPAN-XL) 10 MG 24 hr tablet  please call to discuss further

## 2023-05-17 ENCOUNTER — PATIENT MESSAGE (OUTPATIENT)
Dept: INTERNAL MEDICINE | Facility: CLINIC | Age: 68
End: 2023-05-17
Payer: MEDICARE

## 2023-05-17 NOTE — TELEPHONE ENCOUNTER
Pt requesting medical/cardiac clearance for eyelid surgery     Pt was last seen by PCP 2/16/23      Pt likely needs preop appt; should slots be overridden for this pt if no preop available?     Please advise

## 2023-05-18 ENCOUNTER — PATIENT MESSAGE (OUTPATIENT)
Dept: INTERNAL MEDICINE | Facility: CLINIC | Age: 68
End: 2023-05-18
Payer: MEDICARE

## 2023-05-24 ENCOUNTER — TELEPHONE (OUTPATIENT)
Dept: INTERNAL MEDICINE | Facility: CLINIC | Age: 68
End: 2023-05-24
Payer: MEDICARE

## 2023-07-01 ENCOUNTER — PATIENT MESSAGE (OUTPATIENT)
Dept: ADMINISTRATIVE | Facility: HOSPITAL | Age: 68
End: 2023-07-01
Payer: MEDICARE

## 2023-07-01 DIAGNOSIS — Z12.11 SCREENING FOR COLON CANCER: ICD-10-CM

## 2023-07-07 ENCOUNTER — PATIENT MESSAGE (OUTPATIENT)
Dept: INTERNAL MEDICINE | Facility: CLINIC | Age: 68
End: 2023-07-07
Payer: MEDICARE

## 2023-07-07 ENCOUNTER — OFFICE VISIT (OUTPATIENT)
Dept: URGENT CARE | Facility: CLINIC | Age: 68
End: 2023-07-07
Payer: MEDICARE

## 2023-07-07 VITALS
BODY MASS INDEX: 25.68 KG/M2 | TEMPERATURE: 98 F | HEART RATE: 61 BPM | SYSTOLIC BLOOD PRESSURE: 140 MMHG | WEIGHT: 189.63 LBS | OXYGEN SATURATION: 100 % | DIASTOLIC BLOOD PRESSURE: 62 MMHG | RESPIRATION RATE: 15 BRPM | HEIGHT: 72 IN

## 2023-07-07 DIAGNOSIS — M54.2 MUSCLE PAIN, CERVICAL: Primary | ICD-10-CM

## 2023-07-07 DIAGNOSIS — R68.84 MANDIBULAR PAIN: ICD-10-CM

## 2023-07-07 PROCEDURE — 99213 OFFICE O/P EST LOW 20 MIN: CPT | Mod: 25,S$GLB,,

## 2023-07-07 PROCEDURE — 99213 PR OFFICE/OUTPT VISIT, EST, LEVL III, 20-29 MIN: ICD-10-PCS | Mod: 25,S$GLB,,

## 2023-07-07 PROCEDURE — 96372 THER/PROPH/DIAG INJ SC/IM: CPT | Mod: S$GLB,,,

## 2023-07-07 PROCEDURE — 96372 PR INJECTION,THERAP/PROPH/DIAG2ST, IM OR SUBCUT: ICD-10-PCS | Mod: S$GLB,,,

## 2023-07-07 RX ORDER — HYDROCODONE BITARTRATE AND ACETAMINOPHEN 5; 325 MG/1; MG/1
TABLET ORAL
COMMUNITY
Start: 2023-07-06 | End: 2023-07-24

## 2023-07-07 RX ORDER — KETOROLAC TROMETHAMINE 30 MG/ML
30 INJECTION, SOLUTION INTRAMUSCULAR; INTRAVENOUS
Status: COMPLETED | OUTPATIENT
Start: 2023-07-07 | End: 2023-07-07

## 2023-07-07 RX ORDER — AMOXICILLIN 500 MG/1
CAPSULE ORAL
COMMUNITY
Start: 2023-06-10 | End: 2023-07-24

## 2023-07-07 RX ORDER — IBUPROFEN 600 MG/1
600 TABLET ORAL EVERY 8 HOURS PRN
Qty: 20 TABLET | Refills: 0 | Status: SHIPPED | OUTPATIENT
Start: 2023-07-07 | End: 2023-11-02

## 2023-07-07 RX ADMIN — KETOROLAC TROMETHAMINE 30 MG: 30 INJECTION, SOLUTION INTRAMUSCULAR; INTRAVENOUS at 10:07

## 2023-07-07 NOTE — TELEPHONE ENCOUNTER
Called and spoke to patient.   Positive intermittent throbbing pain (5-6/10) below L ear, sore throat  Pain alleviated with one Tylenol.  Onset 1 week ago.    Was at dentist yesterday- Dentist examined and did x-rays- nothing visible/palpable     Denies fever, sinus pain/pressure, nasal congestion, SOB, CP, nausea, vomiting.  *A week prior to symptoms, pt stopped using Afrin, after using for 2 wks.    No available Primary Care UC apt's. If pain returns today, suggested nearby [facility] visit for evaluation.

## 2023-07-07 NOTE — PATIENT INSTRUCTIONS
You were given Toradol injection in the clinic today which is an antiinflammatory.Take ibuprofen 3 times a day as needed for pain starting tomorrow.  Please follow-up with ENT for further evaluation of your pain.     What care is needed at home?     For recent strains, place an ice pack or a bag of frozen vegetables wrapped in a towel over the painful part. Never put ice right on the skin. Use ice every 1 to 2 hours for 10 to 15 minutes at a time. Use for the first 24 to 48 hours after your injury.  Use heat after the first 24 to 48 hours, but not right away. Put a heating pad on the painful part for no more than 20 minutes at a time. Never go to sleep with a heating pad on as this can cause burns. You can also take a hot shower or bath.  You may want to take medicines like ibuprofen or naproxen for swelling and pain. These are nonsteroidal anti-inflammatory drugs (NSAIDs).  Try to practice good posture to avoid putting strain on your neck. Sit up straight and keep your shoulders back. It can also help to avoid sitting in the same position for too long and to avoid putting pressure on your upper back by carrying heavy things. When you sleep, try to keep your neck in line with the rest of your body.  When do I need to get emergency help?   Call for an ambulance right away if:   Your neck becomes stiff and hard to move and you are feeling sick or develop a fever or chills.  Return to the ED if:   You have new weakness in one of your arms.  When do I need to call the doctor?   You have bad pain that is not helped by pain medicine.  Your hand or arm is swollen.  Your arm is numb or tingly.  You have new or worsening symptoms.  - Rest.    - Drink plenty of fluids.    - Acetaminophen (tylenol) or Ibuprofen (advil,motrin) as directed as needed for fever/pain. Avoid tylenol if you have a history of liver disease. Do not take ibuprofen if you have a history of GI bleeding, kidney disease, or if you take blood thinners.     -  Follow up with your PCP or specialty clinic as directed in the next 1-2 weeks if not improved or as needed.  You can call [phone] to schedule an appointment with the appropriate provider.    - Go to the ER or seek medical attention immediately if you develop new or worsening symptoms.     - You must understand that you have received an Urgent Care treatment only and that you may be released before all of your medical problems are known or treated.   - You, the patient, will arrange for follow up care as instructed.   - If your condition worsens or fails to improve we recommend that you receive another evaluation at the ER immediately or contact your PCP to discuss your concerns or return here.

## 2023-07-07 NOTE — PROGRESS NOTES
Subjective:      Patient ID: Wero Spangler is a 67 y.o. male.    Vitals:  height is 6' (1.829 m) and weight is 86 kg (189 lb 9.5 oz). His oral temperature is 98.3 °F (36.8 °C). His blood pressure is 140/62 (abnormal) and his pulse is 61. His respiration is 15 and oxygen saturation is 100%.     Chief Complaint: No chief complaint on file.    66 y/o male patient reports of intermittent shooting pain under left ear that radiates to the lower jaw x2 weeks. Patient reports he has been taking tylenol as needed which has helped alleviate the pain. Patient reports he recently had his molars extracted by a dentist last week and is currently on amoxicillin. Patient reports some mild tenderness to area. No inflammation, swelling, or erythema noted.         Otalgia   There is pain in the left ear. This is a new problem. The current episode started 1 to 4 weeks ago. The problem occurs constantly. The problem has been gradually worsening. There has been no fever. Associated symptoms include neck pain. Pertinent negatives include no abdominal pain, coughing, diarrhea, ear discharge, hearing loss, rash or vomiting. He has tried acetaminophen for the symptoms. The treatment provided mild relief.     Constitution: Negative for activity change, appetite change, chills, sweating, fatigue, fever and unexpected weight change.   HENT:  Negative for ear pain, ear discharge, foreign body in ear, tinnitus, hearing loss, dental problem and drooling.    Neck: Positive for neck pain. Negative for neck stiffness, painful lymph nodes, neck swelling and degenerative disc disease.   Cardiovascular:  Negative for chest trauma, chest pain, leg swelling, palpitations and sob on exertion.   Eyes:  Negative for eye trauma, foreign body in eye, eye discharge, eye itching, eye pain and eye redness.   Respiratory:  Negative for sleep apnea, chest tightness, cough, sputum production, bloody sputum, COPD and asthma.    Gastrointestinal:  Negative for  abdominal trauma, abdominal pain, abdominal bloating, history of abdominal surgery, nausea, vomiting, constipation and diarrhea.   Endocrine: hair loss, cold intolerance, heat intolerance and excessive thirst.   Genitourinary:  Negative for dysuria, frequency, urgency, urine decreased, flank pain, bladder incontinence, bed wetting and hematuria.   Musculoskeletal:  Positive for pain. Negative for trauma, joint pain, joint swelling, abnormal ROM of joint, arthritis, gout and back pain.   Skin:  Negative for color change, pale, rash, wound, abrasion and laceration.   Allergic/Immunologic: Negative for environmental allergies, seasonal allergies, food allergies, eczema, asthma, immunocompromised state, recurrent sinus infections and chronic cough.   Neurological:  Negative for dizziness, history of vertigo, light-headedness, passing out, facial drooping, disorientation and altered mental status.   Hematologic/Lymphatic: Negative for swollen lymph nodes, easy bruising/bleeding, trouble clotting and history of blood clots. Does not bruise/bleed easily.   Psychiatric/Behavioral:  Negative for altered mental status, disorientation, confusion, agitation and nervous/anxious. The patient is not nervous/anxious.     Objective:     Physical Exam   Constitutional: He is oriented to person, place, and time. He appears well-developed. He is cooperative.  Non-toxic appearance. He does not appear ill. No distress.   HENT:   Head: Normocephalic and atraumatic.       Ears:   Right Ear: Hearing, tympanic membrane, external ear and ear canal normal.   Left Ear: Hearing, tympanic membrane, external ear and ear canal normal.   Nose: Nose normal. No mucosal edema, rhinorrhea or nasal deformity. No epistaxis. Right sinus exhibits no maxillary sinus tenderness and no frontal sinus tenderness. Left sinus exhibits no maxillary sinus tenderness and no frontal sinus tenderness.   Mouth/Throat: Uvula is midline, oropharynx is clear and moist and  mucous membranes are normal. No trismus in the jaw. Normal dentition. No uvula swelling. No oropharyngeal exudate, posterior oropharyngeal edema or posterior oropharyngeal erythema.   Eyes: Conjunctivae and lids are normal. No scleral icterus.   Neck: Trachea normal and phonation normal. Neck supple. No edema present. No erythema present. No neck rigidity present.   Cardiovascular: Normal rate, regular rhythm, normal heart sounds and normal pulses.   Pulmonary/Chest: Effort normal and breath sounds normal. No respiratory distress. He has no decreased breath sounds. He has no rhonchi.   Abdominal: Normal appearance and bowel sounds are normal. He exhibits no mass. Soft.   Musculoskeletal: Normal range of motion.         General: Tenderness present. No deformity. Normal range of motion.   Neurological: He is alert and oriented to person, place, and time. He has normal strength and normal reflexes. No sensory deficit. He exhibits normal muscle tone. Coordination normal.   Skin: Skin is warm, dry, intact, not diaphoretic and not pale.   Psychiatric: His speech is normal and behavior is normal. Judgment and thought content normal.   Nursing note and vitals reviewed.    Assessment:     1. Muscle pain, cervical    2. Mandibular pain        Plan:       Muscle pain, cervical  -     ketorolac injection 30 mg  -     ibuprofen (ADVIL,MOTRIN) 600 MG tablet; Take 1 tablet (600 mg total) by mouth every 8 (eight) hours as needed for Pain.  Dispense: 20 tablet; Refill: 0  -     Ambulatory referral/consult to ENT    Mandibular pain  -     ibuprofen (ADVIL,MOTRIN) 600 MG tablet; Take 1 tablet (600 mg total) by mouth every 8 (eight) hours as needed for Pain.  Dispense: 20 tablet; Refill: 0  -     Ambulatory referral/consult to ENT             Additional MDM:     Heart Failure Score:   COPD = No

## 2023-07-20 ENCOUNTER — OFFICE VISIT (OUTPATIENT)
Dept: ORTHOPEDICS | Facility: CLINIC | Age: 68
End: 2023-07-20
Payer: MEDICARE

## 2023-07-20 VITALS
DIASTOLIC BLOOD PRESSURE: 70 MMHG | WEIGHT: 177.38 LBS | HEART RATE: 60 BPM | BODY MASS INDEX: 24.03 KG/M2 | SYSTOLIC BLOOD PRESSURE: 108 MMHG | HEIGHT: 72 IN

## 2023-07-20 DIAGNOSIS — M17.0 PRIMARY OSTEOARTHRITIS OF BOTH KNEES: Primary | ICD-10-CM

## 2023-07-20 PROCEDURE — 99499 NO LOS: ICD-10-PCS | Mod: S$PBB,,, | Performed by: PHYSICIAN ASSISTANT

## 2023-07-20 PROCEDURE — 99999 PR PBB SHADOW E&M-EST. PATIENT-LVL IV: CPT | Mod: PBBFAC,,, | Performed by: PHYSICIAN ASSISTANT

## 2023-07-20 PROCEDURE — 99499 UNLISTED E&M SERVICE: CPT | Mod: S$PBB,,, | Performed by: PHYSICIAN ASSISTANT

## 2023-07-20 PROCEDURE — 99999 PR PBB SHADOW E&M-EST. PATIENT-LVL IV: ICD-10-PCS | Mod: PBBFAC,,, | Performed by: PHYSICIAN ASSISTANT

## 2023-07-20 PROCEDURE — 99214 OFFICE O/P EST MOD 30 MIN: CPT | Mod: PBBFAC | Performed by: PHYSICIAN ASSISTANT

## 2023-07-20 NOTE — PROGRESS NOTES
Patient is seen today for his longstanding left knee pain he is well-known to us has undergone multiple treatment options including viscosupplementation it has been more than 6 months he would like to repeat this treatment option.  Will order his Euflexxa and plan to begin next week this will be a no-charge visit

## 2023-07-24 ENCOUNTER — OFFICE VISIT (OUTPATIENT)
Dept: INTERNAL MEDICINE | Facility: CLINIC | Age: 68
End: 2023-07-24
Payer: MEDICARE

## 2023-07-24 VITALS
DIASTOLIC BLOOD PRESSURE: 70 MMHG | HEIGHT: 72 IN | BODY MASS INDEX: 24.75 KG/M2 | HEART RATE: 55 BPM | OXYGEN SATURATION: 98 % | SYSTOLIC BLOOD PRESSURE: 110 MMHG | WEIGHT: 182.75 LBS

## 2023-07-24 DIAGNOSIS — N40.1 BPH WITH URINARY OBSTRUCTION: ICD-10-CM

## 2023-07-24 DIAGNOSIS — N13.8 BPH WITH URINARY OBSTRUCTION: ICD-10-CM

## 2023-07-24 DIAGNOSIS — H91.90 HEARING LOSS, UNSPECIFIED HEARING LOSS TYPE, UNSPECIFIED LATERALITY: ICD-10-CM

## 2023-07-24 DIAGNOSIS — G47.00 INSOMNIA, UNSPECIFIED TYPE: ICD-10-CM

## 2023-07-24 DIAGNOSIS — R97.20 ELEVATED PSA: ICD-10-CM

## 2023-07-24 DIAGNOSIS — I10 PRIMARY HYPERTENSION: Primary | ICD-10-CM

## 2023-07-24 DIAGNOSIS — Z00.00 ROUTINE PHYSICAL EXAMINATION: ICD-10-CM

## 2023-07-24 PROCEDURE — 99999 PR PBB SHADOW E&M-EST. PATIENT-LVL IV: CPT | Mod: PBBFAC,,, | Performed by: INTERNAL MEDICINE

## 2023-07-24 PROCEDURE — 99999 PR PBB SHADOW E&M-EST. PATIENT-LVL IV: ICD-10-PCS | Mod: PBBFAC,,, | Performed by: INTERNAL MEDICINE

## 2023-07-24 PROCEDURE — 99214 PR OFFICE/OUTPT VISIT, EST, LEVL IV, 30-39 MIN: ICD-10-PCS | Mod: S$PBB,,, | Performed by: INTERNAL MEDICINE

## 2023-07-24 PROCEDURE — 99214 OFFICE O/P EST MOD 30 MIN: CPT | Mod: PBBFAC | Performed by: INTERNAL MEDICINE

## 2023-07-24 PROCEDURE — 99214 OFFICE O/P EST MOD 30 MIN: CPT | Mod: S$PBB,,, | Performed by: INTERNAL MEDICINE

## 2023-07-24 RX ORDER — ALPRAZOLAM 0.5 MG/1
TABLET ORAL
Qty: 30 TABLET | Refills: 5 | Status: SHIPPED | OUTPATIENT
Start: 2023-07-24 | End: 2024-02-20

## 2023-07-24 RX ORDER — TRIAMTERENE/HYDROCHLOROTHIAZID 37.5-25 MG
1 TABLET ORAL DAILY
Qty: 90 TABLET | Refills: 3 | Status: SHIPPED | OUTPATIENT
Start: 2023-07-24 | End: 2023-11-06

## 2023-07-24 NOTE — PROGRESS NOTES
Subjective:       Patient ID: Wero Spangler is a 67 y.o. male.    Chief Complaint: Annual Exam    HPI: Here for Annual assessment of medical problems. Wants to discuss Insomnia meds and hearing loss.   Recently had bilateral eyelid surgery and that went well.  Almost totally recovered.  He is getting some knee injections soon.  He is due for some labs and we will assist with those.  He denies any GI or  complaints.  Urine and prostate have been stable.   reviewed.  We will update his prescriptions soon    Review of Systems   Constitutional:  Positive for activity change. Negative for unexpected weight change.   HENT:  Positive for hearing loss. Negative for rhinorrhea and trouble swallowing.    Eyes:  Positive for visual disturbance. Negative for discharge.   Respiratory:  Negative for chest tightness and wheezing.    Cardiovascular:  Negative for chest pain and palpitations.   Gastrointestinal:  Positive for constipation. Negative for blood in stool, diarrhea and vomiting.   Endocrine: Negative for polydipsia and polyuria.   Genitourinary:  Negative for difficulty urinating, hematuria and urgency.   Musculoskeletal:  Positive for arthralgias. Negative for joint swelling and neck pain.   Neurological:  Negative for weakness and headaches.   Psychiatric/Behavioral:  Negative for confusion and dysphoric mood.          Past Medical History:   Diagnosis Date    Aftercataract     Allergy     BPH (benign prostatic hyperplasia)     Cataract     Corneal dystrophy     Elevated PSA     Enlarged prostate     Fatty liver     Fuchs' corneal dystrophy     Gallstones     General anesthetics causing adverse effect in therapeutic use 2000    delayed emergence after back surgery    GERD (gastroesophageal reflux disease)     HTN (hypertension) 9/24/2013    Hypertension     Insomnia     Prostate nodule     Urinary tract infection      Past Surgical History:   Procedure Laterality Date    BACK SURGERY  4/2000    CATARACT  EXTRACTION W/  INTRAOCULAR LENS IMPLANT      Both Eyes    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    Procedure: TRANSPLANT, CORNEA;  Surgeon: Vu Wilson MD;  Location: [facility];  Service: Ophthalmology;  Laterality: Right;  DMEK    EYE SURGERY      LAPAROSCOPIC MARSUPIALIZATION OF CYST OF KIDNEY      PROSTATE SURGERY      prostate biopsy benign    TONSILLECTOMY      VASECTOMY        Patient Active Problem List   Diagnosis    Seasonal allergies    Elevated PSA    BPH with urinary obstruction    Corneal dystrophy - Both Eyes    Aftercataract - Both Eyes    Prostate nodule    HTN (hypertension)    Insomnia    Primary localized osteoarthrosis, lower leg    Chondromalacia patellae    Fuchs' corneal dystrophy    Fatty liver    GERD (gastroesophageal reflux disease)    Elevated bilirubin    Erectile dysfunction due to arterial insufficiency        Objective:      Physical Exam  Constitutional:       General: He is not in acute distress.     Appearance: He is well-developed.   HENT:      Head: Normocephalic and atraumatic.      Right Ear: Tympanic membrane, ear canal and external ear normal.      Left Ear: Tympanic membrane, ear canal and external ear normal.      Mouth/Throat:      Pharynx: No oropharyngeal exudate or posterior oropharyngeal erythema.   Eyes:      General: No scleral icterus.     Conjunctiva/sclera: Conjunctivae normal.      Pupils: Pupils are equal, round, and reactive to light.      Comments: Slight residual bruising around the eyes from his surgery   Neck:      Thyroid: No thyromegaly.      Comments: No supraclavicular nodes palpated  Cardiovascular:      Rate and Rhythm: Normal rate and regular rhythm.      Pulses: Normal pulses.      Heart sounds: Normal heart sounds. No murmur heard.  Pulmonary:      Effort: Pulmonary effort is normal.      Breath sounds: Normal breath sounds. No wheezing.   Abdominal:      General: Bowel sounds are normal.      Palpations: Abdomen is soft. There is no mass.       Tenderness: There is no abdominal tenderness.   Musculoskeletal:         General: Tenderness (knees) present.      Cervical back: Normal range of motion and neck supple.      Right lower leg: No edema.      Left lower leg: No edema.   Lymphadenopathy:      Cervical: No cervical adenopathy.   Skin:     Coloration: Skin is not jaundiced or pale.   Neurological:      General: No focal deficit present.      Mental Status: He is alert and oriented to person, place, and time.   Psychiatric:         Mood and Affect: Mood normal.         Behavior: Behavior normal.       Assessment:       Problem List Items Addressed This Visit          Cardiac/Vascular    HTN (hypertension) - Primary    Relevant Orders    CBC Auto Differential    Comprehensive Metabolic Panel    Lipid Panel       Renal/    Elevated PSA    Relevant Orders    CBC Auto Differential    Comprehensive Metabolic Panel    Lipid Panel    BPH with urinary obstruction    Relevant Orders    CBC Auto Differential    Comprehensive Metabolic Panel    Lipid Panel       Other    Insomnia    Relevant Medications    ALPRAZolam (XANAX) 0.5 MG tablet    Other Relevant Orders    CBC Auto Differential    Comprehensive Metabolic Panel    Lipid Panel     Other Visit Diagnoses       Hearing loss, unspecified hearing loss type, unspecified laterality        Relevant Orders    CBC Auto Differential    Comprehensive Metabolic Panel    Lipid Panel    Ambulatory referral/consult to Audiology    Routine physical examination        Relevant Orders    CBC Auto Differential    Comprehensive Metabolic Panel    Lipid Panel            Plan:         Wero was seen today for annual exam.    Diagnoses and all orders for this visit:    Primary hypertension  -     CBC Auto Differential; Future  -     Comprehensive Metabolic Panel; Future  -     Lipid Panel; Future    Hearing loss, unspecified hearing loss type, unspecified laterality  -     CBC Auto Differential; Future  -     Comprehensive  "Metabolic Panel; Future  -     Lipid Panel; Future  -     Ambulatory referral/consult to Audiology; Future    BPH with urinary obstruction  -     CBC Auto Differential; Future  -     Comprehensive Metabolic Panel; Future  -     Lipid Panel; Future    Elevated PSA  -     CBC Auto Differential; Future  -     Comprehensive Metabolic Panel; Future  -     Lipid Panel; Future    Insomnia, unspecified type  -     ALPRAZolam (XANAX) 0.5 MG tablet; TAKE 1 TABLET BY MOUTH EVERY NIGHT  -     CBC Auto Differential; Future  -     Comprehensive Metabolic Panel; Future  -     Lipid Panel; Future    Routine physical examination  -     CBC Auto Differential; Future  -     Comprehensive Metabolic Panel; Future  -     Lipid Panel; Future    Other orders  -     triamterene-hydrochlorothiazide 37.5-25 mg (MAXZIDE-25) 37.5-25 mg per tablet; Take 1 tablet by mouth once daily.           Knee arthritis.     Review labs, follow up in 6 months sooner p.r.n.      Portions of this note may have been created with voice recognition software. Occasional "wrong-word" or "sound-a-like" substitutions may have occurred due to the inherent limitations of voice recognition software. Please, read the note carefully and recognize, using context, where substitutions have occurred.  "

## 2023-07-27 ENCOUNTER — OFFICE VISIT (OUTPATIENT)
Dept: ORTHOPEDICS | Facility: CLINIC | Age: 68
End: 2023-07-27
Payer: MEDICARE

## 2023-07-27 ENCOUNTER — CLINICAL SUPPORT (OUTPATIENT)
Dept: AUDIOLOGY | Facility: CLINIC | Age: 68
End: 2023-07-27
Payer: MEDICARE

## 2023-07-27 ENCOUNTER — LAB VISIT (OUTPATIENT)
Dept: LAB | Facility: HOSPITAL | Age: 68
End: 2023-07-27
Attending: INTERNAL MEDICINE
Payer: MEDICARE

## 2023-07-27 DIAGNOSIS — G47.00 INSOMNIA, UNSPECIFIED TYPE: ICD-10-CM

## 2023-07-27 DIAGNOSIS — I10 PRIMARY HYPERTENSION: ICD-10-CM

## 2023-07-27 DIAGNOSIS — R97.20 ELEVATED PSA: ICD-10-CM

## 2023-07-27 DIAGNOSIS — Z00.00 ROUTINE PHYSICAL EXAMINATION: ICD-10-CM

## 2023-07-27 DIAGNOSIS — H90.3 SENSORINEURAL HEARING LOSS (SNHL) OF BOTH EARS: Primary | ICD-10-CM

## 2023-07-27 DIAGNOSIS — N13.8 BPH WITH URINARY OBSTRUCTION: ICD-10-CM

## 2023-07-27 DIAGNOSIS — H91.90 HEARING LOSS, UNSPECIFIED HEARING LOSS TYPE, UNSPECIFIED LATERALITY: ICD-10-CM

## 2023-07-27 DIAGNOSIS — M17.11 PRIMARY OSTEOARTHRITIS OF RIGHT KNEE: Primary | ICD-10-CM

## 2023-07-27 DIAGNOSIS — N40.1 BPH WITH URINARY OBSTRUCTION: ICD-10-CM

## 2023-07-27 LAB
ALBUMIN SERPL BCP-MCNC: 3.8 G/DL (ref 3.5–5.2)
ALP SERPL-CCNC: 50 U/L (ref 55–135)
ALT SERPL W/O P-5'-P-CCNC: 18 U/L (ref 10–44)
ANION GAP SERPL CALC-SCNC: 9 MMOL/L (ref 8–16)
AST SERPL-CCNC: 19 U/L (ref 10–40)
BASOPHILS # BLD AUTO: 0.05 K/UL (ref 0–0.2)
BASOPHILS NFR BLD: 0.9 % (ref 0–1.9)
BILIRUB SERPL-MCNC: 0.8 MG/DL (ref 0.1–1)
BUN SERPL-MCNC: 20 MG/DL (ref 8–23)
CALCIUM SERPL-MCNC: 9.2 MG/DL (ref 8.7–10.5)
CHLORIDE SERPL-SCNC: 106 MMOL/L (ref 95–110)
CHOLEST SERPL-MCNC: 182 MG/DL (ref 120–199)
CHOLEST/HDLC SERPL: 4.8 {RATIO} (ref 2–5)
CO2 SERPL-SCNC: 29 MMOL/L (ref 23–29)
CREAT SERPL-MCNC: 1 MG/DL (ref 0.5–1.4)
DIFFERENTIAL METHOD: ABNORMAL
EOSINOPHIL # BLD AUTO: 0.1 K/UL (ref 0–0.5)
EOSINOPHIL NFR BLD: 2 % (ref 0–8)
ERYTHROCYTE [DISTWIDTH] IN BLOOD BY AUTOMATED COUNT: 13.2 % (ref 11.5–14.5)
EST. GFR  (NO RACE VARIABLE): >60 ML/MIN/1.73 M^2
GLUCOSE SERPL-MCNC: 101 MG/DL (ref 70–110)
HCT VFR BLD AUTO: 38 % (ref 40–54)
HDLC SERPL-MCNC: 38 MG/DL (ref 40–75)
HDLC SERPL: 20.9 % (ref 20–50)
HGB BLD-MCNC: 13.1 G/DL (ref 14–18)
IMM GRANULOCYTES # BLD AUTO: 0.02 K/UL (ref 0–0.04)
IMM GRANULOCYTES NFR BLD AUTO: 0.4 % (ref 0–0.5)
LDLC SERPL CALC-MCNC: 118.4 MG/DL (ref 63–159)
LYMPHOCYTES # BLD AUTO: 1.3 K/UL (ref 1–4.8)
LYMPHOCYTES NFR BLD: 23.5 % (ref 18–48)
MCH RBC QN AUTO: 30.8 PG (ref 27–31)
MCHC RBC AUTO-ENTMCNC: 34.5 G/DL (ref 32–36)
MCV RBC AUTO: 89 FL (ref 82–98)
MONOCYTES # BLD AUTO: 0.5 K/UL (ref 0.3–1)
MONOCYTES NFR BLD: 8.8 % (ref 4–15)
NEUTROPHILS # BLD AUTO: 3.5 K/UL (ref 1.8–7.7)
NEUTROPHILS NFR BLD: 64.4 % (ref 38–73)
NONHDLC SERPL-MCNC: 144 MG/DL
NRBC BLD-RTO: 0 /100 WBC
PLATELET # BLD AUTO: 239 K/UL (ref 150–450)
PMV BLD AUTO: 10.4 FL (ref 9.2–12.9)
POTASSIUM SERPL-SCNC: 3.4 MMOL/L (ref 3.5–5.1)
PROT SERPL-MCNC: 6.5 G/DL (ref 6–8.4)
RBC # BLD AUTO: 4.25 M/UL (ref 4.6–6.2)
SODIUM SERPL-SCNC: 144 MMOL/L (ref 136–145)
TRIGL SERPL-MCNC: 128 MG/DL (ref 30–150)
WBC # BLD AUTO: 5.48 K/UL (ref 3.9–12.7)

## 2023-07-27 PROCEDURE — 20610 DRAIN/INJ JOINT/BURSA W/O US: CPT | Mod: PBBFAC | Performed by: PHYSICIAN ASSISTANT

## 2023-07-27 PROCEDURE — 36415 COLL VENOUS BLD VENIPUNCTURE: CPT | Performed by: INTERNAL MEDICINE

## 2023-07-27 PROCEDURE — 99499 UNLISTED E&M SERVICE: CPT | Mod: S$PBB,,, | Performed by: PHYSICIAN ASSISTANT

## 2023-07-27 PROCEDURE — 99999 PR PBB SHADOW E&M-EST. PATIENT-LVL II: CPT | Mod: PBBFAC,,,

## 2023-07-27 PROCEDURE — 20610 DRAIN/INJ JOINT/BURSA W/O US: CPT | Mod: PBBFAC,RT | Performed by: PHYSICIAN ASSISTANT

## 2023-07-27 PROCEDURE — 99999PBSHW PR PBB SHADOW TECHNICAL ONLY FILED TO HB: Mod: JZ,PBBFAC,,

## 2023-07-27 PROCEDURE — 99999 PR PBB SHADOW E&M-EST. PATIENT-LVL II: ICD-10-PCS | Mod: PBBFAC,,,

## 2023-07-27 PROCEDURE — 80061 LIPID PANEL: CPT | Performed by: INTERNAL MEDICINE

## 2023-07-27 PROCEDURE — 92567 TYMPANOMETRY: CPT | Mod: PBBFAC

## 2023-07-27 PROCEDURE — 99999 PR PBB SHADOW E&M-EST. PATIENT-LVL III: ICD-10-PCS | Mod: PBBFAC,,, | Performed by: PHYSICIAN ASSISTANT

## 2023-07-27 PROCEDURE — 99999PBSHW PR PBB SHADOW TECHNICAL ONLY FILED TO HB: ICD-10-PCS | Mod: JZ,PBBFAC,,

## 2023-07-27 PROCEDURE — 92557 COMPREHENSIVE HEARING TEST: CPT | Mod: PBBFAC

## 2023-07-27 PROCEDURE — 20610 DRAIN/INJ JOINT/BURSA W/O US: CPT | Mod: S$PBB,RT,, | Performed by: PHYSICIAN ASSISTANT

## 2023-07-27 PROCEDURE — 99213 OFFICE O/P EST LOW 20 MIN: CPT | Mod: PBBFAC,25 | Performed by: PHYSICIAN ASSISTANT

## 2023-07-27 PROCEDURE — 20610 PR DRAIN/INJECT LARGE JOINT/BURSA: ICD-10-PCS | Mod: S$PBB,RT,, | Performed by: PHYSICIAN ASSISTANT

## 2023-07-27 PROCEDURE — 99999 PR PBB SHADOW E&M-EST. PATIENT-LVL III: CPT | Mod: PBBFAC,,, | Performed by: PHYSICIAN ASSISTANT

## 2023-07-27 PROCEDURE — 80053 COMPREHEN METABOLIC PANEL: CPT | Performed by: INTERNAL MEDICINE

## 2023-07-27 PROCEDURE — 99212 OFFICE O/P EST SF 10 MIN: CPT | Mod: PBBFAC,27,25

## 2023-07-27 PROCEDURE — 99499 NO LOS: ICD-10-PCS | Mod: S$PBB,,, | Performed by: PHYSICIAN ASSISTANT

## 2023-07-27 PROCEDURE — 85025 COMPLETE CBC W/AUTO DIFF WBC: CPT | Performed by: INTERNAL MEDICINE

## 2023-07-27 RX ADMIN — Medication 40 MG: at 07:07

## 2023-07-27 NOTE — Clinical Note
Good morning, Mr. Spangler was seen today for an audiologic evaluation. Please review the attached report and contact me with any questions. Yesika Andrade

## 2023-07-27 NOTE — PROGRESS NOTES
Wero Spangler was seen today in the clinic for an audiologic evaluation.  Patient's main complaint was decreased hearing.  Mr. Spanlger reported that his family has started to report hearing concerns. He feels his left ear hears better than his right ear. Mr. Spangler denied otalgia, aural fullness, tinnitus, vertigo, and history of noise exposure today.    Tympanometry revealed Type As in the right ear and Type As in the left ear.     Audiogram results revealed mild hearing loss rising to normal hearing sloping to moderate to severe sensorineural hearing loss (SNHL) in the right ear and normal hearing sloping to moderate to severe SNHL in the left ear.  An asymmetry is noted from 3438-8747 Hz with left ear worse than right ear.    Speech reception thresholds were noted at 30 dB in the right ear and 25 dB in the left ear.    Speech discrimination scores were 80% in the right ear and 92% in the left ear.    Today's results were discussed with the patient and hearing aids were recommended bilaterally following medical clearance (due to pure tone and speech discrimination asymmetry). Mr. Spangler is interested in a hearing aid consultation and would like to utilize any insurance benefits he has for hearing aids through his [employer] supplement. At this time, our clinic is not able to utilize insurance benefits for hearing aid coverage. I recommended that Mr. Spangler contact his insurance provider to assess his benefits and request a list of providers who accept coverage for hearing aids through his plan. Mr. Spangler expressed understanding of today's results and the plan.    Recommendations:  Otologic evaluation  Annual audiogram  Hearing protection when in noise  Hearing aid consultation following medical clearance

## 2023-07-27 NOTE — PROGRESS NOTES
Wero Spangler is a 67 y.o. year old his here today for his 2nd Euflexxa injection for degenerative changes of his right knee . he was last seen and treated in the clinic on 7/20/2023. There has been no significant change in his medical status since his last visit. No Fever, chills, malaise, or unexplained weight change.      Allergies, Medications, past medical and surgical history were reviewed .    Examination of the knee demonstrates  No evidence of edema, erythema , echymosis strength and range of motion are unchanged from previous visit.    The risks, benefits, pros, cons, and potential side effects of the procedure were discussed with the patient in detail all questions were answered.  The patient is comfortable and willing to proceed with the procedure. Verbal consent was obtained and the proper joint was identified by the patient and provider.     The injection site was identified and the skin was prepared with a betadine solution. The  right knee  joint was injected with 2 ml of Euflexxa solution under sterile technique. Wero Spangler tolerated the procedure well, he was advised to rest the knee today, ice and elevation. I may take 3 -6 weeks following the last injection to get the full benefit of the medication.  I will see him back in 1 week. Sooner if he has any problems or concerns.           .     ICD-10-CM ICD-9-CM   1. Primary osteoarthritis of right knee  M17.11 715.16

## 2023-07-31 ENCOUNTER — TELEPHONE (OUTPATIENT)
Dept: INTERNAL MEDICINE | Facility: CLINIC | Age: 68
End: 2023-07-31
Payer: MEDICARE

## 2023-07-31 ENCOUNTER — OFFICE VISIT (OUTPATIENT)
Dept: OTOLARYNGOLOGY | Facility: CLINIC | Age: 68
End: 2023-07-31
Payer: MEDICARE

## 2023-07-31 VITALS — BODY MASS INDEX: 25.8 KG/M2 | WEIGHT: 190.25 LBS

## 2023-07-31 DIAGNOSIS — H92.02 LEFT EAR PAIN: ICD-10-CM

## 2023-07-31 DIAGNOSIS — H91.13 PRESBYCUSIS OF BOTH EARS: Primary | ICD-10-CM

## 2023-07-31 DIAGNOSIS — H90.3 ASYMMETRIC SNHL (SENSORINEURAL HEARING LOSS): ICD-10-CM

## 2023-07-31 DIAGNOSIS — H90.3 ASYMMETRIC SNHL (SENSORINEURAL HEARING LOSS): Primary | ICD-10-CM

## 2023-07-31 PROCEDURE — 99999 PR PBB SHADOW E&M-EST. PATIENT-LVL II: CPT | Mod: PBBFAC,,, | Performed by: OTOLARYNGOLOGY

## 2023-07-31 PROCEDURE — 99999 PR PBB SHADOW E&M-EST. PATIENT-LVL II: ICD-10-PCS | Mod: PBBFAC,,, | Performed by: OTOLARYNGOLOGY

## 2023-07-31 PROCEDURE — 99203 PR OFFICE/OUTPT VISIT, NEW, LEVL III, 30-44 MIN: ICD-10-PCS | Mod: S$PBB,,, | Performed by: OTOLARYNGOLOGY

## 2023-07-31 PROCEDURE — 99203 OFFICE O/P NEW LOW 30 MIN: CPT | Mod: S$PBB,,, | Performed by: OTOLARYNGOLOGY

## 2023-07-31 PROCEDURE — 99212 OFFICE O/P EST SF 10 MIN: CPT | Mod: PBBFAC | Performed by: OTOLARYNGOLOGY

## 2023-07-31 RX ORDER — TRIAMTERENE/HYDROCHLOROTHIAZID 37.5-25 MG
1 TABLET ORAL
Qty: 90 TABLET | Refills: 3 | OUTPATIENT
Start: 2023-07-31

## 2023-07-31 NOTE — TELEPHONE ENCOUNTER
----- Message from LUDMILA Alvarado sent at 7/27/2023  1:56 PM CDT -----  Due to the asymmetry in his hearing, a medical work up by ENT is needed to rule out retrocochlear pathologies as the cause. He is scheduled to see an ENT physician in October but for a neck related issue, so an order from you regarding the hearing loss would be beneficial!      ----- Message -----  From: Duke Cano MD  Sent: 7/27/2023  11:36 AM CDT  To: LUDMILA Alvarado    I see your note mentions hearing aids after medical clearance. Does that mean you suggest more medical testing or work up? Or basically my ok to pursue hearing amplification?   ----- Message -----  From: LUDMILA Alvarado  Sent: 7/27/2023   9:22 AM CDT  To: Duke Cano MD    Good morning, Mr. Spangler was seen today for an audiologic evaluation. Please review the attached report and contact me with any questions. Yesika Andrade

## 2023-07-31 NOTE — TELEPHONE ENCOUNTER
Called pt; pt answered    Informed pt of Dr. Cano's note  Pt verbalized intent to schedule with ENT later today  Provided Pt with number to ENT    Pt verbalized thanks and understanding

## 2023-07-31 NOTE — TELEPHONE ENCOUNTER
Let pt know that Audiology said the hearing loss in the two ears were different so they suggested a formal ENT eval to check on this. I am ok with it have placed the consult order. Let me know if he has any questions and we can assist him with the visit if he is ok with it .

## 2023-07-31 NOTE — PROGRESS NOTES
Subjective     Patient ID: Wero Spangler is a 67 y.o. male.    Chief Complaint: Otalgia    HPI: Ref Dr Cano for L otalgia last wek.    Saw Urgent care / got ? Steroid/ NSAIDs/ resolved.    Pos Nemesio HL.    Pos Fam Hx.    Past Medical History: Patient has a past medical history of Aftercataract, Allergy, BPH (benign prostatic hyperplasia), Cataract, Corneal dystrophy, Elevated PSA, Enlarged prostate, Fatty liver, Fuchs' corneal dystrophy, Gallstones, General anesthetics causing adverse effect in therapeutic use (2000), GERD (gastroesophageal reflux disease), HTN (hypertension) (9/24/2013), Hypertension, Insomnia, Prostate nodule, and Urinary tract infection.    Past Surgical History: Patient has a past surgical history that includes Eye surgery; Tonsillectomy; Cataract extraction w/  intraocular lens implant; Back surgery (4/2000); Prostate surgery; Vasectomy; Laparoscopic marsupialization of cyst of kidney; and Corneal transplant (Right, 11/21/2019).    Social History: Patient reports that he quit smoking about 23 years ago. His smoking use included cigarettes. He has never used smokeless tobacco. He reports current alcohol use of about 1.0 standard drink of alcohol per week. He reports that he does not use drugs.    Family History: family history includes Cancer in his brother, mother, and other family members; Macular degeneration in his maternal grandmother; Prostate cancer in his brother.    Medications:   Current Outpatient Medications   Medication Sig    ALPRAZolam (XANAX) 0.5 MG tablet TAKE 1 TABLET BY MOUTH EVERY NIGHT    efinaconazole (JUBLIA) 10 % Faviola Apply daily to affected nail daily. Remove weekly.    fenofibrate (TRICOR) 54 MG tablet TAKE 1 TABLET BY MOUTH ONCE DAILY    ibuprofen (ADVIL,MOTRIN) 600 MG tablet Take 1 tablet (600 mg total) by mouth every 8 (eight) hours as needed for Pain.    OMEPRAZOLE (PRILOSEC ORAL) Take 20 mg by mouth every morning.     sodium chloride 2% (BARBI 128) 2 %  ophthalmic solution 1 drop as needed (takes 3-4 times/day).    tadalafiL (CIALIS) 20 MG Tab Take 1 tablet (20 mg total) by mouth once daily. Take 1 hour before intercourse    triamterene-hydrochlorothiazide 37.5-25 mg (MAXZIDE-25) 37.5-25 mg per tablet Take 1 tablet by mouth once daily.    oxybutynin (DITROPAN-XL) 10 MG 24 hr tablet Take 1 tablet (10 mg total) by mouth once daily. (Patient not taking: Reported on 7/31/2023)     Current Facility-Administered Medications   Medication    sodium hyaluronate (EUFLEXXA) 10 mg/mL(mw 2.4 -3.6 million) injection 40 mg       Allergies: Patient has No Known Allergies.    Review of Systems   Constitutional:  Negative for activity change, appetite change, chills, diaphoresis, fatigue, fever and unexpected weight change.   HENT:  Positive for hearing loss and postnasal drip. Negative for nasal congestion, ear discharge, ear pain, facial swelling, nosebleeds, rhinorrhea, sinus pressure/congestion, sneezing, sore throat, tinnitus, trouble swallowing and voice change.    Eyes:  Positive for photophobia and itching. Negative for pain, discharge, redness and visual disturbance.   Respiratory:  Positive for cough. Negative for chest tightness, shortness of breath and wheezing.    Cardiovascular: Negative.  Negative for chest pain and palpitations.   Gastrointestinal:  Positive for abdominal pain and constipation. Negative for diarrhea and nausea.   Endocrine: Negative.    Genitourinary:  Negative for dysuria and frequency.   Musculoskeletal:  Negative for arthralgias, back pain, gait problem, joint swelling, myalgias, neck pain and neck stiffness.   Integumentary:  Negative for color change, pallor and rash. Negative.   Allergic/Immunologic: Negative.    Neurological: Negative.  Negative for dizziness, tremors, seizures, syncope, facial asymmetry, speech difficulty, weakness, light-headedness, numbness and headaches.   Hematological: Negative.  Negative for adenopathy. Does not  bruise/bleed easily.   Psychiatric/Behavioral:  Positive for sleep disturbance. Negative for agitation, confusion, decreased concentration and dysphoric mood. The patient is not nervous/anxious and is not hyperactive.           Objective     Physical Exam  Constitutional:       General: He is not in acute distress.     Appearance: Normal appearance. He is well-developed. He is not ill-appearing, toxic-appearing or diaphoretic.   HENT:      Head: Not macrocephalic and not microcephalic. No raccoon eyes, Garcia's sign, abrasion, contusion, right periorbital erythema, left periorbital erythema or laceration. Hair is normal.      Jaw: No trismus.      Right Ear: Decreased hearing noted. No laceration, drainage, swelling or tenderness. No middle ear effusion. No foreign body. No mastoid tenderness. No hemotympanum. Tympanic membrane is not injected, scarred, perforated, erythematous, retracted or bulging. Tympanic membrane has normal mobility.      Left Ear: Decreased hearing noted. No laceration, drainage, swelling or tenderness.  No middle ear effusion. No foreign body. No mastoid tenderness. No hemotympanum. Tympanic membrane is not injected, scarred, perforated, erythematous, retracted or bulging. Tympanic membrane has normal mobility.      Nose: No nasal deformity, septal deviation, laceration, mucosal edema or rhinorrhea.      Right Sinus: No maxillary sinus tenderness.      Left Sinus: No maxillary sinus tenderness.      Mouth/Throat:      Mouth: Mucous membranes are not pale, not dry and not cyanotic. No lacerations or oral lesions.      Dentition: Normal dentition. No dental caries or dental abscesses.      Pharynx: Uvula midline. No oropharyngeal exudate or uvula swelling.      Tonsils: No tonsillar abscesses.   Eyes:      General: No scleral icterus.        Right eye: No foreign body or discharge.         Left eye: No foreign body or discharge.      Extraocular Movements:      Right eye: Normal extraocular  motion and no nystagmus.      Left eye: Normal extraocular motion and no nystagmus.      Conjunctiva/sclera:      Right eye: Right conjunctiva is not injected. No chemosis, exudate or hemorrhage.     Left eye: Left conjunctiva is not injected. No chemosis, exudate or hemorrhage.     Pupils: Pupils are equal.      Right eye: Pupil is round and reactive.      Left eye: Pupil is round and reactive.   Neck:      Thyroid: No thyroid mass or thyromegaly.      Vascular: No JVD.      Trachea: No tracheal tenderness or tracheal deviation.   Cardiovascular:      Rate and Rhythm: Normal rate and regular rhythm.   Pulmonary:      Effort: No tachypnea, bradypnea, accessory muscle usage or respiratory distress.      Breath sounds: Normal breath sounds. No stridor.   Abdominal:      Palpations: Abdomen is soft.   Musculoskeletal:         General: Normal range of motion.      Cervical back: No edema, erythema or rigidity. No muscular tenderness. Normal range of motion.   Lymphadenopathy:      Head:      Right side of head: No submental, submandibular, tonsillar, preauricular, posterior auricular or occipital adenopathy.      Left side of head: No submental, submandibular, tonsillar, preauricular, posterior auricular or occipital adenopathy.      Cervical: No cervical adenopathy.      Right cervical: No superficial, deep or posterior cervical adenopathy.     Left cervical: No superficial, deep or posterior cervical adenopathy.   Skin:     Coloration: Skin is not pale.      Findings: No abrasion, bruising, burn, ecchymosis, erythema, laceration, lesion or rash.   Neurological:      Mental Status: He is alert and oriented to person, place, and time. He is not disoriented.      Cranial Nerves: No cranial nerve deficit.      Sensory: No sensory deficit.      Motor: No tremor, atrophy, abnormal muscle tone or seizure activity.   Psychiatric:         Mood and Affect: Mood is not anxious or depressed. Affect is not labile, blunt, angry or  inappropriate.         Speech: He is communicative. Speech is not rapid and pressured, delayed, slurred or tangential.         Behavior: Behavior normal. Behavior is not agitated, aggressive, withdrawn, hyperactive or combative.         Thought Content: Thought content normal.         Cognition and Memory: Memory is not impaired. He does not exhibit impaired recent memory or impaired remote memory.              Assessment and Plan     1. Presbycusis of both ears    2. Left ear pain        Patient to see Audiology for hearing aid evaluation..    F/U prn.         No follow-ups on file.

## 2023-08-03 ENCOUNTER — PATIENT MESSAGE (OUTPATIENT)
Dept: INTERNAL MEDICINE | Facility: CLINIC | Age: 68
End: 2023-08-03
Payer: MEDICARE

## 2023-08-03 ENCOUNTER — OFFICE VISIT (OUTPATIENT)
Dept: ORTHOPEDICS | Facility: CLINIC | Age: 68
End: 2023-08-03
Payer: MEDICARE

## 2023-08-03 DIAGNOSIS — M17.11 PRIMARY OSTEOARTHRITIS OF RIGHT KNEE: Primary | ICD-10-CM

## 2023-08-03 PROCEDURE — 99499 UNLISTED E&M SERVICE: CPT | Mod: S$PBB,,, | Performed by: PHYSICIAN ASSISTANT

## 2023-08-03 PROCEDURE — 99999 PR PBB SHADOW E&M-EST. PATIENT-LVL III: ICD-10-PCS | Mod: PBBFAC,,, | Performed by: PHYSICIAN ASSISTANT

## 2023-08-03 PROCEDURE — 99499 NO LOS: ICD-10-PCS | Mod: S$PBB,,, | Performed by: PHYSICIAN ASSISTANT

## 2023-08-03 PROCEDURE — 20610 DRAIN/INJ JOINT/BURSA W/O US: CPT | Mod: PBBFAC,EJ,RT | Performed by: PHYSICIAN ASSISTANT

## 2023-08-03 PROCEDURE — 20610 PR DRAIN/INJECT LARGE JOINT/BURSA: ICD-10-PCS | Mod: S$PBB,RT,, | Performed by: PHYSICIAN ASSISTANT

## 2023-08-03 PROCEDURE — 99999PBSHW PR PBB SHADOW TECHNICAL ONLY FILED TO HB: ICD-10-PCS | Mod: JZ,PBBFAC,,

## 2023-08-03 PROCEDURE — 99999 PR PBB SHADOW E&M-EST. PATIENT-LVL III: CPT | Mod: PBBFAC,,, | Performed by: PHYSICIAN ASSISTANT

## 2023-08-03 PROCEDURE — 20610 DRAIN/INJ JOINT/BURSA W/O US: CPT | Mod: S$PBB,RT,, | Performed by: PHYSICIAN ASSISTANT

## 2023-08-03 PROCEDURE — 99213 OFFICE O/P EST LOW 20 MIN: CPT | Mod: PBBFAC | Performed by: PHYSICIAN ASSISTANT

## 2023-08-03 PROCEDURE — 99999PBSHW PR PBB SHADOW TECHNICAL ONLY FILED TO HB: Mod: JZ,PBBFAC,,

## 2023-08-03 RX ADMIN — Medication 40 MG: at 08:08

## 2023-08-03 NOTE — PROGRESS NOTES
Wero Spangler is a 67 y.o. year old his here today for his 3rd Euflexxa injection for degenerative changes of his right knee . he was last seen and treated in the clinic on 7/27/2023. There has been no significant change in his medical status since his last visit. No Fever, chills, malaise, or unexplained weight change.      Allergies, Medications, past medical and surgical history were reviewed .    Examination of the knee demonstrates  No evidence of edema, erythema , echymosis strength and range of motion are unchanged from previous visit.    The risks, benefits, pros, cons, and potential side effects of the procedure were discussed with the patient in detail all questions were answered.  The patient is comfortable and willing to proceed with the procedure. Verbal consent was obtained and the proper joint was identified by the patient and provider.     The injection site was identified and the skin was prepared with a betadine solution. The  right knee  joint was injected with 2 ml of Euflexxa solution under sterile technique. Wero Spangler tolerated the procedure well, he was advised to rest the knee today, ice and elevation. I may take 3 -6 weeks following the last injection to get the full benefit of the medication.  I will see him back in 6 months. Sooner if he has any problems or concerns.           .     ICD-10-CM ICD-9-CM   1. Primary osteoarthritis of right knee  M17.11 715.16

## 2023-08-14 ENCOUNTER — PATIENT MESSAGE (OUTPATIENT)
Dept: INTERNAL MEDICINE | Facility: CLINIC | Age: 68
End: 2023-08-14
Payer: MEDICARE

## 2023-08-14 DIAGNOSIS — Z12.11 SCREEN FOR COLON CANCER: Primary | ICD-10-CM

## 2023-08-22 ENCOUNTER — LAB VISIT (OUTPATIENT)
Dept: LAB | Facility: HOSPITAL | Age: 68
End: 2023-08-22
Attending: INTERNAL MEDICINE
Payer: MEDICARE

## 2023-08-22 DIAGNOSIS — Z12.11 SCREEN FOR COLON CANCER: ICD-10-CM

## 2023-08-22 PROCEDURE — 82274 ASSAY TEST FOR BLOOD FECAL: CPT | Performed by: INTERNAL MEDICINE

## 2023-09-01 LAB — HEMOCCULT STL QL IA: NEGATIVE

## 2023-09-06 ENCOUNTER — TELEPHONE (OUTPATIENT)
Dept: INTERNAL MEDICINE | Facility: CLINIC | Age: 68
End: 2023-09-06
Payer: MEDICARE

## 2023-09-06 LAB — HEMOCCULT STL QL IA: NORMAL

## 2023-09-06 NOTE — TELEPHONE ENCOUNTER
Please let the patient know that there was some issue running the fit kit.  Timing to processing or some other related issue.  Can we get him another fit kit to perform.  Sorry for the inconvenience

## 2023-10-01 ENCOUNTER — PATIENT MESSAGE (OUTPATIENT)
Dept: ELECTROPHYSIOLOGY | Facility: CLINIC | Age: 68
End: 2023-10-01
Payer: MEDICARE

## 2023-10-19 ENCOUNTER — PATIENT MESSAGE (OUTPATIENT)
Dept: CARDIOLOGY | Facility: CLINIC | Age: 68
End: 2023-10-19
Payer: MEDICARE

## 2023-10-19 ENCOUNTER — PATIENT MESSAGE (OUTPATIENT)
Dept: UROLOGY | Facility: CLINIC | Age: 68
End: 2023-10-19
Payer: MEDICARE

## 2023-10-19 DIAGNOSIS — I25.2 OLD ANTERIOR MYOCARDIAL INFARCTION: ICD-10-CM

## 2023-10-19 DIAGNOSIS — I25.10 CORONARY ARTERY DISEASE INVOLVING NATIVE CORONARY ARTERY OF NATIVE HEART WITHOUT ANGINA PECTORIS: Primary | ICD-10-CM

## 2023-10-19 RX ORDER — ATORVASTATIN CALCIUM 80 MG/1
80 TABLET, FILM COATED ORAL DAILY
Qty: 90 TABLET | Refills: 3 | Status: SHIPPED | OUTPATIENT
Start: 2023-10-19 | End: 2023-11-06 | Stop reason: DRUGHIGH

## 2023-10-19 RX ORDER — BISOPROLOL FUMARATE 5 MG/1
5 TABLET, FILM COATED ORAL DAILY
Qty: 90 TABLET | Refills: 3 | Status: SHIPPED | OUTPATIENT
Start: 2023-10-19 | End: 2023-11-06 | Stop reason: DRUGHIGH

## 2023-10-19 RX ORDER — BISOPROLOL FUMARATE 5 MG/1
5 TABLET, FILM COATED ORAL DAILY
COMMUNITY
End: 2023-10-19 | Stop reason: SDUPTHER

## 2023-10-19 RX ORDER — CALC/MAG/B COMPLEX/D3/HERB 61
15 TABLET ORAL DAILY
Qty: 90 CAPSULE | Refills: 12 | Status: SHIPPED | OUTPATIENT
Start: 2023-10-19

## 2023-10-19 RX ORDER — ATORVASTATIN CALCIUM 80 MG/1
80 TABLET, FILM COATED ORAL DAILY
COMMUNITY
End: 2023-10-19 | Stop reason: SDUPTHER

## 2023-10-19 RX ORDER — CALC/MAG/B COMPLEX/D3/HERB 61
15 TABLET ORAL DAILY
COMMUNITY
End: 2023-10-19 | Stop reason: SDUPTHER

## 2023-10-19 RX ORDER — NAPROXEN SODIUM 220 MG/1
81 TABLET, FILM COATED ORAL DAILY
COMMUNITY

## 2023-10-19 NOTE — TELEPHONE ENCOUNTER
Reyes Palacios. I know we were both copied on the [place] [facility]. I was not sure if you wanted to look them over first to see if all appropriate? I am happy to fill but actually do not know what the first one is and was not sure if the antiplatelet was standard for us here, so did not want to confuse things without your thoughts first.     Duke Cano

## 2023-10-19 NOTE — TELEPHONE ENCOUNTER
"Mr. Spangler.     Thank you for your inquiry. I am sorry your time in [place] was not the most pleasant one. Glad you have all the information with you.  Unfortunately, I will be out of office the week of October 23rd.      If you wish to scan your documents and send them through the portal we can review and send new prescriptions to your pharmacy prior to your arrival back home on October 28th.    Alternately we could try to schedule you to see one of my partners either at the [facility] or [facility] ( [facility] location).    I do not have any in-person openings till November 6th.    Please let me know how you would like to proceed. Hope you flight hope will be a safe one.    Pili Palacios    I reviewed the scan.  1. Low dose Aspirin.  You can get it over the counter.  2. Ticagrelol 90mg twice a day. We will prescribe it.  3. Prevacid 15 mg daily. Dr. Cano can possibly prescibe it for "stomach protection".  4. Atorvastatin 80mg once a day. We will prescribe it..  5. Bisoprolol 7.5mg daily. We only have 5 and 10 mg in [place]. We can prescribe 5mg daily and you can start with half ( 2.5mg daily) or 5 mg daily.    Out of these medications the first two you cannot stop for 12 months after the procedure.  So please make sure you have enough to travel home.    Pili Palacios  "

## 2023-10-20 ENCOUNTER — PATIENT MESSAGE (OUTPATIENT)
Dept: INTERNAL MEDICINE | Facility: CLINIC | Age: 68
End: 2023-10-20
Payer: MEDICARE

## 2023-10-23 ENCOUNTER — PATIENT MESSAGE (OUTPATIENT)
Dept: INTERNAL MEDICINE | Facility: CLINIC | Age: 68
End: 2023-10-23
Payer: MEDICARE

## 2023-10-26 RX ORDER — EFINACONAZOLE 100 MG/ML
SOLUTION TOPICAL
Qty: 8 ML | Refills: 1 | Status: SHIPPED | OUTPATIENT
Start: 2023-10-26

## 2023-10-27 ENCOUNTER — LAB VISIT (OUTPATIENT)
Dept: LAB | Facility: HOSPITAL | Age: 68
End: 2023-10-27
Attending: UROLOGY
Payer: MEDICARE

## 2023-10-27 DIAGNOSIS — R97.20 ELEVATED PSA: ICD-10-CM

## 2023-10-27 LAB — COMPLEXED PSA SERPL-MCNC: 13.3 NG/ML (ref 0–4)

## 2023-10-27 PROCEDURE — 36415 COLL VENOUS BLD VENIPUNCTURE: CPT | Performed by: UROLOGY

## 2023-10-27 PROCEDURE — 84153 ASSAY OF PSA TOTAL: CPT | Performed by: UROLOGY

## 2023-10-31 ENCOUNTER — HOSPITAL ENCOUNTER (OUTPATIENT)
Dept: CARDIOLOGY | Facility: HOSPITAL | Age: 68
Discharge: HOME | End: 2023-10-31
Attending: INTERNAL MEDICINE
Payer: MEDICARE

## 2023-10-31 DIAGNOSIS — I25.2 OLD ANTERIOR MYOCARDIAL INFARCTION: ICD-10-CM

## 2023-10-31 DIAGNOSIS — I25.10 CORONARY ARTERY DISEASE INVOLVING NATIVE CORONARY ARTERY OF NATIVE HEART WITHOUT ANGINA PECTORIS: ICD-10-CM

## 2023-10-31 PROCEDURE — 93306 TTE W/DOPPLER COMPLETE: CPT

## 2023-10-31 PROCEDURE — 93306 TTE W/DOPPLER COMPLETE: CPT | Mod: 26,,, | Performed by: INTERNAL MEDICINE

## 2023-10-31 PROCEDURE — 93306 ECHO (CUPID ONLY): ICD-10-PCS | Mod: 26,,, | Performed by: INTERNAL MEDICINE

## 2023-11-01 LAB
ASCENDING AORTA: 2.8 CM
AV INDEX (PROSTH): 0.58
AV MEAN GRADIENT: 4 MMHG
AV PEAK GRADIENT: 8 MMHG
AV VALVE AREA BY VELOCITY RATIO: 1.98 CM²
AV VALVE AREA: 2 CM²
AV VELOCITY RATIO: 0.57
CV ECHO LV RWT: 0.27 CM
DOP CALC AO PEAK VEL: 1.45 M/S
DOP CALC AO VTI: 38.64 CM
DOP CALC LVOT AREA: 3.5 CM2
DOP CALC LVOT DIAMETER: 2.1 CM
DOP CALC LVOT PEAK VEL: 0.83 M/S
DOP CALC LVOT STROKE VOLUME: 77.23 CM3
DOP CALCLVOT PEAK VEL VTI: 22.31 CM
E WAVE DECELERATION TIME: 240.01 MSEC
E/A RATIO: 1.1
E/E' RATIO: 9.78 M/S
ECHO LV POSTERIOR WALL: 0.81 CM (ref 0.6–1.1)
FRACTIONAL SHORTENING: 28 % (ref 28–44)
INTERVENTRICULAR SEPTUM: 0.6 CM (ref 0.6–1.1)
LA MAJOR: 4.98 CM
LA MINOR: 4.89 CM
LA WIDTH: 3.74 CM
LEFT ATRIUM SIZE: 4.13 CM
LEFT ATRIUM VOLUME MOD: 77 CM3
LEFT ATRIUM VOLUME: 64.79 CM3
LEFT INTERNAL DIMENSION IN SYSTOLE: 4.25 CM (ref 2.1–4)
LEFT VENTRICLE DIASTOLIC VOLUME: 174.01 ML
LEFT VENTRICLE SYSTOLIC VOLUME: 80.99 ML
LEFT VENTRICULAR INTERNAL DIMENSION IN DIASTOLE: 5.91 CM (ref 3.5–6)
LEFT VENTRICULAR MASS: 155.18 G
LV LATERAL E/E' RATIO: 9.78 M/S
LV SEPTAL E/E' RATIO: 9.78 M/S
MV PEAK A VEL: 0.8 M/S
MV PEAK E VEL: 0.88 M/S
PISA TR MAX VEL: 2.5 M/S
RA MAJOR: 5.22 CM
RA PRESSURE ESTIMATED: 15 MMHG
RA WIDTH: 3.71 CM
RIGHT VENTRICULAR END-DIASTOLIC DIMENSION: 4.06 CM
RV TB RVSP: 18 MMHG
SINUS: 3.19 CM
STJ: 2.92 CM
TDI LATERAL: 0.09 M/S
TDI SEPTAL: 0.09 M/S
TDI: 0.09 M/S
TR MAX PG: 25 MMHG
TRICUSPID ANNULAR PLANE SYSTOLIC EXCURSION: 2.59 CM
TV REST PULMONARY ARTERY PRESSURE: 40 MMHG

## 2023-11-01 NOTE — PROGRESS NOTES
Please inform the patient that his cardiac echo showed normal heart function, but likely some fluid overload. We will discuss it in detail when I see him in a clinic

## 2023-11-02 ENCOUNTER — OFFICE VISIT (OUTPATIENT)
Dept: UROLOGY | Facility: CLINIC | Age: 68
End: 2023-11-02
Payer: MEDICARE

## 2023-11-02 VITALS
WEIGHT: 190 LBS | BODY MASS INDEX: 25.73 KG/M2 | DIASTOLIC BLOOD PRESSURE: 69 MMHG | HEIGHT: 72 IN | SYSTOLIC BLOOD PRESSURE: 147 MMHG | HEART RATE: 49 BPM

## 2023-11-02 DIAGNOSIS — N40.1 BPH WITH URINARY OBSTRUCTION: ICD-10-CM

## 2023-11-02 DIAGNOSIS — N13.8 BPH WITH URINARY OBSTRUCTION: ICD-10-CM

## 2023-11-02 DIAGNOSIS — N40.2 PROSTATE NODULE: ICD-10-CM

## 2023-11-02 DIAGNOSIS — R97.20 ELEVATED PSA: Primary | ICD-10-CM

## 2023-11-02 DIAGNOSIS — I10 PRIMARY HYPERTENSION: ICD-10-CM

## 2023-11-02 DIAGNOSIS — N52.01 ERECTILE DYSFUNCTION DUE TO ARTERIAL INSUFFICIENCY: ICD-10-CM

## 2023-11-02 PROCEDURE — 99214 OFFICE O/P EST MOD 30 MIN: CPT | Mod: PBBFAC | Performed by: UROLOGY

## 2023-11-02 PROCEDURE — 99999 PR PBB SHADOW E&M-EST. PATIENT-LVL IV: CPT | Mod: PBBFAC,,, | Performed by: UROLOGY

## 2023-11-02 PROCEDURE — 99999 PR PBB SHADOW E&M-EST. PATIENT-LVL IV: ICD-10-PCS | Mod: PBBFAC,,, | Performed by: UROLOGY

## 2023-11-02 PROCEDURE — 99214 OFFICE O/P EST MOD 30 MIN: CPT | Mod: S$PBB,,, | Performed by: UROLOGY

## 2023-11-02 PROCEDURE — 99214 PR OFFICE/OUTPT VISIT, EST, LEVL IV, 30-39 MIN: ICD-10-PCS | Mod: S$PBB,,, | Performed by: UROLOGY

## 2023-11-02 RX ORDER — ALFUZOSIN HYDROCHLORIDE 10 MG/1
10 TABLET, EXTENDED RELEASE ORAL DAILY
Qty: 30 TABLET | Refills: 11 | Status: SHIPPED | OUTPATIENT
Start: 2023-11-02 | End: 2024-10-27

## 2023-11-02 NOTE — PROGRESS NOTES
CHIEF COMPLAINT:    Mr. Spangler is a 68 y.o. male presenting with an elevated PSA.    PRESENTING ILLNESS:    Wero Spangler is a 68 y.o. male with an elevated PSA.  He has had multiple biopsies done.  One was in 2012 when his PSA was 10.14.  There was also a prostate nodule at that time.  Most recent one was 8/23/16 when his PSA was 18.1.  This was a cognitive fusion biopsy.    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  Path returned an oncocytoma.    He also has LUTS.  He's s/p rezum on 5/14/19.  He's voiding much better.  Good FOS. However, he does c/o irritative symptoms that are worsened by caffeine and alcohol. Currently on uroxatral.   Is pleased with how he voids.  Nocturia x 2.    He c/o ED.  He's tried Cialis with good results, but he c/o some decreased sensation.  His T is normal.    REVIEW OF SYSTEMS:    Wero Spangler denies chest pain,sore throat, headache, blurred vision, fever, nausea, vomiting, chills, flank discomfort, abdominal pain, bleeding per rectum, cough, SOB, recent loss of consciousness, recent mental status changes, seizures, dizziness, or upper or lower extremity weakness.    JOSE MANUEL  1. 2  2. 2  3. 3  4. 3  5. 3     PATIENT HISTORY:    Past Medical History:   Diagnosis Date    Aftercataract     Allergy     BPH (benign prostatic hyperplasia)     Cataract     Corneal dystrophy     Elevated PSA     Enlarged prostate     Fatty liver     Fuchs' corneal dystrophy     Gallstones     General anesthetics causing adverse effect in therapeutic use 2000    delayed emergence after back surgery    GERD (gastroesophageal reflux disease)     HTN (hypertension) 9/24/2013    Hypertension     Insomnia     Prostate nodule     Urinary tract infection        Past Surgical History:   Procedure Laterality Date    BACK SURGERY  4/2000    CATARACT EXTRACTION W/  INTRAOCULAR LENS IMPLANT      Both Eyes    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    Procedure: TRANSPLANT, CORNEA;  Surgeon: Vu Wilson,  MD;  Location: [facility];  Service: Ophthalmology;  Laterality: Right;  DMEK    EYE SURGERY      LAPAROSCOPIC MARSUPIALIZATION OF CYST OF KIDNEY      PROSTATE SURGERY      prostate biopsy benign    TONSILLECTOMY      VASECTOMY         Family History   Problem Relation Age of Onset    Cancer Mother         breast    Prostate cancer Brother     Cancer Brother         prostate    Macular degeneration Maternal Grandmother     Cancer Other     Cancer Other     Amblyopia Neg Hx     Blindness Neg Hx     Cataracts Neg Hx     Glaucoma Neg Hx     Retinal detachment Neg Hx     Strabismus Neg Hx        Social History     Socioeconomic History    Marital status:    Tobacco Use    Smoking status: Former     Current packs/day: 0.00     Types: Cigarettes     Quit date:      Years since quittin.    Smokeless tobacco: Never   Substance and Sexual Activity    Alcohol use: Yes     Alcohol/week: 1.0 standard drink of alcohol     Types: 1 Glasses of wine per week     Comment: 2 beers three times a week     Drug use: No    Sexual activity: Yes     Partners: Female     Social Determinants of Health     Financial Resource Strain: Low Risk  (10/30/2023)    Overall Financial Resource Strain (CARDIA)     Difficulty of Paying Living Expenses: Not hard at all   Food Insecurity: No Food Insecurity (10/30/2023)    Hunger Vital Sign     Worried About Running Out of Food in the Last Year: Never true     Ran Out of Food in the Last Year: Never true   Transportation Needs: No Transportation Needs (10/30/2023)    PRAPARE - Transportation     Lack of Transportation (Medical): No     Lack of Transportation (Non-Medical): No   Physical Activity: Insufficiently Active (10/30/2023)    Exercise Vital Sign     Days of Exercise per Week: 3 days     Minutes of Exercise per Session: 40 min   Stress: Stress Concern Present (10/30/2023)    [employer] - Occupational Stress Questionnaire     Feeling of Stress : To some  extent   Social Connections: Unknown (10/30/2023)    Social Connection and Isolation Panel [NHANES]     Frequency of Communication with Friends and Family: More than three times a week     Frequency of Social Gatherings with Friends and Family: Once a week     Active Member of Clubs or Organizations: No     Attends Club or Organization Meetings: Never     Marital Status:    Housing Stability: Low Risk  (10/30/2023)    Housing Stability Vital Sign     Unable to Pay for Housing in the Last Year: No     Number of Places Lived in the Last Year: 1     Unstable Housing in the Last Year: No       Allergies:  Patient has no known allergies.    Medications:    Current Outpatient Medications:     ALPRAZolam (XANAX) 0.5 MG tablet, TAKE 1 TABLET BY MOUTH EVERY NIGHT, Disp: 30 tablet, Rfl: 5    aspirin 81 MG Chew, Take 81 mg by mouth once daily., Disp: , Rfl:     atorvastatin (LIPITOR) 80 MG tablet, Take 1 tablet (80 mg total) by mouth once daily., Disp: 90 tablet, Rfl: 3    bisoprolol (ZEBETA) 5 MG tablet, Take 1 tablet (5 mg total) by mouth once daily., Disp: 90 tablet, Rfl: 3    efinaconazole (JUBLIA) 10 % Faviola, APPLY TO AFFECTED NAIL DAILY. REMOVE WEEKLY, Disp: 8 mL, Rfl: 1    fenofibrate (TRICOR) 54 MG tablet, TAKE 1 TABLET BY MOUTH ONCE DAILY, Disp: 90 tablet, Rfl: 3    ibuprofen (ADVIL,MOTRIN) 600 MG tablet, Take 1 tablet (600 mg total) by mouth every 8 (eight) hours as needed for Pain., Disp: 20 tablet, Rfl: 0    lansoprazole (PREVACID) 15 MG capsule, Take 1 capsule (15 mg total) by mouth once daily., Disp: 90 capsule, Rfl: 12    oxybutynin (DITROPAN-XL) 10 MG 24 hr tablet, Take 1 tablet (10 mg total) by mouth once daily., Disp: 30 tablet, Rfl: 11    sodium chloride 2% (BARBI 128) 2 % ophthalmic solution, 1 drop as needed (takes 3-4 times/day)., Disp: , Rfl:     tadalafiL (CIALIS) 20 MG Tab, Take 1 tablet (20 mg total) by mouth once daily. Take 1 hour before intercourse, Disp: 10 tablet, Rfl: 11    ticagrelor  (BRILINTA) 90 mg tablet, Take 1 tablet (90 mg total) by mouth 2 (two) times daily., Disp: 180 tablet, Rfl: 3    triamterene-hydrochlorothiazide 37.5-25 mg (MAXZIDE-25) 37.5-25 mg per tablet, Take 1 tablet by mouth once daily., Disp: 90 tablet, Rfl: 3    PHYSICAL EXAMINATION:    The patient generally appears in good health, is appropriately interactive, and is in no apparent distress.     Eyes: anicteric sclerae, moist conjunctivae; no lid-lag; PERRLA     HENT: Atraumatic; oropharynx clear with moist mucous membranes and no mucosal ulcerations;normal hard and soft palate.  No evidence of lymphadenopathy.    Neck: Trachea midline.  No thyromegaly.    Musculoskeletal: No abnormal gait.    Skin: No lesions.    Mental: Cooperative with normal affect.  Is oriented to time, place, and person.    Neuro: Grossly intact.    Chest: Normal inspiratory effort.   No accessory muscles.  No audible wheezes.  Respirations symmetric on inspiration and expiration.    Heart: Regular rhythm.      Abdomen:  Soft, non-tender. No masses or organomegaly. Bladder is not palpable. No evidence of flank discomfort. No evidence of inguinal hernia.    Genitourinary: The penis is not circumcised with no evidence of plaques or induration. The urethral meatus is normal. The testes, epididymides, and cord structures are normal in size and contour bilaterally. The scrotum is normal in size and contour.    Normal anal sphincter tone. No rectal mass.    The prostate is 40 g. Normal landmarks. Lateral sulci. Median furrow intact. There is a 1.5 cm x 1.5 cm nodule in the midportion of the gland. Stable.  Seminal vesicles are normal.    Extremities: No clubbing, cyanosis, or edema      LABS:      Lab Results   Component Value Date    PSA 14.0 (H) 09/22/2014    PSA 15.48 (H) 08/16/2013    PSA 11.06 (H) 02/25/2013    PSADIAG 13.3 (H) 10/27/2023    PSADIAG 13.3 (H) 05/09/2023    PSADIAG 13.1 (H) 11/01/2022    PSATOTAL 6.8 (H) 07/12/2011    PSATOTAL 7.9 (H)  01/11/2011    PSAFREE 1.07 07/12/2011    PSAFREE 1.67 (H) 01/11/2011    PSAFREEPCT 15.74 07/12/2011    PSAFREEPCT 21.14 01/11/2011        IMPRESSION:    Encounter Diagnoses   Name Primary?    Elevated PSA Yes    BPH with urinary obstruction     Erectile dysfunction due to arterial insufficiency     Prostate nodule     Primary hypertension    HTN, stable    PLAN:    1. Discussed options for his LUTS.  Continue uroxatral.  Refilled.    2. Discussed options for his ED (affected by above comorbidities). Continue Cialis.    3.  Went over his PSA.  4. RTC 6 months with a PSA.        Copy to:

## 2023-11-06 ENCOUNTER — OFFICE VISIT (OUTPATIENT)
Dept: CARDIOLOGY | Facility: CLINIC | Age: 68
End: 2023-11-06
Payer: MEDICARE

## 2023-11-06 VITALS
DIASTOLIC BLOOD PRESSURE: 70 MMHG | HEIGHT: 72 IN | WEIGHT: 194.13 LBS | HEART RATE: 45 BPM | SYSTOLIC BLOOD PRESSURE: 158 MMHG | BODY MASS INDEX: 26.3 KG/M2

## 2023-11-06 DIAGNOSIS — E78.5 DYSLIPIDEMIA: ICD-10-CM

## 2023-11-06 DIAGNOSIS — I10 PRIMARY HYPERTENSION: Primary | ICD-10-CM

## 2023-11-06 DIAGNOSIS — I21.4 NSTEMI (NON-ST ELEVATED MYOCARDIAL INFARCTION): ICD-10-CM

## 2023-11-06 DIAGNOSIS — I21.4 NSTEMI (NON-ST ELEVATED MYOCARDIAL INFARCTION): Primary | ICD-10-CM

## 2023-11-06 DIAGNOSIS — K76.0 FATTY LIVER: ICD-10-CM

## 2023-11-06 DIAGNOSIS — I25.10 CORONARY ARTERY DISEASE INVOLVING NATIVE CORONARY ARTERY OF NATIVE HEART WITHOUT ANGINA PECTORIS: ICD-10-CM

## 2023-11-06 PROCEDURE — 93010 EKG 12-LEAD: ICD-10-PCS | Mod: S$PBB,,, | Performed by: INTERNAL MEDICINE

## 2023-11-06 PROCEDURE — 99205 OFFICE O/P NEW HI 60 MIN: CPT | Mod: S$PBB,,, | Performed by: INTERNAL MEDICINE

## 2023-11-06 PROCEDURE — 99999 PR PBB SHADOW E&M-EST. PATIENT-LVL IV: CPT | Mod: PBBFAC,,, | Performed by: INTERNAL MEDICINE

## 2023-11-06 PROCEDURE — 99999 PR PBB SHADOW E&M-EST. PATIENT-LVL IV: ICD-10-PCS | Mod: PBBFAC,,, | Performed by: INTERNAL MEDICINE

## 2023-11-06 PROCEDURE — 93005 ELECTROCARDIOGRAM TRACING: CPT | Mod: PBBFAC,PO | Performed by: INTERNAL MEDICINE

## 2023-11-06 PROCEDURE — 99214 OFFICE O/P EST MOD 30 MIN: CPT | Mod: PBBFAC,PO | Performed by: INTERNAL MEDICINE

## 2023-11-06 PROCEDURE — 93010 ELECTROCARDIOGRAM REPORT: CPT | Mod: S$PBB,,, | Performed by: INTERNAL MEDICINE

## 2023-11-06 PROCEDURE — 99205 PR OFFICE/OUTPT VISIT, NEW, LEVL V, 60-74 MIN: ICD-10-PCS | Mod: S$PBB,,, | Performed by: INTERNAL MEDICINE

## 2023-11-06 RX ORDER — ATORVASTATIN CALCIUM 20 MG/1
20 TABLET, FILM COATED ORAL DAILY
Qty: 90 TABLET | Refills: 3 | Status: SHIPPED | OUTPATIENT
Start: 2023-11-06

## 2023-11-06 RX ORDER — ATORVASTATIN CALCIUM 20 MG/1
20 TABLET, FILM COATED ORAL DAILY
COMMUNITY
End: 2023-11-06 | Stop reason: SDUPTHER

## 2023-11-06 RX ORDER — FUROSEMIDE 20 MG/1
20 TABLET ORAL 2 TIMES DAILY
COMMUNITY
End: 2023-11-06 | Stop reason: SDUPTHER

## 2023-11-06 RX ORDER — FUROSEMIDE 20 MG/1
20 TABLET ORAL 2 TIMES DAILY
Qty: 90 TABLET | Refills: 3 | Status: SHIPPED | OUTPATIENT
Start: 2023-11-06 | End: 2023-12-27

## 2023-11-07 ENCOUNTER — LAB VISIT (OUTPATIENT)
Dept: LAB | Facility: HOSPITAL | Age: 68
End: 2023-11-07
Payer: MEDICARE

## 2023-11-07 ENCOUNTER — TELEPHONE (OUTPATIENT)
Dept: CARDIAC REHAB | Facility: CLINIC | Age: 68
End: 2023-11-07
Payer: MEDICARE

## 2023-11-07 DIAGNOSIS — I21.4 NSTEMI (NON-ST ELEVATED MYOCARDIAL INFARCTION): ICD-10-CM

## 2023-11-07 DIAGNOSIS — I25.10 CORONARY ARTERY DISEASE INVOLVING NATIVE CORONARY ARTERY OF NATIVE HEART WITHOUT ANGINA PECTORIS: ICD-10-CM

## 2023-11-07 DIAGNOSIS — E78.5 DYSLIPIDEMIA: ICD-10-CM

## 2023-11-07 LAB
ALBUMIN SERPL BCP-MCNC: 3.9 G/DL (ref 3.5–5.2)
ALP SERPL-CCNC: 64 U/L (ref 55–135)
ALT SERPL W/O P-5'-P-CCNC: 25 U/L (ref 10–44)
ANION GAP SERPL CALC-SCNC: 11 MMOL/L (ref 8–16)
AST SERPL-CCNC: 19 U/L (ref 10–40)
BILIRUB SERPL-MCNC: 1.6 MG/DL (ref 0.1–1)
BNP SERPL-MCNC: 88 PG/ML (ref 0–99)
BUN SERPL-MCNC: 13 MG/DL (ref 8–23)
CALCIUM SERPL-MCNC: 9.1 MG/DL (ref 8.7–10.5)
CHLORIDE SERPL-SCNC: 107 MMOL/L (ref 95–110)
CHOLEST SERPL-MCNC: 149 MG/DL (ref 120–199)
CHOLEST/HDLC SERPL: 3.5 {RATIO} (ref 2–5)
CO2 SERPL-SCNC: 26 MMOL/L (ref 23–29)
CREAT SERPL-MCNC: 0.9 MG/DL (ref 0.5–1.4)
EST. GFR  (NO RACE VARIABLE): >60 ML/MIN/1.73 M^2
GLUCOSE SERPL-MCNC: 95 MG/DL (ref 70–110)
HDLC SERPL-MCNC: 43 MG/DL (ref 40–75)
HDLC SERPL: 28.9 % (ref 20–50)
LDLC SERPL CALC-MCNC: 90.2 MG/DL (ref 63–159)
NONHDLC SERPL-MCNC: 106 MG/DL
POTASSIUM SERPL-SCNC: 4.1 MMOL/L (ref 3.5–5.1)
PROT SERPL-MCNC: 6.6 G/DL (ref 6–8.4)
SODIUM SERPL-SCNC: 144 MMOL/L (ref 136–145)
TRIGL SERPL-MCNC: 79 MG/DL (ref 30–150)
TSH SERPL DL<=0.005 MIU/L-ACNC: 1.58 UIU/ML (ref 0.4–4)

## 2023-11-07 PROCEDURE — 36415 COLL VENOUS BLD VENIPUNCTURE: CPT | Mod: PO | Performed by: INTERNAL MEDICINE

## 2023-11-07 PROCEDURE — 80061 LIPID PANEL: CPT | Performed by: INTERNAL MEDICINE

## 2023-11-07 PROCEDURE — 84443 ASSAY THYROID STIM HORMONE: CPT | Performed by: INTERNAL MEDICINE

## 2023-11-07 PROCEDURE — 80053 COMPREHEN METABOLIC PANEL: CPT | Performed by: INTERNAL MEDICINE

## 2023-11-07 PROCEDURE — 83880 ASSAY OF NATRIURETIC PEPTIDE: CPT | Performed by: INTERNAL MEDICINE

## 2023-11-07 RX ORDER — NITROGLYCERIN 0.4 MG/1
0.4 TABLET SUBLINGUAL EVERY 5 MIN PRN
COMMUNITY
End: 2023-11-07 | Stop reason: SDUPTHER

## 2023-11-07 RX ORDER — NITROGLYCERIN 0.4 MG/1
0.4 TABLET SUBLINGUAL EVERY 5 MIN PRN
Qty: 90 TABLET | Refills: 3 | Status: SHIPPED | OUTPATIENT
Start: 2023-11-07

## 2023-11-07 NOTE — LETTER
November 7, 2023    Wero Spangler  [id number]  [facility] [address]             [facility]  [address]  [place] [address]  Phone: [phone]                                  [facility]   [address]   [place] [address]  [phone]         [facility]   [address] [place] [address] [phone]         [facility]    [address]  [place] [address] [phone]   Re: Wero Spangler  Clinic number: [id number]    Dear Mr. Spangler:    You were recently admitted to an [facility] facility for cardiac (heart) problem.  Your physician has referred you to [facility].  Cardiac Rehab Phase 2 is an educational and exercise program, conducted in a outpatient setting, proven to help reduce your risk for recurrent heart events.    Cardiac rehab has two major parts:    1. Exercise training to help you achieve cardiovascular fitness while learning how to exercise safely and improve muscle strength and endurance.  Your exercise prescription will be based on the results of the cardiopulmonary stress test (CPX) which will be done before entering the program and at completion.  2. Education, counseling and training to help you understand your heart condition and find ways to reduce your risk of future heart problems.  The cardiac rehab team will help you learn how to cope with the stress of adjusting to a new lifestyle and to deal with your fears about the future.    Phase 2 is a 36-session program, meeting 3 times a week for 12 weeks.  Each session consists of an hour of exercise and half-hour dedicated to the educational topic of the day.  Class days vary per location.  Please contact your nearest facility for details.    Through cardiac rehab you will learn:  About your heart condition, medical therapies, and medication  Risk factors in y our lifestyle contributing to  heart disease  New strategies to modify your risk factors  About a healthy diet that can lower your blood cholesterol, control weight, help prevent or control high blood pressure, and diabetes  How to stop smoking  How to manage stress    If you are interested in getting started, call the [facility] of your choosing.     Sincerely,     [facility]

## 2023-11-07 NOTE — TELEPHONE ENCOUNTER
"Information packet sent to patient, which includes "Your guide to living with heart disease". Letter was also sent to patient.      EVIN Corral RN  Cardiac Rehab Nurse  "

## 2023-11-07 NOTE — PROGRESS NOTES
Subjective:   Patient ID:  Wero Spangler is a 68 y.o. male who presents for evaulation of [facility]      HPI: Very pleasant male presents for a follow up after NSTEMI that he suffered while vacationing in [place].  He is doing well now and c/o only of peripheral edema..  Prior to the NSTEMI he had couple of episodes of substernal chest discomfort, but not as severe or long lasting. He underwent stent placement  ( results to be reviewed) and was discharged on beta blocker, statin and DAPT. He reported diarrhea on 80 mg  of Lipitor, that got better when dose was decreased to 40 mg and worse again when it was increased to 80 mg. Eventually his PCP stopped statin 9 days ago and his symptoms have resolved.  In the past he was on Maxzide for hypertension but it was stopped in [place].     In 2021 he was evaluated by EP ( Dr. Warner) for PVC's. No specific therapy was indicated at that time.    Years ago he was evaluated by sleep center and supposedly diagnosed with GHISLAINE, but was never treated for it.    He carries a diagnosis of oncoytoma and is s/p partial nephrectomy.    He is physically active, but does not follow routine exercise program, quit smoking in 1999. He does not drink alcohol.      Echo reviewed with the patient:  Summary         Left Ventricle: The left ventricle is mildly dilated. Normal wall thickness. Normal wall motion. There is normal systolic function with a visually estimated ejection fraction of 55 - 60%. There is indeterminate diastolic function.    Right Ventricle: Normal right ventricular cavity size. Wall thickness is normal. Right ventricle wall motion  is normal. Systolic function is normal.    Left Atrium: Left atrium is mildly dilated.    Right Atrium: Right atrium is dilated.    Pulmonary Artery: The estimated pulmonary artery systolic pressure is 40 mmHg.    IVC/SVC: Elevated venous pressure at 15 mmHg.    ECG - SB, otherwise WNL    Past Medical History:   Diagnosis Date     Aftercataract     Allergy     BPH (benign prostatic hyperplasia)     Cataract     Corneal dystrophy     Elevated PSA     Enlarged prostate     Fatty liver     Fuchs' corneal dystrophy     Gallstones     General anesthetics causing adverse effect in therapeutic use     delayed emergence after back surgery    GERD (gastroesophageal reflux disease)     HTN (hypertension) 2013    Hypertension     Insomnia     Prostate nodule     Urinary tract infection        Past Surgical History:   Procedure Laterality Date    BACK SURGERY  2000    CATARACT EXTRACTION W/  INTRAOCULAR LENS IMPLANT      Both Eyes    CORNEAL TRANSPLANT Right 2019    Procedure: TRANSPLANT, CORNEA;  Surgeon: Vu Wilson MD;  Location: [facility];  Service: Ophthalmology;  Laterality: Right;  DMEK    EYE SURGERY      LAPAROSCOPIC MARSUPIALIZATION OF CYST OF KIDNEY      PROSTATE SURGERY      prostate biopsy benign    TONSILLECTOMY      VASECTOMY         Social History     Socioeconomic History    Marital status:    Tobacco Use    Smoking status: Former     Current packs/day: 0.00     Types: Cigarettes     Quit date:      Years since quittin.    Smokeless tobacco: Never   Substance and Sexual Activity    Alcohol use: Not Currently    Drug use: No    Sexual activity: Yes     Partners: Female     Social Determinants of Health     Financial Resource Strain: Low Risk  (10/30/2023)    Overall Financial Resource Strain (CARDIA)     Difficulty of Paying Living Expenses: Not hard at all   Food Insecurity: No Food Insecurity (10/30/2023)    Hunger Vital Sign     Worried About Running Out of Food in the Last Year: Never true     Ran Out of Food in the Last Year: Never true   Transportation Needs: No Transportation Needs (10/30/2023)    PRAPARE - Transportation     Lack of Transportation (Medical): No     Lack of Transportation (Non-Medical): No   Physical Activity: Insufficiently Active (10/30/2023)    Exercise Vital Sign     Days of  Exercise per Week: 3 days     Minutes of Exercise per Session: 40 min   Stress: Stress Concern Present (10/30/2023)    [employer] - Occupational Stress Questionnaire     Feeling of Stress : To some extent   Social Connections: Unknown (10/30/2023)    Social Connection and Isolation Panel [NHANES]     Frequency of Communication with Friends and Family: More than three times a week     Frequency of Social Gatherings with Friends and Family: Once a week     Active Member of Clubs or Organizations: No     Attends Club or Organization Meetings: Never     Marital Status:    Housing Stability: Low Risk  (10/30/2023)    Housing Stability Vital Sign     Unable to Pay for Housing in the Last Year: No     Number of Places Lived in the Last Year: 1     Unstable Housing in the Last Year: No       Review of patient's allergies indicates:  No Known Allergies    Lab Results   Component Value Date     07/27/2023    K 3.4 (L) 07/27/2023     07/27/2023    CO2 29 07/27/2023    BUN 20 07/27/2023    CREATININE 1.0 07/27/2023     07/27/2023    HGBA1C 4.9 07/25/2022    MG 2.0 01/15/2021    AST 19 07/27/2023    ALT 18 07/27/2023    ALBUMIN 3.8 07/27/2023    PROT 6.5 07/27/2023    BILITOT 0.8 07/27/2023    WBC 5.48 07/27/2023    HGB 13.1 (L) 07/27/2023    HCT 38.0 (L) 07/27/2023    HCT 34 (L) 11/08/2017    MCV 89 07/27/2023     07/27/2023    INR 1.1 11/02/2017    PSA 14.0 (H) 09/22/2014    TSH 1.561 06/29/2018    CHOL 182 07/27/2023    HDL 38 (L) 07/27/2023    LDLCALC 118.4 07/27/2023    TRIG 128 07/27/2023       Review of Systems   Constitutional: Negative.   HENT:  Positive for hearing loss.    Eyes: Negative.    Cardiovascular:  Positive for dyspnea on exertion and leg swelling. Negative for chest pain, claudication, irregular heartbeat, near-syncope, palpitations and syncope.   Respiratory: Negative.  Negative for cough, shortness of breath, snoring and wheezing.     Endocrine: Negative.  Negative for cold intolerance, heat intolerance, polydipsia, polyphagia and polyuria.   Skin: Negative.    Musculoskeletal:  Positive for arthritis, back pain and joint pain.   Gastrointestinal:  Positive for constipation, diarrhea and heartburn.   Genitourinary: Negative.    Neurological: Negative.    Psychiatric/Behavioral: Negative.         Objective:   Physical Exam  Vitals and nursing note reviewed.   Constitutional:       Appearance: He is well-developed.      Comments: BP (!) 158/70   Pulse (!) 45   Ht 6' (1.829 m)   Wt 88 kg (194 lb 1.8 oz)   BMI 26.33 kg/m²      HENT:      Head: Normocephalic.   Eyes:      Pupils: Pupils are equal, round, and reactive to light.   Neck:      Thyroid: No thyromegaly.      Vascular: No carotid bruit.   Cardiovascular:      Rate and Rhythm: Normal rate and regular rhythm.      Pulses: Intact distal pulses.           Carotid pulses are 2+ on the right side and 2+ on the left side.       Radial pulses are 2+ on the right side and 2+ on the left side.        Femoral pulses are 2+ on the right side and 2+ on the left side.       Popliteal pulses are 2+ on the right side and 2+ on the left side.        Dorsalis pedis pulses are 2+ on the right side and 2+ on the left side.        Posterior tibial pulses are 2+ on the right side and 2+ on the left side.      Heart sounds: Normal heart sounds. No murmur heard.     No friction rub. No gallop.   Pulmonary:      Effort: Pulmonary effort is normal. No respiratory distress.      Breath sounds: Normal breath sounds. No wheezing or rales.   Chest:      Chest wall: No tenderness.   Abdominal:      General: Bowel sounds are normal.      Palpations: Abdomen is soft.   Musculoskeletal:         General: Normal range of motion.      Cervical back: Normal range of motion and neck supple.      Right lower leg: Edema present.      Left lower leg: Edema present.      Comments: Bilateral edema 2+   Skin:     General: Skin is  warm and dry.   Neurological:      Mental Status: He is alert and oriented to person, place, and time.         Current Outpatient Medications   Medication Sig    alfuzosin (UROXATRAL) 10 mg Tb24 Take 1 tablet (10 mg total) by mouth once daily.    ALPRAZolam (XANAX) 0.5 MG tablet TAKE 1 TABLET BY MOUTH EVERY NIGHT    aspirin 81 MG Chew Take 81 mg by mouth once daily.    efinaconazole (JUBLIA) 10 % Faviola APPLY TO AFFECTED NAIL DAILY. REMOVE WEEKLY    lansoprazole (PREVACID) 15 MG capsule Take 1 capsule (15 mg total) by mouth once daily.    sodium chloride 2% (BARBI 128) 2 % ophthalmic solution 1 drop as needed (takes 3-4 times/day).    ticagrelor (BRILINTA) 90 mg tablet Take 1 tablet (90 mg total) by mouth 2 (two) times daily.    atorvastatin (LIPITOR) 20 MG tablet Take 1 tablet (20 mg total) by mouth once daily.    bisoprolol (ZEBETA) 2.5 MG Tab split tablet Take 2.5 mg by mouth once daily.    furosemide (LASIX) 20 MG tablet Take 1 tablet (20 mg total) by mouth 2 (two) times daily.    oxybutynin (DITROPAN-XL) 10 MG 24 hr tablet Take 1 tablet (10 mg total) by mouth once daily. (Patient not taking: Reported on 11/6/2023)     No current facility-administered medications for this visit.       Assessment and Plan:     Problem List Items Addressed This Visit          Cardiology Problems    Primary hypertension - Primary    Relevant Orders    IN OFFICE EKG 12-LEAD (to [place])    Cardiac rehab phase II    Basic Metabolic Panel    Lipid Panel    Hepatic Function Panel    NSTEMI (non-ST elevated myocardial infarction)    Relevant Orders    IN OFFICE EKG 12-LEAD (to [place])    Cardiac rehab phase II    Basic Metabolic Panel    Lipid Panel    Hepatic Function Panel       Other    Fatty liver    Relevant Orders    Basic Metabolic Panel    Lipid Panel    Hepatic Function Panel    Dyslipidemia    Relevant Orders    IN OFFICE EKG 12-LEAD (to [place])    Cardiac rehab phase II    Basic Metabolic Panel    Lipid Panel    Hepatic Function Panel        Patient's Medications   New Prescriptions    No medications on file   Previous Medications    ALFUZOSIN (UROXATRAL) 10 MG TB24    Take 1 tablet (10 mg total) by mouth once daily.    ALPRAZOLAM (XANAX) 0.5 MG TABLET    TAKE 1 TABLET BY MOUTH EVERY NIGHT    ASPIRIN 81 MG CHEW    Take 81 mg by mouth once daily.    BISOPROLOL (ZEBETA) 2.5 MG TAB SPLIT TABLET    Take 2.5 mg by mouth once daily.    EFINACONAZOLE (JUBLIA) 10 % MARIA ELENA    APPLY TO AFFECTED NAIL DAILY. REMOVE WEEKLY    LANSOPRAZOLE (PREVACID) 15 MG CAPSULE    Take 1 capsule (15 mg total) by mouth once daily.    OXYBUTYNIN (DITROPAN-XL) 10 MG 24 HR TABLET    Take 1 tablet (10 mg total) by mouth once daily.    SODIUM CHLORIDE 2% (BARBI 128) 2 % OPHTHALMIC SOLUTION    1 drop as needed (takes 3-4 times/day).    TICAGRELOR (BRILINTA) 90 MG TABLET    Take 1 tablet (90 mg total) by mouth 2 (two) times daily.   Modified Medications    Modified Medication Previous Medication    ATORVASTATIN (LIPITOR) 20 MG TABLET atorvastatin (LIPITOR) 20 MG tablet       Take 1 tablet (20 mg total) by mouth once daily.    Take 20 mg by mouth once daily.    FUROSEMIDE (LASIX) 20 MG TABLET furosemide (LASIX) 20 MG tablet       Take 1 tablet (20 mg total) by mouth 2 (two) times daily.    Take 20 mg by mouth 2 (two) times daily.   Discontinued Medications    FENOFIBRATE (TRICOR) 54 MG TABLET    TAKE 1 TABLET BY MOUTH ONCE DAILY     Right sided filling pressures were elevated on the prior echo from 2021. This is likely due to group 2 PH associated with HFpEF, but cannot be sure. Would consider  abdominal ultrasound to rule any abdominal pathology and reassess fatty liver. Defer to Dr. Cano.  Restart Lipitor at 20 mg daily and recheck lipids and LFT's in 2 months. IF GI symptoms occur will either change to Crestor or Zetia. We have also discussed PCSK9 inhibitor, but patient is not interested in this therapy now.  D/c Cialis since we have agreed to prescribe NTG  s.l. prn.( Long  discussion regarding this interaction).  Decrease Zabeta to 2.5mg daily.  Start Lasix 20 mg daily and repeat BMP in 1 week. Dietary potassium supplement for now. Eventually will resume Maxzide.  Continue on DAPT for 12 months.  Sign up for cardiac rehab.  Consider GHISLAINE work up. Defer to Dr. Cano  Thank you for allowing me to participate in the care of this patient. Please do not hesitate to contact me with any questions or concerns.      Follow up in about 6 months (around 5/6/2024).

## 2023-11-08 ENCOUNTER — TELEPHONE (OUTPATIENT)
Dept: INTERNAL MEDICINE | Facility: CLINIC | Age: 68
End: 2023-11-08
Payer: MEDICARE

## 2023-11-08 ENCOUNTER — TELEPHONE (OUTPATIENT)
Dept: CARDIOLOGY | Facility: CLINIC | Age: 68
End: 2023-11-08
Payer: MEDICARE

## 2023-11-08 ENCOUNTER — PATIENT MESSAGE (OUTPATIENT)
Dept: INTERNAL MEDICINE | Facility: CLINIC | Age: 68
End: 2023-11-08
Payer: MEDICARE

## 2023-11-08 DIAGNOSIS — E78.00 HYPERCHOLESTERINEMIC XANTHOMATOSIS: ICD-10-CM

## 2023-11-08 DIAGNOSIS — R17 ELEVATED BILIRUBIN: Primary | ICD-10-CM

## 2023-11-08 DIAGNOSIS — I21.4 ACUTE MYOCARDIAL INFARCTION, SUBENDOCARDIAL INFARCTION, INITIAL EPISODE OF CARE: Primary | ICD-10-CM

## 2023-11-08 NOTE — PROGRESS NOTES
Please inform the patient that overall his blood work is fine. Lipids are better, but we are not sure what will happen in the future since he stopped and now restarted statins. We aim for LDL-CV to be less than 70.  He should discuss slightly elevated bilirubin with Dr. Cano. Would consider abdominal ultrasound. Defer to Dr. RONQUILLO

## 2023-11-08 NOTE — TELEPHONE ENCOUNTER
----- Message from Pili Palacios MD sent at 11/8/2023  1:40 PM CST -----  Please inform the patient that overall his blood work is fine. Lipids are better, but we are not sure what will happen in the future since he stopped and now restarted statins. We aim for LDL-CV to be less than 70.  He should discuss slightly elevated bilirubin with Dr. Cano. Would consider abdominal ultrasound. Defer to Dr. RONQUILLO

## 2023-11-09 ENCOUNTER — HOSPITAL ENCOUNTER (OUTPATIENT)
Dept: RADIOLOGY | Facility: HOSPITAL | Age: 68
Discharge: HOME | End: 2023-11-09
Attending: INTERNAL MEDICINE
Payer: MEDICARE

## 2023-11-09 DIAGNOSIS — R17 ELEVATED BILIRUBIN: ICD-10-CM

## 2023-11-09 PROCEDURE — 76700 US ABDOMEN COMPLETE: ICD-10-PCS | Mod: 26,,, | Performed by: STUDENT IN AN ORGANIZED HEALTH CARE EDUCATION/TRAINING PROGRAM

## 2023-11-09 PROCEDURE — 76700 US EXAM ABDOM COMPLETE: CPT | Mod: TC

## 2023-11-09 PROCEDURE — 76700 US EXAM ABDOM COMPLETE: CPT | Mod: 26,,, | Performed by: STUDENT IN AN ORGANIZED HEALTH CARE EDUCATION/TRAINING PROGRAM

## 2023-11-13 ENCOUNTER — PATIENT MESSAGE (OUTPATIENT)
Dept: INTERNAL MEDICINE | Facility: CLINIC | Age: 68
End: 2023-11-13
Payer: MEDICARE

## 2023-11-13 ENCOUNTER — LAB VISIT (OUTPATIENT)
Dept: LAB | Facility: HOSPITAL | Age: 68
End: 2023-11-13
Attending: INTERNAL MEDICINE
Payer: MEDICARE

## 2023-11-13 DIAGNOSIS — E78.00 HYPERCHOLESTERINEMIC XANTHOMATOSIS: ICD-10-CM

## 2023-11-13 DIAGNOSIS — I10 PRIMARY HYPERTENSION: ICD-10-CM

## 2023-11-13 DIAGNOSIS — I21.4 ACUTE MYOCARDIAL INFARCTION, SUBENDOCARDIAL INFARCTION, INITIAL EPISODE OF CARE: ICD-10-CM

## 2023-11-13 DIAGNOSIS — I21.4 NSTEMI (NON-ST ELEVATED MYOCARDIAL INFARCTION): ICD-10-CM

## 2023-11-13 DIAGNOSIS — K76.0 FATTY LIVER: ICD-10-CM

## 2023-11-13 DIAGNOSIS — E78.5 DYSLIPIDEMIA: ICD-10-CM

## 2023-11-13 LAB
ANION GAP SERPL CALC-SCNC: 7 MMOL/L (ref 8–16)
BASOPHILS # BLD AUTO: 0.05 K/UL (ref 0–0.2)
BASOPHILS NFR BLD: 0.9 % (ref 0–1.9)
BUN SERPL-MCNC: 17 MG/DL (ref 8–23)
CALCIUM SERPL-MCNC: 9 MG/DL (ref 8.7–10.5)
CHLORIDE SERPL-SCNC: 107 MMOL/L (ref 95–110)
CO2 SERPL-SCNC: 29 MMOL/L (ref 23–29)
CREAT SERPL-MCNC: 0.8 MG/DL (ref 0.5–1.4)
CRP SERPL-MCNC: 4.34 MG/L (ref 0–3.19)
DIFFERENTIAL METHOD: ABNORMAL
EOSINOPHIL # BLD AUTO: 0.2 K/UL (ref 0–0.5)
EOSINOPHIL NFR BLD: 3.7 % (ref 0–8)
ERYTHROCYTE [DISTWIDTH] IN BLOOD BY AUTOMATED COUNT: 14.7 % (ref 11.5–14.5)
EST. GFR  (NO RACE VARIABLE): >60 ML/MIN/1.73 M^2
GLUCOSE SERPL-MCNC: 98 MG/DL (ref 70–110)
GLUCOSE SERPL-MCNC: 98 MG/DL (ref 70–110)
HCT VFR BLD AUTO: 40.1 % (ref 40–54)
HGB BLD-MCNC: 12.9 G/DL (ref 14–18)
IMM GRANULOCYTES # BLD AUTO: 0.02 K/UL (ref 0–0.04)
IMM GRANULOCYTES NFR BLD AUTO: 0.4 % (ref 0–0.5)
LYMPHOCYTES # BLD AUTO: 1.1 K/UL (ref 1–4.8)
LYMPHOCYTES NFR BLD: 19.1 % (ref 18–48)
MCH RBC QN AUTO: 29.6 PG (ref 27–31)
MCHC RBC AUTO-ENTMCNC: 32.2 G/DL (ref 32–36)
MCV RBC AUTO: 92 FL (ref 82–98)
MONOCYTES # BLD AUTO: 0.5 K/UL (ref 0.3–1)
MONOCYTES NFR BLD: 8.2 % (ref 4–15)
NEUTROPHILS # BLD AUTO: 3.9 K/UL (ref 1.8–7.7)
NEUTROPHILS NFR BLD: 67.7 % (ref 38–73)
NRBC BLD-RTO: 0 /100 WBC
PLATELET # BLD AUTO: 206 K/UL (ref 150–450)
PMV BLD AUTO: 9.9 FL (ref 9.2–12.9)
POTASSIUM SERPL-SCNC: 4 MMOL/L (ref 3.5–5.1)
RBC # BLD AUTO: 4.36 M/UL (ref 4.6–6.2)
SODIUM SERPL-SCNC: 143 MMOL/L (ref 136–145)
WBC # BLD AUTO: 5.71 K/UL (ref 3.9–12.7)

## 2023-11-13 PROCEDURE — 36415 COLL VENOUS BLD VENIPUNCTURE: CPT | Performed by: INTERNAL MEDICINE

## 2023-11-13 PROCEDURE — 80048 BASIC METABOLIC PNL TOTAL CA: CPT | Performed by: INTERNAL MEDICINE

## 2023-11-13 PROCEDURE — 85025 COMPLETE CBC W/AUTO DIFF WBC: CPT | Performed by: INTERNAL MEDICINE

## 2023-11-13 PROCEDURE — 86141 C-REACTIVE PROTEIN HS: CPT | Performed by: INTERNAL MEDICINE

## 2023-11-13 PROCEDURE — 82947 ASSAY GLUCOSE BLOOD QUANT: CPT | Performed by: INTERNAL MEDICINE

## 2023-11-27 ENCOUNTER — TELEPHONE (OUTPATIENT)
Dept: CARDIAC REHAB | Facility: CLINIC | Age: 68
End: 2023-11-27
Payer: MEDICARE

## 2023-11-28 ENCOUNTER — OFFICE VISIT (OUTPATIENT)
Dept: INTERNAL MEDICINE | Facility: CLINIC | Age: 68
End: 2023-11-28
Payer: MEDICARE

## 2023-11-28 VITALS
HEIGHT: 72 IN | OXYGEN SATURATION: 98 % | DIASTOLIC BLOOD PRESSURE: 60 MMHG | WEIGHT: 184.94 LBS | BODY MASS INDEX: 25.05 KG/M2 | SYSTOLIC BLOOD PRESSURE: 130 MMHG | HEART RATE: 53 BPM

## 2023-11-28 DIAGNOSIS — M54.50 ACUTE BILATERAL LOW BACK PAIN WITHOUT SCIATICA: Primary | ICD-10-CM

## 2023-11-28 PROCEDURE — 99999 PR PBB SHADOW E&M-EST. PATIENT-LVL V: ICD-10-PCS | Mod: PBBFAC,,, | Performed by: PHYSICIAN ASSISTANT

## 2023-11-28 PROCEDURE — 99215 OFFICE O/P EST HI 40 MIN: CPT | Mod: PBBFAC | Performed by: PHYSICIAN ASSISTANT

## 2023-11-28 PROCEDURE — 99999 PR PBB SHADOW E&M-EST. PATIENT-LVL V: CPT | Mod: PBBFAC,,, | Performed by: PHYSICIAN ASSISTANT

## 2023-11-28 PROCEDURE — 99213 OFFICE O/P EST LOW 20 MIN: CPT | Mod: S$PBB,,, | Performed by: PHYSICIAN ASSISTANT

## 2023-11-28 PROCEDURE — 99213 PR OFFICE/OUTPT VISIT, EST, LEVL III, 20-29 MIN: ICD-10-PCS | Mod: S$PBB,,, | Performed by: PHYSICIAN ASSISTANT

## 2023-11-28 RX ORDER — TIZANIDINE 4 MG/1
4 TABLET ORAL NIGHTLY PRN
Qty: 20 TABLET | Refills: 0 | Status: SHIPPED | OUTPATIENT
Start: 2023-11-28 | End: 2023-12-08

## 2023-11-28 NOTE — PROGRESS NOTES
"Subjective     Patient ID: Wero Spangler is a 68 y.o. male.    Chief Complaint: Low-back Pain    HPI    Established pt of Duke Cano MD (new to me)    Here with concerns of lower back back.   Started last Thursday on , states he walked at [employer] that morning and felt fine, reports moving furniture and cleaning later that day to prepare for guest. Describes as "band of pain," not current , worse with movements, spasm./tightness Tried heating pad and ice with some relief. Pain rated 4/10    Past Medical History:   Diagnosis Date    Aftercataract     Allergy     BPH (benign prostatic hyperplasia)     Cataract     Corneal dystrophy     Elevated PSA     Enlarged prostate     Fatty liver     Fuchs' corneal dystrophy     Gallstones     General anesthetics causing adverse effect in therapeutic use     delayed emergence after back surgery    GERD (gastroesophageal reflux disease)     HTN (hypertension) 2013    Hypertension     Insomnia     Prostate nodule     Urinary tract infection      Social History     Tobacco Use    Smoking status: Former     Current packs/day: 0.00     Types: Cigarettes     Quit date:      Years since quittin.9    Smokeless tobacco: Never   Substance Use Topics    Alcohol use: Not Currently    Drug use: No     Review of patient's allergies indicates:  No Known Allergies      Review of Systems   Constitutional:  Negative for chills, fever and unexpected weight change.   Gastrointestinal:  Negative for abdominal pain, nausea and vomiting.   Musculoskeletal:  Positive for back pain. Negative for leg pain.   Integumentary:  Negative for rash.   Neurological:  Negative for weakness and numbness.          Objective  /60 (BP Location: Right arm, Patient Position: Sitting, BP Method: Large (Manual))   Pulse (!) 53   Ht 6' (1.829 m)   Wt 83.9 kg (184 lb 15.5 oz)   SpO2 98%   BMI 25.09 kg/m²       Physical Exam  Vitals reviewed.   Constitutional:       " General: He is not in acute distress.     Appearance: He is well-developed.   HENT:      Head: Normocephalic and atraumatic.   Cardiovascular:      Rate and Rhythm: Normal rate and regular rhythm.      Heart sounds: No murmur heard.  Pulmonary:      Effort: Pulmonary effort is normal.      Breath sounds: Normal breath sounds. No wheezing or rales.   Abdominal:      General: Bowel sounds are normal.      Palpations: Abdomen is soft.      Tenderness: There is no abdominal tenderness.   Musculoskeletal:      Lumbar back: Tenderness present. No bony tenderness. Normal range of motion. Negative right straight leg raise test and negative left straight leg raise test.        Back:       Right lower leg: No edema.      Left lower leg: No edema.      Comments: Ambulating without difficulty  Negative SLR b/l   Skin:     General: Skin is warm and dry.      Findings: No rash.   Neurological:      Mental Status: He is alert.   Psychiatric:         Mood and Affect: Mood normal.            Assessment and Plan     1. Acute bilateral low back pain without sciatica  -     tiZANidine (ZANAFLEX) 4 MG tablet; Take 1 tablet (4 mg total) by mouth nightly as needed (muscle spasm/tightness).  Dispense: 20 tablet; Refill: 0      Patient Instructions   Tylenol    Heat    Lidocaine patch    Muscle relaxer    Handout on stretching    Let us know if your symptom worsen or fail to improve      Radha Prasad PA-C

## 2023-11-28 NOTE — PATIENT INSTRUCTIONS
Tylenol    Heat    Lidocaine patch    Muscle relaxer    Let us know if your symptom worsen or fail to improve

## 2023-12-05 ENCOUNTER — PATIENT MESSAGE (OUTPATIENT)
Dept: INTERNAL MEDICINE | Facility: CLINIC | Age: 68
End: 2023-12-05
Payer: MEDICARE

## 2023-12-06 ENCOUNTER — HOSPITAL ENCOUNTER (EMERGENCY)
Facility: HOSPITAL | Age: 68
Discharge: HOME | End: 2023-12-06
Attending: EMERGENCY MEDICINE
Payer: MEDICARE

## 2023-12-06 VITALS
TEMPERATURE: 98 F | DIASTOLIC BLOOD PRESSURE: 68 MMHG | OXYGEN SATURATION: 99 % | WEIGHT: 183 LBS | RESPIRATION RATE: 20 BRPM | HEART RATE: 61 BPM | HEIGHT: 72 IN | BODY MASS INDEX: 24.79 KG/M2 | SYSTOLIC BLOOD PRESSURE: 150 MMHG

## 2023-12-06 DIAGNOSIS — M79.89 FOOT SWELLING: Primary | ICD-10-CM

## 2023-12-06 PROCEDURE — 99284 EMERGENCY DEPT VISIT MOD MDM: CPT | Mod: 25

## 2023-12-06 PROCEDURE — 25000003 PHARM REV CODE 250: Performed by: PHYSICIAN ASSISTANT

## 2023-12-06 RX ORDER — ACETAMINOPHEN 500 MG
1000 TABLET ORAL
Status: COMPLETED | OUTPATIENT
Start: 2023-12-06 | End: 2023-12-06

## 2023-12-06 RX ADMIN — ACETAMINOPHEN 1000 MG: 500 TABLET ORAL at 11:12

## 2023-12-06 NOTE — ED PROVIDER NOTES
Encounter Date: 12/6/2023       History     Chief Complaint   Patient presents with    Foot Problem     L foot toes purplish in color denies injury, on brilinta, + doppler post tibial     68-year-old male with GERD, hypertension, recent NSTEMI, BPH presents for atraumatic pain, swelling and bruising of his left foot.  He reports associated sensation of restlessness in that leg.  He denies joint swelling, calf or thigh swelling, fevers/chills, numbness, weakness, chest pain or shortness of breath.  He is taking aspirin and Brilinta.  No history of DVT/PE.      Review of patient's allergies indicates:  No Known Allergies  Past Medical History:   Diagnosis Date    Aftercataract     Allergy     BPH (benign prostatic hyperplasia)     Cataract     Corneal dystrophy     Elevated PSA     Enlarged prostate     Fatty liver     Fuchs' corneal dystrophy     Gallstones     General anesthetics causing adverse effect in therapeutic use 2000    delayed emergence after back surgery    GERD (gastroesophageal reflux disease)     HTN (hypertension) 9/24/2013    Hypertension     Insomnia     Prostate nodule     Urinary tract infection      Past Surgical History:   Procedure Laterality Date    BACK SURGERY  4/2000    CATARACT EXTRACTION W/  INTRAOCULAR LENS IMPLANT      Both Eyes    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    Procedure: TRANSPLANT, CORNEA;  Surgeon: Vu Wilson MD;  Location: [facility];  Service: Ophthalmology;  Laterality: Right;  DMEK    EYE SURGERY      LAPAROSCOPIC MARSUPIALIZATION OF CYST OF KIDNEY      PROSTATE SURGERY      prostate biopsy benign    TONSILLECTOMY      VASECTOMY       Family History   Problem Relation Age of Onset    Cancer Mother         breast    Prostate cancer Brother     Cancer Brother         prostate    Macular degeneration Maternal Grandmother     Cancer Other     Cancer Other     Amblyopia Neg Hx     Blindness Neg Hx     Cataracts Neg Hx     Glaucoma Neg Hx     Retinal detachment Neg Hx      Strabismus Neg Hx      Social History     Tobacco Use    Smoking status: Former     Current packs/day: 0.00     Types: Cigarettes     Quit date:      Years since quittin.9    Smokeless tobacco: Never   Substance Use Topics    Alcohol use: Not Currently    Drug use: No     Review of Systems    Physical Exam     Initial Vitals [23 0936]   BP Pulse Resp Temp SpO2   (!) 150/68 61 20 97.6 °F (36.4 °C) 99 %      MAP       --         Physical Exam    Nursing note and vitals reviewed.  Constitutional: He appears well-developed and well-nourished. He is not diaphoretic. No distress.   HENT:   Head: Normocephalic and atraumatic.   Cardiovascular:  Normal rate, regular rhythm, normal heart sounds and intact distal pulses.     Exam reveals no gallop and no friction rub.       No murmur heard.  There is some ecchymosis on the dorsum of the foot.  Strong 2+ bilateral, equal pedal pulses with brisk capillary refill    No calf or thigh swelling or tenderness   Pulmonary/Chest: Breath sounds normal. No respiratory distress. He has no wheezes. He has no rhonchi. He has no rales. He exhibits no tenderness.   Musculoskeletal:         General: Normal range of motion.      Right upper leg: Normal.      Left upper leg: Normal.      Right knee: Normal.      Left knee: Normal.      Right lower leg: Normal.      Left lower leg: Normal.      Right ankle: Normal.      Left ankle: Normal.      Left foot: Tenderness and bony tenderness present.        Feet:       Comments: There is some ecchymosis over the 2nd and 3rd MTP joint.  Mildly tender to palpation.     Neurological: He is alert and oriented to person, place, and time.   Skin: Skin is warm and dry.   Psychiatric: He has a normal mood and affect.         ED Course   Procedures  Labs Reviewed - No data to display       Imaging Results              US Lower Extremity Veins Left (Final result)  Result time 23 12:30:29      Final result by Mana Cooper MD (23  12:30:29)                   Impression:      No evidence of deep venous thrombosis in the left lower extremity.    Electronically signed by resident: Sabrina Garcia  Date:    12/06/2023  Time:    12:12    Electronically signed by: Mana Cooper MD  Date:    12/06/2023  Time:    12:30               Narrative:    EXAMINATION:  US LOWER EXTREMITY VEINS LEFT    CLINICAL HISTORY:  Other specified soft tissue disorders    TECHNIQUE:  Duplex and color flow Doppler evaluation and graded compression of the left lower extremity veins was performed.    COMPARISON:  None    FINDINGS:  Left thigh veins: The common femoral, femoral, popliteal, and upper greater saphenous veins are patent and free of thrombus. The veins are normally compressible and have normal phasic flow and augmentation response.    Left calf veins: The visualized calf veins are patent.    Contralateral CFV: The contralateral (right) common femoral vein is patent and free of thrombus.    Miscellaneous: None                                       X-Ray Foot Complete Left (Final result)  Result time 12/06/23 11:40:07      Final result by Pako Alcantara MD (12/06/23 11:40:07)                   Impression:      See above      Electronically signed by: Pako Alcantara MD  Date:    12/06/2023  Time:    11:40               Narrative:    EXAMINATION:  XR FOOT COMPLETE 3 VIEW LEFT    CLINICAL HISTORY:  .  Other specified soft tissue disorders    TECHNIQUE:  AP, lateral and oblique views of the left foot were performed.    COMPARISON:  None    FINDINGS:  Mild DJD.  No fracture or dislocation.  No bone destruction identified                                       Medications   acetaminophen tablet 1,000 mg (1,000 mg Oral Given 12/6/23 1121)     Medical Decision Making  68-year-old male presenting for atraumatic pain, discoloration and swelling of his foot.  /68, vitals otherwise normal.  He is neurovascularly intact.    Differential diagnosis:  Suspect bruising  secondary to minor injury as he is on aspirin and Brilinta   Less likely as no obvious trauma but he does have some bony tenderness   DVT   He has no clinical signs of arterial occlusion- feet are warm, well-perfused with palpable pulses    Will give analgesics, do x-ray, ultrasound and reassess.    Workup is reassuring.  X-ray negative for fracture.  Ultrasound without evidence of DVT.  Will discharge with instructions to follow up with PCP and return to the ED for worsening symptoms. Stressed the importance of follow-up, strict ED return precautions given.  Patient voiced understanding and is comfortable with discharge.     Amount and/or Complexity of Data Reviewed  Radiology: ordered and independent interpretation performed. Decision-making details documented in ED Course.    Risk  OTC drugs.               ED Course as of 12/06/23 1646   Wed Dec 06, 2023   1140 X-Ray Foot Complete Left  Per my independent interpretation, no acute fracture [CC]   1251 US Lower Extremity Veins Left  No DVT.  Will discharge [CC]      ED Course User Index  [CC] Sonia Shelby PA-C                           Clinical Impression:  Final diagnoses:  [M79.89] Foot swelling (Primary)          ED Disposition Condition    Discharge Stable          ED Prescriptions    None       Follow-up Information       Follow up With Specialties Details Why Contact Info    Duke Cano MD Internal Medicine Schedule an appointment as soon as possible for a visit in 1 week  [address]  [place] [address]  [phone]      [facility] - Emergency Dept Emergency Medicine Go to  If symptoms worsen [address] [facility] [id number] [phone]             Sonia Shelby PA-C  12/06/23 1646

## 2023-12-06 NOTE — TELEPHONE ENCOUNTER
"Called and spoke to patient.     Pt reports-  -Mild/moderate purple discoloration to L dorsal foot spanning 2nd, 3rd and 4th metatarsal phalangeal joints, approx 1",   -tender to touch by 2nd, 3rd, 4th and 5th MPJ  -L leg restlessness    Onset yesterday  Improved overnight but has returned this am.  Hx- MI in Sept (takes brilinta BID)    Denies injury, throbbing, mass, erythema, warmth, SOB, CP    Advised to proceed to ED for evaluation  "

## 2023-12-06 NOTE — DISCHARGE INSTRUCTIONS
Diagnosis:  Foot swelling and bruising    I am not sure what is causing the bruising on your foot.  Your ultrasound did not show any evidence of blood clots in your x-ray did not show any broken bones.  I think that you most likely bruising more easily due to aspirin and Brilinta. You should take Tylenol as needed for pain up to 3 grams daily which is 6 of the 500 mg extra strength tablets.    Please schedule an appointment with your primary care doctor for follow-up. If you start to have any new or worsening symptoms, please come back to the emergency department.

## 2023-12-06 NOTE — ED NOTES
Pt identifiers Wero Spangler were checked and are correct  LOC: The patient is awake, alert, aware of environment with an appropriate affect. Oriented x4, speaking appropriately  APPEARANCE: Pt rates left foot pain a 2/10 when pressure applied, in no acute distress, pt is clean and well groomed, clothing properly fastened  SKIN: Skin warm, dry and intact, normal skin turgor, moist mucus membranes Green colored bruising noted to left foot   RESPIRATORY: Airway is open and patent, respirations are spontaneous, even and unlabored, normal effort and rate  CARDIAC: Normal rate and rhythm, no peripheral edema noted, capillary refill < 3 seconds, bilateral radial pulses 2+ Left dorsalis pedis pulse palpable  ABDOMEN: Soft, nontender, nondistended.   NEUROLOGIC: PERRL, facial expression is symmetrical, patient moving all extremities spontaneously, normal sensation in all extremities when touched with a finger.  Follows all commands appropriately  MUSCULOSKELETAL: No obvious deformities.

## 2023-12-06 NOTE — ED TRIAGE NOTES
Pt complains of discoloration to left foot since yesterday Rates pain to right foot 2/10 when pressure applied

## 2023-12-20 ENCOUNTER — LAB VISIT (OUTPATIENT)
Dept: LAB | Facility: HOSPITAL | Age: 68
End: 2023-12-20
Attending: INTERNAL MEDICINE
Payer: MEDICARE

## 2023-12-20 DIAGNOSIS — I10 PRIMARY HYPERTENSION: ICD-10-CM

## 2023-12-20 DIAGNOSIS — K76.0 FATTY LIVER: ICD-10-CM

## 2023-12-20 DIAGNOSIS — I21.4 NSTEMI (NON-ST ELEVATED MYOCARDIAL INFARCTION): ICD-10-CM

## 2023-12-20 DIAGNOSIS — E78.5 DYSLIPIDEMIA: ICD-10-CM

## 2023-12-20 LAB
ALBUMIN SERPL BCP-MCNC: 3.9 G/DL (ref 3.5–5.2)
ALP SERPL-CCNC: 55 U/L (ref 55–135)
ALT SERPL W/O P-5'-P-CCNC: 17 U/L (ref 10–44)
AST SERPL-CCNC: 16 U/L (ref 10–40)
BILIRUB DIRECT SERPL-MCNC: 0.5 MG/DL (ref 0.1–0.3)
BILIRUB SERPL-MCNC: 1.3 MG/DL (ref 0.1–1)
CHOLEST SERPL-MCNC: 112 MG/DL (ref 120–199)
CHOLEST/HDLC SERPL: 2.9 {RATIO} (ref 2–5)
HDLC SERPL-MCNC: 38 MG/DL (ref 40–75)
HDLC SERPL: 33.9 % (ref 20–50)
LDLC SERPL CALC-MCNC: 60.8 MG/DL (ref 63–159)
NONHDLC SERPL-MCNC: 74 MG/DL
PROT SERPL-MCNC: 6.4 G/DL (ref 6–8.4)
TRIGL SERPL-MCNC: 66 MG/DL (ref 30–150)

## 2023-12-20 PROCEDURE — 36415 COLL VENOUS BLD VENIPUNCTURE: CPT | Performed by: INTERNAL MEDICINE

## 2023-12-20 PROCEDURE — 80076 HEPATIC FUNCTION PANEL: CPT | Performed by: INTERNAL MEDICINE

## 2023-12-20 PROCEDURE — 80061 LIPID PANEL: CPT | Performed by: INTERNAL MEDICINE

## 2023-12-27 ENCOUNTER — TELEPHONE (OUTPATIENT)
Dept: INTERNAL MEDICINE | Facility: CLINIC | Age: 68
End: 2023-12-27
Payer: MEDICARE

## 2023-12-27 RX ORDER — TRIAMTERENE/HYDROCHLOROTHIAZID 37.5-25 MG
1 TABLET ORAL DAILY
Qty: 90 TABLET | Refills: 11 | Status: SHIPPED | OUTPATIENT
Start: 2023-12-27

## 2023-12-27 NOTE — TELEPHONE ENCOUNTER
I spoke to the patient about his recent conversation with his cardiologist.  Per a lab review note I saw where it was suggested that the patient could stop furosemide and restart triamterene HCT so I have adjusted the medication list and sent in refills for the patient.

## 2024-01-09 ENCOUNTER — HOSPITAL ENCOUNTER (OUTPATIENT)
Dept: RADIOLOGY | Facility: HOSPITAL | Age: 69
Discharge: HOME | End: 2024-01-09
Attending: PHYSICIAN ASSISTANT
Payer: MEDICARE

## 2024-01-09 ENCOUNTER — OFFICE VISIT (OUTPATIENT)
Dept: INTERNAL MEDICINE | Facility: CLINIC | Age: 69
End: 2024-01-09
Payer: MEDICARE

## 2024-01-09 VITALS
HEIGHT: 72 IN | WEIGHT: 181.88 LBS | DIASTOLIC BLOOD PRESSURE: 62 MMHG | HEART RATE: 54 BPM | OXYGEN SATURATION: 99 % | BODY MASS INDEX: 24.63 KG/M2 | SYSTOLIC BLOOD PRESSURE: 104 MMHG

## 2024-01-09 DIAGNOSIS — M54.50 CHRONIC RIGHT-SIDED LOW BACK PAIN WITHOUT SCIATICA: Primary | ICD-10-CM

## 2024-01-09 DIAGNOSIS — R10.31 RLQ ABDOMINAL PAIN: ICD-10-CM

## 2024-01-09 DIAGNOSIS — G89.29 CHRONIC RIGHT-SIDED LOW BACK PAIN WITHOUT SCIATICA: Primary | ICD-10-CM

## 2024-01-09 DIAGNOSIS — M54.50 CHRONIC RIGHT-SIDED LOW BACK PAIN WITHOUT SCIATICA: ICD-10-CM

## 2024-01-09 DIAGNOSIS — G89.29 CHRONIC RIGHT-SIDED LOW BACK PAIN WITHOUT SCIATICA: ICD-10-CM

## 2024-01-09 DIAGNOSIS — K80.20 GALLSTONES: ICD-10-CM

## 2024-01-09 LAB
BILIRUB UR QL STRIP: NEGATIVE
CLARITY UR REFRACT.AUTO: CLEAR
COLOR UR AUTO: YELLOW
GLUCOSE UR QL STRIP: NEGATIVE
HGB UR QL STRIP: NEGATIVE
KETONES UR QL STRIP: NEGATIVE
LEUKOCYTE ESTERASE UR QL STRIP: NEGATIVE
NITRITE UR QL STRIP: NEGATIVE
PH UR STRIP: 6 [PH] (ref 5–8)
PROT UR QL STRIP: NEGATIVE
SP GR UR STRIP: 1.02 (ref 1–1.03)
URN SPEC COLLECT METH UR: NORMAL

## 2024-01-09 PROCEDURE — 72100 X-RAY EXAM L-S SPINE 2/3 VWS: CPT | Mod: 26,,, | Performed by: INTERNAL MEDICINE

## 2024-01-09 PROCEDURE — 99999 PR PBB SHADOW E&M-EST. PATIENT-LVL V: CPT | Mod: PBBFAC,,, | Performed by: PHYSICIAN ASSISTANT

## 2024-01-09 PROCEDURE — 72100 X-RAY EXAM L-S SPINE 2/3 VWS: CPT | Mod: TC

## 2024-01-09 PROCEDURE — 99214 OFFICE O/P EST MOD 30 MIN: CPT | Mod: S$PBB,,, | Performed by: PHYSICIAN ASSISTANT

## 2024-01-09 PROCEDURE — 99215 OFFICE O/P EST HI 40 MIN: CPT | Mod: PBBFAC | Performed by: PHYSICIAN ASSISTANT

## 2024-01-09 PROCEDURE — 81003 URINALYSIS AUTO W/O SCOPE: CPT | Performed by: PHYSICIAN ASSISTANT

## 2024-01-09 RX ORDER — TIZANIDINE 4 MG/1
4 TABLET ORAL NIGHTLY
COMMUNITY
Start: 2023-12-27

## 2024-01-09 NOTE — PROGRESS NOTES
Subjective     Patient ID: Wero Spangler is a 68 y.o. male.    Chief Complaint: Back Pain and Cholelithiasis    HPI    Established pt of Duke Cano MD (new to me)      Back pain has waxed and waned since last visit about 6 weeks ago.   pain is now localized to right side,  exacerbated by sitting up/getting out the bed, if he gets up too fast occ back spasm not constant. Like a quick catch then goes away  Tried heating pad, leg exercises. Using a muscle relaxer.     He inquires if this pain could be related to his gallstones    He notes right sided lower abdominal pain ongoing since , soreness, also worse with position/movements. BM are normal, no new urinary changes, hematuria or dysuria  Not exacerbated with food intake, no n/v or fevers.       Past Medical History:   Diagnosis Date    Aftercataract     Allergy     BPH (benign prostatic hyperplasia)     Cataract     Corneal dystrophy     Elevated PSA     Enlarged prostate     Fatty liver     Fuchs' corneal dystrophy     Gallstones     General anesthetics causing adverse effect in therapeutic use     delayed emergence after back surgery    GERD (gastroesophageal reflux disease)     HTN (hypertension) 2013    Hypertension     Insomnia     Prostate nodule     Urinary tract infection      Social History     Tobacco Use    Smoking status: Former     Current packs/day: 0.00     Types: Cigarettes     Quit date:      Years since quittin.0    Smokeless tobacco: Never   Substance Use Topics    Alcohol use: Not Currently    Drug use: No     Review of patient's allergies indicates:  No Known Allergies    Review of Systems   Constitutional:  Negative for chills, fever and unexpected weight change.   Respiratory:  Negative for cough and shortness of breath.    Cardiovascular:  Negative for chest pain and leg swelling.   Gastrointestinal:  Positive for abdominal pain. Negative for nausea and vomiting.   Musculoskeletal:  Positive for back pain.    Integumentary:  Negative for rash.   Neurological:  Negative for weakness and headaches.          Objective  /62 (BP Location: Left arm, Patient Position: Sitting, BP Method: Medium (Manual))   Pulse (!) 54   Ht 6' (1.829 m)   Wt 82.5 kg (181 lb 14.1 oz)   SpO2 99%   BMI 24.67 kg/m²       Physical Exam  Vitals reviewed.   Constitutional:       General: He is not in acute distress.     Appearance: He is well-developed.   HENT:      Head: Normocephalic and atraumatic.   Cardiovascular:      Rate and Rhythm: Normal rate and regular rhythm.      Heart sounds: No murmur heard.  Pulmonary:      Effort: Pulmonary effort is normal.      Breath sounds: Normal breath sounds. No wheezing or rales.   Abdominal:      General: Abdomen is flat. Bowel sounds are normal.      Palpations: Abdomen is soft.      Tenderness: There is abdominal tenderness in the right lower quadrant. There is no right CVA tenderness, left CVA tenderness, guarding or rebound.   Musculoskeletal:      Lumbar back: No tenderness or bony tenderness. Normal range of motion.      Right lower leg: No edema.      Left lower leg: No edema.   Skin:     General: Skin is warm and dry.      Findings: No rash.   Neurological:      Mental Status: He is alert.   Psychiatric:         Mood and Affect: Mood normal.            Assessment and Plan     1. Chronic right-sided low back pain without sciatica  -     X-Ray Lumbar Spine Ap And Lateral; Future; Expected date: 01/09/2024    2. Gallstones    3. RLQ abdominal pain  -     CBC Auto Differential; Future; Expected date: 01/09/2024  -     Comprehensive Metabolic Panel; Future; Expected date: 01/09/2024  -     Urinalysis, Reflex to Urine Culture Urine, Clean Catch  -     CT Abdomen Pelvis With IV Contrast NO Oral Contrast; Future; Expected date: 01/09/2024      Reviewed past imaging  I do not suspect that gallstones and back pain are related  We discussed physical therapy for back, he is schedule to start cardiac  rehab soon and we will reassess after he completes a few session  He does have tender on exam to RLQ, will proceed with lab/imaging as above  Further recs to follow pending results      Radha Prasad PA-C

## 2024-01-11 ENCOUNTER — HOSPITAL ENCOUNTER (OUTPATIENT)
Dept: CARDIOLOGY | Facility: HOSPITAL | Age: 69
Discharge: HOME | End: 2024-01-11
Attending: INTERNAL MEDICINE
Payer: MEDICARE

## 2024-01-11 VITALS
BODY MASS INDEX: 23.98 KG/M2 | HEART RATE: 52 BPM | WEIGHT: 177 LBS | HEIGHT: 72 IN | DIASTOLIC BLOOD PRESSURE: 58 MMHG | SYSTOLIC BLOOD PRESSURE: 100 MMHG

## 2024-01-11 DIAGNOSIS — I21.4 ACUTE MYOCARDIAL INFARCTION, SUBENDOCARDIAL INFARCTION, INITIAL EPISODE OF CARE: ICD-10-CM

## 2024-01-11 LAB
CV STRESS BASE HR: 52 BPM
DIASTOLIC BLOOD PRESSURE: 58 MMHG
OHS CV CPX 1 MINUTE RECOVERY HEART RATE: 85 BPM
OHS CV CPX 85 PERCENT MAX PREDICTED HEART RATE MALE: 128
OHS CV CPX ABDOMINAL GIRTH: 37.5 CM
OHS CV CPX ANAEROBIC THRESHOLD DIASTOLIC BLOOD PRESSURE: 53 MMHG
OHS CV CPX ANAEROBIC THRESHOLD HEART RATE: 79
OHS CV CPX ANAEROBIC THRESHOLD RATE PRESSURE PRODUCT: 9322
OHS CV CPX ANAEROBIC THRESHOLD SYSTOLIC BLOOD PRESSURE: 118
OHS CV CPX DATA GRADE - AT: 6.9
OHS CV CPX DATA GRADE - PEAK: 11.7
OHS CV CPX DATA O2 SAT - PEAK: 95
OHS CV CPX DATA O2 SAT - REST: 95
OHS CV CPX DATA SPEED - AT: 2.3
OHS CV CPX DATA SPEED - PEAK: 3.3
OHS CV CPX DATA TIME - AT: 3.43
OHS CV CPX DATA TIME - PEAK: 5.6
OHS CV CPX DATA VE/VCO2 - AT: 40
OHS CV CPX DATA VE/VCO2 - PEAK: 40
OHS CV CPX DATA VE/VO2 - AT: 31
OHS CV CPX DATA VE/VO2 - PEAK: 35
OHS CV CPX DATA VO2 - AT: 15.8
OHS CV CPX DATA VO2 - PEAK: 18.8
OHS CV CPX DATA VO2 - REST: 4.6
OHS CV CPX ESTIMATED METS: 9
OHS CV CPX FEV1/FVC: 0.78
OHS CV CPX FORCED EXPIRATORY VOLUME: 3.4
OHS CV CPX FORCED VITAL CAPACITY (FVC): 4.38
OHS CV CPX HIGHEST VO: 33.1
OHS CV CPX MAX PREDICTED HEART RATE: 150
OHS CV CPX MAXIMAL VOLUNTARY VENTILATION (MVV) PREDICTED: 136
OHS CV CPX MAXIMAL VOLUNTARY VENTILATION (MVV): 104
OHS CV CPX MAXIUMUM EXERCISE VENTILATION (VE MAX): 47.3
OHS CV CPX PATIENT AGE: 70
OHS CV CPX PATIENT HEIGHT IN: 72
OHS CV CPX PATIENT IS FEMALE AGE 11-19: 0
OHS CV CPX PATIENT IS FEMALE AGE GREATER THAN 19: 0
OHS CV CPX PATIENT IS FEMALE AGE LESS THAN 11: 0
OHS CV CPX PATIENT IS FEMALE: 0
OHS CV CPX PATIENT IS MALE AGE 11-25: 0
OHS CV CPX PATIENT IS MALE AGE GREATER THAN 25: 1
OHS CV CPX PATIENT IS MALE AGE LESS THAN 11: 0
OHS CV CPX PATIENT IS MALE GREATER THAN 18: 1
OHS CV CPX PATIENT IS MALE LESS THAN OR EQUAL TO 18: 0
OHS CV CPX PATIENT IS MALE: 1
OHS CV CPX PATIENT WEIGHT RETURNED IN OZ: 2832.47
OHS CV CPX PEAK DIASTOLIC BLOOD PRESSURE: 56 MMHG
OHS CV CPX PEAK HEAR RATE: 98 BPM
OHS CV CPX PEAK RATE PRESSURE PRODUCT: NORMAL
OHS CV CPX PEAK SYSTOLIC BLOOD PRESSURE: 133 MMHG
OHS CV CPX PERCENT BODY FAT: 12.9
OHS CV CPX PERCENT MAX PREDICTED HEART RATE ACHIEVED: 65
OHS CV CPX PREDICTED VO2: 33.1 ML/KG/MIN
OHS CV CPX RATE PRESSURE PRODUCT PRESENTING: 5200
OHS CV CPX REST PET CO2: 30
OHS CV CPX VE/VCO2 SLOPE: 37.2
STRESS ECHO POST EXERCISE DUR MIN: 5 MINUTES
STRESS ECHO POST EXERCISE DUR SEC: 36 SECONDS
SYSTOLIC BLOOD PRESSURE: 100 MMHG

## 2024-01-11 PROCEDURE — 94621 CARDIOPULM EXERCISE TESTING: CPT | Mod: 26,,, | Performed by: INTERNAL MEDICINE

## 2024-01-11 PROCEDURE — 94621 CARDIOPULM EXERCISE TESTING: CPT

## 2024-01-11 NOTE — PROGRESS NOTES
Session: Orientation   Cardiac Rehab Individual Treatment Plan - Initial Assessment      Patient Name: Wero Spangler MRN: [id number]   : 1955   Age: 68 y.o.   Primary Diagnosis: ***  Date of Event: ***  EF: ***%  Risk Stratification: {Desc; low/moderate/high:679032}  Referring Physician: ***   Exercise Assessment:     CPX/TM Date: *** Results   RHR ***   Max HR ***   Peak VO2 (CPX only) ***   Actual METS (CPX only) ***   Estimated METS ***     Anthropometrics    Height *** inches   Weight *** lbs   BMI ***   Abdominal Girth ***   Body Composition ***%     ST Depression noted on Stress Test?:{YES NO:06326}  Angina with exercise?: {YES NO:08888}   Fall Risk: {YES NO:10600}   Assistive Devices:  {ambulatory status:15129}   Currently exercising? {Home Interventions:22013}  *** stated there were *** limitations to exercise due to ***.  Exercise modalities will be modified to meet the patient's needs and capabilities.     Exercise Plan:   Goals:  {CR Exercise Goals:15228}    Intervention:   Discussed importance of regular attendance to cardiac rehab class    Exercise Prescription:  THR Range ***-***   Mode: {Exercise Mode:05561:p}   Frequency:  3 days/week   Duration:  30 - 60 minutes   Intensity:  12 - 15 RPE   Resistance Training:  {YES No:16462}: *** lb weights with 10-15 reps based on strength and range of motion assessment     Home Prescription:  Mode Aerobic   Frequency: 2- 3 days/week   Duration: 30-60 minutes   Resistance Training: None        Education:  {CR ITP Exercise Initial Education:57546}    Comments:  ***    All consent forms were signed, proper attire and shoes were discussed.       *** will begin Cardiac Rehab on *** at *** .    The exercise prescription will be adjusted based on tolerance of exercise intensity by patient.    Buffy Berrios., CEP

## 2024-01-11 NOTE — PROGRESS NOTES
HISTORY: S/P PTCA/STENT (9-28-23), S/P NSTEMI, CAD, HTN, HLP, EF=55-60%(10-31-23)    ANTHROPOMETRICS:     PRE   Height (in) 72   Weight (lb) 176   BMI 24.0   Abdominal Girth (in) 37.5   % Body Fat 12.9%       LAB RESULTS:    Lab Results   Component Value Date    HGB 13.8 (L) 01/09/2024     Lab Results   Component Value Date    HCT 41.7 01/09/2024     Lab Results   Component Value Date    MPV 10.0 01/09/2024       Lab Results   Component Value Date    CHOL 112 (L) 12/20/2023     Lab Results   Component Value Date    HDL 38 (L) 12/20/2023     Lab Results   Component Value Date    LDLCALC 60.8 (L) 12/20/2023     Lab Results   Component Value Date    TRIG 66 12/20/2023     Lab Results   Component Value Date    CHOLHDL 33.9 12/20/2023       Lab Results   Component Value Date    GLUF 98 11/13/2023     Lab Results   Component Value Date    HGBA1C 4.9 07/25/2022        Lab Results   Component Value Date    HSCRP 4.34 (H) 11/13/2023         MARISOL SCORES:     PRE   Anxiety 3   Depression 2   Somatic 3   Hostility 0     SF-36 SCORES:     PRE   Physical Function 25   Social Function 8   Mental Health 21   Pain 5   Change in Health 2   Physical Role Limitation 2   Mental Role Limitation 1   Energy/Fatigue 15   Health Perceptions 19   Total Score 98     PHQ-9:      1/16/2024     9:16 AM   PHQ-9 Depression Patient Health Questionnaire   Over the last two weeks how often have you been bothered by little interest or pleasure in doing things 0   Over the last two weeks how often have you been bothered by feeling down, depressed or hopeless 0   Over the last two weeks how often have you been bothered by trouble falling or staying asleep, or sleeping too much 1   Over the last two weeks how often have you been bothered by feeling tired or having little energy 1   Over the last two weeks how often have you been bothered by a poor appetite or overeating 1   Over the last two weeks how often have you been bothered by feeling bad about  yourself - or that you are a failure or have let yourself or your family down 0   Over the last two weeks how often have you been bothered by trouble concentrating on things, such as reading the newspaper or watching television 0   Over the last two weeks how often have you been bothered by moving or speaking so slowly that other people could have noticed. 0   Over the last two weeks how often have you been bothered by thoughts that you would be better off dead, or of hurting yourself 0   If you checked off any problems, how difficult have these problems made it for you to do your work, take care of things at home or get along with other people? Not difficult at all   PHQ-9 Score 3             EDUCATION SCORES:     PRE   Education Score 70

## 2024-01-12 NOTE — PROGRESS NOTES
Session: Orientation   Cardiac Rehab Individual Treatment Plan - Initial Assessment      Patient Name: Wero Spangler MRN: [id number]   : 1955   Age: 68 y.o.   Primary Diagnosis: PTCA/STENT  Date of Event: 23  EF: 55-60%  Risk Stratification: moderate  Referring Physician: YAMIL   Exercise Assessment:     CPX/TM Date: 24 Results   RHR 52   Max HR 98   Peak VO2 (CPX only) 18.8   Actual METS (CPX only) 5.37   Estimated METS 9.0     Anthropometrics    Height 72 inches   Weight 176 lbs   BMI 24.0   Abdominal Girth 37.5   Body Composition 12.9%     ST Depression noted on Stress Test?:No  Angina with exercise?: No   Fall Risk: Yes   Assistive Devices:  independent   Currently exercising? No   Mr. Conteh stated there were no limitations to exercise but has been experiencing lower back pain when walking.  He is having a CT scan this afternoon.     Exercise Plan:   Goals:  CR Exercise Goals: Attend Cardiac Rehab 3 times/week: In Progress  Home Aerobic Exercise: 2 additional days/week for 30-60 minutes: In Progress  Intensity of 12-15 on the Rate of Perceived Exertion (RPE) scale: In Progress  30% increase in entry estimated METS: 11.7 : In Progress  5 days/week for 30-60 minutes: In Progress  Demonstrate proper pulse taking technique: In Progress    Intervention:   Discussed importance of regular attendance to cardiac rehab class    Exercise Prescription:  THR Range 74-91   Mode: Treadmill  Recumbent Bike  Upright Bike  Nustep  Elliptical   Frequency:  3 days/week   Duration:  30 - 60 minutes   Intensity:  12 - 15 RPE   Resistance Training:  Yes: 5 lb weights with 10-15 reps based on strength and range of motion assessment     Home Prescription:  Mode Aerobic   Frequency: 2- 3 days/week   Duration: 30-60 minutes   Resistance Training: None        Education:  Orientation to Equipment; verbalizes understanding; Date: 24  Exercise Recommendations; verbalizes understanding; Date: 24  Exercise  Safety; verbalizes understanding; Date: 1-16-24  Class Preparation: verbalizes understanding; Date: 1-16-24  Signs and symptoms to report: verbalizes understanding; Date: 1-16-24  Caffeine/Hydration: verbalizes understanding; Date: 1-16-24  Exercise Terminology: verbalizes understanding; Date: 1-16-24  Resistance Training: verbalizes understanding; Date: 1-16-24    Comments:  I encouraged Mr. Conteh to begin thinking about some type of aerobic exercise he can participate in at least 2 non-rehab days per week for at least 30 minutes in addition to attending Phase II cardiac rehab classes 3 days per week.  He stated understanding.    All consent forms were signed, proper attire and shoes were discussed.       Mr. Conteh will begin Cardiac Rehab after his CT of his back and is cleared.  He would like to attend the 9:00 am class.    The exercise prescription will be adjusted based on tolerance of exercise intensity by patient.    Buffy Berrios., CEP

## 2024-01-16 ENCOUNTER — CLINICAL SUPPORT (OUTPATIENT)
Dept: CARDIAC REHAB | Facility: CLINIC | Age: 69
End: 2024-01-16
Payer: MEDICARE

## 2024-01-16 ENCOUNTER — HOSPITAL ENCOUNTER (OUTPATIENT)
Dept: RADIOLOGY | Facility: HOSPITAL | Age: 69
Discharge: HOME | End: 2024-01-16
Attending: PHYSICIAN ASSISTANT
Payer: MEDICARE

## 2024-01-16 DIAGNOSIS — I21.4 ACUTE MYOCARDIAL INFARCTION, SUBENDOCARDIAL INFARCTION, INITIAL EPISODE OF CARE: Primary | ICD-10-CM

## 2024-01-16 DIAGNOSIS — I25.10 ATHEROSCLEROSIS OF NATIVE CORONARY ARTERY OF NATIVE HEART, UNSPECIFIED WHETHER ANGINA PRESENT: ICD-10-CM

## 2024-01-16 DIAGNOSIS — R10.31 RLQ ABDOMINAL PAIN: ICD-10-CM

## 2024-01-16 PROCEDURE — 93798 PHYS/QHP OP CAR RHAB W/ECG: CPT | Mod: PBBFAC,PO

## 2024-01-16 PROCEDURE — 99211 OFF/OP EST MAY X REQ PHY/QHP: CPT | Mod: PBBFAC,PO

## 2024-01-16 PROCEDURE — 25500020 PHARM REV CODE 255: Performed by: PHYSICIAN ASSISTANT

## 2024-01-16 PROCEDURE — 99999 PR PBB SHADOW E&M-EST. PATIENT-LVL I: CPT | Mod: PBBFAC,,,

## 2024-01-16 PROCEDURE — 93798 PHYS/QHP OP CAR RHAB W/ECG: CPT | Mod: S$PBB,,, | Performed by: INTERNAL MEDICINE

## 2024-01-16 PROCEDURE — 74177 CT ABD & PELVIS W/CONTRAST: CPT | Mod: TC

## 2024-01-16 PROCEDURE — 74177 CT ABD & PELVIS W/CONTRAST: CPT | Mod: 26,,, | Performed by: STUDENT IN AN ORGANIZED HEALTH CARE EDUCATION/TRAINING PROGRAM

## 2024-01-16 RX ADMIN — IOHEXOL 100 ML: 350 INJECTION, SOLUTION INTRAVENOUS at 05:01

## 2024-01-16 NOTE — PROGRESS NOTES
Orientation   Cardiac Rehab Individual Treatment Plan - Initial Assessment      Patient Name: Wero Spangler MRN: [id number]   : 1955   Age: 68 y.o.   Primary Diagnosis: PTCA/stent, s/p NSTEMI, CAD, HTN, HLD    Nutrition Assessment:     Anthropometrics    Height 72 inches   Weight 176 lbs   BMI 24   Abdominal Girth 37.5   Body Composition 12.9       Drug Allergies and Intolerances:  Review of patient's allergies indicates:  No Known Allergies    Food Allergies and Intolerances:  NA    Past Medical History:  Past Medical History:   Diagnosis Date    Aftercataract     Allergy     BPH (benign prostatic hyperplasia)     Cataract     Corneal dystrophy     Elevated PSA     Enlarged prostate     Fatty liver     Fuchs' corneal dystrophy     Gallstones     General anesthetics causing adverse effect in therapeutic use     delayed emergence after back surgery    GERD (gastroesophageal reflux disease)     HTN (hypertension) 2013    Hypertension     Insomnia     Prostate nodule     Urinary tract infection        Past Surgical History:  Past Surgical History:   Procedure Laterality Date    BACK SURGERY  2000    CATARACT EXTRACTION W/  INTRAOCULAR LENS IMPLANT      Both Eyes    CORNEAL TRANSPLANT Right 2019    Procedure: TRANSPLANT, CORNEA;  Surgeon: Vu Wilson MD;  Location: [facility];  Service: Ophthalmology;  Laterality: Right;  DMEK    EYE SURGERY      LAPAROSCOPIC MARSUPIALIZATION OF CYST OF KIDNEY      PROSTATE SURGERY      prostate biopsy benign    TONSILLECTOMY      VASECTOMY         Medications:  Current Outpatient Medications   Medication Sig    alfuzosin (UROXATRAL) 10 mg Tb24 Take 1 tablet (10 mg total) by mouth once daily.    ALPRAZolam (XANAX) 0.5 MG tablet TAKE 1 TABLET BY MOUTH EVERY NIGHT    aspirin 81 MG Chew Take 81 mg by mouth once daily.    atorvastatin (LIPITOR) 20 MG tablet Take 1 tablet (20 mg total) by mouth once daily.    bisoprolol (ZEBETA) 2.5 MG Tab split tablet Take 2.5 mg  by mouth once daily.    efinaconazole (JUBLIA) 10 % Faviola APPLY TO AFFECTED NAIL DAILY. REMOVE WEEKLY    lansoprazole (PREVACID) 15 MG capsule Take 1 capsule (15 mg total) by mouth once daily.    nitroGLYCERIN (NITROSTAT) 0.4 MG SL tablet Place 1 tablet (0.4 mg total) under the tongue every 5 (five) minutes as needed for Chest pain.    oxybutynin (DITROPAN-XL) 10 MG 24 hr tablet Take 1 tablet (10 mg total) by mouth once daily. (Patient not taking: Reported on 11/6/2023)    sodium chloride 2% (BARBI 128) 2 % ophthalmic solution 1 drop as needed (takes 3-4 times/day).    ticagrelor (BRILINTA) 90 mg tablet Take 1 tablet (90 mg total) by mouth 2 (two) times daily.    tiZANidine (ZANAFLEX) 4 MG tablet Take 4 mg by mouth every evening.    triamterene-hydrochlorothiazide 37.5-25 mg (MAXZIDE-25) 37.5-25 mg per tablet Take 1 tablet by mouth once daily.     No current facility-administered medications for this visit.       Vitamins and Supplements:  NA    Labs:  Patient confirms he is taking lipitor 20mg for cholesterol control.    Lab Results   Component Value Date    CHOL 112 (L) 12/20/2023     Lab Results   Component Value Date    HDL 38 (L) 12/20/2023     Lab Results   Component Value Date    LDLCALC 60.8 (L) 12/20/2023     Lab Results   Component Value Date    TRIG 66 12/20/2023     Lab Results   Component Value Date    CHOLHDL 33.9 12/20/2023         Lab Results   Component Value Date    GLUF 98 11/13/2023     Lab Results   Component Value Date    HGBA1C 4.9 07/25/2022       Nutrition/Diet History:  Patient eats 3 meals daily.    Seasons food with pepper.  Patient denies use of a salt shaker at the table on prepared foods.   Dines out 1-2 per week at restaurants.    Chooses fried foods rarely  Chooses fish 2-3 time(s) per week.   Beverages:  water and coffee with sugar  Alcohol: none    24 Hour Recall:  Breakfast: coffee with cream/sugar x 2, overnight oats with 2% milk, raisins/walnuts/strawberries  Lunch:low sodium  ham/gueyere cheese/awan/mustard/onion on white  Dinner:salmon, mashed potatoes, broccoli crunch salad  Other: banana, apple    Difficulty Chewing or Swallowing: No  Current Exercise: See Exercise Physiologist Note  Food Safety/Food Preparation: self  Living Arrangements/Family Support: Lives alone  Cultural/Spiritual/Personal Preferences: not applicable   Barriers to Education: none identified  Stage of Change Related to Diet Habits: Action    Nutrition Diagnosis:  Food and nutrition related knowledge deficit related to the lack of prior nutrition education as evidenced by diet history and 24 hour recall    Nutrition Plan:   Goals:  LDL-C < 70 (for high risk patients)  Hgb A1c < 7%  BMI < 25 and abdominal girth < 40M/<35 F  2 gram sodium, Mediterranean diet  Rehab weight goal: maintain recent weight lost  Fish intake (non-fried varieties) to a goal of 2-3 servings per week.   Increase fruit and vegetable intake    Interventions/Recommendations:  Lab results reviewed and discussed  Nutrition Prescription:  Total Energy Estimated Needs: 8173-5069 Kcal/d for weight maintenance  Method for Estimating Needs: 25-28kcal/kg  Total Protein Estimated Needs: 64-96 g/d  Method for Estimating Needs: 0.8-1.2 g/Kg BW  Total Fluid Estimated Needs: 1 mL/Kcal  Dietitian Consult: No  Patient to participate in Cardiac Rehab sessions three times a week  Weekly Dietitian Weight Check  Encouraged patient to complete 3 day food diary  Follow Up Plan for Ongoing Self-Management Support    Education:  Mediterranean Diet; verbalizes understanding; Date: 1/16/24  Person taught: patient  Preferred Learning Method: Verbal, Written  Education Needed/Provided: Nutrition counseling and education related to cardiac rehabilitation  Education Method: Weekly nutrition lectures on the Mediterranean diet, cooking, shopping, and dining out  Written Materials Provided: 3 Day Food Record, Introduction to Mediterranean Diet  Strategies Implemented:  Motivational interviewing, Goal setting, Self-Monitoring, and Problem Solving    Comments:   Discussed ways to incorporate healthy snacks, eating on a schedule, and monitoring sodium intake for heart health.  Pt has made significant diet changes and lost weight, very conscious of food choices and portions    Diabetes  Is the patient diabetic? No      RD contact information provided.      Petrona Mitchell MS, RDN/LDN

## 2024-01-17 ENCOUNTER — PATIENT MESSAGE (OUTPATIENT)
Dept: INTERNAL MEDICINE | Facility: CLINIC | Age: 69
End: 2024-01-17
Payer: MEDICARE

## 2024-01-17 NOTE — TELEPHONE ENCOUNTER
Pt inquiring on behalf of cardio rehab if physical therapy should be performed prior to cardio as to not worsen or interrupt pt's back     Pt also inquiring about CT scan

## 2024-01-29 ENCOUNTER — CLINICAL SUPPORT (OUTPATIENT)
Dept: CARDIAC REHAB | Facility: CLINIC | Age: 69
End: 2024-01-29
Payer: MEDICARE

## 2024-01-29 VITALS — OXYGEN SATURATION: 100 % | SYSTOLIC BLOOD PRESSURE: 124 MMHG | HEART RATE: 50 BPM | DIASTOLIC BLOOD PRESSURE: 76 MMHG

## 2024-01-29 DIAGNOSIS — Z98.61 POSTSURGICAL PERCUTANEOUS TRANSLUMINAL CORONARY ANGIOPLASTY STATUS: Primary | ICD-10-CM

## 2024-01-29 DIAGNOSIS — I25.10 ATHEROSCLEROSIS OF NATIVE CORONARY ARTERY OF NATIVE HEART WITHOUT ANGINA PECTORIS: ICD-10-CM

## 2024-01-29 PROCEDURE — 93798 PHYS/QHP OP CAR RHAB W/ECG: CPT | Mod: S$PBB,,, | Performed by: INTERNAL MEDICINE

## 2024-01-29 PROCEDURE — 93798 PHYS/QHP OP CAR RHAB W/ECG: CPT | Mod: PBBFAC,PO

## 2024-01-29 NOTE — PROGRESS NOTES
Session: Orientation   Cardiac Rehab Individual Treatment Plan - Initial Assessment      Patient Name: Wero Spangler MRN: [id number]   : 1955   Age: 68 y.o.   Date of Event: 2023     Primary Diagnosis: PTCA    EF: 55-60%    Physical Assessment:   /76 (BP Location: Right arm)   Pulse (!) 50   SpO2 100%     ASSESSMENT:  Heart Sounds: regular rate and rhythm, S1, S2 normal, no murmur, click, rub or gallop and regular rate and rhythm  Prosthetic Valve: No  Lung Sounds: normal air entry, lungs clear to auscultation  Capillary Refill: normal  Left Radial Pulse: Normal (+2)  Right Radial Pulse: Normal (+2)  Left Pedal Pulse: Normal (+2)  Right Pedal Pulse: Normal (+2)  Right Edema: none  Left Edema none  Strength: normal  Range of Motion: full range of motion  Existing Limitations:      Site   [] Arthritis, bursitis    [] Amputation, atrophy    [x] Other: Lower back pain   []       Diabetic patient's foot examination comments:  pt is not a diabetic.  Incisional site: N/A  Special needs: none    Psychosocial Assessment:   Outcome Survey Tools:    MARISOL SCORES:   PRE   Anxiety 3   Depression 2   Somatic 3   Hostility 0     SF-36 SCORES:   PRE   Physical Function 25   Social Function 8   Mental Health 21   Pain 5   Change in Health 2   Physical Role Limitation 2   Mental Role Limitation 1   Energy/Fatigue 15   Health Perceptions 19   Total Score 98     PHQ-9:      2024     9:16 AM   PHQ-9 Depression Patient Health Questionnaire   Over the last two weeks how often have you been bothered by little interest or pleasure in doing things 0   Over the last two weeks how often have you been bothered by feeling down, depressed or hopeless 0   Over the last two weeks how often have you been bothered by trouble falling or staying asleep, or sleeping too much 1   Over the last two weeks how often have you been bothered by feeling tired or having little energy 1   Over the last two weeks how often have you  been bothered by a poor appetite or overeating 1   Over the last two weeks how often have you been bothered by feeling bad about yourself - or that you are a failure or have let yourself or your family down 0   Over the last two weeks how often have you been bothered by trouble concentrating on things, such as reading the newspaper or watching television 0   Over the last two weeks how often have you been bothered by moving or speaking so slowly that other people could have noticed. 0   Over the last two weeks how often have you been bothered by thoughts that you would be better off dead, or of hurting yourself 0   If you checked off any problems, how difficult have these problems made it for you to do your work, take care of things at home or get along with other people? Not difficult at all   PHQ-9 Score 3              Living Arrangements: Lives alone  Family Support:  daughters  Self Reported: Effective Coping Skills  Displays: happiness and calmness  Medication: not applicable    Psychosocial Plan:   Goals:  Verbalizes coping mechanisms  Maintain positive support system  Maintain positive outlook  Improve overall quality of life    Interventions/Recommendations:  Discussed Results of Surveys  Patient to Self Report Emotional Changes at Session Check In  Recommend Physical Activity  Recommend Attending Education Lectures  Notify MD: No  Program Referral: No  Pharmaceutical Intervention/Therapy: Yes. He takes Xanax.  Other Needs: not applicable  Stage of Readiness to Change: Preparation    Education:  Stress Management; verbalizes understanding; Date: 1/19/2024  Stress; verbalizes understanding; Date: 1/19/2024    Comments:  Patient denies having any overwhelming stress or anxiety & states that he is a very calm individual.  He is , but does have support from his daughters.  He plans to be consistent with attending lectures & exercise sessions.  Encouraged 2 additional days of exercise at home.  He has  been instructed to notify staff in the event that circumstances change.  Patient verbalizes understanding.    Other Core Components/Risk Factors Assessment:   RISK FACTORS:  hypertension, positive family history, sedentary lifestyle    Learning Barriers: None    Education Level:  Post-College Graduate Degree    Pre-test Score: 70    Medication Compliance: has been compliant with taking medications    Other Core Components/Risk Factors Plan:   Goals:  Decrease cholesterol level: In Progress  Increase exercise tolerance: In Progress  Increase knowledge of CAD: In Progress  Learn more about healthy eating: In Progress    Interventions/Recommendations:  Recommend regular attendance for Cardiac Rehab: Exercise and Education Lectures  Encourage medication compliance  Individual Education/ Counseling: Yes  Physician Referral: No    Education:    blood pressure meds, verbalizes understanding; Date: 1/29/2024  cholesterol, verbalizes understanding; Date: 1/29/2024  cholesterol meds, verbalizes understanding; Date: 1/29/2024  hypertension, verbalizes understanding; Date: 1/29/2024  physical activity, verbalizes understanding; Date: 1/29/2024  risk factors, verbalizes understanding; Date: 1/29/2024         Education method adapted to patients education level and preferred method of learning.  Method: explanation    Comments:  Patient is compliant with his medications.  He will be working on achieving goals that were set.He plans to exercise here in rehab & 2 additional days.  He is monitoring BP at home.  Encouraged that he keeps a log of these BP.      Other Core Components/Hypertension Assessment:   Resting BP:   BP Readings from Last 1 Encounters:   01/29/24 124/76         BP Diagnosis: Hypertensive  Patient reported symptoms: none    Other Core Components/Hypertension Plan:   Goals:  Blood Pressure <130/80    Interventions/Recommendations:  Med Card Reconciled: Yes  Encourage medication compliance  Encourage sodium  "reduction  Recommend physical activity  Educate on contributory factors  Reduce stress, anxiety, anger, depression, and/or chronic pain  Encourage home blood pressure monitoring  Recommend daily weights    Education:    Hypertension; verbalizes understanding; Date: 1/29/2024  Coronary Artery Disease; verbalizes understanding; Date: 1/29/2024  Congestive Heart Failure; verbalizes understanding; Date: 1/29/2024  Risk Factors; verbalizes understanding; Date: 1/29/2024  Stress; verbalizes understanding; Date: 1/29/2024         Comments:  Patient will be monitoring his BP & keeping a log.  Also checking a daily weight & keep log of that.  He did have a CT scan on his lower back & has been cleared for rehab.  Will be monitoring sodium intake.      Does the patient have Heart Failure? No    Other Core Components/Tobacco Cessation Assessment:   Smoking Status: past smoker who quit "many years ago"  Smoking Cessation Barriers:  N/A  Stage of Readiness to Change: Maintenance    Other Core Components/Tobacco Cessation Plan:   Goals:  Maintain non-smoking status    Interventions:  Maintains non-smoking status    Education:    Risk Factors; verbalizes understanding; Date: 1/29/2024         Comments:  Non smoker.    Discussed Cardiac Rehab program in depth with patient.  Medication list updated per patient & marked as reviewed.  Patient has been instructed to notify staff of any problems while attending rehab (ie: chest pain, shortness of breath, lightheadedness, dizziness).  Patient verbalizes understanding.  Will monitor patient carefully & notify cardiologist with any problems/issues.    Kimmy Sheppard RN  Cardiac Rehab Nurse    "

## 2024-01-31 ENCOUNTER — CLINICAL SUPPORT (OUTPATIENT)
Dept: CARDIAC REHAB | Facility: CLINIC | Age: 69
End: 2024-01-31
Payer: MEDICARE

## 2024-01-31 DIAGNOSIS — Z98.61 POSTSURGICAL PERCUTANEOUS TRANSLUMINAL CORONARY ANGIOPLASTY STATUS: Primary | ICD-10-CM

## 2024-01-31 DIAGNOSIS — I25.10 ATHEROSCLEROSIS OF NATIVE CORONARY ARTERY OF NATIVE HEART WITHOUT ANGINA PECTORIS: ICD-10-CM

## 2024-01-31 PROCEDURE — 93798 PHYS/QHP OP CAR RHAB W/ECG: CPT | Mod: PBBFAC,PO

## 2024-01-31 PROCEDURE — 93798 PHYS/QHP OP CAR RHAB W/ECG: CPT | Mod: S$PBB,,, | Performed by: INTERNAL MEDICINE

## 2024-02-02 ENCOUNTER — CLINICAL SUPPORT (OUTPATIENT)
Dept: CARDIAC REHAB | Facility: CLINIC | Age: 69
End: 2024-02-02
Payer: MEDICARE

## 2024-02-02 DIAGNOSIS — Z98.61 POSTSURGICAL PERCUTANEOUS TRANSLUMINAL CORONARY ANGIOPLASTY STATUS: Primary | ICD-10-CM

## 2024-02-02 DIAGNOSIS — I25.10 ATHEROSCLEROSIS OF NATIVE CORONARY ARTERY OF NATIVE HEART WITHOUT ANGINA PECTORIS: ICD-10-CM

## 2024-02-02 PROCEDURE — 93798 PHYS/QHP OP CAR RHAB W/ECG: CPT | Mod: PBBFAC,PO

## 2024-02-02 PROCEDURE — 93798 PHYS/QHP OP CAR RHAB W/ECG: CPT | Mod: S$PBB,,, | Performed by: INTERNAL MEDICINE

## 2024-02-05 ENCOUNTER — CLINICAL SUPPORT (OUTPATIENT)
Dept: CARDIAC REHAB | Facility: CLINIC | Age: 69
End: 2024-02-05
Payer: MEDICARE

## 2024-02-05 DIAGNOSIS — I25.10 ATHEROSCLEROSIS OF NATIVE CORONARY ARTERY OF NATIVE HEART WITHOUT ANGINA PECTORIS: ICD-10-CM

## 2024-02-05 DIAGNOSIS — Z98.61 POSTSURGICAL PERCUTANEOUS TRANSLUMINAL CORONARY ANGIOPLASTY STATUS: Primary | ICD-10-CM

## 2024-02-05 PROCEDURE — 93798 PHYS/QHP OP CAR RHAB W/ECG: CPT | Mod: PBBFAC,PO

## 2024-02-05 PROCEDURE — 93798 PHYS/QHP OP CAR RHAB W/ECG: CPT | Mod: S$PBB,,, | Performed by: INTERNAL MEDICINE

## 2024-02-07 ENCOUNTER — CLINICAL SUPPORT (OUTPATIENT)
Dept: CARDIAC REHAB | Facility: CLINIC | Age: 69
End: 2024-02-07
Payer: MEDICARE

## 2024-02-07 DIAGNOSIS — I25.10 CORONARY ARTERY DISEASE, UNSPECIFIED VESSEL OR LESION TYPE, UNSPECIFIED WHETHER ANGINA PRESENT, UNSPECIFIED WHETHER NATIVE OR TRANSPLANTED HEART: ICD-10-CM

## 2024-02-07 DIAGNOSIS — Z98.61 POSTSURGICAL PERCUTANEOUS TRANSLUMINAL CORONARY ANGIOPLASTY STATUS: Primary | ICD-10-CM

## 2024-02-07 PROCEDURE — 93798 PHYS/QHP OP CAR RHAB W/ECG: CPT | Mod: S$PBB,,, | Performed by: INTERNAL MEDICINE

## 2024-02-07 PROCEDURE — 93798 PHYS/QHP OP CAR RHAB W/ECG: CPT | Mod: PBBFAC,PO

## 2024-02-08 ENCOUNTER — DOCUMENTATION ONLY (OUTPATIENT)
Dept: CARDIAC REHAB | Facility: CLINIC | Age: 69
End: 2024-02-08
Payer: MEDICARE

## 2024-02-08 NOTE — PROGRESS NOTES
Mr. Conteh has completed 6 out of 36 exercise session of Phase II cardiac rehab.  A follow up reassessment will be completed at 12 sessions.    Session: Orientation   Cardiac Rehab Individual Treatment Plan - Initial Assessment      Patient Name: Wero Spangler MRN: [id number]   : 1955   Age: 68 y.o.   Primary Diagnosis: PTCA/STENT  Date of Event: 23  EF: 55-60%  Risk Stratification: moderate  Referring Physician: YAMIL   Exercise Assessment:     CPX/TM Date: 24 Results   RHR 52   Max HR 98   Peak VO2 (CPX only) 18.8   Actual METS (CPX only) 5.37   Estimated METS 9.0     Anthropometrics    Height 72 inches   Weight 176 lbs   BMI 24.0   Abdominal Girth 37.5   Body Composition 12.9%     ST Depression noted on Stress Test?:No  Angina with exercise?: No   Fall Risk: Yes   Assistive Devices:  independent   Currently exercising? No   Mr. Conteh stated there were no limitations to exercise but has been experiencing lower back pain when walking.  He is having a CT scan this afternoon.     Exercise Plan:   Goals:  CR Exercise Goals: Attend Cardiac Rehab 3 times/week: In Progress  Home Aerobic Exercise: 2 additional days/week for 30-60 minutes: In Progress  Intensity of 12-15 on the Rate of Perceived Exertion (RPE) scale: In Progress  30% increase in entry estimated METS: 11.7 : In Progress  5 days/week for 30-60 minutes: In Progress  Demonstrate proper pulse taking technique: In Progress    Intervention:   Discussed importance of regular attendance to cardiac rehab class    Exercise Prescription:  THR Range 74-91   Mode: Treadmill  Recumbent Bike  Upright Bike  Nustep  Elliptical   Frequency:  3 days/week   Duration:  30 - 60 minutes   Intensity:  12 - 15 RPE   Resistance Training:  Yes: 5 lb weights with 10-15 reps based on strength and range of motion assessment     Home Prescription:  Mode Aerobic   Frequency: 2- 3 days/week   Duration: 30-60 minutes   Resistance Training: None         Education:  Orientation to Equipment; verbalizes understanding; Date: 24  Exercise Recommendations; verbalizes understanding; Date: 24  Exercise Safety; verbalizes understanding; Date: 24  Class Preparation: verbalizes understanding; Date: 24  Signs and symptoms to report: verbalizes understanding; Date: 24  Caffeine/Hydration: verbalizes understanding; Date: 24  Exercise Terminology: verbalizes understanding; Date: 24  Resistance Training: verbalizes understanding; Date: 24    Comments:  I encouraged Mr. Conteh to begin thinking about some type of aerobic exercise he can participate in at least 2 non-rehab days per week for at least 30 minutes in addition to attending Phase II cardiac rehab classes 3 days per week.  He stated understanding.    All consent forms were signed, proper attire and shoes were discussed.       Mr. Conteh will begin Cardiac Rehab after his CT of his back and is cleared.  He would like to attend the 9:00 am class.    The exercise prescription will be adjusted based on tolerance of exercise intensity by patient.    Prosper Berrios, CEP    Orientation   Cardiac Rehab Individual Treatment Plan - Initial Assessment      Patient Name: Wero Spangler MRN: [id number]   : 1955   Age: 68 y.o.   Primary Diagnosis: PTCA/stent, s/p NSTEMI, CAD, HTN, HLD    Nutrition Assessment:     Anthropometrics    Height 72 inches   Weight 176 lbs   BMI 24   Abdominal Girth 37.5   Body Composition 12.9       Drug Allergies and Intolerances:  Review of patient's allergies indicates:  No Known Allergies    Food Allergies and Intolerances:  NA    Past Medical History:  Past Medical History:   Diagnosis Date    Aftercataract     Allergy     BPH (benign prostatic hyperplasia)     Cataract     Corneal dystrophy     Elevated PSA     Enlarged prostate     Fatty liver     Fuchs' corneal dystrophy     Gallstones     General anesthetics causing adverse effect in  therapeutic use 2000    delayed emergence after back surgery    GERD (gastroesophageal reflux disease)     HTN (hypertension) 9/24/2013    Hypertension     Insomnia     Prostate nodule     Urinary tract infection        Past Surgical History:  Past Surgical History:   Procedure Laterality Date    BACK SURGERY  4/2000    CATARACT EXTRACTION W/  INTRAOCULAR LENS IMPLANT      Both Eyes    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    Procedure: TRANSPLANT, CORNEA;  Surgeon: Vu Wilson MD;  Location: [facility];  Service: Ophthalmology;  Laterality: Right;  DMEK    EYE SURGERY      LAPAROSCOPIC MARSUPIALIZATION OF CYST OF KIDNEY      PROSTATE SURGERY      prostate biopsy benign    TONSILLECTOMY      VASECTOMY         Medications:  Current Outpatient Medications   Medication Sig    alfuzosin (UROXATRAL) 10 mg Tb24 Take 1 tablet (10 mg total) by mouth once daily.    ALPRAZolam (XANAX) 0.5 MG tablet TAKE 1 TABLET BY MOUTH EVERY NIGHT    aspirin 81 MG Chew Take 81 mg by mouth once daily.    atorvastatin (LIPITOR) 20 MG tablet Take 1 tablet (20 mg total) by mouth once daily.    bisoprolol (ZEBETA) 2.5 MG Tab split tablet Take 2.5 mg by mouth once daily.    efinaconazole (JUBLIA) 10 % Faviola APPLY TO AFFECTED NAIL DAILY. REMOVE WEEKLY    lansoprazole (PREVACID) 15 MG capsule Take 1 capsule (15 mg total) by mouth once daily.    nitroGLYCERIN (NITROSTAT) 0.4 MG SL tablet Place 1 tablet (0.4 mg total) under the tongue every 5 (five) minutes as needed for Chest pain.    oxybutynin (DITROPAN-XL) 10 MG 24 hr tablet Take 1 tablet (10 mg total) by mouth once daily. (Patient not taking: Reported on 11/6/2023)    sodium chloride 2% (BARBI 128) 2 % ophthalmic solution 1 drop as needed (takes 3-4 times/day).    ticagrelor (BRILINTA) 90 mg tablet Take 1 tablet (90 mg total) by mouth 2 (two) times daily.    tiZANidine (ZANAFLEX) 4 MG tablet Take 4 mg by mouth every evening.    triamterene-hydrochlorothiazide 37.5-25 mg (MAXZIDE-25) 37.5-25 mg per  tablet Take 1 tablet by mouth once daily.     No current facility-administered medications for this visit.       Vitamins and Supplements:  NA    Labs:  Patient confirms he is taking lipitor 20mg for cholesterol control.    Lab Results   Component Value Date    CHOL 112 (L) 12/20/2023     Lab Results   Component Value Date    HDL 38 (L) 12/20/2023     Lab Results   Component Value Date    LDLCALC 60.8 (L) 12/20/2023     Lab Results   Component Value Date    TRIG 66 12/20/2023     Lab Results   Component Value Date    CHOLHDL 33.9 12/20/2023         Lab Results   Component Value Date    GLUF 98 11/13/2023     Lab Results   Component Value Date    HGBA1C 4.9 07/25/2022       Nutrition/Diet History:  Patient eats 3 meals daily.    Seasons food with pepper.  Patient denies use of a salt shaker at the table on prepared foods.   Dines out 1-2 per week at restaurants.    Chooses fried foods rarely  Chooses fish 2-3 time(s) per week.   Beverages:  water and coffee with sugar  Alcohol: none    24 Hour Recall:  Breakfast: coffee with cream/sugar x 2, overnight oats with 2% milk, raisins/walnuts/strawberries  Lunch:low sodium ham/gueyere cheese/awan/mustard/onion on white  Dinner:salmon, mashed potatoes, broccoli crunch salad  Other: banana, apple    Difficulty Chewing or Swallowing: No  Current Exercise: See Exercise Physiologist Note  Food Safety/Food Preparation: self  Living Arrangements/Family Support: Lives alone  Cultural/Spiritual/Personal Preferences: not applicable   Barriers to Education: none identified  Stage of Change Related to Diet Habits: Action    Nutrition Diagnosis:  Food and nutrition related knowledge deficit related to the lack of prior nutrition education as evidenced by diet history and 24 hour recall    Nutrition Plan:   Goals:  LDL-C < 70 (for high risk patients)  Hgb A1c < 7%  BMI < 25 and abdominal girth < 40M/<35 F  2 gram sodium, Mediterranean diet  Rehab weight goal: maintain recent weight  lost  Fish intake (non-fried varieties) to a goal of 2-3 servings per week.   Increase fruit and vegetable intake    Interventions/Recommendations:  Lab results reviewed and discussed  Nutrition Prescription:  Total Energy Estimated Needs: 6123-1307 Kcal/d for weight maintenance  Method for Estimating Needs: 25-28kcal/kg  Total Protein Estimated Needs: 64-96 g/d  Method for Estimating Needs: 0.8-1.2 g/Kg BW  Total Fluid Estimated Needs: 1 mL/Kcal  Dietitian Consult: No  Patient to participate in Cardiac Rehab sessions three times a week  Weekly Dietitian Weight Check  Encouraged patient to complete 3 day food diary  Follow Up Plan for Ongoing Self-Management Support    Education:  Mediterranean Diet; verbalizes understanding; Date: 24  Person taught: patient  Preferred Learning Method: Verbal, Written  Education Needed/Provided: Nutrition counseling and education related to cardiac rehabilitation  Education Method: Weekly nutrition lectures on the Mediterranean diet, cooking, shopping, and dining out  Written Materials Provided: 3 Day Food Record, Introduction to Mediterranean Diet  Strategies Implemented: Motivational interviewing, Goal setting, Self-Monitoring, and Problem Solving    Comments:   Discussed ways to incorporate healthy snacks, eating on a schedule, and monitoring sodium intake for heart health.  Pt has made significant diet changes and lost weight, very conscious of food choices and portions    Diabetes  Is the patient diabetic? No      RD contact information provided.      Petrona Mitchell MS, RDN/LDN    Session: Orientation   Cardiac Rehab Individual Treatment Plan - Initial Assessment      Patient Name: Wero Spangler MRN: [id number]   : 1955   Age: 68 y.o.   Date of Event: 2023     Primary Diagnosis: PTCA    EF: 55-60%    Physical Assessment:   There were no vitals taken for this visit.    ASSESSMENT:  Heart Sounds: regular rate and rhythm, S1, S2 normal, no murmur, click, rub  or gallop and regular rate and rhythm  Prosthetic Valve: No  Lung Sounds: normal air entry, lungs clear to auscultation  Capillary Refill: normal  Left Radial Pulse: Normal (+2)  Right Radial Pulse: Normal (+2)  Left Pedal Pulse: Normal (+2)  Right Pedal Pulse: Normal (+2)  Right Edema: none  Left Edema none  Strength: normal  Range of Motion: full range of motion  Existing Limitations:      Site   [] Arthritis, bursitis    [] Amputation, atrophy    [x] Other: Lower back pain   []       Diabetic patient's foot examination comments:  pt is not a diabetic.  Incisional site: N/A  Special needs: none    Psychosocial Assessment:   Outcome Survey Tools:    MARISOL SCORES:   PRE   Anxiety 3   Depression 2   Somatic 3   Hostility 0     SF-36 SCORES:   PRE   Physical Function 25   Social Function 8   Mental Health 21   Pain 5   Change in Health 2   Physical Role Limitation 2   Mental Role Limitation 1   Energy/Fatigue 15   Health Perceptions 19   Total Score 98     PHQ-9:      1/16/2024     9:16 AM   PHQ-9 Depression Patient Health Questionnaire   Over the last two weeks how often have you been bothered by little interest or pleasure in doing things 0   Over the last two weeks how often have you been bothered by feeling down, depressed or hopeless 0   Over the last two weeks how often have you been bothered by trouble falling or staying asleep, or sleeping too much 1   Over the last two weeks how often have you been bothered by feeling tired or having little energy 1   Over the last two weeks how often have you been bothered by a poor appetite or overeating 1   Over the last two weeks how often have you been bothered by feeling bad about yourself - or that you are a failure or have let yourself or your family down 0   Over the last two weeks how often have you been bothered by trouble concentrating on things, such as reading the newspaper or watching television 0   Over the last two weeks how often have you been bothered by  moving or speaking so slowly that other people could have noticed. 0   Over the last two weeks how often have you been bothered by thoughts that you would be better off dead, or of hurting yourself 0   If you checked off any problems, how difficult have these problems made it for you to do your work, take care of things at home or get along with other people? Not difficult at all   PHQ-9 Score 3              Living Arrangements: Lives alone  Family Support:  daughters  Self Reported: Effective Coping Skills  Displays: happiness and calmness  Medication: not applicable    Psychosocial Plan:   Goals:  Verbalizes coping mechanisms  Maintain positive support system  Maintain positive outlook  Improve overall quality of life    Interventions/Recommendations:  Discussed Results of Surveys  Patient to Self Report Emotional Changes at Session Check In  Recommend Physical Activity  Recommend Attending Education Lectures  Notify MD: No  Program Referral: No  Pharmaceutical Intervention/Therapy: Yes. He takes Xanax.  Other Needs: not applicable  Stage of Readiness to Change: Preparation    Education:  Stress Management; verbalizes understanding; Date: 1/19/2024  Stress; verbalizes understanding; Date: 1/19/2024    Comments:  Patient denies having any overwhelming stress or anxiety & states that he is a very calm individual.  He is , but does have support from his daughters.  He plans to be consistent with attending lectures & exercise sessions.  Encouraged 2 additional days of exercise at home.  He has been instructed to notify staff in the event that circumstances change.  Patient verbalizes understanding.    Other Core Components/Risk Factors Assessment:   RISK FACTORS:  hypertension, positive family history, sedentary lifestyle    Learning Barriers: None    Education Level:  Post-College Graduate Degree    Pre-test Score: 70    Medication Compliance: has been compliant with taking medications    Other Core  Components/Risk Factors Plan:   Goals:  Decrease cholesterol level: In Progress  Increase exercise tolerance: In Progress  Increase knowledge of CAD: In Progress  Learn more about healthy eating: In Progress    Interventions/Recommendations:  Recommend regular attendance for Cardiac Rehab: Exercise and Education Lectures  Encourage medication compliance  Individual Education/ Counseling: Yes  Physician Referral: No    Education:    blood pressure meds, verbalizes understanding; Date: 1/29/2024  cholesterol, verbalizes understanding; Date: 1/29/2024  cholesterol meds, verbalizes understanding; Date: 1/29/2024  hypertension, verbalizes understanding; Date: 1/29/2024  physical activity, verbalizes understanding; Date: 1/29/2024  risk factors, verbalizes understanding; Date: 1/29/2024         Education method adapted to patients education level and preferred method of learning.  Method: explanation    Comments:  Patient is compliant with his medications.  He will be working on achieving goals that were set.He plans to exercise here in rehab & 2 additional days.  He is monitoring BP at home.  Encouraged that he keeps a log of these BP.      Other Core Components/Hypertension Assessment:   Resting BP:   BP Readings from Last 1 Encounters:   01/29/24 124/76         BP Diagnosis: Hypertensive  Patient reported symptoms: none    Other Core Components/Hypertension Plan:   Goals:  Blood Pressure <130/80    Interventions/Recommendations:  Med Card Reconciled: Yes  Encourage medication compliance  Encourage sodium reduction  Recommend physical activity  Educate on contributory factors  Reduce stress, anxiety, anger, depression, and/or chronic pain  Encourage home blood pressure monitoring  Recommend daily weights    Education:    Hypertension; verbalizes understanding; Date: 1/29/2024  Coronary Artery Disease; verbalizes understanding; Date: 1/29/2024  Congestive Heart Failure; verbalizes understanding; Date: 1/29/2024  Risk  "Factors; verbalizes understanding; Date: 1/29/2024  Stress; verbalizes understanding; Date: 1/29/2024         Comments:  Patient will be monitoring his BP & keeping a log.  Also checking a daily weight & keep log of that.  He did have a CT scan on his lower back & has been cleared for rehab.  Will be monitoring sodium intake.      Does the patient have Heart Failure? No    Other Core Components/Tobacco Cessation Assessment:   Smoking Status: past smoker who quit "many years ago"  Smoking Cessation Barriers:  N/A  Stage of Readiness to Change: Maintenance    Other Core Components/Tobacco Cessation Plan:   Goals:  Maintain non-smoking status    Interventions:  Maintains non-smoking status    Education:    Risk Factors; verbalizes understanding; Date: 1/29/2024         Comments:  Non smoker.    Discussed Cardiac Rehab program in depth with patient.  Medication list updated per patient & marked as reviewed.  Patient has been instructed to notify staff of any problems while attending rehab (ie: chest pain, shortness of breath, lightheadedness, dizziness).  Patient verbalizes understanding.  Will monitor patient carefully & notify cardiologist with any problems/issues.    Kimmy Sheppard RN  Cardiac Rehab Nurse    "

## 2024-02-09 ENCOUNTER — CLINICAL SUPPORT (OUTPATIENT)
Dept: CARDIAC REHAB | Facility: CLINIC | Age: 69
End: 2024-02-09
Payer: MEDICARE

## 2024-02-09 DIAGNOSIS — Z98.61 POSTSURGICAL PERCUTANEOUS TRANSLUMINAL CORONARY ANGIOPLASTY STATUS: Primary | ICD-10-CM

## 2024-02-09 DIAGNOSIS — I25.10 ATHEROSCLEROSIS OF NATIVE CORONARY ARTERY OF NATIVE HEART, UNSPECIFIED WHETHER ANGINA PRESENT: ICD-10-CM

## 2024-02-09 PROCEDURE — 93798 PHYS/QHP OP CAR RHAB W/ECG: CPT | Mod: PBBFAC,PO | Performed by: INTERNAL MEDICINE

## 2024-02-09 PROCEDURE — 93798 PHYS/QHP OP CAR RHAB W/ECG: CPT | Mod: S$PBB,,, | Performed by: INTERNAL MEDICINE

## 2024-02-12 ENCOUNTER — CLINICAL SUPPORT (OUTPATIENT)
Dept: CARDIAC REHAB | Facility: CLINIC | Age: 69
End: 2024-02-12
Payer: MEDICARE

## 2024-02-12 DIAGNOSIS — I25.10 ATHEROSCLEROSIS OF NATIVE CORONARY ARTERY OF NATIVE HEART, UNSPECIFIED WHETHER ANGINA PRESENT: ICD-10-CM

## 2024-02-12 DIAGNOSIS — Z98.61 POSTSURGICAL PERCUTANEOUS TRANSLUMINAL CORONARY ANGIOPLASTY STATUS: Primary | ICD-10-CM

## 2024-02-12 PROCEDURE — 93798 PHYS/QHP OP CAR RHAB W/ECG: CPT | Mod: PBBFAC,PO | Performed by: INTERNAL MEDICINE

## 2024-02-12 PROCEDURE — 93798 PHYS/QHP OP CAR RHAB W/ECG: CPT | Mod: S$PBB,,, | Performed by: INTERNAL MEDICINE

## 2024-02-14 ENCOUNTER — CLINICAL SUPPORT (OUTPATIENT)
Dept: CARDIAC REHAB | Facility: CLINIC | Age: 69
End: 2024-02-14
Payer: MEDICARE

## 2024-02-14 DIAGNOSIS — Z98.61 POSTSURGICAL PERCUTANEOUS TRANSLUMINAL CORONARY ANGIOPLASTY STATUS: Primary | ICD-10-CM

## 2024-02-14 DIAGNOSIS — I25.10 CORONARY ARTERY DISEASE, UNSPECIFIED VESSEL OR LESION TYPE, UNSPECIFIED WHETHER ANGINA PRESENT, UNSPECIFIED WHETHER NATIVE OR TRANSPLANTED HEART: ICD-10-CM

## 2024-02-14 PROCEDURE — 93798 PHYS/QHP OP CAR RHAB W/ECG: CPT | Mod: PBBFAC,PO

## 2024-02-14 PROCEDURE — 93798 PHYS/QHP OP CAR RHAB W/ECG: CPT | Mod: S$PBB,,, | Performed by: INTERNAL MEDICINE

## 2024-02-16 ENCOUNTER — CLINICAL SUPPORT (OUTPATIENT)
Dept: CARDIAC REHAB | Facility: CLINIC | Age: 69
End: 2024-02-16
Payer: MEDICARE

## 2024-02-16 DIAGNOSIS — Z98.61 POSTSURGICAL PERCUTANEOUS TRANSLUMINAL CORONARY ANGIOPLASTY STATUS: Primary | ICD-10-CM

## 2024-02-16 DIAGNOSIS — I25.10 ATHEROSCLEROSIS OF NATIVE CORONARY ARTERY OF NATIVE HEART WITHOUT ANGINA PECTORIS: ICD-10-CM

## 2024-02-16 PROCEDURE — 93798 PHYS/QHP OP CAR RHAB W/ECG: CPT | Mod: S$PBB,,, | Performed by: INTERNAL MEDICINE

## 2024-02-16 PROCEDURE — 93798 PHYS/QHP OP CAR RHAB W/ECG: CPT | Mod: PBBFAC,PO

## 2024-02-18 DIAGNOSIS — G47.00 INSOMNIA, UNSPECIFIED TYPE: ICD-10-CM

## 2024-02-18 NOTE — TELEPHONE ENCOUNTER
No care due was identified.  [facility] Embedded Care Due Messages. Reference number: [id number].   2/18/2024 3:34:30 PM CST

## 2024-02-19 ENCOUNTER — CLINICAL SUPPORT (OUTPATIENT)
Dept: CARDIAC REHAB | Facility: CLINIC | Age: 69
End: 2024-02-19
Payer: MEDICARE

## 2024-02-19 DIAGNOSIS — Z98.61 POSTSURGICAL PERCUTANEOUS TRANSLUMINAL CORONARY ANGIOPLASTY STATUS: Primary | ICD-10-CM

## 2024-02-19 DIAGNOSIS — I25.10 ATHEROSCLEROSIS OF NATIVE CORONARY ARTERY OF NATIVE HEART WITHOUT ANGINA PECTORIS: ICD-10-CM

## 2024-02-19 PROCEDURE — 93798 PHYS/QHP OP CAR RHAB W/ECG: CPT | Mod: PBBFAC,PO

## 2024-02-19 PROCEDURE — 93798 PHYS/QHP OP CAR RHAB W/ECG: CPT | Mod: S$PBB,,, | Performed by: INTERNAL MEDICINE

## 2024-02-20 RX ORDER — ALPRAZOLAM 0.5 MG/1
TABLET ORAL
Qty: 30 TABLET | Refills: 0 | Status: SHIPPED | OUTPATIENT
Start: 2024-02-20 | End: 2024-05-16 | Stop reason: SDUPTHER

## 2024-02-20 NOTE — TELEPHONE ENCOUNTER
Please remind pt to continue receiving a Rx for a controlled medication that he needs a visit every 6 months. 1 can be virtual.

## 2024-02-20 NOTE — TELEPHONE ENCOUNTER
----- Message from Radha Acuña sent at 2/20/2024  3:13 PM CST -----  Contact: [phone]  Patient is returning a phone call.  Who left a message for the patient: Nicolle   Does patient know what this is regarding:  yes   Would you like a call back, or a response through your [facility] portal?:   call back   Comments:

## 2024-02-21 ENCOUNTER — DOCUMENTATION ONLY (OUTPATIENT)
Dept: CARDIAC REHAB | Facility: CLINIC | Age: 69
End: 2024-02-21

## 2024-02-21 ENCOUNTER — CLINICAL SUPPORT (OUTPATIENT)
Dept: CARDIAC REHAB | Facility: CLINIC | Age: 69
End: 2024-02-21
Payer: MEDICARE

## 2024-02-21 DIAGNOSIS — Z98.61 POSTSURGICAL PERCUTANEOUS TRANSLUMINAL CORONARY ANGIOPLASTY STATUS: Primary | ICD-10-CM

## 2024-02-21 DIAGNOSIS — I25.10 ATHEROSCLEROSIS OF NATIVE CORONARY ARTERY OF NATIVE HEART WITHOUT ANGINA PECTORIS: ICD-10-CM

## 2024-02-21 PROCEDURE — 93798 PHYS/QHP OP CAR RHAB W/ECG: CPT | Mod: PBBFAC,PO

## 2024-02-21 PROCEDURE — 93798 PHYS/QHP OP CAR RHAB W/ECG: CPT | Mod: S$PBB,,, | Performed by: INTERNAL MEDICINE

## 2024-02-21 NOTE — PROGRESS NOTES
12 Session Follow Up   Cardiac Rehab Individual Treatment Plan - Reassessment    Patient Name: Wero Spangler MRN: [id number]   : 1955   Age: 68 y.o.   Primary Diagnosis: PTCA/stent    Nutrition Assessment:     Anthropometrics    Height 72 inches   Weight 176.5 lbs   BMI 23.9     Patient confirms he is taking lipitor 20mg for cholesterol control.  Difficulty Chewing or Swallowing: No  Current Exercise: See Exercise Physiologist Note  Food Safety/Food Preparation: self  Living Arrangements/Family Support: Lives alone  Cultural/Spiritual/Personal Preferences: not applicable   Barriers to Education: none identified  Stage of Change Related to Diet Habits: Action  Recent Changes to Diet: Yes  Food Diary: Given      Nutrition Plan:   Goals:  LDL-C < 70 (for high risk patients)  Hgb A1c < 7%  BMI < 25 and abdominal girth < 40M/<35 F  2 gram sodium, Mediterranean diet: In Progress  Fish intake (non-fried varieties) to a goal of 2-3 servings per week: In Progress  Increase fruit and vegetable intake: In Progress    Comments on Goal Progression:  Pt states he feels well and has increased energy, he eats fish several times weekly and fruit daily and discussed small ways to incorporate more vegetables regularly    Interventions:  Dietitian Consult: No  Patient to participate in Cardiac Rehab sessions three times a week  Weekly Dietitian Weight Check  Nutrition Recommendations Provided: Verbal, Reviewed  Follow Up Plan for Ongoing Self-Management Support    Education:  Protein; verbalizes understanding; Date: 24  Seafood; verbalizes understanding; Date: 24  Sodium; verbalizes understanding; Date: 24  Fiber; verbalizes understanding; Date: 24    Comments:   Discussed ways to incorporate healthy snacks, eating on a schedule, and monitoring sodium intake for heart health.    Diabetes  Is the patient diabetic? No      Petrona Mitchell MS, RDN/LDN

## 2024-02-21 NOTE — PROGRESS NOTES
Mr. Conteh has completed 12 out of 36 exercise session of Phase II cardiac rehab.  A follow up reassessment will be completed at 24 sessions.    12 Session Follow Up   Cardiac Rehab Individual Treatment Plan - Reassessment      Patient Name: Wero Spangler MRN: [id number]   : 1955   Age: 68 y.o.   Primary Diagnosis: PTCA/STENT Date of Event: 23   EF: 55-60%  Risk Stratification: moderate  Referring Physician: YAMIL   Exercise Assessment:     Angina with exercise?: No   ST Depression with Exercise?: No  Fall Risk: No   Assistive Devices:  independent  Mr. Conteh stated at orientation there were possible limitations to exercise due to chronic back pain.  Exercise modalities have been modified to meet the patient's needs and capabilities.  Comments on Progression: Mr. Conteh is progressing well and his workloads will continue to be increased as he tolerates exercise intensity.    Exercise Plan:   Goals:  CR Exercise Goals: Attend Cardiac Rehab 3 times/week: In Progress  Home Aerobic Exercise: 2 additional days/week for 30-60 minutes: In Progress  Intensity of 12-15 on the Rate of Perceived Exertion (RPE) scale: In Progress  30% increase in entry estimated METS: 11.7 : In Progress  5 days/week for 30-60 minutes: In Progress  Demonstrate proper pulse taking technique: In Progress    Comments on Goal Progression:  Patient Consistency: consistent with attendance  Home exercise? Yes: Walking; Frequency: 2 non-rehab days per week; Duration 40 minutes  Patient reports intensity rate 10-13 on RPE scale  Patient is progressing steadily  Patient is Able to demonstrate proper pulse taking technique with or without a fitness tracker.      Intervention/Recommendations:   Discussed importance of regular attendance to cardiac rehab class    Exercise Prescription:  THR Range 74-91   Mode: Treadmill  Nustep   Frequency:  3 days/week   Duration:  30-60 minutes   Intensity:  12-15 RPE   Resistance Training:  Yes: 5 to 8 lb  weights with 10-15 reps based on strength and range of motion and adjusted accordingly     Home Prescription:  Mode Aerobic exercise   Frequency: 2- 3 days/week   Duration: 30-60 minutes   Resistance Training: None     Education:  Body Composition; verbalizes understanding; Date: 1-29-24  Diabetes; verbalizes understanding; Date: 2-19-24  Exercise and Rehydration; verbalizes understanding; Date: 2-5-24  Exercise Q & A; verbalizes understanding; Date: 2-12-24    Comments:  I reviewed exercise recommendations with Mr. Conteh.  I encouraged him to continue exercising.  He stated understanding.     The exercise prescription will continue to be adjusted based on tolerance of exercise intensity by patient.    Buffy Berrios., CEP

## 2024-02-23 ENCOUNTER — CLINICAL SUPPORT (OUTPATIENT)
Dept: CARDIAC REHAB | Facility: CLINIC | Age: 69
End: 2024-02-23
Payer: MEDICARE

## 2024-02-23 DIAGNOSIS — I25.10 ATHEROSCLEROSIS OF NATIVE CORONARY ARTERY OF NATIVE HEART, UNSPECIFIED WHETHER ANGINA PRESENT: ICD-10-CM

## 2024-02-23 DIAGNOSIS — Z98.61 POSTSURGICAL PERCUTANEOUS TRANSLUMINAL CORONARY ANGIOPLASTY STATUS: Primary | ICD-10-CM

## 2024-02-23 PROCEDURE — 93798 PHYS/QHP OP CAR RHAB W/ECG: CPT | Mod: PBBFAC,PO | Performed by: INTERNAL MEDICINE

## 2024-02-23 PROCEDURE — 93798 PHYS/QHP OP CAR RHAB W/ECG: CPT | Mod: S$PBB,,, | Performed by: INTERNAL MEDICINE

## 2024-02-26 ENCOUNTER — CLINICAL SUPPORT (OUTPATIENT)
Dept: CARDIAC REHAB | Facility: CLINIC | Age: 69
End: 2024-02-26
Payer: MEDICARE

## 2024-02-26 DIAGNOSIS — I25.10 CORONARY ARTERY DISEASE, UNSPECIFIED VESSEL OR LESION TYPE, UNSPECIFIED WHETHER ANGINA PRESENT, UNSPECIFIED WHETHER NATIVE OR TRANSPLANTED HEART: ICD-10-CM

## 2024-02-26 DIAGNOSIS — Z98.61 POSTSURGICAL PERCUTANEOUS TRANSLUMINAL CORONARY ANGIOPLASTY STATUS: Primary | ICD-10-CM

## 2024-02-26 PROCEDURE — 93798 PHYS/QHP OP CAR RHAB W/ECG: CPT | Mod: S$PBB,,, | Performed by: INTERNAL MEDICINE

## 2024-02-26 PROCEDURE — 93798 PHYS/QHP OP CAR RHAB W/ECG: CPT | Mod: PBBFAC,PO

## 2024-02-26 NOTE — PROGRESS NOTES
Session: 12 Session Follow Up   Cardiac Rehab Individual Treatment Plan - Reassessment      Patient Name: Wero Spangler MRN: [id number]   : 1955   Age: 68 y.o.   Primary Diagnosis: PTCA      Psychosocial Assessment:   Living Arrangements: Lives alone  Family Support:  Daughters  Self Reported: decrease Effective Coping Skills  Displays: happiness, smiles often, and calmness  Medication: not applicable    Psychosocial Plan:   Goals:  Verbalizes coping mechanisms: In Progress  Maintain positive support system: In Progress  Maintain positive outlook: In Progress  Improve overall quality of life: In Progress    Comments on Goal Progression:  Patient denies having any overwhelming stress or anxiety.  He states that he has good support from his daughters.  He is motivated & reports to be feeling better physically & emotionally since beginning cardiac rehab.  He is exercising 2 additional days outside of rehab.    Interventions:  Patient to Self Report Emotional Changes at Session Check In  Recommend Physical Activity  Recommend Attending Education Lectures  Notify MD: No  Program Referral: No  Pharmaceutical Intervention/Therapy: Yes  Other Needs: not applicable  Stage of Readiness to Change: Action    Education:  Stress Management; verbalizes understanding; Date: 2024  Stress; verbalizes understanding; Date: 2024      Patient has been instructed to notify staff in the event that circumstances change.  Patient verbalizes understanding.    Other Core Components/Risk Factors Assessment:   RISK FACTORS:  hypertension, positive family history, sedentary lifestyle    Learning Barriers: None    Medication Compliance: has been compliant with taking medications    Other Core Components/Risk Factors Plan:   Goals:  Decrease cholesterol level: In Progress  Increase exercise tolerance: In Progress  Increase knowledge of CAD: In Progress  Learn more about healthy eating: In Progress    Comments on Goal  Progression:  Patient will continue to work on decreasing risk factors for CAD.  He is attending lectures & exercise sessions on a consistent basis.  He is exercising 2 additional days outside of cardiac rehab.  He is compliant with his medications.    Interventions:  Individual Education/ Counseling: Yes  Physician Referral: No    Education:    hypertension, verbalizes understanding; Date: 2/23/2024  risk factors, verbalizes understanding; Date: 2/23/2024  stress, verbalizes understanding; Date: 2/23/2024         Education method adapted to patients education level and preferred method of learning.  Method: explanation  handouts        Other Core Components/Hypertension Assessment:   BP Range: 128-100/80-50  BP at Goal: Yes  Patient reported symptoms: none    Medications:  Medication Prescribed? Adherent? Exception   Beta-blocker [x]Yes  []No  []Unknown [x]Yes  []No  []Unknown    ACEI/ARB []Yes  [x]No  []Unknown []Yes  []No  []Unknown    Calcium Channel Blocker []Yes  [x]No  []Unknown []Yes  []No  []Unknown    Diuretic []Yes  [x]No  []Unknown []Yes  []No  []Unknown        Other Core Components/Hypertension Plan:   Goals:  Blood Pressure <130/80    Comments on Goal Progression:  Patient is monitoring BP at home & reports to be obtaining readings similar to those obtained in cardiac rehab.  He is monitoring his sodium intake carefully.  He is also monitoring his weight on a daily basis.    Interventions:  Med Card Reconciled: Yes  Encourage medication compliance  Encourage sodium reduction  Reduce alcohol consumption  Encourage weight loss  Recommend physical activity  Encourage home blood pressure monitoring  Recommend daily weights    Education:    Risk Factors; verbalizes understanding; Date: 2/23/2024  Stroke; verbalizes understanding; Date: 2/2/2024             Does the patient have Heart Failure? No      Other Core Components/Tobacco Cessation Assessment:   Smoking Status: Former (>6 months)  Primary Tobacco  Type: Cigarette  Tobacco Usage: no  Smoking Cessation Barriers:  N/A  Stage of Readiness to Change: Maintenance    Other Core Components/Tobacco Cessation Plan:   Goals:  Maintain non-smoking status    Comments on Goal Progression:  Non smoker.    Interventions:  Maintains non-smoking status    Education:    Risk Factors; verbalizes understanding; Date: 2/23/2024  Stroke; verbalizes understanding; Date: 2/2/2024  Benefits of Cardiac Rehab; verbalizes understanding; Date: 2/9/2024          Comments:   Non smoker.    Kimmy Sheppard RN  Cardiac Rehab Nurse

## 2024-02-27 DIAGNOSIS — Z00.00 ENCOUNTER FOR MEDICARE ANNUAL WELLNESS EXAM: ICD-10-CM

## 2024-02-28 ENCOUNTER — CLINICAL SUPPORT (OUTPATIENT)
Dept: CARDIAC REHAB | Facility: CLINIC | Age: 69
End: 2024-02-28
Payer: MEDICARE

## 2024-02-28 DIAGNOSIS — I25.10 CORONARY ARTERY DISEASE, UNSPECIFIED VESSEL OR LESION TYPE, UNSPECIFIED WHETHER ANGINA PRESENT, UNSPECIFIED WHETHER NATIVE OR TRANSPLANTED HEART: ICD-10-CM

## 2024-02-28 DIAGNOSIS — Z98.61 POSTSURGICAL PERCUTANEOUS TRANSLUMINAL CORONARY ANGIOPLASTY STATUS: Primary | ICD-10-CM

## 2024-02-28 PROCEDURE — 93798 PHYS/QHP OP CAR RHAB W/ECG: CPT | Mod: S$PBB,,, | Performed by: INTERNAL MEDICINE

## 2024-02-28 PROCEDURE — 93798 PHYS/QHP OP CAR RHAB W/ECG: CPT | Mod: PBBFAC,PO

## 2024-02-29 ENCOUNTER — DOCUMENTATION ONLY (OUTPATIENT)
Dept: CARDIAC REHAB | Facility: CLINIC | Age: 69
End: 2024-02-29
Payer: MEDICARE

## 2024-02-29 NOTE — PROGRESS NOTES
Mr. Conteh has completed 15 out of 36 exercise session of Phase II cardiac rehab.  A follow up reassessment will be completed at 24 sessions.    12 Session Follow Up   Cardiac Rehab Individual Treatment Plan - Reassessment      Patient Name: Wero Spangler MRN: [id number]   : 1955   Age: 68 y.o.   Primary Diagnosis: PTCA/STENT Date of Event: 23   EF: 55-60%  Risk Stratification: moderate  Referring Physician: YAMIL   Exercise Assessment:     Angina with exercise?: No   ST Depression with Exercise?: No  Fall Risk: No   Assistive Devices:  independent  Mr. Conteh stated at orientation there were possible limitations to exercise due to chronic back pain.  Exercise modalities have been modified to meet the patient's needs and capabilities.  Comments on Progression: Mr. Conteh is progressing well and his workloads will continue to be increased as he tolerates exercise intensity.    Exercise Plan:   Goals:  CR Exercise Goals: Attend Cardiac Rehab 3 times/week: In Progress  Home Aerobic Exercise: 2 additional days/week for 30-60 minutes: In Progress  Intensity of 12-15 on the Rate of Perceived Exertion (RPE) scale: In Progress  30% increase in entry estimated METS: 11.7 : In Progress  5 days/week for 30-60 minutes: In Progress  Demonstrate proper pulse taking technique: In Progress    Comments on Goal Progression:  Patient Consistency: consistent with attendance  Home exercise? Yes: Walking; Frequency: 2 non-rehab days per week; Duration 40 minutes  Patient reports intensity rate 10-13 on RPE scale  Patient is progressing steadily  Patient is Able to demonstrate proper pulse taking technique with or without a fitness tracker.      Intervention/Recommendations:   Discussed importance of regular attendance to cardiac rehab class    Exercise Prescription:  THR Range 74-91   Mode: Treadmill  Nustep   Frequency:  3 days/week   Duration:  30-60 minutes   Intensity:  12-15 RPE   Resistance Training:  Yes: 5 to 8 lb  weights with 10-15 reps based on strength and range of motion and adjusted accordingly     Home Prescription:  Mode Aerobic exercise   Frequency: 2- 3 days/week   Duration: 30-60 minutes   Resistance Training: None     Education:  Body Composition; verbalizes understanding; Date: 24  Diabetes; verbalizes understanding; Date: 24  Exercise and Rehydration; verbalizes understanding; Date: 24  Exercise Q & A; verbalizes understanding; Date: 24    Comments:  I reviewed exercise recommendations with Mr. Conteh.  I encouraged him to continue exercising.  He stated understanding.     The exercise prescription will continue to be adjusted based on tolerance of exercise intensity by patient.    Prosper Berrios, CEP    12 Session Follow Up   Cardiac Rehab Individual Treatment Plan - Reassessment    Patient Name: Wero Spangler MRN: [id number]   : 1955   Age: 68 y.o.   Primary Diagnosis: PTCA/stent    Nutrition Assessment:     Anthropometrics    Height 72 inches   Weight 176.5 lbs   BMI 23.9     Patient confirms he is taking lipitor 20mg for cholesterol control.  Difficulty Chewing or Swallowing: No  Current Exercise: See Exercise Physiologist Note  Food Safety/Food Preparation: self  Living Arrangements/Family Support: Lives alone  Cultural/Spiritual/Personal Preferences: not applicable   Barriers to Education: none identified  Stage of Change Related to Diet Habits: Action  Recent Changes to Diet: Yes  Food Diary: Given      Nutrition Plan:   Goals:  LDL-C < 70 (for high risk patients)  Hgb A1c < 7%  BMI < 25 and abdominal girth < 40M/<35 F  2 gram sodium, Mediterranean diet: In Progress  Fish intake (non-fried varieties) to a goal of 2-3 servings per week: In Progress  Increase fruit and vegetable intake: In Progress    Comments on Goal Progression:  Pt states he feels well and has increased energy, he eats fish several times weekly and fruit daily and discussed small ways to incorporate more  vegetables regularly    Interventions:  Dietitian Consult: No  Patient to participate in Cardiac Rehab sessions three times a week  Weekly Dietitian Weight Check  Nutrition Recommendations Provided: Verbal, Reviewed  Follow Up Plan for Ongoing Self-Management Support    Education:  Protein; verbalizes understanding; Date: 24  Seafood; verbalizes understanding; Date: 24  Sodium; verbalizes understanding; Date: 24  Fiber; verbalizes understanding; Date: 24    Comments:   Discussed ways to incorporate healthy snacks, eating on a schedule, and monitoring sodium intake for heart health.    Diabetes  Is the patient diabetic? No      Petrona Mitchell MS, RDN/LDN    Session: 12 Session Follow Up   Cardiac Rehab Individual Treatment Plan - Reassessment      Patient Name: Wero Spangler MRN: [id number]   : 1955   Age: 68 y.o.   Primary Diagnosis: PTCA      Psychosocial Assessment:   Living Arrangements: Lives alone  Family Support:  Daughters  Self Reported: decrease Effective Coping Skills  Displays: happiness, smiles often, and calmness  Medication: not applicable    Psychosocial Plan:   Goals:  Verbalizes coping mechanisms: In Progress  Maintain positive support system: In Progress  Maintain positive outlook: In Progress  Improve overall quality of life: In Progress    Comments on Goal Progression:  Patient denies having any overwhelming stress or anxiety.  He states that he has good support from his daughters.  He is motivated & reports to be feeling better physically & emotionally since beginning cardiac rehab.  He is exercising 2 additional days outside of rehab.    Interventions:  Patient to Self Report Emotional Changes at Session Check In  Recommend Physical Activity  Recommend Attending Education Lectures  Notify MD: No  Program Referral: No  Pharmaceutical Intervention/Therapy: Yes  Other Needs: not applicable  Stage of Readiness to Change: Action    Education:  Stress Management;  verbalizes understanding; Date: 2/23/2024  Stress; verbalizes understanding; Date: 2/23/2024      Patient has been instructed to notify staff in the event that circumstances change.  Patient verbalizes understanding.    Other Core Components/Risk Factors Assessment:   RISK FACTORS:  hypertension, positive family history, sedentary lifestyle    Learning Barriers: None    Medication Compliance: has been compliant with taking medications    Other Core Components/Risk Factors Plan:   Goals:  Decrease cholesterol level: In Progress  Increase exercise tolerance: In Progress  Increase knowledge of CAD: In Progress  Learn more about healthy eating: In Progress    Comments on Goal Progression:  Patient will continue to work on decreasing risk factors for CAD.  He is attending lectures & exercise sessions on a consistent basis.  He is exercising 2 additional days outside of cardiac rehab.  He is compliant with his medications.    Interventions:  Individual Education/ Counseling: Yes  Physician Referral: No    Education:    hypertension, verbalizes understanding; Date: 2/23/2024  risk factors, verbalizes understanding; Date: 2/23/2024  stress, verbalizes understanding; Date: 2/23/2024         Education method adapted to patients education level and preferred method of learning.  Method: explanation  handouts        Other Core Components/Hypertension Assessment:   BP Range: 128-100/80-50  BP at Goal: Yes  Patient reported symptoms: none    Medications:  Medication Prescribed? Adherent? Exception   Beta-blocker [x]Yes  []No  []Unknown [x]Yes  []No  []Unknown    ACEI/ARB []Yes  [x]No  []Unknown []Yes  []No  []Unknown    Calcium Channel Blocker []Yes  [x]No  []Unknown []Yes  []No  []Unknown    Diuretic []Yes  [x]No  []Unknown []Yes  []No  []Unknown        Other Core Components/Hypertension Plan:   Goals:  Blood Pressure <130/80    Comments on Goal Progression:  Patient is monitoring BP at home & reports to be obtaining readings  similar to those obtained in cardiac rehab.  He is monitoring his sodium intake carefully.  He is also monitoring his weight on a daily basis.    Interventions:  Med Card Reconciled: Yes  Encourage medication compliance  Encourage sodium reduction  Reduce alcohol consumption  Encourage weight loss  Recommend physical activity  Encourage home blood pressure monitoring  Recommend daily weights    Education:    Risk Factors; verbalizes understanding; Date: 2/23/2024  Stroke; verbalizes understanding; Date: 2/2/2024             Does the patient have Heart Failure? No      Other Core Components/Tobacco Cessation Assessment:   Smoking Status: Former (>6 months)  Primary Tobacco Type: Cigarette  Tobacco Usage: no  Smoking Cessation Barriers:  N/A  Stage of Readiness to Change: Maintenance    Other Core Components/Tobacco Cessation Plan:   Goals:  Maintain non-smoking status    Comments on Goal Progression:  Non smoker.    Interventions:  Maintains non-smoking status    Education:    Risk Factors; verbalizes understanding; Date: 2/23/2024  Stroke; verbalizes understanding; Date: 2/2/2024  Benefits of Cardiac Rehab; verbalizes understanding; Date: 2/9/2024          Comments:   Non smoker.    Kimmy Sheppard RN  Cardiac Rehab Nurse

## 2024-03-01 ENCOUNTER — CLINICAL SUPPORT (OUTPATIENT)
Dept: CARDIAC REHAB | Facility: CLINIC | Age: 69
End: 2024-03-01
Payer: MEDICARE

## 2024-03-01 DIAGNOSIS — I25.10 CORONARY ATHEROSCLEROSIS OF NATIVE CORONARY ARTERY: ICD-10-CM

## 2024-03-01 DIAGNOSIS — Z98.61 POSTSURGICAL PERCUTANEOUS TRANSLUMINAL CORONARY ANGIOPLASTY STATUS: Primary | ICD-10-CM

## 2024-03-01 PROCEDURE — 93798 PHYS/QHP OP CAR RHAB W/ECG: CPT | Mod: PBBFAC,PO

## 2024-03-01 PROCEDURE — 93798 PHYS/QHP OP CAR RHAB W/ECG: CPT | Mod: S$PBB,,, | Performed by: INTERNAL MEDICINE

## 2024-03-04 ENCOUNTER — CLINICAL SUPPORT (OUTPATIENT)
Dept: CARDIAC REHAB | Facility: CLINIC | Age: 69
End: 2024-03-04
Payer: MEDICARE

## 2024-03-04 ENCOUNTER — PATIENT MESSAGE (OUTPATIENT)
Dept: CARDIOLOGY | Facility: CLINIC | Age: 69
End: 2024-03-04
Payer: MEDICARE

## 2024-03-04 DIAGNOSIS — Z98.61 POSTSURGICAL PERCUTANEOUS TRANSLUMINAL CORONARY ANGIOPLASTY STATUS: Primary | ICD-10-CM

## 2024-03-04 DIAGNOSIS — I25.10 ATHEROSCLEROSIS OF NATIVE CORONARY ARTERY OF NATIVE HEART WITHOUT ANGINA PECTORIS: ICD-10-CM

## 2024-03-04 PROCEDURE — 93798 PHYS/QHP OP CAR RHAB W/ECG: CPT | Mod: S$PBB,,, | Performed by: INTERNAL MEDICINE

## 2024-03-04 PROCEDURE — 93798 PHYS/QHP OP CAR RHAB W/ECG: CPT | Mod: PBBFAC,PO

## 2024-03-04 NOTE — TELEPHONE ENCOUNTER
Mr. Spangler,     I have asked Marisol to convey to you to stop triamterene and conitue to stay hydrated, especially before the rehab. Let's see how it goes.

## 2024-03-06 ENCOUNTER — CLINICAL SUPPORT (OUTPATIENT)
Dept: CARDIAC REHAB | Facility: CLINIC | Age: 69
End: 2024-03-06
Payer: MEDICARE

## 2024-03-06 ENCOUNTER — TELEPHONE (OUTPATIENT)
Dept: CARDIOLOGY | Facility: CLINIC | Age: 69
End: 2024-03-06
Payer: MEDICARE

## 2024-03-06 DIAGNOSIS — I25.10 CORONARY ARTERY DISEASE, UNSPECIFIED VESSEL OR LESION TYPE, UNSPECIFIED WHETHER ANGINA PRESENT, UNSPECIFIED WHETHER NATIVE OR TRANSPLANTED HEART: ICD-10-CM

## 2024-03-06 DIAGNOSIS — Z98.61 POSTSURGICAL PERCUTANEOUS TRANSLUMINAL CORONARY ANGIOPLASTY STATUS: Primary | ICD-10-CM

## 2024-03-06 PROCEDURE — 93798 PHYS/QHP OP CAR RHAB W/ECG: CPT | Mod: PBBFAC,PO

## 2024-03-06 PROCEDURE — 93798 PHYS/QHP OP CAR RHAB W/ECG: CPT | Mod: S$PBB,,, | Performed by: INTERNAL MEDICINE

## 2024-03-06 NOTE — TELEPHONE ENCOUNTER
----- Message from Olamide Siegel sent at 3/6/2024 11:41 AM CST -----  Regarding: [facility] f/u  Pt [phone] would like a call to [facility] his f/u with Dr. Palacios.    Thanks

## 2024-03-07 NOTE — TELEPHONE ENCOUNTER
----- Message from Keyla Person MA sent at 3/7/2024 10:34 AM CST -----  Contact: self  Pt is calling back requesting to speak to you again about an appt with . Please [phone].

## 2024-03-08 ENCOUNTER — CLINICAL SUPPORT (OUTPATIENT)
Dept: CARDIAC REHAB | Facility: CLINIC | Age: 69
End: 2024-03-08
Payer: MEDICARE

## 2024-03-08 DIAGNOSIS — Z98.61 POSTSURGICAL PERCUTANEOUS TRANSLUMINAL CORONARY ANGIOPLASTY STATUS: Primary | ICD-10-CM

## 2024-03-08 DIAGNOSIS — I25.10 ATHEROSCLEROSIS OF NATIVE CORONARY ARTERY OF NATIVE HEART WITHOUT ANGINA PECTORIS: ICD-10-CM

## 2024-03-08 PROCEDURE — 93798 PHYS/QHP OP CAR RHAB W/ECG: CPT | Mod: S$PBB,,, | Performed by: INTERNAL MEDICINE

## 2024-03-08 PROCEDURE — 93798 PHYS/QHP OP CAR RHAB W/ECG: CPT | Mod: PBBFAC,PO

## 2024-03-13 ENCOUNTER — TELEPHONE (OUTPATIENT)
Dept: CARDIAC REHAB | Facility: CLINIC | Age: 69
End: 2024-03-13
Payer: MEDICARE

## 2024-03-13 NOTE — TELEPHONE ENCOUNTER
Patient has not attended cardiac rehab today & Monday.  Attempted to contact patient.  Left message.  Kimmy Sheppard RN  Cardiac Rehab Nurse

## 2024-03-15 ENCOUNTER — CLINICAL SUPPORT (OUTPATIENT)
Dept: CARDIAC REHAB | Facility: CLINIC | Age: 69
End: 2024-03-15
Payer: MEDICARE

## 2024-03-15 DIAGNOSIS — I25.10 CORONARY ARTERY DISEASE, UNSPECIFIED VESSEL OR LESION TYPE, UNSPECIFIED WHETHER ANGINA PRESENT, UNSPECIFIED WHETHER NATIVE OR TRANSPLANTED HEART: ICD-10-CM

## 2024-03-15 DIAGNOSIS — Z98.61 POSTSURGICAL PERCUTANEOUS TRANSLUMINAL CORONARY ANGIOPLASTY STATUS: Primary | ICD-10-CM

## 2024-03-15 PROCEDURE — 93798 PHYS/QHP OP CAR RHAB W/ECG: CPT | Mod: PBBFAC,PO

## 2024-03-15 PROCEDURE — 93798 PHYS/QHP OP CAR RHAB W/ECG: CPT | Mod: S$PBB,,, | Performed by: INTERNAL MEDICINE

## 2024-03-18 ENCOUNTER — CLINICAL SUPPORT (OUTPATIENT)
Dept: CARDIAC REHAB | Facility: CLINIC | Age: 69
End: 2024-03-18
Payer: MEDICARE

## 2024-03-18 DIAGNOSIS — Z98.61 POSTSURGICAL PERCUTANEOUS TRANSLUMINAL CORONARY ANGIOPLASTY STATUS: Primary | ICD-10-CM

## 2024-03-18 DIAGNOSIS — I25.10 CORONARY ARTERY DISEASE, UNSPECIFIED VESSEL OR LESION TYPE, UNSPECIFIED WHETHER ANGINA PRESENT, UNSPECIFIED WHETHER NATIVE OR TRANSPLANTED HEART: ICD-10-CM

## 2024-03-18 PROCEDURE — 93798 PHYS/QHP OP CAR RHAB W/ECG: CPT | Mod: S$PBB,,, | Performed by: INTERNAL MEDICINE

## 2024-03-18 PROCEDURE — 93798 PHYS/QHP OP CAR RHAB W/ECG: CPT | Mod: PBBFAC,PO

## 2024-03-21 ENCOUNTER — DOCUMENTATION ONLY (OUTPATIENT)
Dept: CARDIAC REHAB | Facility: CLINIC | Age: 69
End: 2024-03-21
Payer: MEDICARE

## 2024-03-21 NOTE — PROGRESS NOTES
Mr. Conteh has completed 21 out of 36 exercise session of Phase II cardiac rehab.  A follow up reassessment will be completed at 24 sessions.    12 Session Follow Up   Cardiac Rehab Individual Treatment Plan - Reassessment      Patient Name: Wero Spangler MRN: [id number]   : 1955   Age: 68 y.o.   Primary Diagnosis: PTCA/STENT Date of Event: 23   EF: 55-60%  Risk Stratification: moderate  Referring Physician: YAMIL   Exercise Assessment:     Angina with exercise?: No   ST Depression with Exercise?: No  Fall Risk: No   Assistive Devices:  independent  Mr. Conteh stated at orientation there were possible limitations to exercise due to chronic back pain.  Exercise modalities have been modified to meet the patient's needs and capabilities.  Comments on Progression: Mr. Conteh is progressing well and his workloads will continue to be increased as he tolerates exercise intensity.    Exercise Plan:   Goals:  CR Exercise Goals: Attend Cardiac Rehab 3 times/week: In Progress  Home Aerobic Exercise: 2 additional days/week for 30-60 minutes: In Progress  Intensity of 12-15 on the Rate of Perceived Exertion (RPE) scale: In Progress  30% increase in entry estimated METS: 11.7 : In Progress  5 days/week for 30-60 minutes: In Progress  Demonstrate proper pulse taking technique: In Progress    Comments on Goal Progression:  Patient Consistency: consistent with attendance  Home exercise? Yes: Walking; Frequency: 2 non-rehab days per week; Duration 40 minutes  Patient reports intensity rate 10-13 on RPE scale  Patient is progressing steadily  Patient is Able to demonstrate proper pulse taking technique with or without a fitness tracker.      Intervention/Recommendations:   Discussed importance of regular attendance to cardiac rehab class    Exercise Prescription:  THR Range 74-91   Mode: Treadmill  Nustep   Frequency:  3 days/week   Duration:  30-60 minutes   Intensity:  12-15 RPE   Resistance Training:  Yes: 5 to 8 lb  weights with 10-15 reps based on strength and range of motion and adjusted accordingly     Home Prescription:  Mode Aerobic exercise   Frequency: 2- 3 days/week   Duration: 30-60 minutes   Resistance Training: None     Education:  Body Composition; verbalizes understanding; Date: 24  Diabetes; verbalizes understanding; Date: 24  Exercise and Rehydration; verbalizes understanding; Date: 24  Exercise Q & A; verbalizes understanding; Date: 24    Comments:  I reviewed exercise recommendations with Mr. Conteh.  I encouraged him to continue exercising.  He stated understanding.     The exercise prescription will continue to be adjusted based on tolerance of exercise intensity by patient.    Prosper Berrios, CEP    12 Session Follow Up   Cardiac Rehab Individual Treatment Plan - Reassessment    Patient Name: Wero Spangler MRN: [id number]   : 1955   Age: 68 y.o.   Primary Diagnosis: PTCA/stent    Nutrition Assessment:     Anthropometrics    Height 72 inches   Weight 176.5 lbs   BMI 23.9     Patient confirms he is taking lipitor 20mg for cholesterol control.  Difficulty Chewing or Swallowing: No  Current Exercise: See Exercise Physiologist Note  Food Safety/Food Preparation: self  Living Arrangements/Family Support: Lives alone  Cultural/Spiritual/Personal Preferences: not applicable   Barriers to Education: none identified  Stage of Change Related to Diet Habits: Action  Recent Changes to Diet: Yes  Food Diary: Given      Nutrition Plan:   Goals:  LDL-C < 70 (for high risk patients)  Hgb A1c < 7%  BMI < 25 and abdominal girth < 40M/<35 F  2 gram sodium, Mediterranean diet: In Progress  Fish intake (non-fried varieties) to a goal of 2-3 servings per week: In Progress  Increase fruit and vegetable intake: In Progress    Comments on Goal Progression:  Pt states he feels well and has increased energy, he eats fish several times weekly and fruit daily and discussed small ways to incorporate more  vegetables regularly    Interventions:  Dietitian Consult: No  Patient to participate in Cardiac Rehab sessions three times a week  Weekly Dietitian Weight Check  Nutrition Recommendations Provided: Verbal, Reviewed  Follow Up Plan for Ongoing Self-Management Support    Education:  Protein; verbalizes understanding; Date: 24  Seafood; verbalizes understanding; Date: 24  Sodium; verbalizes understanding; Date: 24  Fiber; verbalizes understanding; Date: 24    Comments:   Discussed ways to incorporate healthy snacks, eating on a schedule, and monitoring sodium intake for heart health.    Diabetes  Is the patient diabetic? No      Petrona Mitchell MS, RDN/LDN    Session: 12 Session Follow Up   Cardiac Rehab Individual Treatment Plan - Reassessment      Patient Name: Wero Spangler MRN: [id number]   : 1955   Age: 68 y.o.   Primary Diagnosis: PTCA      Psychosocial Assessment:   Living Arrangements: Lives alone  Family Support:  Daughters  Self Reported: decrease Effective Coping Skills  Displays: happiness, smiles often, and calmness  Medication: not applicable    Psychosocial Plan:   Goals:  Verbalizes coping mechanisms: In Progress  Maintain positive support system: In Progress  Maintain positive outlook: In Progress  Improve overall quality of life: In Progress    Comments on Goal Progression:  Patient denies having any overwhelming stress or anxiety.  He states that he has good support from his daughters.  He is motivated & reports to be feeling better physically & emotionally since beginning cardiac rehab.  He is exercising 2 additional days outside of rehab.    Interventions:  Patient to Self Report Emotional Changes at Session Check In  Recommend Physical Activity  Recommend Attending Education Lectures  Notify MD: No  Program Referral: No  Pharmaceutical Intervention/Therapy: Yes  Other Needs: not applicable  Stage of Readiness to Change: Action    Education:  Stress Management;  verbalizes understanding; Date: 2/23/2024  Stress; verbalizes understanding; Date: 2/23/2024      Patient has been instructed to notify staff in the event that circumstances change.  Patient verbalizes understanding.    Other Core Components/Risk Factors Assessment:   RISK FACTORS:  hypertension, positive family history, sedentary lifestyle    Learning Barriers: None    Medication Compliance: has been compliant with taking medications    Other Core Components/Risk Factors Plan:   Goals:  Decrease cholesterol level: In Progress  Increase exercise tolerance: In Progress  Increase knowledge of CAD: In Progress  Learn more about healthy eating: In Progress    Comments on Goal Progression:  Patient will continue to work on decreasing risk factors for CAD.  He is attending lectures & exercise sessions on a consistent basis.  He is exercising 2 additional days outside of cardiac rehab.  He is compliant with his medications.    Interventions:  Individual Education/ Counseling: Yes  Physician Referral: No    Education:    hypertension, verbalizes understanding; Date: 2/23/2024  risk factors, verbalizes understanding; Date: 2/23/2024  stress, verbalizes understanding; Date: 2/23/2024         Education method adapted to patients education level and preferred method of learning.  Method: explanation  handouts        Other Core Components/Hypertension Assessment:   BP Range: 128-100/80-50  BP at Goal: Yes  Patient reported symptoms: none    Medications:  Medication Prescribed? Adherent? Exception   Beta-blocker [x]Yes  []No  []Unknown [x]Yes  []No  []Unknown    ACEI/ARB []Yes  [x]No  []Unknown []Yes  []No  []Unknown    Calcium Channel Blocker []Yes  [x]No  []Unknown []Yes  []No  []Unknown    Diuretic []Yes  [x]No  []Unknown []Yes  []No  []Unknown        Other Core Components/Hypertension Plan:   Goals:  Blood Pressure <130/80    Comments on Goal Progression:  Patient is monitoring BP at home & reports to be obtaining readings  similar to those obtained in cardiac rehab.  He is monitoring his sodium intake carefully.  He is also monitoring his weight on a daily basis.    Interventions:  Med Card Reconciled: Yes  Encourage medication compliance  Encourage sodium reduction  Reduce alcohol consumption  Encourage weight loss  Recommend physical activity  Encourage home blood pressure monitoring  Recommend daily weights    Education:    Risk Factors; verbalizes understanding; Date: 2/23/2024  Stroke; verbalizes understanding; Date: 2/2/2024             Does the patient have Heart Failure? No      Other Core Components/Tobacco Cessation Assessment:   Smoking Status: Former (>6 months)  Primary Tobacco Type: Cigarette  Tobacco Usage: no  Smoking Cessation Barriers:  N/A  Stage of Readiness to Change: Maintenance    Other Core Components/Tobacco Cessation Plan:   Goals:  Maintain non-smoking status    Comments on Goal Progression:  Non smoker.    Interventions:  Maintains non-smoking status    Education:    Risk Factors; verbalizes understanding; Date: 2/23/2024  Stroke; verbalizes understanding; Date: 2/2/2024  Benefits of Cardiac Rehab; verbalizes understanding; Date: 2/9/2024          Comments:   Non smoker.    Kimmy Sheppard RN  Cardiac Rehab Nurse

## 2024-03-23 ENCOUNTER — HOSPITAL ENCOUNTER (EMERGENCY)
Facility: HOSPITAL | Age: 69
Discharge: HOME | End: 2024-03-23
Attending: STUDENT IN AN ORGANIZED HEALTH CARE EDUCATION/TRAINING PROGRAM
Payer: MEDICARE

## 2024-03-23 VITALS
SYSTOLIC BLOOD PRESSURE: 118 MMHG | RESPIRATION RATE: 16 BRPM | OXYGEN SATURATION: 97 % | DIASTOLIC BLOOD PRESSURE: 58 MMHG | BODY MASS INDEX: 24.14 KG/M2 | WEIGHT: 178 LBS | TEMPERATURE: 99 F | HEART RATE: 75 BPM

## 2024-03-23 DIAGNOSIS — J06.9 UPPER RESPIRATORY TRACT INFECTION, UNSPECIFIED TYPE: Primary | ICD-10-CM

## 2024-03-23 DIAGNOSIS — R05.9 COUGH: ICD-10-CM

## 2024-03-23 LAB
ANION GAP SERPL CALC-SCNC: 10 MMOL/L (ref 8–16)
BASOPHILS # BLD AUTO: 0.02 K/UL (ref 0–0.2)
BASOPHILS NFR BLD: 0.2 % (ref 0–1.9)
BILIRUB UR QL STRIP: NEGATIVE
BUN SERPL-MCNC: 11 MG/DL (ref 8–23)
CALCIUM SERPL-MCNC: 9.8 MG/DL (ref 8.7–10.5)
CHLORIDE SERPL-SCNC: 107 MMOL/L (ref 95–110)
CLARITY UR REFRACT.AUTO: CLEAR
CO2 SERPL-SCNC: 27 MMOL/L (ref 23–29)
COLOR UR AUTO: YELLOW
CREAT SERPL-MCNC: 0.8 MG/DL (ref 0.5–1.4)
DIFFERENTIAL METHOD BLD: ABNORMAL
EOSINOPHIL # BLD AUTO: 0.1 K/UL (ref 0–0.5)
EOSINOPHIL NFR BLD: 0.7 % (ref 0–8)
ERYTHROCYTE [DISTWIDTH] IN BLOOD BY AUTOMATED COUNT: 14.2 % (ref 11.5–14.5)
EST. GFR  (NO RACE VARIABLE): >60 ML/MIN/1.73 M^2
GLUCOSE SERPL-MCNC: 102 MG/DL (ref 70–110)
GLUCOSE UR QL STRIP: NEGATIVE
HCT VFR BLD AUTO: 41.8 % (ref 40–54)
HCV AB SERPL QL IA: NORMAL
HGB BLD-MCNC: 14.1 G/DL (ref 14–18)
HGB UR QL STRIP: NEGATIVE
HIV 1+2 AB+HIV1 P24 AG SERPL QL IA: NORMAL
IMM GRANULOCYTES # BLD AUTO: 0.03 K/UL (ref 0–0.04)
IMM GRANULOCYTES NFR BLD AUTO: 0.3 % (ref 0–0.5)
INFLUENZA A, MOLECULAR: NEGATIVE
INFLUENZA B, MOLECULAR: NEGATIVE
KETONES UR QL STRIP: NEGATIVE
LEUKOCYTE ESTERASE UR QL STRIP: NEGATIVE
LYMPHOCYTES # BLD AUTO: 0.7 K/UL (ref 1–4.8)
LYMPHOCYTES NFR BLD: 6.4 % (ref 18–48)
MCH RBC QN AUTO: 31.3 PG (ref 27–31)
MCHC RBC AUTO-ENTMCNC: 33.7 G/DL (ref 32–36)
MCV RBC AUTO: 93 FL (ref 82–98)
MONOCYTES # BLD AUTO: 0.7 K/UL (ref 0.3–1)
MONOCYTES NFR BLD: 6.3 % (ref 4–15)
NEUTROPHILS # BLD AUTO: 9.2 K/UL (ref 1.8–7.7)
NEUTROPHILS NFR BLD: 86.1 % (ref 38–73)
NITRITE UR QL STRIP: NEGATIVE
NRBC BLD-RTO: 0 /100 WBC
PH UR STRIP: 6 [PH] (ref 5–8)
PLATELET # BLD AUTO: 159 K/UL (ref 150–450)
PMV BLD AUTO: 9.8 FL (ref 9.2–12.9)
POTASSIUM SERPL-SCNC: 4.2 MMOL/L (ref 3.5–5.1)
PROT UR QL STRIP: NEGATIVE
RBC # BLD AUTO: 4.51 M/UL (ref 4.6–6.2)
SARS-COV-2 RDRP RESP QL NAA+PROBE: NEGATIVE
SODIUM SERPL-SCNC: 144 MMOL/L (ref 136–145)
SP GR UR STRIP: 1.02 (ref 1–1.03)
SPECIMEN SOURCE: NORMAL
URN SPEC COLLECT METH UR: NORMAL
WBC # BLD AUTO: 10.71 K/UL (ref 3.9–12.7)

## 2024-03-23 PROCEDURE — 86803 HEPATITIS C AB TEST: CPT | Performed by: PHYSICIAN ASSISTANT

## 2024-03-23 PROCEDURE — 96360 HYDRATION IV INFUSION INIT: CPT

## 2024-03-23 PROCEDURE — 87502 INFLUENZA DNA AMP PROBE: CPT | Performed by: PHYSICIAN ASSISTANT

## 2024-03-23 PROCEDURE — U0002 COVID-19 LAB TEST NON-CDC: HCPCS | Performed by: PHYSICIAN ASSISTANT

## 2024-03-23 PROCEDURE — 85025 COMPLETE CBC W/AUTO DIFF WBC: CPT | Performed by: PHYSICIAN ASSISTANT

## 2024-03-23 PROCEDURE — 25000003 PHARM REV CODE 250: Performed by: PHYSICIAN ASSISTANT

## 2024-03-23 PROCEDURE — 80048 BASIC METABOLIC PNL TOTAL CA: CPT | Performed by: PHYSICIAN ASSISTANT

## 2024-03-23 PROCEDURE — 99284 EMERGENCY DEPT VISIT MOD MDM: CPT | Mod: 25

## 2024-03-23 PROCEDURE — 81003 URINALYSIS AUTO W/O SCOPE: CPT | Performed by: PHYSICIAN ASSISTANT

## 2024-03-23 PROCEDURE — 87389 HIV-1 AG W/HIV-1&-2 AB AG IA: CPT | Performed by: PHYSICIAN ASSISTANT

## 2024-03-23 RX ORDER — BENZONATATE 200 MG/1
200 CAPSULE ORAL 3 TIMES DAILY PRN
Qty: 30 CAPSULE | Refills: 0 | Status: SHIPPED | OUTPATIENT
Start: 2024-03-23 | End: 2024-04-02

## 2024-03-23 RX ORDER — ACETAMINOPHEN 500 MG
1000 TABLET ORAL
Status: COMPLETED | OUTPATIENT
Start: 2024-03-23 | End: 2024-03-23

## 2024-03-23 RX ADMIN — ACETAMINOPHEN 1000 MG: 500 TABLET ORAL at 10:03

## 2024-03-23 RX ADMIN — SODIUM CHLORIDE 1000 ML: 9 INJECTION, SOLUTION INTRAVENOUS at 10:03

## 2024-03-23 NOTE — ED NOTES
Patient identifiers verified and correct for Wero Spangler    LOC: The patient is awake, alert and aware of environment with an appropriate affect, the patient is oriented x 3 and speaking appropriately.   APPEARANCE: Patient appears comfortable and in no acute distress, patient is clean and well groomed.  SKIN: The skin is warm and dry, color consistent with ethnicity, patient has normal skin turgor and moist mucus membranes, skin intact, no breakdown or bruising noted.   MUSCULOSKELETAL: Patient moving all extremities spontaneously, no swelling noted.  RESPIRATORY: Airway is open and patent, respirations are spontaneous, patient has a normal effort and rate, no accessory muscle use noted, pt placed on pulse ox SPO2 noted at 98% on room air.  CARDIAC: Patient has a normal rate, no edema noted, capillary refill < 3 seconds.   GASTRO: Soft and non tender to palpation. Pt states bowel movements have been regular.  : Pt denies any pain or frequency with urination.  NEURO: Pt opens eyes spontaneously, behavior appropriate to situation, follows commands, facial expression symmetrical, bilateral hand grasp equal and even, purposeful motor response noted  PAIN:  4/10

## 2024-03-23 NOTE — DISCHARGE INSTRUCTIONS
I suspect your symptoms are caused by a viral upper respiratory infection.  COVID and influenza test were negative.  No acute findings noted on your chest x-ray.  Your urinalysis does not show signs of infection    Recommend Tylenol, fluids, and rest    Tessalon Perles prescribed for cough.  Take as needed and directed    Astelin or Flonase over-the-counter as needed for nasal congestion    PCP in 2 days if you do not feel better    Strict ED precautions given to return immediately for new, worsening, or concerning symptoms

## 2024-03-23 NOTE — ED PROVIDER NOTES
Encounter Date: 3/23/2024       History     Chief Complaint   Patient presents with    Multiple complaints      Cough, congestion, chills, and body aches      68-year-old male with a PMHx BPH, cholelithiasis, GERD, HTN presents to the ED with URI symptoms x5 days. His symptoms have worsened in the past 24 hours.  He complains of cough, congestion, shaking chills, subjective fever, aches, decreased appetite.  He is taking Tylenol for his symptoms.  Denies photophobia, neck stiffness, chest pain, shortness of breath, abdominal pain, dysuria.    The history is provided by the patient.     Review of patient's allergies indicates:  No Known Allergies  Past Medical History:   Diagnosis Date    Aftercataract     Allergy     BPH (benign prostatic hyperplasia)     Cataract     Corneal dystrophy     Elevated PSA     Enlarged prostate     Fatty liver     Fuchs' corneal dystrophy     Gallstones     General anesthetics causing adverse effect in therapeutic use 2000    delayed emergence after back surgery    GERD (gastroesophageal reflux disease)     HTN (hypertension) 9/24/2013    Hypertension     Insomnia     Prostate nodule     Urinary tract infection      Past Surgical History:   Procedure Laterality Date    BACK SURGERY  4/2000    CATARACT EXTRACTION W/  INTRAOCULAR LENS IMPLANT      Both Eyes    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    Procedure: TRANSPLANT, CORNEA;  Surgeon: Vu Wilson MD;  Location: [facility];  Service: Ophthalmology;  Laterality: Right;  DMEK    EYE SURGERY      LAPAROSCOPIC MARSUPIALIZATION OF CYST OF KIDNEY      PROSTATE SURGERY      prostate biopsy benign    TONSILLECTOMY      VASECTOMY       Family History   Problem Relation Age of Onset    Cancer Mother         breast    Prostate cancer Brother     Cancer Brother         prostate    Macular degeneration Maternal Grandmother     Cancer Other     Cancer Other     Amblyopia Neg Hx     Blindness Neg Hx     Cataracts Neg Hx     Glaucoma Neg Hx      Retinal detachment Neg Hx     Strabismus Neg Hx      Social History     Tobacco Use    Smoking status: Former     Current packs/day: 0.00     Types: Cigarettes     Quit date: 2000     Years since quittin.2    Smokeless tobacco: Never   Substance Use Topics    Alcohol use: Not Currently    Drug use: No     Review of Systems   Constitutional:  Positive for chills, fatigue and fever.   HENT:  Positive for congestion.    Respiratory:  Positive for cough. Negative for shortness of breath.    Cardiovascular:  Negative for chest pain.   Gastrointestinal:  Negative for abdominal pain.   Genitourinary:  Negative for dysuria.       Physical Exam     Initial Vitals [24 0942]   BP Pulse Resp Temp SpO2   (!) 172/77 76 20 98.7 °F (37.1 °C) 98 %      MAP       --         Physical Exam    Nursing note and vitals reviewed.  Constitutional: He appears well-developed and well-nourished. He is not diaphoretic. No distress.   HENT:   Head: Normocephalic and atraumatic.   Nose: Nose normal.   Eyes: Conjunctivae and EOM are normal.   Neck: Neck supple.   Cardiovascular:  Normal rate, regular rhythm, normal heart sounds and intact distal pulses.           Pulmonary/Chest: Breath sounds normal. No respiratory distress.   Speaks in complete sentences.  Good air movement   Musculoskeletal:      Cervical back: Neck supple.     Neurological: He is alert and oriented to person, place, and time.   Skin: No rash noted.   Psychiatric: He has a normal mood and affect. His behavior is normal. Judgment and thought content normal.         ED Course   Procedures  Labs Reviewed   CBC W/ AUTO DIFFERENTIAL - Abnormal; Notable for the following components:       Result Value    RBC 4.51 (*)     MCH 31.3 (*)     Gran # (ANC) 9.2 (*)     Lymph # 0.7 (*)     Gran % 86.1 (*)     Lymph % 6.4 (*)     All other components within normal limits   INFLUENZA A & B BY MOLECULAR   HIV 1 / 2 ANTIBODY    Narrative:     Release to patient->Immediate   HEPATITIS  C ANTIBODY    Narrative:     Release to patient->Immediate   SARS-COV-2 RNA AMPLIFICATION, QUAL   BASIC METABOLIC PANEL   URINALYSIS, REFLEX TO URINE CULTURE    Narrative:     Specimen Source->Urine          Imaging Results              X-Ray Chest PA And Lateral (Final result)  Result time 03/23/24 11:07:03      Final result by Raf Aguirre MD (03/23/24 11:07:03)                   Impression:      No convincing evidence of acute cardiopulmonary disease.      Electronically signed by: Raf Aguirre  Date:    03/23/2024  Time:    11:07               Narrative:    EXAMINATION:  XR CHEST PA AND LATERAL    CLINICAL HISTORY:  Cough, unspecified    TECHNIQUE:  PA and lateral views of the chest were performed.    COMPARISON:  Chest radiograph performed 01/15/2021.    FINDINGS:  Cardiomediastinal contours appear to be within normal limits.    Lungs essentially clear.    No definite pneumothorax or large volume pleural effusion.    No acute findings in the visualized abdomen.  Osseous and soft tissue structures appear without definite acute abnormality.                                       Medications   acetaminophen tablet 1,000 mg (1,000 mg Oral Given 3/23/24 1022)   sodium chloride 0.9% bolus 1,000 mL 1,000 mL (0 mLs Intravenous Stopped 3/23/24 1130)     Medical Decision Making  68-year-old male with a PMHx BPH, cholelithiasis, GERD, HTN presents to the ED with URI symptoms x5 days. Nontoxic appearing. Hemodynamically stable. Afebrile. Exam as above. I will initiate workup and reassess.      Ddx:  COVID, influenza, pneumonia, bronchitis, dehydration, electrolyte derangement, UTI    Workup today is reassuring.  COVID and influenza are negative. CXR without infiltrate, effusion, pneumothorax, mass or other acute findings. Labs without leukocytosis. H&H stable. No significant electrolyte derangements.Urine negative for signs of infection including nitrites, leukocytes, blood.     On reassessment, he is resting  comfortably.  Appears in no acute distress.  He is hemodynamically stable.  I suspect his symptoms are caused by a viral upper respiratory infection.  His partner recently had a URI.  Tessalon Perles prescribed for cough.  His PCP in 2 days if you does not feel better. Strict ED precautions given to return immediately for new, worsening, or concerning symptoms        Amount and/or Complexity of Data Reviewed  Labs: ordered. Decision-making details documented in ED Course.  Radiology: ordered.    Risk  OTC drugs.  Prescription drug management.               ED Course as of 03/23/24 1308   Sat Mar 23, 2024   1029 WBC: 10.71 [HM]   1029 Hemoglobin: 14.1 [HM]   1029 Hematocrit: 41.8 [HM]   1121 SARS-CoV-2 RNA, Amplification, Qual: Negative []   1241 Blood, UA: Negative []   1241 NITRITE UA: Negative [HM]   1241 Leukocyte Esterase, UA: Negative [HM]      ED Course User Index  [HM] Vandana Snyder PA-C                             Clinical Impression:  Final diagnoses:  [R05.9] Cough  [J06.9] Upper respiratory tract infection, unspecified type (Primary)          ED Disposition Condition    Discharge Stable          ED Prescriptions       Medication Sig Dispense Start Date End Date Auth. Provider    benzonatate (TESSALON) 200 MG capsule Take 1 capsule (200 mg total) by mouth 3 (three) times daily as needed for Cough. 30 capsule 3/23/2024 4/2/2024 Vandana Snyder PA-C          Follow-up Information       Follow up With Specialties Details Why Contact Info    Duke Cano MD Internal Medicine Schedule an appointment as soon as possible for a visit today  [address]  [place] [address]  [phone]      [facility] - Emergency Dept Emergency Medicine   [address] [facility] [id number] [phone]             Vandana Snyder PA-C  03/23/24 1309

## 2024-03-25 ENCOUNTER — OFFICE VISIT (OUTPATIENT)
Dept: INTERNAL MEDICINE | Facility: CLINIC | Age: 69
End: 2024-03-25
Payer: MEDICARE

## 2024-03-25 ENCOUNTER — PATIENT OUTREACH (OUTPATIENT)
Dept: EMERGENCY MEDICINE | Facility: HOSPITAL | Age: 69
End: 2024-03-25
Payer: MEDICARE

## 2024-03-25 VITALS
BODY MASS INDEX: 25.68 KG/M2 | OXYGEN SATURATION: 98 % | HEIGHT: 72 IN | DIASTOLIC BLOOD PRESSURE: 64 MMHG | SYSTOLIC BLOOD PRESSURE: 120 MMHG | HEART RATE: 66 BPM | WEIGHT: 189.63 LBS

## 2024-03-25 DIAGNOSIS — L98.9 SCALP LESION: ICD-10-CM

## 2024-03-25 DIAGNOSIS — J40 BRONCHITIS: Primary | ICD-10-CM

## 2024-03-25 DIAGNOSIS — R09.81 NASAL CONGESTION: ICD-10-CM

## 2024-03-25 PROCEDURE — 99999 PR PBB SHADOW E&M-EST. PATIENT-LVL V: CPT | Mod: PBBFAC,,, | Performed by: PHYSICIAN ASSISTANT

## 2024-03-25 PROCEDURE — 99215 OFFICE O/P EST HI 40 MIN: CPT | Mod: PBBFAC | Performed by: PHYSICIAN ASSISTANT

## 2024-03-25 PROCEDURE — 99214 OFFICE O/P EST MOD 30 MIN: CPT | Mod: S$PBB,,, | Performed by: PHYSICIAN ASSISTANT

## 2024-03-25 RX ORDER — CODEINE PHOSPHATE AND GUAIFENESIN 10; 100 MG/5ML; MG/5ML
5 SOLUTION ORAL EVERY 8 HOURS PRN
Qty: 118 ML | Refills: 0 | Status: SHIPPED | OUTPATIENT
Start: 2024-03-25 | End: 2024-04-04

## 2024-03-25 RX ORDER — FLUTICASONE PROPIONATE 50 MCG
2 SPRAY, SUSPENSION (ML) NASAL DAILY
Qty: 16 G | Refills: 0 | Status: SHIPPED | OUTPATIENT
Start: 2024-03-25

## 2024-03-25 RX ORDER — LEVOCETIRIZINE DIHYDROCHLORIDE 5 MG/1
5 TABLET, FILM COATED ORAL NIGHTLY
Qty: 30 TABLET | Refills: 1 | Status: SHIPPED | OUTPATIENT
Start: 2024-03-25 | End: 2024-05-02

## 2024-03-25 NOTE — PATIENT INSTRUCTIONS
Hydrate hydrate hydrate    Warm teas with honey    Cough meds, antihistamine, and flonase    Message and call with any questions.     Dermatology appt we will arrange.

## 2024-03-29 ENCOUNTER — PATIENT MESSAGE (OUTPATIENT)
Dept: CARDIOLOGY | Facility: CLINIC | Age: 69
End: 2024-03-29
Payer: MEDICARE

## 2024-04-01 ENCOUNTER — CLINICAL SUPPORT (OUTPATIENT)
Dept: CARDIAC REHAB | Facility: CLINIC | Age: 69
End: 2024-04-01
Payer: MEDICARE

## 2024-04-01 DIAGNOSIS — Z98.61 POSTSURGICAL PERCUTANEOUS TRANSLUMINAL CORONARY ANGIOPLASTY STATUS: Primary | ICD-10-CM

## 2024-04-01 DIAGNOSIS — I25.10 ATHEROSCLEROSIS OF NATIVE CORONARY ARTERY WITHOUT ANGINA PECTORIS, UNSPECIFIED WHETHER NATIVE OR TRANSPLANTED HEART: ICD-10-CM

## 2024-04-01 PROCEDURE — 93798 PHYS/QHP OP CAR RHAB W/ECG: CPT | Mod: S$PBB,,, | Performed by: INTERNAL MEDICINE

## 2024-04-01 PROCEDURE — 93798 PHYS/QHP OP CAR RHAB W/ECG: CPT | Mod: PBBFAC,PO

## 2024-04-01 NOTE — PROGRESS NOTES
Patient arrived for cardiac rehab session. Pt reports to have eaten prior to exercise session.  The patient was on continuous EKG monitoring throughout the session. The patient tolerated exercise session well with no complaints.

## 2024-04-01 NOTE — TELEPHONE ENCOUNTER
Mr. Spangler,  Please restart, but continue to monitor BP and please stay hydrated especially after recent URI.

## 2024-04-01 NOTE — PROGRESS NOTES
Subjective     Patient ID: Wero Spangler is a 68 y.o. male.    Chief Complaint: Follow-up (ERFU), Cough, and Nasal Congestion    HPI    Established pt of Duke Cano MD     Here for ED follow up  Seen there 2 days ago for URI, (flu and COVID negative, lab and CXR unremarkable) He was discharged with Rx Tessalon Perles.     Today he is attended by spouse  Feeling a little better, c/o a lot of congestion and mucus. Mild sob with coughing spells  Tried Afrin    No fevers, cp or wheezing.    Also c/o scalp lesion, present for over 20yrs, has increased in size and occ bleeds.     Past Medical History:   Diagnosis Date    Aftercataract     Allergy     BPH (benign prostatic hyperplasia)     Cataract     Corneal dystrophy     Elevated PSA     Enlarged prostate     Fatty liver     Fuchs' corneal dystrophy     Gallstones     General anesthetics causing adverse effect in therapeutic use     delayed emergence after back surgery    GERD (gastroesophageal reflux disease)     HTN (hypertension) 2013    Hypertension     Insomnia     Prostate nodule     Urinary tract infection      Social History     Tobacco Use    Smoking status: Former     Current packs/day: 0.00     Types: Cigarettes     Quit date:      Years since quittin.2    Smokeless tobacco: Never   Substance Use Topics    Alcohol use: Not Currently    Drug use: No     Review of patient's allergies indicates:  No Known Allergies        Review of Systems   Constitutional:  Positive for fatigue. Negative for chills, diaphoresis and fever.   HENT:  Positive for nasal congestion and sinus pressure/congestion. Negative for sore throat.    Respiratory:  Positive for cough. Negative for shortness of breath and wheezing.    Cardiovascular:  Negative for chest pain.   Gastrointestinal:  Negative for nausea and vomiting.   Integumentary:  Positive for mole/lesion. Negative for rash.          Objective /64 (BP Location: Right arm, Patient Position:  Sitting, BP Method: Medium (Manual))   Pulse 66   Ht 6' (1.829 m)   Wt 86 kg (189 lb 9.5 oz)   SpO2 98%   BMI 25.71 kg/m²       Physical Exam  Vitals reviewed.   Constitutional:       General: He is not in acute distress.     Appearance: He is well-developed.   HENT:      Head: Normocephalic and atraumatic.      Right Ear: Tympanic membrane, ear canal and external ear normal.      Left Ear: Tympanic membrane, ear canal and external ear normal.   Cardiovascular:      Rate and Rhythm: Normal rate and regular rhythm.      Heart sounds: No murmur heard.  Pulmonary:      Effort: Pulmonary effort is normal.      Breath sounds: Normal breath sounds. No wheezing or rales.      Comments: Occ cough  Abdominal:      General: Bowel sounds are normal.      Palpations: Abdomen is soft.      Tenderness: There is no abdominal tenderness.   Musculoskeletal:      Right lower leg: No edema.      Left lower leg: No edema.   Lymphadenopathy:      Cervical: No cervical adenopathy.   Skin:     General: Skin is warm and dry.      Findings: Lesion (scalp, raised lesion with scab and telangiectasias) present. No rash.   Neurological:      Mental Status: He is alert.   Psychiatric:         Mood and Affect: Mood normal.            Assessment and Plan     1. Bronchitis  -     fluticasone propionate (FLONASE) 50 mcg/actuation nasal spray; 2 sprays (100 mcg total) by Each Nostril route once daily.  Dispense: 16 g; Refill: 0  -     levocetirizine (XYZAL) 5 MG tablet; Take 1 tablet (5 mg total) by mouth every evening.  Dispense: 30 tablet; Refill: 1  -     guaiFENesin-codeine 100-10 mg/5 ml (TUSSI-ORGANIDIN NR)  mg/5 mL syrup; Take 5 mLs by mouth every 8 (eight) hours as needed for Cough or Congestion.  Dispense: 118 mL; Refill: 0    2. Nasal congestion  -     fluticasone propionate (FLONASE) 50 mcg/actuation nasal spray; 2 sprays (100 mcg total) by Each Nostril route once daily.  Dispense: 16 g; Refill: 0    3. Scalp lesion  -      Ambulatory referral/consult to Dermatology; Future; Expected date: 04/01/2024      Patient Instructions   Hydrate hydrate hydrate    Warm teas with honey    Cough meds, antihistamine, and flonase    Message and call with any questions.     Dermatology appt we will arrange.     Radha Prasad PA-C    Future Appointments   Date Time Provider Department Center   4/1/2024  9:00 AM REHAB, CARDIAC [facility] [place]   4/3/2024  9:00 AM REHAB, CARDIAC [facility] [place]   4/5/2024  9:00 AM REHAB, CARDIAC [facility] [place]   4/8/2024  9:00 AM REHAB, CARDIAC [facility] [place]   4/10/2024  9:00 AM REHAB, CARDIAC [facility] [place]   4/10/2024  2:45 PM Mak Segal MD [facility] DERW[facility] [address] [facility]   4/12/2024  9:00 AM REHAB, CARDIAC [facility] [place]   4/15/2024  9:00 AM REHAB, CARDIAC [facility] [place]   4/17/2024  9:00 AM REHAB, CARDIAC [facility] [place]   4/19/2024  9:00 AM REHAB, CARDIAC [facility] [place]   4/23/2024  1:30 PM Anahi Coffey, COLTEN [facility] [address] [facility]   4/30/2024  8:00 AM LAB, APPOINTMENT [facility] LAB VNP [facility]   5/2/2024  9:15 AM Darren Yuen MD [facility] UROLOGY [address]   6/10/2024  8:20 AM Duke Cano MD [facility] IM [address] PCW   6/26/2024  9:30 AM Pili Palacios MD [facility] CARDIO [place]   7/19/2024  9:00 AM Doron Pereyra OD [facility] OPTOMTY [place]   7/25/2024  9:20 AM Duke Cano MD [facility] [address] [facility]

## 2024-04-03 ENCOUNTER — CLINICAL SUPPORT (OUTPATIENT)
Dept: CARDIAC REHAB | Facility: CLINIC | Age: 69
End: 2024-04-03
Payer: MEDICARE

## 2024-04-03 DIAGNOSIS — Z98.61 POSTSURGICAL PERCUTANEOUS TRANSLUMINAL CORONARY ANGIOPLASTY STATUS: Primary | ICD-10-CM

## 2024-04-03 DIAGNOSIS — I25.10 ATHEROSCLEROSIS OF NATIVE CORONARY ARTERY OF NATIVE HEART, UNSPECIFIED WHETHER ANGINA PRESENT: ICD-10-CM

## 2024-04-03 DIAGNOSIS — I25.10 CORONARY ATHEROSCLEROSIS OF NATIVE CORONARY ARTERY: ICD-10-CM

## 2024-04-03 PROCEDURE — 93798 PHYS/QHP OP CAR RHAB W/ECG: CPT | Mod: S$PBB,,, | Performed by: INTERNAL MEDICINE

## 2024-04-03 PROCEDURE — 93798 PHYS/QHP OP CAR RHAB W/ECG: CPT | Mod: PBBFAC,PO

## 2024-04-05 ENCOUNTER — DOCUMENTATION ONLY (OUTPATIENT)
Dept: CARDIAC REHAB | Facility: CLINIC | Age: 69
End: 2024-04-05

## 2024-04-05 ENCOUNTER — CLINICAL SUPPORT (OUTPATIENT)
Dept: CARDIAC REHAB | Facility: CLINIC | Age: 69
End: 2024-04-05
Payer: MEDICARE

## 2024-04-05 DIAGNOSIS — I25.10 ATHEROSCLEROSIS OF NATIVE CORONARY ARTERY OF NATIVE HEART, UNSPECIFIED WHETHER ANGINA PRESENT: ICD-10-CM

## 2024-04-05 DIAGNOSIS — Z98.61 POSTSURGICAL PERCUTANEOUS TRANSLUMINAL CORONARY ANGIOPLASTY STATUS: Primary | ICD-10-CM

## 2024-04-05 PROCEDURE — 93798 PHYS/QHP OP CAR RHAB W/ECG: CPT | Mod: PBBFAC,PO | Performed by: INTERNAL MEDICINE

## 2024-04-05 PROCEDURE — 93798 PHYS/QHP OP CAR RHAB W/ECG: CPT | Mod: S$PBB,,, | Performed by: INTERNAL MEDICINE

## 2024-04-05 NOTE — PROGRESS NOTES
24 Session Follow Up   Cardiac Rehab Individual Treatment Plan - Reassessment    Patient Name: Wero Spangler MRN: [id number]   : 1955   Age: 68 y.o.   Primary Diagnosis: PTCA/stent    Nutrition Assessment:     Anthropometrics    Height 72 inches   Weight 175.8 lbs   BMI 24     Patient confirms he is taking lipitor 20mg for cholesterol control.  Difficulty Chewing or Swallowing: No  Current Exercise: See Exercise Physiologist Note  Food Safety/Food Preparation: self  Living Arrangements/Family Support: Lives alone  Cultural/Spiritual/Personal Preferences: not applicable   Barriers to Education: none identified  Stage of Change Related to Diet Habits: Maintenance  Recent Changes to Diet: No  Food Diary: Completed      Nutrition Plan:   Goals:  LDL-C < 70 (for high risk patients)  Hgb A1c < 7%  BMI < 25 and abdominal girth < 40M/<35 F  2 gram sodium, Mediterranean diet: In Progress  Fish intake (non-fried varieties) to a goal of 2-3 servings per week: In Progress  Increase fruit and vegetable intake: In Progress    Comments on Goal Progression:  Pt states overall he is feeling better although currently recovering from recent URI for which he is on ABT.    Interventions:  Dietitian Consult: No  Patient to participate in Cardiac Rehab sessions three times a week  Weekly Dietitian Weight Check  Nutrition Recommendations Provided: Verbal and Written, Reviewed  Follow Up Plan for Ongoing Self-Management Support    Education:  Vitamins; verbalizes understanding; Date: 24  Pre/Probiotics; verbalizes understanding; Date: 3/6/24  Sugar & Carbohydrates; verbalizes understanding; Date: 4/3/24    Comments:   Discussed ways to incorporate healthy snacks, eating on a schedule, and monitoring sodium intake for heart health.    Diabetes  Is the patient diabetic? No      Petrona Mitchell, MS, RDN/LDN

## 2024-04-05 NOTE — PROGRESS NOTES
Wero has completed 24 out of 36 exercise session of Phase II cardiac rehab.  A follow up reassessment will be completed at exit interview.    24 Session Follow Up   Cardiac Rehab Individual Treatment Plan - Reassessment      Patient Name: Wero Spangler MRN: [id number]   : 1955   Age: 68 y.o.   Primary Diagnosis: PTCA/STENT Date of Event: 23   EF: 55-60%  Risk Stratification: moderate  Referring Physician: YAMIL   Exercise Assessment:     Angina with exercise?: No   ST Depression with Exercise?: No  Fall Risk: Yes   Assistive Devices:  independent  Wero stated at orientation there were some limitations to exercise due to back pain but has been cleared and doing well.  Exercise modalities have been modified to meet the patient's needs and capabilities.  Comments on Progression: Wero is progressing fairly well and his workloads will continue to be increased as he tolerates exercise intensity.    Exercise Plan:   Goals:  CR Exercise Goals: Attend Cardiac Rehab 3 times/week: In Progress  Home Aerobic Exercise: 2 additional days/week for 30-60 minutes: In Progress  Intensity of 12-15 on the Rate of Perceived Exertion (RPE) scale: In Progress  30% increase in entry estimated METS: 11.07 : In Progress  5 days/week for 30-60 minutes: In Progress  Demonstrate proper pulse taking technique: In Progress    Comments on Goal Progression:  Patient Consistency: consistent with attendance  Home exercise? Yes: Walking; Frequency: 2 to 3 non-rehab days per week; Duration 42 minutes  Patient reports intensity rate 11-14 on RPE scale  Patient is progressing steadily  Patient is Able to demonstrate proper pulse taking technique with or without a fitness tracker.      Intervention/Recommendations:   Discussed importance of regular attendance to cardiac rehab class    Exercise Prescription:  THR Range 74-91   Mode: Treadmill  Nustep   Frequency:  3 days/week   Duration:  30-60 minutes   Intensity:  12-15 RPE   Resistance  Training:  Yes: 5 to 7 lb weights with 10-15 reps based on strength and range of motion and adjusted accordingly     Home Prescription:  Mode Aerobic exercise   Frequency: 2- 3 days/week   Duration: 30-60 minutes   Resistance Training: None     Education:  Exercise Terminology; verbalizes understanding; Date: 3-4-24  Exercise Program; verbalizes understanding; Date: 3-18-24  Resistance Training II; verbalizes understanding; Date: 4-1-24  Arthritis; verbalizes understanding; Date: 2-26-24  Osteoporosis; verbalizes understanding; Date: 2-26-24    Comments:  I reviewed exercise recommendations with Wero.  I encouraged him to continue exercising.  He stated understanding.     The exercise prescription will continue to be adjusted based on tolerance of exercise intensity by patient.    Buffy Berrios., CEP

## 2024-04-08 ENCOUNTER — CLINICAL SUPPORT (OUTPATIENT)
Dept: CARDIAC REHAB | Facility: CLINIC | Age: 69
End: 2024-04-08
Payer: MEDICARE

## 2024-04-08 DIAGNOSIS — Z98.61 POSTSURGICAL PERCUTANEOUS TRANSLUMINAL CORONARY ANGIOPLASTY STATUS: Primary | ICD-10-CM

## 2024-04-08 DIAGNOSIS — I25.10 ATHEROSCLEROSIS OF NATIVE CORONARY ARTERY OF NATIVE HEART WITHOUT ANGINA PECTORIS: ICD-10-CM

## 2024-04-08 PROCEDURE — 93798 PHYS/QHP OP CAR RHAB W/ECG: CPT | Mod: PBBFAC,PO

## 2024-04-08 PROCEDURE — 93798 PHYS/QHP OP CAR RHAB W/ECG: CPT | Mod: S$PBB,,, | Performed by: INTERNAL MEDICINE

## 2024-04-10 ENCOUNTER — CLINICAL SUPPORT (OUTPATIENT)
Dept: CARDIAC REHAB | Facility: CLINIC | Age: 69
End: 2024-04-10
Payer: MEDICARE

## 2024-04-10 DIAGNOSIS — I25.10 ATHEROSCLEROSIS OF NATIVE CORONARY ARTERY OF NATIVE HEART WITHOUT ANGINA PECTORIS: ICD-10-CM

## 2024-04-10 DIAGNOSIS — Z98.61 POSTSURGICAL PERCUTANEOUS TRANSLUMINAL CORONARY ANGIOPLASTY STATUS: Primary | ICD-10-CM

## 2024-04-10 PROCEDURE — 93798 PHYS/QHP OP CAR RHAB W/ECG: CPT | Mod: S$PBB,,, | Performed by: INTERNAL MEDICINE

## 2024-04-10 PROCEDURE — 93798 PHYS/QHP OP CAR RHAB W/ECG: CPT | Mod: PBBFAC,PO

## 2024-04-11 ENCOUNTER — DOCUMENTATION ONLY (OUTPATIENT)
Dept: CARDIAC REHAB | Facility: CLINIC | Age: 69
End: 2024-04-11
Payer: MEDICARE

## 2024-04-11 NOTE — PROGRESS NOTES
Wero has completed 26 out of 36 exercise session of Phase II cardiac rehab.  A follow up reassessment will be completed at exit interview.    24 Session Follow Up   Cardiac Rehab Individual Treatment Plan - Reassessment      Patient Name: Wero Spangler MRN: [id number]   : 1955   Age: 68 y.o.   Primary Diagnosis: PTCA/STENT Date of Event: 23   EF: 55-60%  Risk Stratification: moderate  Referring Physician: YAMIL   Exercise Assessment:     Angina with exercise?: No   ST Depression with Exercise?: No  Fall Risk: Yes   Assistive Devices:  independent  Wero stated at orientation there were some limitations to exercise due to back pain but has been cleared and doing well.  Exercise modalities have been modified to meet the patient's needs and capabilities.  Comments on Progression: Wero is progressing fairly well and his workloads will continue to be increased as he tolerates exercise intensity.    Exercise Plan:   Goals:  CR Exercise Goals: Attend Cardiac Rehab 3 times/week: In Progress  Home Aerobic Exercise: 2 additional days/week for 30-60 minutes: In Progress  Intensity of 12-15 on the Rate of Perceived Exertion (RPE) scale: In Progress  30% increase in entry estimated METS: 11.07 : In Progress  5 days/week for 30-60 minutes: In Progress  Demonstrate proper pulse taking technique: In Progress    Comments on Goal Progression:  Patient Consistency: consistent with attendance  Home exercise? Yes: Walking; Frequency: 2 to 3 non-rehab days per week; Duration 42 minutes  Patient reports intensity rate 11-14 on RPE scale  Patient is progressing steadily  Patient is Able to demonstrate proper pulse taking technique with or without a fitness tracker.      Intervention/Recommendations:   Discussed importance of regular attendance to cardiac rehab class    Exercise Prescription:  THR Range 74-91   Mode: Treadmill  Nustep   Frequency:  3 days/week   Duration:  30-60 minutes   Intensity:  12-15 RPE   Resistance  Training:  Yes: 5 to 7 lb weights with 10-15 reps based on strength and range of motion and adjusted accordingly     Home Prescription:  Mode Aerobic exercise   Frequency: 2- 3 days/week   Duration: 30-60 minutes   Resistance Training: None     Education:  Exercise Terminology; verbalizes understanding; Date: 3-4-24  Exercise Program; verbalizes understanding; Date: 3-18-24  Resistance Training II; verbalizes understanding; Date: 24  Arthritis; verbalizes understanding; Date: 24  Osteoporosis; verbalizes understanding; Date: 24    Comments:  I reviewed exercise recommendations with Wero.  I encouraged him to continue exercising.  He stated understanding.     The exercise prescription will continue to be adjusted based on tolerance of exercise intensity by patient.    Prosper Berrios, CEP    24 Session Follow Up   Cardiac Rehab Individual Treatment Plan - Reassessment    Patient Name: Wero Spangler MRN: [id number]   : 1955   Age: 68 y.o.   Primary Diagnosis: PTCA/stent    Nutrition Assessment:     Anthropometrics    Height 72 inches   Weight 175.8 lbs   BMI 24     Patient confirms he is taking lipitor 20mg for cholesterol control.  Difficulty Chewing or Swallowing: No  Current Exercise: See Exercise Physiologist Note  Food Safety/Food Preparation: self  Living Arrangements/Family Support: Lives alone  Cultural/Spiritual/Personal Preferences: not applicable   Barriers to Education: none identified  Stage of Change Related to Diet Habits: Maintenance  Recent Changes to Diet: No  Food Diary: Completed      Nutrition Plan:   Goals:  LDL-C < 70 (for high risk patients)  Hgb A1c < 7%  BMI < 25 and abdominal girth < 40M/<35 F  2 gram sodium, Mediterranean diet: In Progress  Fish intake (non-fried varieties) to a goal of 2-3 servings per week: In Progress  Increase fruit and vegetable intake: In Progress    Comments on Goal Progression:  Pt states overall he is feeling better although currently  recovering from recent URI for which he is on ABT.    Interventions:  Dietitian Consult: No  Patient to participate in Cardiac Rehab sessions three times a week  Weekly Dietitian Weight Check  Nutrition Recommendations Provided: Verbal and Written, Reviewed  Follow Up Plan for Ongoing Self-Management Support    Education:  Vitamins; verbalizes understanding; Date: 24  Pre/Probiotics; verbalizes understanding; Date: 3/6/24  Sugar & Carbohydrates; verbalizes understanding; Date: 4/3/24    Comments:   Discussed ways to incorporate healthy snacks, eating on a schedule, and monitoring sodium intake for heart health.    Diabetes  Is the patient diabetic? No      Petrona Mitchell MS, RDN/LDN    Session: 12 Session Follow Up   Cardiac Rehab Individual Treatment Plan - Reassessment      Patient Name: Wero Spangler MRN: [id number]   : 1955   Age: 68 y.o.   Primary Diagnosis: PTCA      Psychosocial Assessment:   Living Arrangements: Lives alone  Family Support:  Daughters  Self Reported: decrease Effective Coping Skills  Displays: happiness, smiles often, and calmness  Medication: not applicable    Psychosocial Plan:   Goals:  Verbalizes coping mechanisms: In Progress  Maintain positive support system: In Progress  Maintain positive outlook: In Progress  Improve overall quality of life: In Progress    Comments on Goal Progression:  Patient denies having any overwhelming stress or anxiety.  He states that he has good support from his daughters.  He is motivated & reports to be feeling better physically & emotionally since beginning cardiac rehab.  He is exercising 2 additional days outside of rehab.    Interventions:  Patient to Self Report Emotional Changes at Session Check In  Recommend Physical Activity  Recommend Attending Education Lectures  Notify MD: No  Program Referral: No  Pharmaceutical Intervention/Therapy: Yes  Other Needs: not applicable  Stage of Readiness to Change: Action    Education:  Stress  Management; verbalizes understanding; Date: 2/23/2024  Stress; verbalizes understanding; Date: 2/23/2024      Patient has been instructed to notify staff in the event that circumstances change.  Patient verbalizes understanding.    Other Core Components/Risk Factors Assessment:   RISK FACTORS:  hypertension, positive family history, sedentary lifestyle    Learning Barriers: None    Medication Compliance: has been compliant with taking medications    Other Core Components/Risk Factors Plan:   Goals:  Decrease cholesterol level: In Progress  Increase exercise tolerance: In Progress  Increase knowledge of CAD: In Progress  Learn more about healthy eating: In Progress    Comments on Goal Progression:  Patient will continue to work on decreasing risk factors for CAD.  He is attending lectures & exercise sessions on a consistent basis.  He is exercising 2 additional days outside of cardiac rehab.  He is compliant with his medications.    Interventions:  Individual Education/ Counseling: Yes  Physician Referral: No    Education:    hypertension, verbalizes understanding; Date: 2/23/2024  risk factors, verbalizes understanding; Date: 2/23/2024  stress, verbalizes understanding; Date: 2/23/2024         Education method adapted to patients education level and preferred method of learning.  Method: explanation  handouts        Other Core Components/Hypertension Assessment:   BP Range: 128-100/80-50  BP at Goal: Yes  Patient reported symptoms: none    Medications:  Medication Prescribed? Adherent? Exception   Beta-blocker [x]Yes  []No  []Unknown [x]Yes  []No  []Unknown    ACEI/ARB []Yes  [x]No  []Unknown []Yes  []No  []Unknown    Calcium Channel Blocker []Yes  [x]No  []Unknown []Yes  []No  []Unknown    Diuretic []Yes  [x]No  []Unknown []Yes  []No  []Unknown        Other Core Components/Hypertension Plan:   Goals:  Blood Pressure <130/80    Comments on Goal Progression:  Patient is monitoring BP at home & reports to be  obtaining readings similar to those obtained in cardiac rehab.  He is monitoring his sodium intake carefully.  He is also monitoring his weight on a daily basis.    Interventions:  Med Card Reconciled: Yes  Encourage medication compliance  Encourage sodium reduction  Reduce alcohol consumption  Encourage weight loss  Recommend physical activity  Encourage home blood pressure monitoring  Recommend daily weights    Education:    Risk Factors; verbalizes understanding; Date: 2/23/2024  Stroke; verbalizes understanding; Date: 2/2/2024             Does the patient have Heart Failure? No      Other Core Components/Tobacco Cessation Assessment:   Smoking Status: Former (>6 months)  Primary Tobacco Type: Cigarette  Tobacco Usage: no  Smoking Cessation Barriers:  N/A  Stage of Readiness to Change: Maintenance    Other Core Components/Tobacco Cessation Plan:   Goals:  Maintain non-smoking status    Comments on Goal Progression:  Non smoker.    Interventions:  Maintains non-smoking status    Education:    Risk Factors; verbalizes understanding; Date: 2/23/2024  Stroke; verbalizes understanding; Date: 2/2/2024  Benefits of Cardiac Rehab; verbalizes understanding; Date: 2/9/2024          Comments:   Non smoker.    Kimmy Sheppard RN  Cardiac Rehab Nurse

## 2024-04-12 ENCOUNTER — CLINICAL SUPPORT (OUTPATIENT)
Dept: CARDIAC REHAB | Facility: CLINIC | Age: 69
End: 2024-04-12
Payer: MEDICARE

## 2024-04-12 DIAGNOSIS — Z98.61 POSTSURGICAL PERCUTANEOUS TRANSLUMINAL CORONARY ANGIOPLASTY STATUS: Primary | ICD-10-CM

## 2024-04-12 DIAGNOSIS — I25.10 ATHEROSCLEROSIS OF NATIVE CORONARY ARTERY OF NATIVE HEART WITHOUT ANGINA PECTORIS: ICD-10-CM

## 2024-04-12 PROCEDURE — 93798 PHYS/QHP OP CAR RHAB W/ECG: CPT | Mod: PBBFAC,PO

## 2024-04-12 PROCEDURE — 93798 PHYS/QHP OP CAR RHAB W/ECG: CPT | Mod: S$PBB,,, | Performed by: INTERNAL MEDICINE

## 2024-04-15 ENCOUNTER — CLINICAL SUPPORT (OUTPATIENT)
Dept: CARDIAC REHAB | Facility: CLINIC | Age: 69
End: 2024-04-15
Payer: MEDICARE

## 2024-04-15 DIAGNOSIS — Z98.61 POSTSURGICAL PERCUTANEOUS TRANSLUMINAL CORONARY ANGIOPLASTY STATUS: Primary | ICD-10-CM

## 2024-04-15 DIAGNOSIS — I25.10 ATHEROSCLEROSIS OF NATIVE CORONARY ARTERY OF NATIVE HEART WITHOUT ANGINA PECTORIS: ICD-10-CM

## 2024-04-15 PROCEDURE — 93798 PHYS/QHP OP CAR RHAB W/ECG: CPT | Mod: PBBFAC,PO

## 2024-04-15 PROCEDURE — 93798 PHYS/QHP OP CAR RHAB W/ECG: CPT | Mod: S$PBB,,, | Performed by: INTERNAL MEDICINE

## 2024-04-15 NOTE — PROGRESS NOTES
Patient arrived for cardiac rehab session. Pt reports to have eaten prior to exercise session.  The patient was on continuous EKG monitoring throughout the session. The patient tolerated exercise session well with no complaints.    
No

## 2024-04-17 ENCOUNTER — CLINICAL SUPPORT (OUTPATIENT)
Dept: CARDIAC REHAB | Facility: CLINIC | Age: 69
End: 2024-04-17
Payer: MEDICARE

## 2024-04-17 DIAGNOSIS — Z98.61 POSTSURGICAL PERCUTANEOUS TRANSLUMINAL CORONARY ANGIOPLASTY STATUS: Primary | ICD-10-CM

## 2024-04-17 DIAGNOSIS — I25.10 ATHEROSCLEROSIS OF NATIVE CORONARY ARTERY OF NATIVE HEART WITHOUT ANGINA PECTORIS: ICD-10-CM

## 2024-04-17 PROCEDURE — 93798 PHYS/QHP OP CAR RHAB W/ECG: CPT | Mod: S$PBB,,, | Performed by: INTERNAL MEDICINE

## 2024-04-17 PROCEDURE — 93798 PHYS/QHP OP CAR RHAB W/ECG: CPT | Mod: PBBFAC,PO

## 2024-04-17 NOTE — PROGRESS NOTES
Session: 24 Session Follow Up   Cardiac Rehab Individual Treatment Plan - Reassessment      Patient Name: Wero Spangler MRN: [id number]   : 1955   Age: 68 y.o.   Primary Diagnosis: PTCA/STENT 2023      Psychosocial Assessment:   Living Arrangements: Lives alone  Family Support: daughters and family  Self Reported: decrease Effective Coping Skills  Displays: happiness and calmness  Medication:  yes, xanax prn    Psychosocial Plan:   Goals:  Verbalizes coping mechanisms: Met  Maintain positive support system: Met  Maintain positive outlook: Met  Improve overall quality of life: In Progress    Comments on Goal Progression:  Pt states he is feeling stronger more confident and this has helped lessen his previous anxieties.    Interventions:  Patient to Self Report Emotional Changes at Session Check In  Recommend Physical Activity  Recommend Attending Education Lectures  Notify MD: No  Program Referral: No  Pharmaceutical Intervention/Therapy: Yes  Other Needs: not applicable  Stage of Readiness to Change: Action    Education:  Stress; verbalizes understanding; Date: 2024  Risk factors; verbalizes understanding; Date: 3/15/2024    Pt denies any overwhelming stress or anxiety at this time.  Patient has been instructed to notify staff in the event that circumstances worsen.  Patient verbalizes understanding.    Other Core Components/Risk Factors Assessment:   RISK FACTORS:  hypertension, positive family history, sedentary lifestyle    Learning Barriers: None    Medication Compliance: has been compliant with taking medications    Other Core Components/Risk Factors Plan:   Goals:  Decrease cholesterol level: In Progress  Increase exercise tolerance: Met  Increase knowledge of CAD: In Progress  Learn more about healthy eating: In Progress    Comments on Goal Progression:  Pt has maintained his weight during rehab and states he is eating much better. Pt states he is exercising on days that he is not in  cardiac rehab. Pt has good support from his 2 daughters and it reinforces his improved habits. Pt states he is feeling stronger and more confident from exercising.    Interventions:  Individual Education/ Counseling: Yes  Physician Referral: No    Education:    cardiac interventions, verbalizes understanding; Date: 3/01/2024  fluid overload/CHF, verbalizes understanding; Date: 3/08/2024  risk factors, verbalizes understanding; Date: 3/15/2024         Education method adapted to patients education level and preferred method of learning.  Method: explanation  demonstration  handouts        Other Core Components/Hypertension Assessment:   BP Range:  systolic; 58-72 diastolic  BP at Goal: Yes  Patient reported symptoms: tiredness/fatigue    Medications:  Medication Prescribed? Adherent? Exception   Beta-blocker [x]Yes  []No  []Unknown [x]Yes  []No  []Unknown    ACEI/ARB []Yes  []No  []Unknown []Yes  []No  []Unknown    Calcium Channel Blocker []Yes  []No  []Unknown []Yes  []No  []Unknown    Diuretic [x]Yes  []No  []Unknown []Yes  []No  []Unknown  Discontinued 3/2/2024       Other Core Components/Hypertension Plan:   Goals:  Blood Pressure <130/80    Comments on Goal Progression:  Pt was having low blood pressure readings and not feeling well. Dr. Palacios notified and dc'd his thiazide diuretic. Pt is feeling better and maintaining his goal of <130/80 readings. B/P pt is monitoring his blood pressure daily when not at cardiac rehab.    Interventions:  Med Card Reconciled: Yes  Encourage medication compliance  Encourage sodium reduction  Recommend physical activity  Educate on contributory factors  Reduce stress, anxiety, anger, depression, and/or chronic pain  Encourage home blood pressure monitoring  Recommend daily weights  MD notified/Physician Referral: No    Education:    Coronary Artery Disease; verbalizes understanding; Date: 3/01/2024  Risk Factors; verbalizes understanding; Date: 3/15/2024              Does the patient have Heart Failure? No      Other Core Components/Tobacco Cessation Assessment:   Smoking Status: Former (>6 months)  Primary Tobacco Type: N/A  Tobacco Usage: no  Smoking Cessation Barriers:  N/A  Stage of Readiness to Change: Maintenance    Other Core Components/Tobacco Cessation Plan:   Goals:  Maintain non-smoking status    Comments on Goal Progression:  Pt no longer smokes.    Interventions:  Maintains non-smoking status    Education:    Risk Factors; verbalizes understanding; Date: 3/15/2024          Comments:   Pt does not use tobacco products.    Roby Arzola RN

## 2024-04-19 ENCOUNTER — CLINICAL SUPPORT (OUTPATIENT)
Dept: CARDIAC REHAB | Facility: CLINIC | Age: 69
End: 2024-04-19
Payer: MEDICARE

## 2024-04-19 DIAGNOSIS — I25.10 ATHEROSCLEROSIS OF NATIVE CORONARY ARTERY OF NATIVE HEART, UNSPECIFIED WHETHER ANGINA PRESENT: ICD-10-CM

## 2024-04-19 DIAGNOSIS — Z98.61 POSTSURGICAL PERCUTANEOUS TRANSLUMINAL CORONARY ANGIOPLASTY STATUS: Primary | ICD-10-CM

## 2024-04-19 PROCEDURE — 93798 PHYS/QHP OP CAR RHAB W/ECG: CPT | Mod: S$PBB,,, | Performed by: INTERNAL MEDICINE

## 2024-04-19 PROCEDURE — 93798 PHYS/QHP OP CAR RHAB W/ECG: CPT | Mod: PBBFAC,PO | Performed by: INTERNAL MEDICINE

## 2024-04-22 ENCOUNTER — CLINICAL SUPPORT (OUTPATIENT)
Dept: CARDIAC REHAB | Facility: CLINIC | Age: 69
End: 2024-04-22
Payer: MEDICARE

## 2024-04-22 DIAGNOSIS — Z98.61 POSTSURGICAL PERCUTANEOUS TRANSLUMINAL CORONARY ANGIOPLASTY STATUS: Primary | ICD-10-CM

## 2024-04-22 DIAGNOSIS — I25.10 ATHEROSCLEROSIS OF NATIVE CORONARY ARTERY OF NATIVE HEART, UNSPECIFIED WHETHER ANGINA PRESENT: ICD-10-CM

## 2024-04-22 PROBLEM — I21.4 ACUTE MYOCARDIAL INFARCTION, SUBENDOCARDIAL INFARCTION, INITIAL EPISODE OF CARE: Status: RESOLVED | Noted: 2023-11-06 | Resolved: 2024-04-22

## 2024-04-22 PROCEDURE — 93798 PHYS/QHP OP CAR RHAB W/ECG: CPT | Mod: S$PBB,,, | Performed by: INTERNAL MEDICINE

## 2024-04-22 PROCEDURE — 93798 PHYS/QHP OP CAR RHAB W/ECG: CPT | Mod: PBBFAC,PO | Performed by: INTERNAL MEDICINE

## 2024-04-23 ENCOUNTER — OFFICE VISIT (OUTPATIENT)
Dept: INTERNAL MEDICINE | Facility: CLINIC | Age: 69
End: 2024-04-23
Payer: MEDICARE

## 2024-04-23 VITALS
OXYGEN SATURATION: 99 % | DIASTOLIC BLOOD PRESSURE: 60 MMHG | BODY MASS INDEX: 24.07 KG/M2 | HEART RATE: 50 BPM | SYSTOLIC BLOOD PRESSURE: 110 MMHG | WEIGHT: 177.5 LBS

## 2024-04-23 DIAGNOSIS — H90.3 ASYMMETRIC SNHL (SENSORINEURAL HEARING LOSS): ICD-10-CM

## 2024-04-23 DIAGNOSIS — I10 PRIMARY HYPERTENSION: ICD-10-CM

## 2024-04-23 DIAGNOSIS — Z00.00 ENCOUNTER FOR MEDICARE ANNUAL WELLNESS EXAM: Primary | ICD-10-CM

## 2024-04-23 DIAGNOSIS — E78.5 HYPERLIPIDEMIA, UNSPECIFIED HYPERLIPIDEMIA TYPE: ICD-10-CM

## 2024-04-23 DIAGNOSIS — I70.0 AORTIC ATHEROSCLEROSIS: ICD-10-CM

## 2024-04-23 DIAGNOSIS — K21.9 GASTROESOPHAGEAL REFLUX DISEASE, UNSPECIFIED WHETHER ESOPHAGITIS PRESENT: ICD-10-CM

## 2024-04-23 DIAGNOSIS — J84.10 CALCIFIED GRANULOMA OF LUNG: ICD-10-CM

## 2024-04-23 DIAGNOSIS — D69.2 PURPURA: ICD-10-CM

## 2024-04-23 PROCEDURE — 99999 PR PBB SHADOW E&M-EST. PATIENT-LVL IV: CPT | Mod: PBBFAC,,, | Performed by: NURSE PRACTITIONER

## 2024-04-23 PROCEDURE — G0439 PPPS, SUBSEQ VISIT: HCPCS | Mod: ,,, | Performed by: NURSE PRACTITIONER

## 2024-04-23 PROCEDURE — 99214 OFFICE O/P EST MOD 30 MIN: CPT | Mod: PBBFAC | Performed by: NURSE PRACTITIONER

## 2024-04-23 NOTE — PATIENT INSTRUCTIONS
Counseling and Referral of Other Preventative  (Italic type indicates deductible and co-insurance are waived)    Patient Name: Wero Spangler  Today's Date: 4/23/2024    Health Maintenance       Date Due Completion Date    TETANUS VACCINE Never done ---    RSV Vaccine (Age 60+ and Pregnant patients) (1 - 1-dose 60+ series) Never done ---    Shingles Vaccine (2 of 2) 07/23/2023 5/28/2023    COVID-19 Vaccine (7 - 2023-24 season) 09/01/2023 5/22/2023    Colorectal Cancer Screening 08/31/2024 8/31/2023    PROSTATE-SPECIFIC ANTIGEN 10/27/2024 10/27/2023    High Dose Statin 04/17/2025 4/17/2024    Aspirin/Antiplatelet Therapy 04/17/2025 4/17/2024    Lipid Panel 12/20/2028 12/20/2023        No orders of the defined types were placed in this encounter.      The following information is provided to all patients.  This information is to help you find resources for any of the problems found today that may be affecting your health:                  Living healthy guide: www.[facility].[place].gov      Understanding Diabetes: www.diabetes.org      Eating healthy: www.cdc.gov/healthyweight      CDC home safety checklist: www.cdc.gov/steadi/patient.html      Agency on Aging: www.goea.[place].gov      Alcoholics anonymous (AA): www.aa.org      Physical Activity: www.apple.nih.gov/hh2unhe      Tobacco use: www.quitwithusla.org

## 2024-04-23 NOTE — PROGRESS NOTES
Wero Spangler presented for a  Medicare AWV and comprehensive Health Risk Assessment today. The following components were reviewed and updated:    Medical history  Family History  Social history  Allergies and Current Medications  Health Risk Assessment  Health Maintenance  Care Team         ** See Completed Assessments for Annual Wellness Visit within the encounter summary.**         The following assessments were completed:  Living Situation  CAGE  Depression Screening  Timed Get Up and Go  Whisper Test  Cognitive Function Screening  Nutrition Screening  ADL Screening  PAQ Screening      Opioid documentation:      Patient does not have a current opioid prescription.        Vitals:    04/23/24 1328   BP: 110/60   Pulse: (!) 50   SpO2: 99%   Weight: 80.5 kg (177 lb 7.5 oz)     Body mass index is 24.07 kg/m².  Physical Exam  Vitals and nursing note reviewed.   Constitutional:       Appearance: He is well-developed.   HENT:      Head: Normocephalic and atraumatic.      Right Ear: External ear normal.      Left Ear: External ear normal.   Eyes:      Conjunctiva/sclera: Conjunctivae normal.      Pupils: Pupils are equal, round, and reactive to light.   Cardiovascular:      Rate and Rhythm: Normal rate and regular rhythm.   Pulmonary:      Effort: Pulmonary effort is normal.      Breath sounds: Normal breath sounds.   Abdominal:      General: Bowel sounds are normal.      Palpations: Abdomen is soft.   Musculoskeletal:         General: Normal range of motion.      Cervical back: Normal range of motion and neck supple.   Skin:     General: Skin is warm and dry.      Comments: BUE mild bruising   Neurological:      Mental Status: He is alert and oriented to person, place, and time.               Diagnoses and health risks identified today and associated recommendations/orders:    1. Encounter for Medicare annual wellness exam  Health Maintenance updated   Records reviewed   Exam done   - Ambulatory Referral/Consult to  Enhanced Annual Wellness Visit (eAWV)    2. Aortic atherosclerosis  Noted on CT chest 8/11/2018. Stable and chronic.     3. Purpura  Mild bruising noted on BUE. Discussed with patient. Stable and chronic.     4. Calcified granuloma of lung  Noted on CT abdominal 11/16/2024. Stable and chronic.     5. Hyperlipidemia, unspecified hyperlipidemia type  Stable and chronic. Continue current medications. Followed by PCP.     6. Primary hypertension  Stable, followed by PCP   Take medications as prescribed.   Monitor BP at home, goal BP < or = 140/80, call office if consistently above this range.   Follow low salt DASH diet and exercise.   BMI reviewed.     7. Gastroesophageal reflux disease, unspecified whether esophagitis present  Stable and chronic. Continue current medications. Followed by PCP.     8. Asymmetric SNHL (sensorineural hearing loss)  Stable and chronic. Followed by PCP.   Counseling and Referral of Other Preventative  (Italic type indicates deductible and co-insurance are waived)    Patient Name: Wero Spangler  Today's Date: 4/23/2024    Health Maintenance         Date Due Completion Date    TETANUS VACCINE Never done ---    RSV Vaccine (Age 60+ and Pregnant patients) (1 - 1-dose 60+ series) Never done ---    Shingles Vaccine (2 of 2) 07/23/2023 5/28/2023    COVID-19 Vaccine (7 - 2023-24 season) 09/01/2023 5/22/2023    Colorectal Cancer Screening 08/31/2024 8/31/2023    PROSTATE-SPECIFIC ANTIGEN 10/27/2024 10/27/2023    High Dose Statin 04/17/2025 4/17/2024    Aspirin/Antiplatelet Therapy 04/17/2025 4/17/2024    Lipid Panel 12/20/2028 12/20/2023          No orders of the defined types were placed in this encounter.        Provided Wero with a 5-10 year written screening schedule and personal prevention plan. Recommendations were developed using the USPSTF age appropriate recommendations. Education, counseling, and referrals were provided as needed. After Visit Summary printed and given to patient which  includes a list of additional screenings\tests needed.    Follow up in about 3 months (around 7/25/2024) for pcp visit.    Anahi Coffey, NP    I offered to discuss advanced care planning, including how to pick a person who would make decisions for you if you were unable to make them for yourself, called a health care power of , and what kind of decisions you might make such as use of life sustaining treatments such as ventilators and tube feeding when faced with a life limiting illness recorded on a living will that they will need to know. (How you want to be cared for as you near the end of your natural life)     X  Patient has advanced directives on file, which we reviewed, and they do not wish to make changes.

## 2024-04-24 ENCOUNTER — CLINICAL SUPPORT (OUTPATIENT)
Dept: CARDIAC REHAB | Facility: CLINIC | Age: 69
End: 2024-04-24
Payer: MEDICARE

## 2024-04-24 DIAGNOSIS — Z98.61 POSTSURGICAL PERCUTANEOUS TRANSLUMINAL CORONARY ANGIOPLASTY STATUS: Primary | ICD-10-CM

## 2024-04-24 DIAGNOSIS — I25.10 CORONARY ATHEROSCLEROSIS OF NATIVE CORONARY ARTERY: ICD-10-CM

## 2024-04-24 PROCEDURE — 93798 PHYS/QHP OP CAR RHAB W/ECG: CPT | Mod: S$PBB,,, | Performed by: INTERNAL MEDICINE

## 2024-04-24 PROCEDURE — 93798 PHYS/QHP OP CAR RHAB W/ECG: CPT | Mod: PBBFAC,PO

## 2024-04-25 ENCOUNTER — TELEPHONE (OUTPATIENT)
Dept: ORTHOPEDICS | Facility: CLINIC | Age: 69
End: 2024-04-25
Payer: MEDICARE

## 2024-04-25 DIAGNOSIS — M17.11 PRIMARY OSTEOARTHRITIS OF RIGHT KNEE: Primary | ICD-10-CM

## 2024-04-25 NOTE — TELEPHONE ENCOUNTER
----- Message from Kena Wilson MA sent at 4/25/2024  8:23 AM CDT -----  Regarding: Injection concern  Contact: pt at  [phone]  Pt is calling to speak with someone in provider office is asking for a return call regarding the  approval  process for  his knee injection  please call pt at  [phone]

## 2024-04-26 ENCOUNTER — CLINICAL SUPPORT (OUTPATIENT)
Dept: CARDIAC REHAB | Facility: CLINIC | Age: 69
End: 2024-04-26
Payer: MEDICARE

## 2024-04-26 DIAGNOSIS — Z98.61 POSTSURGICAL PERCUTANEOUS TRANSLUMINAL CORONARY ANGIOPLASTY STATUS: Primary | ICD-10-CM

## 2024-04-26 DIAGNOSIS — I25.10 CORONARY ATHEROSCLEROSIS OF NATIVE CORONARY ARTERY: ICD-10-CM

## 2024-04-26 PROCEDURE — 93798 PHYS/QHP OP CAR RHAB W/ECG: CPT | Mod: S$PBB,,, | Performed by: INTERNAL MEDICINE

## 2024-04-26 PROCEDURE — 93798 PHYS/QHP OP CAR RHAB W/ECG: CPT | Mod: PBBFAC,PO

## 2024-04-29 ENCOUNTER — CLINICAL SUPPORT (OUTPATIENT)
Dept: CARDIAC REHAB | Facility: CLINIC | Age: 69
End: 2024-04-29
Payer: MEDICARE

## 2024-04-29 DIAGNOSIS — I25.10 ATHEROSCLEROSIS OF NATIVE CORONARY ARTERY OF NATIVE HEART WITHOUT ANGINA PECTORIS: ICD-10-CM

## 2024-04-29 DIAGNOSIS — Z98.61 POSTSURGICAL PERCUTANEOUS TRANSLUMINAL CORONARY ANGIOPLASTY STATUS: Primary | ICD-10-CM

## 2024-04-29 PROCEDURE — 93798 PHYS/QHP OP CAR RHAB W/ECG: CPT | Mod: PBBFAC,PO

## 2024-04-29 PROCEDURE — 93798 PHYS/QHP OP CAR RHAB W/ECG: CPT | Mod: S$PBB,,, | Performed by: INTERNAL MEDICINE

## 2024-05-01 ENCOUNTER — HOSPITAL ENCOUNTER (OUTPATIENT)
Dept: CARDIOLOGY | Facility: HOSPITAL | Age: 69
Discharge: HOME | End: 2024-05-01
Attending: INTERNAL MEDICINE
Payer: MEDICARE

## 2024-05-01 VITALS
HEART RATE: 45 BPM | WEIGHT: 172 LBS | DIASTOLIC BLOOD PRESSURE: 67 MMHG | BODY MASS INDEX: 23.3 KG/M2 | SYSTOLIC BLOOD PRESSURE: 103 MMHG | HEIGHT: 72 IN

## 2024-05-01 DIAGNOSIS — Z98.61 POSTSURGICAL PERCUTANEOUS TRANSLUMINAL CORONARY ANGIOPLASTY STATUS: ICD-10-CM

## 2024-05-01 DIAGNOSIS — I25.10 ATHEROSCLEROSIS OF NATIVE CORONARY ARTERY OF NATIVE HEART, UNSPECIFIED WHETHER ANGINA PRESENT: ICD-10-CM

## 2024-05-01 LAB
CV STRESS BASE HR: 45 BPM
DIASTOLIC BLOOD PRESSURE: 67 MMHG
OHS CV CPX 1 MINUTE RECOVERY HEART RATE: 76 BPM
OHS CV CPX 85 PERCENT MAX PREDICTED HEART RATE MALE: 128
OHS CV CPX ABDOMINAL GIRTH: 37.1 CM
OHS CV CPX ANAEROBIC THRESHOLD DIASTOLIC BLOOD PRESSURE: 57 MMHG
OHS CV CPX ANAEROBIC THRESHOLD HEART RATE: 78
OHS CV CPX ANAEROBIC THRESHOLD RATE PRESSURE PRODUCT: 9126
OHS CV CPX ANAEROBIC THRESHOLD SYSTOLIC BLOOD PRESSURE: 117
OHS CV CPX DATA GRADE - AT: 10.4
OHS CV CPX DATA GRADE - PEAK: 14.9
OHS CV CPX DATA O2 SAT - PEAK: 98
OHS CV CPX DATA O2 SAT - REST: 99
OHS CV CPX DATA SPEED - AT: 3
OHS CV CPX DATA SPEED - PEAK: 3.9
OHS CV CPX DATA TIME - AT: 5.1
OHS CV CPX DATA TIME - PEAK: 7
OHS CV CPX DATA VE/VCO2 - AT: 33
OHS CV CPX DATA VE/VCO2 - PEAK: 32
OHS CV CPX DATA VE/VO2 - AT: 27
OHS CV CPX DATA VE/VO2 - PEAK: 28
OHS CV CPX DATA VO2 - AT: 18.1
OHS CV CPX DATA VO2 - PEAK: 23
OHS CV CPX DATA VO2 - REST: 7.2
OHS CV CPX ESTIMATED METS: 12
OHS CV CPX FEV1/FVC: 0.78
OHS CV CPX FORCED EXPIRATORY VOLUME: 3.48
OHS CV CPX FORCED VITAL CAPACITY (FVC): 4.46
OHS CV CPX HIGHEST VO: 29.4
OHS CV CPX MAX PREDICTED HEART RATE: 150
OHS CV CPX MAXIMAL VOLUNTARY VENTILATION (MVV) PREDICTED: 139.2
OHS CV CPX MAXIMAL VOLUNTARY VENTILATION (MVV): 130
OHS CV CPX MAXIUMUM EXERCISE VENTILATION (VE MAX): 45
OHS CV CPX PATIENT AGE: 70
OHS CV CPX PATIENT HEIGHT IN: 72
OHS CV CPX PATIENT IS FEMALE AGE 11-19: 0
OHS CV CPX PATIENT IS FEMALE AGE GREATER THAN 19: 0
OHS CV CPX PATIENT IS FEMALE AGE LESS THAN 11: 0
OHS CV CPX PATIENT IS FEMALE: 0
OHS CV CPX PATIENT IS MALE AGE 11-25: 0
OHS CV CPX PATIENT IS MALE AGE GREATER THAN 25: 1
OHS CV CPX PATIENT IS MALE AGE LESS THAN 11: 0
OHS CV CPX PATIENT IS MALE GREATER THAN 18: 1
OHS CV CPX PATIENT IS MALE LESS THAN OR EQUAL TO 18: 0
OHS CV CPX PATIENT IS MALE: 1
OHS CV CPX PATIENT WEIGHT RETURNED IN OZ: 2752
OHS CV CPX PEAK DIASTOLIC BLOOD PRESSURE: 56 MMHG
OHS CV CPX PEAK HEAR RATE: 94 BPM
OHS CV CPX PEAK RATE PRESSURE PRODUCT: NORMAL
OHS CV CPX PEAK SYSTOLIC BLOOD PRESSURE: 124 MMHG
OHS CV CPX PERCENT BODY FAT: 13.2
OHS CV CPX PERCENT MAX PREDICTED HEART RATE ACHIEVED: 63
OHS CV CPX PREDICTED VO2: 29.4 ML/KG/MIN
OHS CV CPX RATE PRESSURE PRODUCT PRESENTING: 4635
OHS CV CPX REST PET CO2: 32
OHS CV CPX VE/VCO2 SLOPE: 30.6
STRESS ECHO POST EXERCISE DUR MIN: 7 MINUTES
STRESS ECHO POST EXERCISE DUR SEC: 0 SECONDS
SYSTOLIC BLOOD PRESSURE: 103 MMHG

## 2024-05-01 PROCEDURE — 94621 CARDIOPULM EXERCISE TESTING: CPT

## 2024-05-01 PROCEDURE — 94621 CARDIOPULM EXERCISE TESTING: CPT | Mod: 26,,, | Performed by: INTERNAL MEDICINE

## 2024-05-02 ENCOUNTER — OFFICE VISIT (OUTPATIENT)
Dept: UROLOGY | Facility: CLINIC | Age: 69
End: 2024-05-02
Payer: MEDICARE

## 2024-05-02 ENCOUNTER — OFFICE VISIT (OUTPATIENT)
Dept: ORTHOPEDICS | Facility: CLINIC | Age: 69
End: 2024-05-02
Payer: MEDICARE

## 2024-05-02 ENCOUNTER — DOCUMENTATION ONLY (OUTPATIENT)
Dept: CARDIAC REHAB | Facility: CLINIC | Age: 69
End: 2024-05-02
Payer: MEDICARE

## 2024-05-02 VITALS
HEART RATE: 49 BPM | DIASTOLIC BLOOD PRESSURE: 57 MMHG | BODY MASS INDEX: 23.57 KG/M2 | SYSTOLIC BLOOD PRESSURE: 101 MMHG | WEIGHT: 174 LBS | HEIGHT: 72 IN

## 2024-05-02 VITALS — HEIGHT: 72 IN | WEIGHT: 175.25 LBS | BODY MASS INDEX: 23.74 KG/M2

## 2024-05-02 DIAGNOSIS — R97.20 ELEVATED PSA: Primary | ICD-10-CM

## 2024-05-02 DIAGNOSIS — N52.01 ERECTILE DYSFUNCTION DUE TO ARTERIAL INSUFFICIENCY: ICD-10-CM

## 2024-05-02 DIAGNOSIS — N13.8 BPH WITH URINARY OBSTRUCTION: ICD-10-CM

## 2024-05-02 DIAGNOSIS — N40.2 PROSTATE NODULE: ICD-10-CM

## 2024-05-02 DIAGNOSIS — M17.11 PRIMARY OSTEOARTHRITIS OF RIGHT KNEE: Primary | ICD-10-CM

## 2024-05-02 DIAGNOSIS — N40.1 BPH WITH URINARY OBSTRUCTION: ICD-10-CM

## 2024-05-02 PROBLEM — Z98.61 POSTSURGICAL PERCUTANEOUS TRANSLUMINAL CORONARY ANGIOPLASTY STATUS: Status: RESOLVED | Noted: 2024-02-09 | Resolved: 2024-05-02

## 2024-05-02 PROCEDURE — 99499 UNLISTED E&M SERVICE: CPT | Mod: S$PBB,,, | Performed by: PHYSICIAN ASSISTANT

## 2024-05-02 PROCEDURE — 20610 DRAIN/INJ JOINT/BURSA W/O US: CPT | Mod: S$PBB,RT,, | Performed by: PHYSICIAN ASSISTANT

## 2024-05-02 PROCEDURE — 99999 PR PBB SHADOW E&M-EST. PATIENT-LVL III: CPT | Mod: PBBFAC,,, | Performed by: PHYSICIAN ASSISTANT

## 2024-05-02 PROCEDURE — 99214 OFFICE O/P EST MOD 30 MIN: CPT | Mod: PBBFAC,27 | Performed by: UROLOGY

## 2024-05-02 PROCEDURE — 99213 OFFICE O/P EST LOW 20 MIN: CPT | Mod: PBBFAC | Performed by: PHYSICIAN ASSISTANT

## 2024-05-02 PROCEDURE — 99999PBSHW PR PBB SHADOW TECHNICAL ONLY FILED TO HB: Mod: JZ,PBBFAC,,

## 2024-05-02 PROCEDURE — 99999 PR PBB SHADOW E&M-EST. PATIENT-LVL IV: CPT | Mod: PBBFAC,,, | Performed by: UROLOGY

## 2024-05-02 PROCEDURE — 99214 OFFICE O/P EST MOD 30 MIN: CPT | Mod: S$PBB,,, | Performed by: UROLOGY

## 2024-05-02 PROCEDURE — 20610 DRAIN/INJ JOINT/BURSA W/O US: CPT | Mod: PBBFAC,RT | Performed by: PHYSICIAN ASSISTANT

## 2024-05-02 RX ORDER — ALFUZOSIN HYDROCHLORIDE 10 MG/1
10 TABLET, EXTENDED RELEASE ORAL DAILY
Qty: 30 TABLET | Refills: 11 | Status: SHIPPED | OUTPATIENT
Start: 2024-05-02 | End: 2025-04-27

## 2024-05-02 RX ORDER — FINASTERIDE 5 MG/1
5 TABLET, FILM COATED ORAL DAILY
Qty: 30 TABLET | Refills: 11 | Status: SHIPPED | OUTPATIENT
Start: 2024-05-02 | End: 2024-05-12

## 2024-05-02 RX ADMIN — Medication 20 MG: at 08:05

## 2024-05-02 NOTE — PROGRESS NOTES
Wero has completed 34 out of 36 exercise session of Phase II cardiac rehab.  A follow up reassessment will be completed at exit interview.    24 Session Follow Up   Cardiac Rehab Individual Treatment Plan - Reassessment      Patient Name: Wero Spangler MRN: [id number]   : 1955   Age: 68 y.o.   Primary Diagnosis: PTCA/STENT Date of Event: 23   EF: 55-60%  Risk Stratification: moderate  Referring Physician: YAMIL   Exercise Assessment:     Angina with exercise?: No   ST Depression with Exercise?: No  Fall Risk: Yes   Assistive Devices:  independent  Wero stated at orientation there were some limitations to exercise due to back pain but has been cleared and doing well.  Exercise modalities have been modified to meet the patient's needs and capabilities.  Comments on Progression: Wero is progressing fairly well and his workloads will continue to be increased as he tolerates exercise intensity.    Exercise Plan:   Goals:  CR Exercise Goals: Attend Cardiac Rehab 3 times/week: In Progress  Home Aerobic Exercise: 2 additional days/week for 30-60 minutes: In Progress  Intensity of 12-15 on the Rate of Perceived Exertion (RPE) scale: In Progress  30% increase in entry estimated METS: 11.07 : In Progress  5 days/week for 30-60 minutes: In Progress  Demonstrate proper pulse taking technique: In Progress    Comments on Goal Progression:  Patient Consistency: consistent with attendance  Home exercise? Yes: Walking; Frequency: 2 to 3 non-rehab days per week; Duration 42 minutes  Patient reports intensity rate 11-14 on RPE scale  Patient is progressing steadily  Patient is Able to demonstrate proper pulse taking technique with or without a fitness tracker.      Intervention/Recommendations:   Discussed importance of regular attendance to cardiac rehab class    Exercise Prescription:  THR Range 74-91   Mode: Treadmill  Nustep   Frequency:  3 days/week   Duration:  30-60 minutes   Intensity:  12-15 RPE   Resistance  Training:  Yes: 5 to 7 lb weights with 10-15 reps based on strength and range of motion and adjusted accordingly     Home Prescription:  Mode Aerobic exercise   Frequency: 2- 3 days/week   Duration: 30-60 minutes   Resistance Training: None     Education:  Exercise Terminology; verbalizes understanding; Date: 3-4-24  Exercise Program; verbalizes understanding; Date: 3-18-24  Resistance Training II; verbalizes understanding; Date: 24  Arthritis; verbalizes understanding; Date: 24  Osteoporosis; verbalizes understanding; Date: 24    Comments:  I reviewed exercise recommendations with Wero.  I encouraged him to continue exercising.  He stated understanding.     The exercise prescription will continue to be adjusted based on tolerance of exercise intensity by patient.    Prosper Berrios, CEP    24 Session Follow Up   Cardiac Rehab Individual Treatment Plan - Reassessment    Patient Name: Wero Spangler MRN: [id number]   : 1955   Age: 68 y.o.   Primary Diagnosis: PTCA/stent    Nutrition Assessment:     Anthropometrics    Height 72 inches   Weight 175.8 lbs   BMI 24     Patient confirms he is taking lipitor 20mg for cholesterol control.  Difficulty Chewing or Swallowing: No  Current Exercise: See Exercise Physiologist Note  Food Safety/Food Preparation: self  Living Arrangements/Family Support: Lives alone  Cultural/Spiritual/Personal Preferences: not applicable   Barriers to Education: none identified  Stage of Change Related to Diet Habits: Maintenance  Recent Changes to Diet: No  Food Diary: Completed      Nutrition Plan:   Goals:  LDL-C < 70 (for high risk patients)  Hgb A1c < 7%  BMI < 25 and abdominal girth < 40M/<35 F  2 gram sodium, Mediterranean diet: In Progress  Fish intake (non-fried varieties) to a goal of 2-3 servings per week: In Progress  Increase fruit and vegetable intake: In Progress    Comments on Goal Progression:  Pt states overall he is feeling better although currently  recovering from recent URI for which he is on ABT.    Interventions:  Dietitian Consult: No  Patient to participate in Cardiac Rehab sessions three times a week  Weekly Dietitian Weight Check  Nutrition Recommendations Provided: Verbal and Written, Reviewed  Follow Up Plan for Ongoing Self-Management Support    Education:  Vitamins; verbalizes understanding; Date: 24  Pre/Probiotics; verbalizes understanding; Date: 3/6/24  Sugar & Carbohydrates; verbalizes understanding; Date: 4/3/24    Comments:   Discussed ways to incorporate healthy snacks, eating on a schedule, and monitoring sodium intake for heart health.    Diabetes  Is the patient diabetic? No      Petrona Mitchell MS, RDN/LDN    Session: 24 Session Follow Up   Cardiac Rehab Individual Treatment Plan - Reassessment      Patient Name: Wero Spangler MRN: [id number]   : 1955   Age: 68 y.o.   Primary Diagnosis: PTCA/STENT 2023      Psychosocial Assessment:   Living Arrangements: Lives alone  Family Support: daughters and family  Self Reported: decrease Effective Coping Skills  Displays: happiness and calmness  Medication:  yes, xanax prn    Psychosocial Plan:   Goals:  Verbalizes coping mechanisms: Met  Maintain positive support system: Met  Maintain positive outlook: Met  Improve overall quality of life: In Progress    Comments on Goal Progression:  Pt states he is feeling stronger more confident and this has helped lessen his previous anxieties.    Interventions:  Patient to Self Report Emotional Changes at Session Check In  Recommend Physical Activity  Recommend Attending Education Lectures  Notify MD: No  Program Referral: No  Pharmaceutical Intervention/Therapy: Yes  Other Needs: not applicable  Stage of Readiness to Change: Action    Education:  Stress; verbalizes understanding; Date: 2024  Risk factors; verbalizes understanding; Date: 3/15/2024    Pt denies any overwhelming stress or anxiety at this time.  Patient has been  instructed to notify staff in the event that circumstances worsen.  Patient verbalizes understanding.    Other Core Components/Risk Factors Assessment:   RISK FACTORS:  hypertension, positive family history, sedentary lifestyle    Learning Barriers: None    Medication Compliance: has been compliant with taking medications    Other Core Components/Risk Factors Plan:   Goals:  Decrease cholesterol level: In Progress  Increase exercise tolerance: Met  Increase knowledge of CAD: In Progress  Learn more about healthy eating: In Progress    Comments on Goal Progression:  Pt has maintained his weight during rehab and states he is eating much better. Pt states he is exercising on days that he is not in cardiac rehab. Pt has good support from his 2 daughters and it reinforces his improved habits. Pt states he is feeling stronger and more confident from exercising.    Interventions:  Individual Education/ Counseling: Yes  Physician Referral: No    Education:    cardiac interventions, verbalizes understanding; Date: 3/01/2024  fluid overload/CHF, verbalizes understanding; Date: 3/08/2024  risk factors, verbalizes understanding; Date: 3/15/2024         Education method adapted to patients education level and preferred method of learning.  Method: explanation  demonstration  handouts        Other Core Components/Hypertension Assessment:   BP Range:  systolic; 58-72 diastolic  BP at Goal: Yes  Patient reported symptoms: tiredness/fatigue    Medications:  Medication Prescribed? Adherent? Exception   Beta-blocker [x]Yes  []No  []Unknown [x]Yes  []No  []Unknown    ACEI/ARB []Yes  []No  []Unknown []Yes  []No  []Unknown    Calcium Channel Blocker []Yes  []No  []Unknown []Yes  []No  []Unknown    Diuretic [x]Yes  []No  []Unknown []Yes  []No  []Unknown  Discontinued 3/2/2024       Other Core Components/Hypertension Plan:   Goals:  Blood Pressure <130/80    Comments on Goal Progression:  Pt was having low blood pressure readings  and not feeling well. Dr. Palacios notified and dc'd his thiazide diuretic. Pt is feeling better and maintaining his goal of <130/80 readings. B/P pt is monitoring his blood pressure daily when not at cardiac rehab.    Interventions:  Med Card Reconciled: Yes  Encourage medication compliance  Encourage sodium reduction  Recommend physical activity  Educate on contributory factors  Reduce stress, anxiety, anger, depression, and/or chronic pain  Encourage home blood pressure monitoring  Recommend daily weights  MD notified/Physician Referral: No    Education:    Coronary Artery Disease; verbalizes understanding; Date: 3/01/2024  Risk Factors; verbalizes understanding; Date: 3/15/2024             Does the patient have Heart Failure? No      Other Core Components/Tobacco Cessation Assessment:   Smoking Status: Former (>6 months)  Primary Tobacco Type: N/A  Tobacco Usage: no  Smoking Cessation Barriers:  N/A  Stage of Readiness to Change: Maintenance    Other Core Components/Tobacco Cessation Plan:   Goals:  Maintain non-smoking status    Comments on Goal Progression:  Pt no longer smokes.    Interventions:  Maintains non-smoking status    Education:    Risk Factors; verbalizes understanding; Date: 3/15/2024          Comments:   Pt does not use tobacco products.    Roby Arzola RN

## 2024-05-02 NOTE — PROGRESS NOTES
Wero Spangler is a 68 y.o. year old his here today for his 1st Euflexxa injection for degenerative changes of his right knee . he was last seen and treated in the clinic on 8/3/2023. There has been no significant change in his medical status since his last visit. No Fever, chills, malaise, or unexplained weight change.      Allergies, Medications, past medical and surgical history were reviewed .    Examination of the knee demonstrates  No evidence of edema, erythema , echymosis strength and range of motion are unchanged from previous visit.    The risks, benefits, pros, cons, and potential side effects of the procedure were discussed with the patient in detail all questions were answered.  The patient is comfortable and willing to proceed with the procedure. Verbal consent was obtained and the proper joint was identified by the patient and provider.     The injection site was identified and the skin was prepared with a betadine solution. The  right knee  joint was injected with 2 ml of Euflexxa solution under sterile technique. Wero Spangler tolerated the procedure well, he was advised to rest the knee today, ice and elevation. I may take 3 -6 weeks following the last injection to get the full benefit of the medication.  I will see him back in 1 week. Sooner if he has any problems or concerns.           .     ICD-10-CM ICD-9-CM   1. Primary osteoarthritis of right knee  M17.11 715.16

## 2024-05-02 NOTE — PROGRESS NOTES
CHIEF COMPLAINT:    Mr. Spangler is a 68 y.o. male presenting with an elevated PSA.    PRESENTING ILLNESS:    Wero Spangler is a 68 y.o. male with an elevated PSA.  He has had multiple biopsies done.  One was in 2012 when his PSA was 10.14.  There was also a prostate nodule at that time.  Most recent one was 8/23/16 when his PSA was 18.1.  This was a cognitive fusion biopsy.    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  Path returned an oncocytoma.    He also has LUTS.  He's s/p rezum on 5/14/19.  He's voiding much better.  Good FOS. However, he does c/o irritative symptoms that are worsened by caffeine and alcohol. Currently on uroxatral.   Nocturia has increased to every 2 hours.  Would like to add proscar.    He c/o ED.  He's tried Cialis with good results, but he c/o some decreased sensation.  His T is normal.  He is s/p an angioplasty and now has an Rx for NTG.    REVIEW OF SYSTEMS:    Wero Spangler denies chest pain,sore throat, headache, blurred vision, fever, nausea, vomiting, chills, flank discomfort, abdominal pain, bleeding per rectum, cough, SOB, recent loss of consciousness, recent mental status changes, seizures, dizziness, or upper or lower extremity weakness.    JOSE MANUEL  1. 2  2. 2  3. 2  4. 3  5. 2     PATIENT HISTORY:    Past Medical History:   Diagnosis Date    Aftercataract     Allergy     BPH (benign prostatic hyperplasia)     Cataract     Corneal dystrophy     Elevated PSA     Enlarged prostate     Fatty liver     Fuchs' corneal dystrophy     Gallstones     General anesthetics causing adverse effect in therapeutic use 2000    delayed emergence after back surgery    GERD (gastroesophageal reflux disease)     HTN (hypertension) 9/24/2013    Hypertension     Insomnia     Prostate nodule     Urinary tract infection        Past Surgical History:   Procedure Laterality Date    BACK SURGERY  4/2000    CATARACT EXTRACTION W/  INTRAOCULAR LENS IMPLANT      Both Eyes    CORNEAL TRANSPLANT  Right 2019    Procedure: TRANSPLANT, CORNEA;  Surgeon: Vu Wilson MD;  Location: [facility];  Service: Ophthalmology;  Laterality: Right;  DMEK    EYE SURGERY      LAPAROSCOPIC MARSUPIALIZATION OF CYST OF KIDNEY      PROSTATE SURGERY      prostate biopsy benign    TONSILLECTOMY      VASECTOMY         Family History   Problem Relation Name Age of Onset    Cancer Mother          breast    Prostate cancer Brother prostate     Cancer Brother prostate         prostate    Macular degeneration Maternal Grandmother      Cancer Other      Cancer Other      Amblyopia Neg Hx      Blindness Neg Hx      Cataracts Neg Hx      Glaucoma Neg Hx      Retinal detachment Neg Hx      Strabismus Neg Hx         Social History     Socioeconomic History    Marital status:    Tobacco Use    Smoking status: Former     Current packs/day: 0.00     Types: Cigarettes     Quit date:      Years since quittin.3    Smokeless tobacco: Never   Substance and Sexual Activity    Alcohol use: Not Currently    Drug use: No    Sexual activity: Yes     Partners: Female     Social Determinants of Health     Financial Resource Strain: Low Risk  (2023)    Overall Financial Resource Strain (CARDIA)     Difficulty of Paying Living Expenses: Not hard at all   Food Insecurity: No Food Insecurity (2023)    Hunger Vital Sign     Worried About Running Out of Food in the Last Year: Never true     Ran Out of Food in the Last Year: Never true   Transportation Needs: No Transportation Needs (2023)    PRAPARE - Transportation     Lack of Transportation (Medical): No     Lack of Transportation (Non-Medical): No   Physical Activity: Sufficiently Active (2023)    Exercise Vital Sign     Days of Exercise per Week: 3 days     Minutes of Exercise per Session: 50 min   Recent Concern: Physical Activity - Insufficiently Active (10/30/2023)    Exercise Vital Sign     Days of Exercise per Week: 3 days     Minutes of Exercise per  Session: 40 min   Stress: No Stress Concern Present (11/27/2023)    [employer] - Occupational Stress Questionnaire     Feeling of Stress : Only a little   Recent Concern: Stress - Stress Concern Present (10/30/2023)    [employer] - Occupational Stress Questionnaire     Feeling of Stress : To some extent   Housing Stability: Low Risk  (11/27/2023)    Housing Stability Vital Sign     Unable to Pay for Housing in the Last Year: No     Number of Places Lived in the Last Year: 1     Unstable Housing in the Last Year: No       Allergies:  Patient has no known allergies.    Medications:    Current Outpatient Medications:     alfuzosin (UROXATRAL) 10 mg Tb24, Take 1 tablet (10 mg total) by mouth once daily., Disp: 30 tablet, Rfl: 11    ALPRAZolam (XANAX) 0.5 MG tablet, TAKE 1 TABLET BY MOUTH EVERY NIGHT, Disp: 30 tablet, Rfl: 0    aspirin 81 MG Chew, Take 81 mg by mouth once daily., Disp: , Rfl:     atorvastatin (LIPITOR) 20 MG tablet, Take 1 tablet (20 mg total) by mouth once daily., Disp: 90 tablet, Rfl: 3    bisoprolol (ZEBETA) 2.5 MG Tab split tablet, Take 2.5 mg by mouth once daily., Disp: , Rfl:     efinaconazole (JUBLIA) 10 % Faviola, APPLY TO AFFECTED NAIL DAILY. REMOVE WEEKLY, Disp: 8 mL, Rfl: 1    fluticasone propionate (FLONASE) 50 mcg/actuation nasal spray, 2 sprays (100 mcg total) by Each Nostril route once daily., Disp: 16 g, Rfl: 0    lansoprazole (PREVACID) 15 MG capsule, Take 1 capsule (15 mg total) by mouth once daily., Disp: 90 capsule, Rfl: 12    nitroGLYCERIN (NITROSTAT) 0.4 MG SL tablet, Place 1 tablet (0.4 mg total) under the tongue every 5 (five) minutes as needed for Chest pain., Disp: 90 tablet, Rfl: 3    sodium chloride 2% (BARBI 128) 2 % ophthalmic solution, 1 drop as needed (takes 3-4 times/day)., Disp: , Rfl:     ticagrelor (BRILINTA) 90 mg tablet, Take 1 tablet (90 mg total) by mouth 2 (two) times daily., Disp: 180 tablet, Rfl: 3     triamterene-hydrochlorothiazide 37.5-25 mg (MAXZIDE-25) 37.5-25 mg per tablet, Take 1 tablet by mouth once daily., Disp: 90 tablet, Rfl: 11    Current Facility-Administered Medications:     sodium hyaluronate (EUFLEXXA) 10 mg/mL(mw 2.4 -3.6 million) injection 20 mg, 20 mg, Intra-articular, Weekly, Cesar King PA-C, 20 mg at 05/02/24 0847    PHYSICAL EXAMINATION:    The patient generally appears in good health, is appropriately interactive, and is in no apparent distress.     Eyes: anicteric sclerae, moist conjunctivae; no lid-lag; PERRLA     HENT: Atraumatic; oropharynx clear with moist mucous membranes and no mucosal ulcerations;normal hard and soft palate.  No evidence of lymphadenopathy.    Neck: Trachea midline.  No thyromegaly.    Musculoskeletal: No abnormal gait.    Skin: No lesions.    Mental: Cooperative with normal affect.  Is oriented to time, place, and person.    Neuro: Grossly intact.    Chest: Normal inspiratory effort.   No accessory muscles.  No audible wheezes.  Respirations symmetric on inspiration and expiration.    Heart: Regular rhythm.      Abdomen:  Soft, non-tender. No masses or organomegaly. Bladder is not palpable. No evidence of flank discomfort. No evidence of inguinal hernia.    Genitourinary: The penis is not circumcised with no evidence of plaques or induration. The urethral meatus is normal. The testes, epididymides, and cord structures are normal in size and contour bilaterally. The scrotum is normal in size and contour.    Normal anal sphincter tone. No rectal mass.    The prostate is 40 g. Normal landmarks. Lateral sulci. Median furrow intact. There is a 1.5 cm x 1.5 cm nodule in the midportion of the gland. Stable.  Seminal vesicles are normal.    Extremities: No clubbing, cyanosis, or edema      LABS:      Lab Results   Component Value Date    PSA 14.0 (H) 09/22/2014    PSA 15.48 (H) 08/16/2013    PSA 11.06 (H) 02/25/2013    PSADIAG 18.1 (H) 05/01/2024    PSADIAG 13.3  (H) 10/27/2023    PSADIAG 13.3 (H) 05/09/2023    PSATOTAL 6.8 (H) 07/12/2011    PSATOTAL 7.9 (H) 01/11/2011    PSAFREE 1.07 07/12/2011    PSAFREE 1.67 (H) 01/11/2011    PSAFREEPCT 15.74 07/12/2011    PSAFREEPCT 21.14 01/11/2011        IMPRESSION:    Encounter Diagnoses   Name Primary?    Elevated PSA Yes    BPH with urinary obstruction     Prostate nodule     Erectile dysfunction due to arterial insufficiency    HTN, stable    PLAN:    1. Discussed options for his LUTS.  Continue uroxatral.  Refilled.   Will also start proscar. Side effects discussed.  A new Rx was given.  Discussed this will artificially lower his PSA.  2. Discussed options for his ED (affected by above comorbidities). Stop Cialis given his active Rx for NTG.  He'd like to think about [facility].  3.  Went over his PSA.  Discussed that it's increased, but it's not higher than when it was when he had a prior biopsy.  4. RTC 6 months with a PSA.        Copy to:

## 2024-05-03 ENCOUNTER — TELEPHONE (OUTPATIENT)
Dept: CARDIAC REHAB | Facility: CLINIC | Age: 69
End: 2024-05-03

## 2024-05-03 ENCOUNTER — CLINICAL SUPPORT (OUTPATIENT)
Dept: CARDIAC REHAB | Facility: CLINIC | Age: 69
End: 2024-05-03
Payer: MEDICARE

## 2024-05-03 DIAGNOSIS — I25.10 ATHEROSCLEROSIS OF NATIVE CORONARY ARTERY OF NATIVE HEART, UNSPECIFIED WHETHER ANGINA PRESENT: ICD-10-CM

## 2024-05-03 DIAGNOSIS — Z98.61 POSTSURGICAL PERCUTANEOUS TRANSLUMINAL CORONARY ANGIOPLASTY STATUS: Primary | ICD-10-CM

## 2024-05-03 PROCEDURE — 93798 PHYS/QHP OP CAR RHAB W/ECG: CPT | Mod: PBBFAC,PO | Performed by: INTERNAL MEDICINE

## 2024-05-03 PROCEDURE — 93798 PHYS/QHP OP CAR RHAB W/ECG: CPT | Mod: S$PBB,,, | Performed by: INTERNAL MEDICINE

## 2024-05-03 NOTE — TELEPHONE ENCOUNTER
----- Message from Pato Cobb sent at 5/3/2024  4:04 PM CDT -----  Regarding: Returning call  Pt your call about paperwork.    Contact @ [phone]    Thanks

## 2024-05-03 NOTE — TELEPHONE ENCOUNTER
Patient was given cardiac rehab questionnaire & skipped a page.  Will complete when he attends his exit interview on 5/9/2024.  Kimmy Sheppard RN  Cardiac Rehab Nurse

## 2024-05-07 NOTE — PROGRESS NOTES
HISTORY: S/P PTCA/STENT (9-28-23), S/P NSTEMI, CAD, HTN, HLP, EF=55-60%(10-31-23)    ANTHROPOMETRICS:     PRE POST   Abdominal girth (in) 37.5 37.1   Height (in) 72 72   Weight (lbs) 176 172   BMI 24.0 23.3   % Body Fat 12.9% 13.2%     EXERCISE RESULTS:     PRE POST   Peak VO2 (CPX only) 18.8 23.0   Actual METS (CPX only) 5.37 6.6   Estimated METS 9.0 12.0       HOME PRESCRIPTION: Congratulations, Wero.  You completed Cardiac Rehab.  Aerobic exercise frequency is recommended 5 to 6 times per week with a duration of 30 to 60 minutes.  Intensity should keep you in your target heart range of 73 to 87 beats per minute.  Include a 3 to 5 minute warm up as well as cool down with stretches.  Resistance training is recommended 2 to 3 days per week on non-consecutive days.  Good luck to you.  Keep up the hard work, and it has been a pleasure working with you.      LAB RESULTS:    Lab Results   Component Value Date    HGB 13.5 (L) 05/01/2024    HGB 14.1 03/23/2024    HGB 13.8 (L) 01/09/2024     Lab Results   Component Value Date    HCT 40.3 05/01/2024    HCT 41.8 03/23/2024    HCT 41.7 01/09/2024     Lab Results   Component Value Date    MPV 9.7 05/01/2024    MPV 9.8 03/23/2024    MPV 10.0 01/09/2024       Lab Results   Component Value Date    CHOL 122 05/01/2024    CHOL 112 (L) 12/20/2023    CHOL 149 11/07/2023     Lab Results   Component Value Date    HDL 39 (L) 05/01/2024    HDL 38 (L) 12/20/2023    HDL 43 11/07/2023     Lab Results   Component Value Date    LDLCALC 65.2 05/01/2024    LDLCALC 60.8 (L) 12/20/2023    LDLCALC 90.2 11/07/2023     Lab Results   Component Value Date    TRIG 89 05/01/2024    TRIG 66 12/20/2023    TRIG 79 11/07/2023     Lab Results   Component Value Date    CHOLHDL 32.0 05/01/2024    CHOLHDL 33.9 12/20/2023    CHOLHDL 28.9 11/07/2023       Lab Results   Component Value Date    GLUF 100 05/01/2024    GLUF 98 11/13/2023     Lab Results   Component Value Date    HGBA1C 4.9 07/25/2022    HGBA1C 4.9  07/22/2021    HGBA1C 5.0 01/30/2020        Lab Results   Component Value Date    HSCRP 0.33 05/01/2024    HSCRP 4.34 (H) 11/13/2023         MARISOL SCORES:     PRE POST   Anxiety 3 1   Depression 2 0   Somatic 3 0   Hostility 0 0     SF-36 SCORES:     PRE POST   Physical Function 25 26   Social Function 8 11   Mental Health 21 27   Pain 5 10   Change in Health 2 5   Physical Role Limitation 2 4   Mental Role Limitation 1 3   Energy/Fatigue 15 15   Health Perceptions 19 20   Total Score 98 121     PHQ-9:        1/16/2024     9:16 AM 4/23/2024     1:30 PM 5/9/2024     9:24 AM   PHQ-9 Depression Patient Health Questionnaire   Over the last two weeks how often have you been bothered by little interest or pleasure in doing things 0 0 0   Over the last two weeks how often have you been bothered by feeling down, depressed or hopeless 0 0 0   Over the last two weeks how often have you been bothered by trouble falling or staying asleep, or sleeping too much 1  1   Over the last two weeks how often have you been bothered by feeling tired or having little energy 1  0   Over the last two weeks how often have you been bothered by a poor appetite or overeating 1  0   Over the last two weeks how often have you been bothered by feeling bad about yourself - or that you are a failure or have let yourself or your family down 0  0   Over the last two weeks how often have you been bothered by trouble concentrating on things, such as reading the newspaper or watching television 0  0   Over the last two weeks how often have you been bothered by moving or speaking so slowly that other people could have noticed. 0  0   Over the last two weeks how often have you been bothered by thoughts that you would be better off dead, or of hurting yourself 0  0   If you checked off any problems, how difficult have these problems made it for you to do your work, take care of things at home or get along with other people? Not difficult at all  Not  difficult at all   PHQ-9 Score 3  1                  EDUCATION SCORES:     PRE POST   Education Score 70 100

## 2024-05-07 NOTE — PROGRESS NOTES
Session: Exit     Cardiac Rehab Individual Treatment Plan - Discharge Assessment      Patient Name: Wero Spangler MRN: [id number]   : 1955   Age: 68 y.o.   Primary Diagnosis: PTCA/STENT  Date of Event: 23  EF: 55-60%  Risk Stratification: moderate  Referring Physician: YAMIL   Exercise Assessment:     CPX/TM: Entry Results Exit Results    Date: 24 Date: 24   RHR 52 45   Max HR 98 94   Peak VO2 (CPX only) 18.8 23.0   Actual METS (CPX only) 5.37 6.6   Estimated METS 9.0 12.0     Anthropometrics Entry Results Exit Results   Height 72 inches 72 inches   Weight 176 lbs 172 lbs   BMI 24.0  23.3   Abdominal Girth 37.5 37.1   Body Composition 12.9% 13.2%     Angina with exercise?: No   ST Depression with Exercise?: No  Currently exercising at home? yes Walking; Frequency: 3 days per week; Duration 40 minutes    Education:  Flexibility/Stretching; verbalizes understanding; Date: 24  After Phase II; verbalizes understanding; Date: 4-15-24    Rehab Exercise Goals:  Attend Cardiac Rehab 3 times/week: Met  Home Aerobic Exercise: 2 additional days/week for 30-60 minutes: Met  Intensity of 12-15 on the Rate of Perceived Exertion (RPE) scale: Met  30% increase in entry estimated METS: 11.7 : Met  Demonstrate proper pulse taking technique: Met    Comments: Mr. Conteh attended rehab regularly and had significant improvement in cardiovascular fitness.      Exercise Discharge Plan:     Intervention/Recommendations:       Recommended Home Prescription:  THR Range: 73-87   Mode: Aerobic   Frequency: 5-6 times per week   Duration: 30-60 minutes each day   Other Recs: 3-5 minute warm up and cool down/stretches   Resistance Trainin-3 days per week on non-consecutive days       Comments:    I met with Mr. Conteh for an exit interview from the Phase II cardiac rehab program.  The entry and exit stress testing results were discussed as well as current and future exercise programs.     Patient's plan: Mr. Conteh is  trying to join a gym but is having a difficult time finding one.  He has been waiting for clearance to go to [place] but hasn't been able to get the paperwork completed due to doctor clearance.  Currently he is walking 3 days per week for 40 minutes.    I reviewed exercise recommendations with Mr. Conteh and encouraged him to continue exercising.  We attempted to find a gym close to his home but was unsuccessful.  After our meeting, I printed a list in the [place] area.  I asked him to call if he has any questions.  He stated understanding.    Buffy Berrios., CEP

## 2024-05-08 ENCOUNTER — PATIENT MESSAGE (OUTPATIENT)
Dept: INTERNAL MEDICINE | Facility: CLINIC | Age: 69
End: 2024-05-08
Payer: MEDICARE

## 2024-05-08 ENCOUNTER — PATIENT MESSAGE (OUTPATIENT)
Dept: UROLOGY | Facility: CLINIC | Age: 69
End: 2024-05-08
Payer: MEDICARE

## 2024-05-09 ENCOUNTER — CLINICAL SUPPORT (OUTPATIENT)
Dept: CARDIAC REHAB | Facility: CLINIC | Age: 69
End: 2024-05-09
Payer: MEDICARE

## 2024-05-09 ENCOUNTER — OFFICE VISIT (OUTPATIENT)
Dept: ORTHOPEDICS | Facility: CLINIC | Age: 69
End: 2024-05-09
Payer: MEDICARE

## 2024-05-09 DIAGNOSIS — M17.11 PRIMARY OSTEOARTHRITIS OF RIGHT KNEE: Primary | ICD-10-CM

## 2024-05-09 DIAGNOSIS — I25.10 CORONARY ARTERY DISEASE, UNSPECIFIED VESSEL OR LESION TYPE, UNSPECIFIED WHETHER ANGINA PRESENT, UNSPECIFIED WHETHER NATIVE OR TRANSPLANTED HEART: ICD-10-CM

## 2024-05-09 DIAGNOSIS — Z98.61 POSTSURGICAL PERCUTANEOUS TRANSLUMINAL CORONARY ANGIOPLASTY STATUS: Primary | ICD-10-CM

## 2024-05-09 PROCEDURE — 99499 UNLISTED E&M SERVICE: CPT | Mod: S$PBB,,, | Performed by: PHYSICIAN ASSISTANT

## 2024-05-09 PROCEDURE — 93798 PHYS/QHP OP CAR RHAB W/ECG: CPT | Mod: PBBFAC,PO

## 2024-05-09 PROCEDURE — 99999PBSHW PR PBB SHADOW TECHNICAL ONLY FILED TO HB: Mod: JZ,PBBFAC,,

## 2024-05-09 PROCEDURE — 99213 OFFICE O/P EST LOW 20 MIN: CPT | Mod: PBBFAC,25 | Performed by: PHYSICIAN ASSISTANT

## 2024-05-09 PROCEDURE — 20610 DRAIN/INJ JOINT/BURSA W/O US: CPT | Mod: S$PBB,RT,, | Performed by: PHYSICIAN ASSISTANT

## 2024-05-09 PROCEDURE — 99999 PR PBB SHADOW E&M-EST. PATIENT-LVL III: CPT | Mod: PBBFAC,,,

## 2024-05-09 PROCEDURE — 93798 PHYS/QHP OP CAR RHAB W/ECG: CPT | Mod: S$PBB,,, | Performed by: INTERNAL MEDICINE

## 2024-05-09 PROCEDURE — 20610 DRAIN/INJ JOINT/BURSA W/O US: CPT | Mod: PBBFAC,RT | Performed by: PHYSICIAN ASSISTANT

## 2024-05-09 PROCEDURE — 99213 OFFICE O/P EST LOW 20 MIN: CPT | Mod: PBBFAC,27,PO,25

## 2024-05-09 PROCEDURE — 99999 PR PBB SHADOW E&M-EST. PATIENT-LVL III: CPT | Mod: PBBFAC,,, | Performed by: PHYSICIAN ASSISTANT

## 2024-05-09 RX ADMIN — Medication 20 MG: at 07:05

## 2024-05-09 NOTE — PROGRESS NOTES
Exit   Cardiac Rehab Individual Treatment Plan - Discharge Assessment      Patient Name: Wero Spangler MRN: [id number]   : 1955   Age: 68 y.o.   Primary Diagnosis: PTCA/stent    Nutrition Assessment:     Anthropometrics Pre Post   Height 72 inches 72 inches   Weight 176 lbs 172 lbs   BMI 24 23.3   Abdominal Girth 37.5 37.1   Body Composition 12.9 13.2       Labs:  Patient confirms he is taking lipitor 20mg for cholesterol control.    Lab Results   Component Value Date    CHOL 122 2024    CHOL 112 (L) 2023    CHOL 149 2023     Lab Results   Component Value Date    HDL 39 (L) 2024    HDL 38 (L) 2023    HDL 43 2023     Lab Results   Component Value Date    LDLCALC 65.2 2024    LDLCALC 60.8 (L) 2023    LDLCALC 90.2 2023     Lab Results   Component Value Date    TRIG 89 2024    TRIG 66 2023    TRIG 79 2023     Lab Results   Component Value Date    CHOLHDL 32.0 2024    CHOLHDL 33.9 2023    CHOLHDL 28.9 2023       Lab Results   Component Value Date    GLUF 100 2024     Lab Results   Component Value Date    HGBA1C 4.9 2022       Nutrition/Diet History:  Patient eats 2-3 meals daily.    Seasons food with pepper/Mrs Dash occasionally.  Patient denies use of a salt shaker at the table on prepared foods.   Dines out 3-4 per week.  Chooses fried foods rarely   Chooses fish 2-3 time(s) per week.   Beverages:  water and coffee without sugar  Alcohol: none  Current Exercise: See Exercise Physiologist Note  Food Safety/Food Preparation: self  Living Arrangements/Family Support: Lives alone  Stage of Change Related to Diet Habits: Maintenance  Recent Changes to Diet: No  Food Diary: Completed      Nutrition Plan:   Goals:  2 gram sodium, Mediterranean diet: Met  Increase fish intake (non-fried varieties) to a goal of 2-3 servings per week: Met  Increase fruit and vegetable intake: Met    Comments of Goal Progression:  Pt  states he feels much better and is more confident about exercise abilities. He has been much more conscious of diet and incorporating whole foods and more produce regularly as well as limiting sodium. Family is very supportive and encouraging    Interventions/Recommendations:  Reviewed Anthropometric Progress  Reviewed Pre and Post Labs  Dietitian Consult: No  Nutrition Recommendations Provided: Verbal and Written, Reviewed  Discussed follow up plan for ongoing self-management support    Education:  Food Labels; verbalizes understanding; Date: 4/24/24  Recipe Modication; verbalizes understanding; Date: 4/10/24  Seafood; verbalizes understanding; Date: 4/17/24    Comments:   Discussed ways to continue to incorporate healthy snacks, eating on a schedule, and monitoring sodium intake for heart health.    Diabetes  Is the patient diabetic? No      Petrona Mitchell MS, RDN/LDN

## 2024-05-09 NOTE — PROGRESS NOTES
Wero Spangler is a 68 y.o. year old his here today for his 2nd Euflexxa injection for degenerative changes of his right knee . he was last seen and treated in the clinic on 5/2/2024. There has been no significant change in his medical status since his last visit. No Fever, chills, malaise, or unexplained weight change.      Allergies, Medications, past medical and surgical history were reviewed .    Examination of the knee demonstrates  No evidence of edema, erythema , echymosis strength and range of motion are unchanged from previous visit.    The risks, benefits, pros, cons, and potential side effects of the procedure were discussed with the patient in detail all questions were answered.  The patient is comfortable and willing to proceed with the procedure. Verbal consent was obtained and the proper joint was identified by the patient and provider.     The injection site was identified and the skin was prepared with a betadine solution. The  right knee  joint was injected with 2 ml of Euflexxa solution under sterile technique. Wero Spangler tolerated the procedure well, he was advised to rest the knee today, ice and elevation. I may take 3 -6 weeks following the last injection to get the full benefit of the medication.  I will see him back in 1 week. Sooner if he has any problems or concerns.           .     ICD-10-CM ICD-9-CM   1. Primary osteoarthritis of right knee  M17.11 715.16

## 2024-05-12 ENCOUNTER — HOSPITAL ENCOUNTER (EMERGENCY)
Facility: HOSPITAL | Age: 69
Discharge: HOME | End: 2024-05-12
Attending: EMERGENCY MEDICINE
Payer: MEDICARE

## 2024-05-12 VITALS
SYSTOLIC BLOOD PRESSURE: 133 MMHG | HEIGHT: 72 IN | HEART RATE: 55 BPM | RESPIRATION RATE: 18 BRPM | TEMPERATURE: 98 F | BODY MASS INDEX: 23.6 KG/M2 | OXYGEN SATURATION: 100 % | DIASTOLIC BLOOD PRESSURE: 61 MMHG

## 2024-05-12 DIAGNOSIS — R42 DIZZINESS: Primary | ICD-10-CM

## 2024-05-12 DIAGNOSIS — R00.1 BRADYCARDIA: ICD-10-CM

## 2024-05-12 PROBLEM — R29.898 BILATERAL LEG WEAKNESS: Status: ACTIVE | Noted: 2024-05-12

## 2024-05-12 LAB
ALBUMIN SERPL BCP-MCNC: 3.9 G/DL (ref 3.5–5.2)
ALP SERPL-CCNC: 65 U/L (ref 55–135)
ALT SERPL W/O P-5'-P-CCNC: 19 U/L (ref 10–44)
ANION GAP SERPL CALC-SCNC: 10 MMOL/L (ref 8–16)
AST SERPL-CCNC: 25 U/L (ref 10–40)
BASOPHILS # BLD AUTO: 0.05 K/UL (ref 0–0.2)
BASOPHILS NFR BLD: 0.8 % (ref 0–1.9)
BILIRUB SERPL-MCNC: 1.3 MG/DL (ref 0.1–1)
BILIRUB UR QL STRIP: NEGATIVE
BUN SERPL-MCNC: 16 MG/DL (ref 6–30)
BUN SERPL-MCNC: 16 MG/DL (ref 8–23)
CALCIUM SERPL-MCNC: 9.3 MG/DL (ref 8.7–10.5)
CHLORIDE SERPL-SCNC: 100 MMOL/L (ref 95–110)
CHLORIDE SERPL-SCNC: 104 MMOL/L (ref 95–110)
CHOLEST SERPL-MCNC: 118 MG/DL (ref 120–199)
CHOLEST/HDLC SERPL: 3.3 {RATIO} (ref 2–5)
CLARITY UR REFRACT.AUTO: CLEAR
CO2 SERPL-SCNC: 26 MMOL/L (ref 23–29)
COLOR UR AUTO: YELLOW
CREAT SERPL-MCNC: 0.9 MG/DL (ref 0.5–1.4)
CREAT SERPL-MCNC: 1 MG/DL (ref 0.5–1.4)
CREAT SERPL-MCNC: 1.1 MG/DL (ref 0.5–1.4)
DIFFERENTIAL METHOD BLD: ABNORMAL
EOSINOPHIL # BLD AUTO: 0.1 K/UL (ref 0–0.5)
EOSINOPHIL NFR BLD: 1.1 % (ref 0–8)
ERYTHROCYTE [DISTWIDTH] IN BLOOD BY AUTOMATED COUNT: 14.5 % (ref 11.5–14.5)
EST. GFR  (NO RACE VARIABLE): >60 ML/MIN/1.73 M^2
GLUCOSE SERPL-MCNC: 84 MG/DL (ref 70–110)
GLUCOSE SERPL-MCNC: 91 MG/DL (ref 70–110)
GLUCOSE UR QL STRIP: NEGATIVE
HCT VFR BLD AUTO: 41.6 % (ref 40–54)
HCT VFR BLD CALC: 39 %PCV (ref 36–54)
HDLC SERPL-MCNC: 36 MG/DL (ref 40–75)
HDLC SERPL: 30.5 % (ref 20–50)
HGB BLD-MCNC: 14.3 G/DL (ref 14–18)
HGB UR QL STRIP: NEGATIVE
IMM GRANULOCYTES # BLD AUTO: 0.02 K/UL (ref 0–0.04)
IMM GRANULOCYTES NFR BLD AUTO: 0.3 % (ref 0–0.5)
INR PPP: 1.1 (ref 0.8–1.2)
KETONES UR QL STRIP: NEGATIVE
LDLC SERPL CALC-MCNC: 51.8 MG/DL (ref 63–159)
LEUKOCYTE ESTERASE UR QL STRIP: NEGATIVE
LYMPHOCYTES # BLD AUTO: 1.2 K/UL (ref 1–4.8)
LYMPHOCYTES NFR BLD: 18.2 % (ref 18–48)
MCH RBC QN AUTO: 31.9 PG (ref 27–31)
MCHC RBC AUTO-ENTMCNC: 34.4 G/DL (ref 32–36)
MCV RBC AUTO: 93 FL (ref 82–98)
MONOCYTES # BLD AUTO: 0.7 K/UL (ref 0.3–1)
MONOCYTES NFR BLD: 9.9 % (ref 4–15)
NEUTROPHILS # BLD AUTO: 4.6 K/UL (ref 1.8–7.7)
NEUTROPHILS NFR BLD: 69.7 % (ref 38–73)
NITRITE UR QL STRIP: NEGATIVE
NONHDLC SERPL-MCNC: 82 MG/DL
NRBC BLD-RTO: 0 /100 WBC
OHS QRS DURATION: 100 MS
OHS QTC CALCULATION: 388 MS
PH UR STRIP: 6 [PH] (ref 5–8)
PLATELET # BLD AUTO: 219 K/UL (ref 150–450)
PMV BLD AUTO: 10.2 FL (ref 9.2–12.9)
POC IONIZED CALCIUM: 1.24 MMOL/L (ref 1.06–1.42)
POC PTINR: 1.3 (ref 0.9–1.2)
POC PTWBT: 14.9 SEC (ref 9.7–14.3)
POC TCO2 (MEASURED): 29 MMOL/L (ref 23–29)
POCT GLUCOSE: 98 MG/DL (ref 70–110)
POTASSIUM BLD-SCNC: 3.7 MMOL/L (ref 3.5–5.1)
POTASSIUM SERPL-SCNC: 3.6 MMOL/L (ref 3.5–5.1)
PROT SERPL-MCNC: 6.6 G/DL (ref 6–8.4)
PROT UR QL STRIP: NEGATIVE
PROTHROMBIN TIME: 12 SEC (ref 9–12.5)
RBC # BLD AUTO: 4.48 M/UL (ref 4.6–6.2)
SAMPLE: ABNORMAL
SAMPLE: NORMAL
SAMPLE: NORMAL
SODIUM BLD-SCNC: 140 MMOL/L (ref 136–145)
SODIUM SERPL-SCNC: 140 MMOL/L (ref 136–145)
SP GR UR STRIP: 1.01 (ref 1–1.03)
TRIGL SERPL-MCNC: 151 MG/DL (ref 30–150)
TSH SERPL DL<=0.005 MIU/L-ACNC: 1.35 UIU/ML (ref 0.4–4)
URN SPEC COLLECT METH UR: NORMAL
WBC # BLD AUTO: 6.59 K/UL (ref 3.9–12.7)

## 2024-05-12 PROCEDURE — 93010 ELECTROCARDIOGRAM REPORT: CPT | Mod: ,,, | Performed by: INTERNAL MEDICINE

## 2024-05-12 PROCEDURE — 82330 ASSAY OF CALCIUM: CPT

## 2024-05-12 PROCEDURE — 80061 LIPID PANEL: CPT | Performed by: EMERGENCY MEDICINE

## 2024-05-12 PROCEDURE — 82565 ASSAY OF CREATININE: CPT

## 2024-05-12 PROCEDURE — 93005 ELECTROCARDIOGRAM TRACING: CPT

## 2024-05-12 PROCEDURE — 85610 PROTHROMBIN TIME: CPT | Mod: 91 | Performed by: EMERGENCY MEDICINE

## 2024-05-12 PROCEDURE — 99285 EMERGENCY DEPT VISIT HI MDM: CPT | Mod: 25

## 2024-05-12 PROCEDURE — 99283 EMERGENCY DEPT VISIT LOW MDM: CPT | Mod: FS,,, | Performed by: PSYCHIATRY & NEUROLOGY

## 2024-05-12 PROCEDURE — 25500020 PHARM REV CODE 255: Performed by: EMERGENCY MEDICINE

## 2024-05-12 PROCEDURE — 84443 ASSAY THYROID STIM HORMONE: CPT | Performed by: EMERGENCY MEDICINE

## 2024-05-12 PROCEDURE — 80053 COMPREHEN METABOLIC PANEL: CPT | Performed by: EMERGENCY MEDICINE

## 2024-05-12 PROCEDURE — 82962 GLUCOSE BLOOD TEST: CPT

## 2024-05-12 PROCEDURE — 85610 PROTHROMBIN TIME: CPT

## 2024-05-12 PROCEDURE — 80047 BASIC METABLC PNL IONIZED CA: CPT

## 2024-05-12 PROCEDURE — 85025 COMPLETE CBC W/AUTO DIFF WBC: CPT | Performed by: EMERGENCY MEDICINE

## 2024-05-12 PROCEDURE — 99900035 HC TECH TIME PER 15 MIN (STAT)

## 2024-05-12 PROCEDURE — 81003 URINALYSIS AUTO W/O SCOPE: CPT | Performed by: EMERGENCY MEDICINE

## 2024-05-12 RX ORDER — MECLIZINE HYDROCHLORIDE 25 MG/1
25 TABLET ORAL 3 TIMES DAILY PRN
Qty: 20 TABLET | Refills: 0 | Status: SHIPPED | OUTPATIENT
Start: 2024-05-12

## 2024-05-12 RX ADMIN — IOHEXOL 100 ML: 350 INJECTION, SOLUTION INTRAVENOUS at 10:05

## 2024-05-12 NOTE — ED PROVIDER NOTES
Encounter Date: 5/12/2024       History     Chief Complaint   Patient presents with    Leg Pain     C/o heaviness in bilateral legs onset this morning getting out of car, now reports tingling to bilateral legs more to right leg, also report dizziness onset 0930am     HPI  Wero Spangler is a 68-year-old male with a history of BPH, cholelithiasis, GERD, hypertension and CAD status post cardiac stent (9/2023) presenting with bilateral leg heaviness with paresthesias to the right leg and associated dizziness with sudden onset at 9:30 a.m. The patient reports that he was attempting to get out of his vehicle at the grocery store when he felt dizzy, gait instability and disequilibrium associated with lower extremity heaviness with sudden onset.  His symptoms were acute and severe and slightly improved at this time.  He denies any associated fevers, chills, nausea, vomiting, diaphoresis, arm pain or jaw pain.  He denies any visual changes, falls or trauma.  His symptoms were acute and severe.  He denies any chest pain, headaches, neck pain, back pain, dysuria, hematochezia, melena hemoptysis or hematemesis.  He is compliant with his medications and status post angioplasty secondary to CAD and MI.    Review of patient's allergies indicates:  No Known Allergies  Past Medical History:   Diagnosis Date    Aftercataract     Allergy     BPH (benign prostatic hyperplasia)     Cataract     Corneal dystrophy     Elevated PSA     Enlarged prostate     Fatty liver     Fuchs' corneal dystrophy     Gallstones     General anesthetics causing adverse effect in therapeutic use 2000    delayed emergence after back surgery    GERD (gastroesophageal reflux disease)     HTN (hypertension) 9/24/2013    Hypertension     Insomnia     Prostate nodule     Urinary tract infection      Past Surgical History:   Procedure Laterality Date    BACK SURGERY  4/2000    CATARACT EXTRACTION W/  INTRAOCULAR LENS IMPLANT      Both Eyes    CORNEAL  TRANSPLANT Right 2019    Procedure: TRANSPLANT, CORNEA;  Surgeon: Vu Wilson MD;  Location: [facility];  Service: Ophthalmology;  Laterality: Right;  DMEK    EYE SURGERY      LAPAROSCOPIC MARSUPIALIZATION OF CYST OF KIDNEY      PROSTATE SURGERY      prostate biopsy benign    TONSILLECTOMY      VASECTOMY       Family History   Problem Relation Name Age of Onset    Cancer Mother          breast    Prostate cancer Brother prostate     Cancer Brother prostate         prostate    Macular degeneration Maternal Grandmother      Cancer Other      Cancer Other      Amblyopia Neg Hx      Blindness Neg Hx      Cataracts Neg Hx      Glaucoma Neg Hx      Retinal detachment Neg Hx      Strabismus Neg Hx       Social History     Tobacco Use    Smoking status: Former     Current packs/day: 0.00     Types: Cigarettes     Quit date:      Years since quittin.3    Smokeless tobacco: Never   Substance Use Topics    Alcohol use: Not Currently    Drug use: No     Review of Systems  All other systems reviewed and were negative; see HPI also for additional ROS.    Physical Exam     Initial Vitals [24 1003]   BP Pulse Resp Temp SpO2   134/65 (!) 48 18 97.5 °F (36.4 °C) 99 %      MAP       --         Physical Exam    Nursing note and vitals reviewed.      Gen/Constitutional: Interactive. No acute distress  Head: Normocephalic, Atraumatic  Neck: supple, no masses or LAD, no JVD  Eyes: PERRLA, conjunctiva clear  Ears, Nose and Throat: No rhinorrhea or stridor.  Cardiac:  Regular rate, Reg Rhythm, No murmur  Pulmonary: CTA Bilat, no wheezes, rhonchi, rales.  No increased work of breathing.  GI: Abdomen soft, non-tender, non-distended; no rebound or guarding  : No CVA tenderness.  Musculoskeletal: Extremities warm, well perfused, no erythema, no edema  Skin: No rashes, cyanosis or jaundice.  Neuro: Alert and Oriented x 3; No focal motor or sensory deficits on repeat evaluation.  NIH score is 0, no truncal ataxia  Psych:  Normal affect      ED Course   Procedures  Labs Reviewed   CBC W/ AUTO DIFFERENTIAL - Abnormal; Notable for the following components:       Result Value    RBC 4.48 (*)     MCH 31.9 (*)     All other components within normal limits   COMPREHENSIVE METABOLIC PANEL - Abnormal; Notable for the following components:    Total Bilirubin 1.3 (*)     All other components within normal limits   LIPID PANEL - Abnormal; Notable for the following components:    Cholesterol 118 (*)     Triglycerides 151 (*)     HDL 36 (*)     LDL Cholesterol 51.8 (*)     All other components within normal limits   ISTAT PROCEDURE - Abnormal; Notable for the following components:    POC PTWBT 14.9 (*)     POC PTINR 1.3 (*)     All other components within normal limits   PROTIME-INR   TSH   URINALYSIS, REFLEX TO URINE CULTURE   POCT GLUCOSE, HAND-HELD DEVICE   POCT GLUCOSE   ISTAT PROCEDURE   ISTAT CREATININE     EKG Readings: (Independently Interpreted)   Initial Reading: No STEMI. Previous EKG: Compared with most recent EKG Rhythm: Sinus Bradycardia. Heart Rate: 50. ST Segments: Non-Specific ST Segment Depression.     ECG Results              EKG 12-lead (Final result)        Collection Time Result Time QRS Duration OHS QTC Calculation    05/12/24 10:08:21 05/12/24 10:33:52 100 388                     Final result by Interface, Lab In [facility] (05/12/24 10:33:55)                   Narrative:    Test Reason : R00.1,    Vent. Rate : 050 BPM     Atrial Rate : 050 BPM     P-R Int : 152 ms          QRS Dur : 100 ms      QT Int : 426 ms       P-R-T Axes : 044 075 048 degrees     QTc Int : 388 ms    Sinus bradycardia with artifact  Otherwise normal ECG  When compared with ECG of 01-MAY-2024 08:12,  No significant change was found    Confirmed by Willy CASTREJON MD ([id number]) on 5/12/2024 10:33:50 AM    Referred By:             Confirmed By:Willy CASTREJON MD                                  Imaging Results              CTA STROKE MULTI-PHASE (Final result)  Result  time 05/12/24 10:43:53      Final result by Maksim Love DO (05/12/24 10:43:53)                   Impression:      CTA head: Unremarkable CTA of the head specifically without evidence for proximal significant stenosis or proximal large vessel occlusion.    CTA neck: Subtle atherosclerotic plaquing carotid bifurcations greater on the right than 50% proximal ICA stenosis as detailed above..    CT head: No evidence for acute intracranial hemorrhage or sulcal effacement to suggest large territory recent infarction.    Clinical correlation and further evaluation with MRI as warranted if patient compatible.      Electronically signed by: Maksim Love DO  Date:    05/12/2024  Time:    10:43               Narrative:    EXAMINATION:  CTA STROKE MULTI-PHASE    CLINICAL HISTORY:  Neuro deficit, acute, stroke suspected;    TECHNIQUE:  5 mm axial images of the head pre contrast with 0.625 mm axial CTA images of the head and neck post-contrast.  Coronal and sagittal MPR and MIP imaging was performed 100 ml of Omnipaque 350 contrast was injected intravenously.  Please note study was performed in multiphase technique with 3 arterial passes through the head.  Rapid AI proprietary software was utilized for lesion detection with hemorrhage and large vessel occlusion algorithms performed with imaging reviewed and archive for the permanent record.    COMPARISON:  None    FINDINGS:  CT head  without contrast: There is no evidence for acute intracranial hemorrhage or sulcal effacement.  The ventricles are normal in size and configuration without evidence for hydrocephalus.  There is no midline shift or mass effect.  Moderate lobular opacity right maxillary antra suggestive for mucous retention cyst trace ethmoidal mucosal thickening.  Visualized paranasal sinuses and mastoid air cells are clear.    CTA head:    Anterior circulation: The bilateral distal cervical, petrous, cavernous, and supraclinoid segments of the ICAs are patent  without significant focal stenosis or aneurysm.    The anterior and middle cerebral arteries are patent without focal stenosis or aneurysm.    Posterior circulation: The distal vertebral arteries, basilar artery and posterior cerebral arteries are patent without focal stenosis or aneurysm.    CTA neck: Mild scattered atherosclerotic plaquing visualized arch origin the great vessels from the arch are within normal limits..    The origin of the vertebral arteries from the respective subclavian arteries are within normal limits.  The vertebral arteries are patent throughout their course without focal stenosis or occlusion.  Left vertebral artery slightly dominant.    Right carotid: The right common carotid artery, carotid bifurcation and extracranial portions of the internal carotid artery are patent without significant focal stenosis.    Left carotid: The left common carotid artery, carotid bifurcation and extracranial portions of the internal carotid arteries are patent without significant focal stenosis.    Atherosclerotic plaquing of the carotid bifurcation greater on the right, there is less than 50% stenosis of the proximal ICAs by NASCET criteria.    Pharynx/larynx: Evaluation distorted by scatter artifact from dental metal and motion allowing for artifacts the nasopharynx, oropharynx, hypopharynx larynx and proximal trachea are within normal limits allowing for limitation and posterior impression related to medialized carotids.    Glands: Bilateral parotid and submandibular glands are within normal limits.  1.4 cm heterogeneous nodule right lobe of the thyroid with smaller nodule on the left.    No evidence for adenopathy throughout the neck by size criteria.    Degenerative change of the cervical spine without evidence for acute fracture.                                    X-Rays:   Independently Interpreted Readings:   Head CT: No skull fracture.  No acute stroke.  No hemorrhage.     Medications   iohexoL  (OMNIPAQUE 350) injection 100 mL (100 mLs Intravenous Given 5/12/24 1023)     Medical Decision Making  Wero Spangler is a 68-year-old male with a history of BPH, cholelithiasis, GERD, hypertension and CAD status post cardiac stent (9/2023) presenting with bilateral leg heaviness with paresthesias to the right leg and associated dizziness with sudden onset at 9:30 a.m.     Differential diagnosis includes not limited to:  Stroke, TIA, intracranial hemorrhage, ACS, electrolyte abnormality, vertigo    Amount and/or Complexity of Data Reviewed  External Data Reviewed: notes.  Labs: ordered.  Radiology: ordered and independent interpretation performed.  ECG/medicine tests: ordered and independent interpretation performed.    Risk  Prescription drug management.  Decision regarding hospitalization.    Patient is afebrile vital signs are stable on arrival with bradycardia.  Patient has known bradycardia on beta blockade, but no signs of block on telemetry or ECG.  ECG was obtained with no signs of ischemia or STEMI on my read.  Placed on cardiac and telemetry monitoring including pulse oximetry.  Patient had acute onset dizziness with disequilibrium and also reported bilateral lower extremity heaviness with predominance towards the right lower extremity.  Given abundance of caution and concern for stroke-like symptoms and timing as the symptoms occurred 30 minutes prior to arrival, a stroke code was initiated.  However his symptoms resolved completely at the time of stroke team evaluation.  CTA was negative for any acute large vessel occlusion or stroke-like symptoms including intracranial hemorrhage.  The remainder exam including labs and urinalysis unremarkable with no evidence of UTI, no leukocytosis, dehydration, KENZIE or emergent laboratory abnormality.  Whether this was a TIA, vertigo is unclear.  He does have risk factors including recent MI in September 2023 requiring cardiac stent.  He is compliant with his  medications.  He has a follow-up appointment scheduled with Cardiology and I recommend close follow-up.  After evaluation by vascular Neurology, they do not think this is a stroke and does not need any further evaluation.  Strict ED precautions return instructions were discussed including stroke-like symptoms, chest pain, AMS or any concerns. Patient agreeable to discharge plan. Strict ED precautions and return instructions discussed at length and patient verbalized understanding. All questions were answered and ample time was given for questions.                                      Clinical Impression:  Final diagnoses:  [R00.1] Bradycardia  [R42] Dizziness (Primary)          ED Disposition Condition    Discharge Stable          ED Prescriptions       Medication Sig Dispense Start Date End Date Auth. Provider    meclizine (ANTIVERT) 25 mg tablet Take 1 tablet (25 mg total) by mouth 3 (three) times daily as needed for Dizziness. 20 tablet 5/12/2024 -- Kyler Chan DO          Follow-up Information       Follow up With Specialties Details Why Contact Info Additional Information    [facility] - [facility] Cardiology Schedule an appointment as soon as possible for a visit in 1 week As needed, If symptoms worsen - or your cardiolgist. [address] [facility] [id number] [phone] Cardiology Services Clinics - 3rd floor    [facility] Emergency Dept Emergency Medicine Go to  As needed, If symptoms worsen [address] [facility] [id number] [phone]             Kyler Chan DO, [facility]  Emergency Staff Physician   Dept of Emergency Medicine   [facility]  Spectralink: [id number]        Disclaimer: This note has been generated using voice-recognition software. There may be typographical errors that have been missed during proof-reading.       Kyler Chan DO  05/12/24 1445

## 2024-05-12 NOTE — DISCHARGE INSTRUCTIONS
Today, your evaluation did not show any abnormalities.  Your symptoms improved on re-evaluation.  You were evaluated by the stroke team and had imaging negative for stroke.  We recommend you follow-up with your primary care and cardiologist.  If you continue developing low heart rate call bradycardia and have symptoms including lightheadedness or dizziness, follow up sooner or hold 1 day of your blood pressure medication/beta blocker.  If you develop any worsening symptoms like stroke-like symptoms please follow-up immediately or return emergency department.    Our goal in the emergency department is to always give you outstanding care and exceptional service. You may receive a survey by mail or e-mail in the next week regarding your experience in our ED. We would greatly appreciate your completing and returning the survey. Your feedback provides us with a way to recognize our staff who give very good care and it helps us learn how to improve when your experience was below our aspiration of excellence.

## 2024-05-12 NOTE — CONSULTS
[facility] - Emergency Dept  Vascular Neurology  [facility]  Consult Note    Inpatient consult to Vascular (Stroke) Neurology  Consult performed by: Frederick Hazel, NP  Consult ordered by: Kyler Chan DO        Assessment/Plan:     Patient is a 68 y.o. year old male with:    Bilateral leg weakness  68 y.o. yo male with PMHx  CAD, HTN, and HLD who presented to [facility] [place] with reports of transient BLE weakness that lasted about 30 mins. Patient reports he had been running errands this morning. He described both legs felt heavy and weak. He sat in car for a couple minutes and was able to walk into store for water. Patient reports BLE weakness improved after resting in car and drinking water. He was able to drive himself to [facility] [place] ED for eval. Stroke code was called. TNK not given as NIH 0. Stat CTA MP completed. Dr. Corrales reviewed images as acquired. No LVO. Low suspicion for stroke as symptoms do not fit vascular territory and improved with hydration and rest.     Coronary atherosclerosis of native coronary artery  -stroke risk factor    Dyslipidemia  -stroke risk factor      Primary hypertension  -stroke risk factor         STROKE DOCUMENTATION     Acute Stroke Times   Last Known Normal Date: 05/12/24  Last Known Normal Time: 0910  Symptom Onset Date: 05/12/24  Symptom Onset Time: 0910  Stroke Team Called Date: 05/12/24  Stroke Team Called Time: 1011  Stroke Team Arrival Date: 05/12/24  Stroke Team Arrival Time: 1015  CT Interpretation Time: 2227  Thrombolytic Therapy Recommended: No  Thrombectomy Recommended: No    NIH Scale:  1a. Level of Consciousness: 0-->Alert, keenly responsive  1b. LOC Questions: 0-->Answers both questions correctly  1c. LOC Commands: 0-->Performs both tasks correctly  2. Best Gaze: 0-->Normal  3. Visual: 0-->No visual loss  4. Facial Palsy: 0-->Normal symmetrical movements  5a. Motor Arm, Left: 0-->No drift, limb holds 90 (or 45) degrees for full 10 secs  5b. Motor Arm, Right:  0-->No drift, limb holds 90 (or 45) degrees for full 10 secs  6a. Motor Leg, Left: 0-->No drift, leg holds 30 degree position for full 5 secs  6b. Motor Leg, Right: 0-->No drift, leg holds 30 degree position for full 5 secs  7. Limb Ataxia: 0-->Absent  8. Sensory: 0-->Normal, no sensory loss  9. Best Language: 0-->No aphasia, normal  10. Dysarthria: 0-->Normal  11. Extinction and Inattention (formerly Neglect): 0-->No abnormality  Total (NIH Stroke Scale): 0    Modified [place] Score: 0  Barbara Coma Scale:15   ABCD2 Score:    [id number] Score:   HAS -BLED Score:   ICH Score:   Hunt & Hong Classification:       Thrombolysis Candidate? No, Non-disabling symptoms - Low NIHSS     Delays to Thrombolysis?  Not Applicable    Interventional Revascularization Candidate?   Is the patient eligible for mechanical endovascular reperfusion (CYRUS)?  No; No large vessel occlusion identified on imaging     Delays to Thrombectomy? Not Applicable    Hemorrhagic change of an Ischemic Stroke: Does this patient have an ischemic stroke with hemorrhagic changes? No     Subjective:     History of Present Illness:  Wero Spangler is a 68 y.o. yo male with PMHx CAD, HTN, and HLD who presented to [facility] with reports of transient BLE weakness that lasted about 30 mins. Patient reports he had been running errands this morning. He described both legs felt heavy and weak. He sat in car for a couple minutes and was able to walk into store for water. Patient reports BLE weakness improved after resting in car and drinking water. He was able to drive himself to [facility] ED for eval. Stroke code was called.            Past Medical History:   Diagnosis Date    Aftercataract     Allergy     BPH (benign prostatic hyperplasia)     Cataract     Corneal dystrophy     Elevated PSA     Enlarged prostate     Fatty liver     Fuchs' corneal dystrophy     Gallstones     General anesthetics causing adverse effect in therapeutic use 2000    delayed emergence after back  surgery    GERD (gastroesophageal reflux disease)     HTN (hypertension) 2013    Hypertension     Insomnia     Prostate nodule     Urinary tract infection      Past Surgical History:   Procedure Laterality Date    BACK SURGERY  2000    CATARACT EXTRACTION W/  INTRAOCULAR LENS IMPLANT      Both Eyes    CORNEAL TRANSPLANT Right 2019    Procedure: TRANSPLANT, CORNEA;  Surgeon: Vu Wilson MD;  Location: [facility];  Service: Ophthalmology;  Laterality: Right;  DMEK    EYE SURGERY      LAPAROSCOPIC MARSUPIALIZATION OF CYST OF KIDNEY      PROSTATE SURGERY      prostate biopsy benign    TONSILLECTOMY      VASECTOMY       Social History     Tobacco Use    Smoking status: Former     Current packs/day: 0.00     Types: Cigarettes     Quit date:      Years since quittin.3    Smokeless tobacco: Never   Substance Use Topics    Alcohol use: Not Currently    Drug use: No     Review of patient's allergies indicates:  No Known Allergies    Medications: I have reviewed the current medication administration record.    (Not in a hospital admission)      Review of Systems   Constitutional:  Positive for activity change.   HENT:  Negative for trouble swallowing and voice change.    Eyes:  Negative for visual disturbance.   Respiratory:  Negative for shortness of breath, wheezing and stridor.    Cardiovascular:  Negative for chest pain and palpitations.   Gastrointestinal:  Negative for diarrhea and vomiting.   Musculoskeletal:  Negative for gait problem.   Neurological:  Negative for facial asymmetry, speech difficulty, weakness, light-headedness and numbness.   Psychiatric/Behavioral:  Negative for confusion and decreased concentration.      Objective:     Vital Signs (Most Recent):  Temp: 97.5 °F (36.4 °C) (24 1003)  Pulse: (!) 49 (24 1043)  Resp: 17 (24 1043)  BP: (!) 125/56 (24 1043)  SpO2: 100 % (24 1043)    Vital Signs Range (Last 24H):  Temp:  [97.5 °F (36.4 °C)]   Pulse:   "[48-50]   Resp:  [17-18]   BP: (125-134)/(56-65)   SpO2:  [99 %-100 %]        Physical Exam  HENT:      Head: Normocephalic and atraumatic.      Right Ear: External ear normal.      Left Ear: External ear normal.      Nose: Nose normal.   Cardiovascular:      Rate and Rhythm: Bradycardia present.   Abdominal:      General: Abdomen is flat.   Musculoskeletal:         General: Normal range of motion.   Skin:     General: Skin is warm and dry.   Neurological:      General: No focal deficit present.      Mental Status: He is alert.   Psychiatric:         Mood and Affect: Mood normal.              Neurological Exam:   LOC: alert  Attention Span: Good   Language: No aphasia  Articulation: No dysarthria  Orientation: Person, Place, Time   Visual Fields: Full  EOM (CN III, IV, VI): Full/intact  Facial Sensation (CN V): Normal  Facial Movement (CN VII): Symmetric facial expression    Motor: Arm left  Normal 5/5  Leg left  Normal 5/5  Arm right  Normal 5/5  Leg right Normal 5/5  Cerebellum: No evidence of appendicular or axial ataxia  Sensation: Intact to light touch, temperature and vibration  Tone: Normal tone throughout      Laboratory:  CMP: No results for input(s): "GLUCOSE", "CALCIUM", "ALBUMIN", "PROT", "NA", "K", "CO2", "CL", "BUN", "CREATININE", "ALKPHOS", "ALT", "AST", "BILITOT" in the last 24 hours.  CBC:   Recent Labs   Lab 05/12/24  1020   HCT 39     Lipid Panel: No results for input(s): "CHOL", "LDLCALC", "HDL", "TRIG" in the last 168 hours.  Coagulation:   Recent Labs   Lab 05/12/24  1025   INR 1.3*     Hgb A1C: No results for input(s): "HGBA1C" in the last 168 hours.  TSH: No results for input(s): "TSH" in the last 168 hours.    Diagnostic Results:      Brain/ Vessel Imaging:    CTA stroke MP 5/12/24    CTA head: Unremarkable CTA of the head specifically without evidence for proximal significant stenosis or proximal large vessel occlusion.     CTA neck: Subtle atherosclerotic plaquing carotid bifurcations " greater on the right than 50% proximal ICA stenosis as detailed above..     CT head: No evidence for acute intracranial hemorrhage or sulcal effacement to suggest large territory recent infarction.    Cardiac Evaluation:         Frederick Hazel NP  [facility]  Department of Vascular Neurology   [facility]

## 2024-05-12 NOTE — ED TRIAGE NOTES
LKW around 0930. Pt had bilateral leg tingling/numbness. Pt was able to continue ambulating around grocery store and drive himself here. Stroke code called upon arrival and taken to CT by 1015.

## 2024-05-12 NOTE — ED NOTES
Patient identifiers verified and correct for Wero Spangler.  LOC: The patient is awake, alert and aware of environment with an appropriate affect, the patient is oriented x 3 and speaking appropriately.   APPEARANCE: Patient appears comfortable and in no acute distress, patient is clean and well groomed.  SKIN: The skin is warm and dry, color consistent with ethnicity, patient has normal skin turgor and moist mucus membranes, skin intact, no breakdown or bruising noted.   MUSCULOSKELETAL: Patient moving all extremities spontaneously, no swelling noted.  RESPIRATORY: Airway is open and patent, respirations are spontaneous, patient has a normal effort and rate, no accessory muscle use noted, pt placed on continuous pulse ox with O2 sats noted at 100% on room air.  CARDIAC: Pt placed on cardiac monitor. bradycardic, no edema noted, capillary refill < 3 seconds.   GASTRO: Soft and non tender to palpation, no distention noted, normoactive bowel sounds present in all four quadrants. Pt states bowel movements have been regular.  : Pt denies any pain or frequency with urination.  NEURO: Pt opens eyes spontaneously, behavior appropriate to situation, follows commands, facial expression symmetrical, bilateral hand grasp equal and even, purposeful motor response noted, normal sensation in all extremities when touched with a finger.

## 2024-05-12 NOTE — ASSESSMENT & PLAN NOTE
68 y.o. yo male with PMHx  CAD, HTN, and HLD who presented to [facility] [place] with reports of transient BLE weakness that lasted about 30 mins. Patient reports he had been running errands this morning. He described both legs felt heavy and weak. He sat in car for a couple minutes and was able to walk into store for water. Patient reports BLE weakness improved after resting in car and drinking water. He was able to drive himself to [facility] ED for eval. Stroke code was called. TNK not given as NIH 0. Stat CTA MP completed. Dr. Corrales reviewed images as acquired. No LVO. Low suspicion for stroke as symptoms do not fit vascular territory and improved with hydration and rest.

## 2024-05-12 NOTE — HPI
Wero Spangler is a 68 y.o. yo male with PMHx CAD, HTN, and HLD who presented to [facility] with reports of transient BLE weakness that lasted about 30 mins. Patient reports he had been running errands this morning. He described both legs felt heavy and weak. He sat in car for a couple minutes and was able to walk into store for water. Patient reports BLE weakness improved after resting in car and drinking water. He was able to drive himself to [facility] ED for eval. Stroke code was called.

## 2024-05-12 NOTE — SUBJECTIVE & OBJECTIVE
Past Medical History:   Diagnosis Date    Aftercataract     Allergy     BPH (benign prostatic hyperplasia)     Cataract     Corneal dystrophy     Elevated PSA     Enlarged prostate     Fatty liver     Fuchs' corneal dystrophy     Gallstones     General anesthetics causing adverse effect in therapeutic use     delayed emergence after back surgery    GERD (gastroesophageal reflux disease)     HTN (hypertension) 2013    Hypertension     Insomnia     Prostate nodule     Urinary tract infection      Past Surgical History:   Procedure Laterality Date    BACK SURGERY  2000    CATARACT EXTRACTION W/  INTRAOCULAR LENS IMPLANT      Both Eyes    CORNEAL TRANSPLANT Right 2019    Procedure: TRANSPLANT, CORNEA;  Surgeon: Vu Wilson MD;  Location: [facility];  Service: Ophthalmology;  Laterality: Right;  DMEK    EYE SURGERY      LAPAROSCOPIC MARSUPIALIZATION OF CYST OF KIDNEY      PROSTATE SURGERY      prostate biopsy benign    TONSILLECTOMY      VASECTOMY       Social History     Tobacco Use    Smoking status: Former     Current packs/day: 0.00     Types: Cigarettes     Quit date:      Years since quittin.3    Smokeless tobacco: Never   Substance Use Topics    Alcohol use: Not Currently    Drug use: No     Review of patient's allergies indicates:  No Known Allergies    Medications: I have reviewed the current medication administration record.    (Not in a hospital admission)      Review of Systems   Constitutional:  Positive for activity change.   HENT:  Negative for trouble swallowing and voice change.    Eyes:  Negative for visual disturbance.   Respiratory:  Negative for shortness of breath, wheezing and stridor.    Cardiovascular:  Negative for chest pain and palpitations.   Gastrointestinal:  Negative for diarrhea and vomiting.   Musculoskeletal:  Negative for gait problem.   Neurological:  Negative for facial asymmetry, speech difficulty, weakness, light-headedness and numbness.  "  Psychiatric/Behavioral:  Negative for confusion and decreased concentration.      Objective:     Vital Signs (Most Recent):  Temp: 97.5 °F (36.4 °C) (05/12/24 1003)  Pulse: (!) 49 (05/12/24 1043)  Resp: 17 (05/12/24 1043)  BP: (!) 125/56 (05/12/24 1043)  SpO2: 100 % (05/12/24 1043)    Vital Signs Range (Last 24H):  Temp:  [97.5 °F (36.4 °C)]   Pulse:  [48-50]   Resp:  [17-18]   BP: (125-134)/(56-65)   SpO2:  [99 %-100 %]        Physical Exam  HENT:      Head: Normocephalic and atraumatic.      Right Ear: External ear normal.      Left Ear: External ear normal.      Nose: Nose normal.   Cardiovascular:      Rate and Rhythm: Bradycardia present.   Abdominal:      General: Abdomen is flat.   Musculoskeletal:         General: Normal range of motion.   Skin:     General: Skin is warm and dry.   Neurological:      General: No focal deficit present.      Mental Status: He is alert.   Psychiatric:         Mood and Affect: Mood normal.              Neurological Exam:   LOC: alert  Attention Span: Good   Language: No aphasia  Articulation: No dysarthria  Orientation: Person, Place, Time   Visual Fields: Full  EOM (CN III, IV, VI): Full/intact  Facial Sensation (CN V): Normal  Facial Movement (CN VII): Symmetric facial expression    Motor: Arm left  Normal 5/5  Leg left  Normal 5/5  Arm right  Normal 5/5  Leg right Normal 5/5  Cerebellum: No evidence of appendicular or axial ataxia  Sensation: Intact to light touch, temperature and vibration  Tone: Normal tone throughout      Laboratory:  CMP: No results for input(s): "GLUCOSE", "CALCIUM", "ALBUMIN", "PROT", "NA", "K", "CO2", "CL", "BUN", "CREATININE", "ALKPHOS", "ALT", "AST", "BILITOT" in the last 24 hours.  CBC:   Recent Labs   Lab 05/12/24  1020   HCT 39     Lipid Panel: No results for input(s): "CHOL", "LDLCALC", "HDL", "TRIG" in the last 168 hours.  Coagulation:   Recent Labs   Lab 05/12/24  1025   INR 1.3*     Hgb A1C: No results for input(s): "HGBA1C" in the last 168 " "hours.  TSH: No results for input(s): "TSH" in the last 168 hours.    Diagnostic Results:      Brain/ Vessel Imaging:    CTA stroke MP 5/12/24    CTA head: Unremarkable CTA of the head specifically without evidence for proximal significant stenosis or proximal large vessel occlusion.     CTA neck: Subtle atherosclerotic plaquing carotid bifurcations greater on the right than 50% proximal ICA stenosis as detailed above..     CT head: No evidence for acute intracranial hemorrhage or sulcal effacement to suggest large territory recent infarction.    Cardiac Evaluation:       "

## 2024-05-13 ENCOUNTER — PATIENT MESSAGE (OUTPATIENT)
Dept: CARDIOLOGY | Facility: CLINIC | Age: 69
End: 2024-05-13
Payer: MEDICARE

## 2024-05-13 ENCOUNTER — OFFICE VISIT (OUTPATIENT)
Dept: UROLOGY | Facility: CLINIC | Age: 69
End: 2024-05-13
Payer: MEDICARE

## 2024-05-13 ENCOUNTER — PATIENT OUTREACH (OUTPATIENT)
Dept: EMERGENCY MEDICINE | Facility: HOSPITAL | Age: 69
End: 2024-05-13
Payer: MEDICARE

## 2024-05-13 VITALS
BODY MASS INDEX: 23.98 KG/M2 | HEART RATE: 48 BPM | HEIGHT: 72 IN | DIASTOLIC BLOOD PRESSURE: 56 MMHG | WEIGHT: 177 LBS | SYSTOLIC BLOOD PRESSURE: 115 MMHG

## 2024-05-13 DIAGNOSIS — R10.31 DISCOMFORT OF RIGHT GROIN: Primary | ICD-10-CM

## 2024-05-13 LAB
OHS QRS DURATION: 104 MS
OHS QTC CALCULATION: 393 MS

## 2024-05-13 PROCEDURE — 99999 PR PBB SHADOW E&M-EST. PATIENT-LVL IV: CPT | Mod: PBBFAC,,,

## 2024-05-13 PROCEDURE — 99214 OFFICE O/P EST MOD 30 MIN: CPT | Mod: S$PBB,,,

## 2024-05-13 PROCEDURE — 99214 OFFICE O/P EST MOD 30 MIN: CPT | Mod: PBBFAC

## 2024-05-13 NOTE — TELEPHONE ENCOUNTER
Patient reports that he was not feeling well so he went to the hospital over the weekend. States his pulse has been in the 40s-50s. Looks to be consistent with what he was while in Cardiac Rehab. Reviewed medication list. Reports taking Maxzide and Zebeta as listed (both in the morning). Unable to report a blood pressure as he does not have a cuff at home. He does have a urology appointment this afternoon and is hoping to obtain a BP during that appointment. Please advise.

## 2024-05-13 NOTE — TELEPHONE ENCOUNTER
Mr. Spangler, Not sure what caused your symptoms.  Doubt they were cardiac related. Suspect you are not drinking enough water.  Hydration is very important. Pulse of 48 with normal BP is acceptable.  However if you are concerned that it is too slow for you you can stop Bisoprolol and continue to watch your pulse and BP.  Please let me knw how you are feeling after stopping Bisoprolol.

## 2024-05-13 NOTE — PROGRESS NOTES
Patient stated his Cardiologist is on vacation in [place] for 2 weeks but her staff have reached out to him and advised per his Cardiologist to adjust medications. Patient declined assistance making a follow-up appointment with Cardiologist at this time.

## 2024-05-13 NOTE — PROGRESS NOTES
CHIEF COMPLAINT:  Lower right abdominal discomfort       HISTORY OF PRESENTING ILLINESS:  Wero Spangler is a 68 y.o. male established patient of Dr. Yuen. Presents today due to R lower abdominal discomfort that began 8 days ago. Pain is more bothersome and noticeable when sitting. He is able to palpate a hard knot that comes and goes.     Last seen by Dr. Yuen 5/10/2024 for elevated PSA.  He has had multiple biopsies done.  One was in 2012 when his PSA was 10.14.  There was also a prostate nodule at that time.  Most recent one was 8/23/16 when his PSA was 18.1.  This was a cognitive fusion biopsy.     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  Path returned an oncocytoma.     He also has LUTS.  He's s/p rezum on 5/14/19.  He's voiding much better.  Good FOS. However, he does c/o irritative symptoms that are worsened by caffeine and alcohol. Currently on uroxatral.   Nocturia has increased to every 2 hours.  Would like to add proscar.     He c/o ED.  He's tried Cialis with good results, but he c/o some decreased sensation.  His T is normal.  He is s/p an angioplasty and now has an Rx for NTG.       REVIEW OF SYSTEMS:  Review of Systems   Constitutional:  Negative for chills and fever.   HENT:  Negative for congestion and sore throat.    Respiratory:  Negative for cough and shortness of breath.    Cardiovascular:  Negative for chest pain and palpitations.   Gastrointestinal:  Negative for nausea and vomiting.   Genitourinary:  Negative for dysuria, flank pain, frequency, hematuria and urgency.   Neurological:  Negative for dizziness and headaches.         PATIENT HISTORY:    Past Medical History:   Diagnosis Date    Aftercataract     Allergy     BPH (benign prostatic hyperplasia)     Cataract     Corneal dystrophy     Elevated PSA     Enlarged prostate     Fatty liver     Fuchs' corneal dystrophy     Gallstones     General anesthetics causing adverse effect in therapeutic use 2000    delayed  emergence after back surgery    GERD (gastroesophageal reflux disease)     HTN (hypertension) 2013    Hypertension     Insomnia     Prostate nodule     Urinary tract infection        Past Surgical History:   Procedure Laterality Date    BACK SURGERY  2000    CATARACT EXTRACTION W/  INTRAOCULAR LENS IMPLANT      Both Eyes    CORNEAL TRANSPLANT Right 2019    Procedure: TRANSPLANT, CORNEA;  Surgeon: Vu Wilson MD;  Location: [facility];  Service: Ophthalmology;  Laterality: Right;  DMEK    EYE SURGERY      LAPAROSCOPIC MARSUPIALIZATION OF CYST OF KIDNEY      PROSTATE SURGERY      prostate biopsy benign    TONSILLECTOMY      VASECTOMY         Family History   Problem Relation Name Age of Onset    Cancer Mother          breast    Prostate cancer Brother prostate     Cancer Brother prostate         prostate    Macular degeneration Maternal Grandmother      Cancer Other      Cancer Other      Amblyopia Neg Hx      Blindness Neg Hx      Cataracts Neg Hx      Glaucoma Neg Hx      Retinal detachment Neg Hx      Strabismus Neg Hx         Social History     Socioeconomic History    Marital status:    Tobacco Use    Smoking status: Former     Current packs/day: 0.00     Types: Cigarettes     Quit date:      Years since quittin.3    Smokeless tobacco: Never   Substance and Sexual Activity    Alcohol use: Not Currently    Drug use: No    Sexual activity: Yes     Partners: Female     Social Determinants of Health     Financial Resource Strain: Low Risk  (2023)    Overall Financial Resource Strain (CARDIA)     Difficulty of Paying Living Expenses: Not hard at all   Food Insecurity: No Food Insecurity (2023)    Hunger Vital Sign     Worried About Running Out of Food in the Last Year: Never true     Ran Out of Food in the Last Year: Never true   Transportation Needs: No Transportation Needs (2023)    PRAPARE - Transportation     Lack of Transportation (Medical): No     Lack of  Transportation (Non-Medical): No   Physical Activity: Sufficiently Active (11/27/2023)    Exercise Vital Sign     Days of Exercise per Week: 3 days     Minutes of Exercise per Session: 50 min   Recent Concern: Physical Activity - Insufficiently Active (10/30/2023)    Exercise Vital Sign     Days of Exercise per Week: 3 days     Minutes of Exercise per Session: 40 min   Stress: No Stress Concern Present (11/27/2023)    Citizen of [place] of Occupational Health - Occupational Stress Questionnaire     Feeling of Stress : Only a little   Recent Concern: Stress - Stress Concern Present (10/30/2023)    Citizen of [place] of Occupational Health - Occupational Stress Questionnaire     Feeling of Stress : To some extent   Housing Stability: Low Risk  (11/27/2023)    Housing Stability Vital Sign     Unable to Pay for Housing in the Last Year: No     Number of Places Lived in the Last Year: 1     Unstable Housing in the Last Year: No       Allergies:  Patient has no known allergies.    Medications:    Current Outpatient Medications:     alfuzosin (UROXATRAL) 10 mg Tb24, Take 1 tablet (10 mg total) by mouth once daily., Disp: 30 tablet, Rfl: 11    ALPRAZolam (XANAX) 0.5 MG tablet, TAKE 1 TABLET BY MOUTH EVERY NIGHT, Disp: 30 tablet, Rfl: 0    aspirin 81 MG Chew, Take 81 mg by mouth once daily., Disp: , Rfl:     atorvastatin (LIPITOR) 20 MG tablet, Take 1 tablet (20 mg total) by mouth once daily., Disp: 90 tablet, Rfl: 3    bisoprolol (ZEBETA) 2.5 MG Tab split tablet, Take 2.5 mg by mouth once daily., Disp: , Rfl:     efinaconazole (JUBLIA) 10 % Faviola, APPLY TO AFFECTED NAIL DAILY. REMOVE WEEKLY, Disp: 8 mL, Rfl: 1    fluticasone propionate (FLONASE) 50 mcg/actuation nasal spray, 2 sprays (100 mcg total) by Each Nostril route once daily., Disp: 16 g, Rfl: 0    lansoprazole (PREVACID) 15 MG capsule, Take 1 capsule (15 mg total) by mouth once daily., Disp: 90 capsule, Rfl: 12    meclizine (ANTIVERT) 25 mg tablet, Take 1 tablet (25 mg  total) by mouth 3 (three) times daily as needed for Dizziness., Disp: 20 tablet, Rfl: 0    nitroGLYCERIN (NITROSTAT) 0.4 MG SL tablet, Place 1 tablet (0.4 mg total) under the tongue every 5 (five) minutes as needed for Chest pain., Disp: 90 tablet, Rfl: 3    sodium chloride 2% (BARBI 128) 2 % ophthalmic solution, 1 drop as needed (takes 3-4 times/day)., Disp: , Rfl:     ticagrelor (BRILINTA) 90 mg tablet, Take 1 tablet (90 mg total) by mouth 2 (two) times daily., Disp: 180 tablet, Rfl: 3    triamterene-hydrochlorothiazide 37.5-25 mg (MAXZIDE-25) 37.5-25 mg per tablet, Take 1 tablet by mouth once daily., Disp: 90 tablet, Rfl: 11    Current Facility-Administered Medications:     sodium hyaluronate (EUFLEXXA) 10 mg/mL(mw 2.4 -3.6 million) injection 20 mg, 20 mg, Intra-articular, Weekly, Cesar King PA-C, 20 mg at 05/09/24 0748    PHYSICAL EXAMINATION:  Physical Exam  Constitutional:       Appearance: Normal appearance.   HENT:      Right Ear: External ear normal.      Left Ear: External ear normal.      Nose: Nose normal.      Mouth/Throat:      Mouth: Mucous membranes are moist.   Pulmonary:      Effort: Pulmonary effort is normal. No respiratory distress.   Abdominal:      Hernia: A hernia is present. Hernia is present in the right inguinal area. There is no hernia in the left inguinal area.   Genitourinary:      Lymphadenopathy:      Lower Body: No right inguinal adenopathy. No left inguinal adenopathy.   Skin:     General: Skin is warm and dry.   Neurological:      General: No focal deficit present.      Mental Status: He is alert and oriented to person, place, and time.   Psychiatric:         Mood and Affect: Mood normal.         Behavior: Behavior normal.               Lab Results   Component Value Date    PSA 14.0 (H) 09/22/2014    PSA 15.48 (H) 08/16/2013    PSA 11.06 (H) 02/25/2013    PSADIAG 18.1 (H) 05/01/2024    PSADIAG 13.3 (H) 10/27/2023    PSADIAG 13.3 (H) 05/09/2023    PSATOTAL 6.8 (H)  07/12/2011    PSATOTAL 7.9 (H) 01/11/2011       Lab Results   Component Value Date    CREATININE 0.9 05/12/2024    EGFRNORACEVR >60.0 05/12/2024               IMPRESSION:    Encounter Diagnoses   Name Primary?    Discomfort of right groin Yes         Assessment:       1. Discomfort of right groin        Plan:   - US R groin ordered, will call with results and refer to general surgery for abnormal findings.     RTC dependent on US result    I spent 30 minutes with the patient of which more than half was spent in direct consultation with the patient in regards to our treatment and plan.  We addressed the office findings and recent labs; any need to go ER today.   Education and recommendations of today's plan of care including home remedies and needed follow up with PCP.   We discussed the chief complaints; reviewed the LUTS and the possible contributory factors.   Reassurance no infection or visible blood seen in today's urine sample  Reviewed management; including possible medical and surgical options.   Recommended lifestyle modifications with a proper, healthy diet, good hydration but during the day. Reducing bladder irritants.   Benefits of regular exercise and weight loss.

## 2024-05-13 NOTE — TELEPHONE ENCOUNTER
Notified patient. Voiced understanding. Will hold bisoprolol and notify us by Thursday as to how he feels.

## 2024-05-14 ENCOUNTER — HOSPITAL ENCOUNTER (OUTPATIENT)
Dept: RADIOLOGY | Facility: HOSPITAL | Age: 69
Discharge: HOME | End: 2024-05-14
Payer: MEDICARE

## 2024-05-14 DIAGNOSIS — R10.31 DISCOMFORT OF RIGHT GROIN: ICD-10-CM

## 2024-05-14 PROCEDURE — 76882 US LMTD JT/FCL EVL NVASC XTR: CPT | Mod: TC,RT

## 2024-05-14 PROCEDURE — 76882 US LMTD JT/FCL EVL NVASC XTR: CPT | Mod: 26,RT,, | Performed by: INTERNAL MEDICINE

## 2024-05-15 ENCOUNTER — PATIENT MESSAGE (OUTPATIENT)
Dept: UROLOGY | Facility: CLINIC | Age: 69
End: 2024-05-15
Payer: MEDICARE

## 2024-05-15 DIAGNOSIS — K40.90 RIGHT INGUINAL HERNIA: Primary | ICD-10-CM

## 2024-05-16 ENCOUNTER — OFFICE VISIT (OUTPATIENT)
Dept: INTERNAL MEDICINE | Facility: CLINIC | Age: 69
End: 2024-05-16
Payer: MEDICARE

## 2024-05-16 ENCOUNTER — OFFICE VISIT (OUTPATIENT)
Dept: ORTHOPEDICS | Facility: CLINIC | Age: 69
End: 2024-05-16
Payer: MEDICARE

## 2024-05-16 ENCOUNTER — OFFICE VISIT (OUTPATIENT)
Dept: SURGERY | Facility: CLINIC | Age: 69
End: 2024-05-16
Payer: MEDICARE

## 2024-05-16 VITALS
OXYGEN SATURATION: 99 % | HEIGHT: 72 IN | SYSTOLIC BLOOD PRESSURE: 132 MMHG | HEART RATE: 58 BPM | BODY MASS INDEX: 23.79 KG/M2 | DIASTOLIC BLOOD PRESSURE: 60 MMHG | WEIGHT: 175.63 LBS

## 2024-05-16 VITALS
HEART RATE: 58 BPM | DIASTOLIC BLOOD PRESSURE: 70 MMHG | BODY MASS INDEX: 23.89 KG/M2 | SYSTOLIC BLOOD PRESSURE: 110 MMHG | HEIGHT: 72 IN | OXYGEN SATURATION: 98 % | WEIGHT: 176.38 LBS

## 2024-05-16 DIAGNOSIS — M17.11 PRIMARY OSTEOARTHRITIS OF RIGHT KNEE: Primary | ICD-10-CM

## 2024-05-16 DIAGNOSIS — E78.5 DYSLIPIDEMIA: ICD-10-CM

## 2024-05-16 DIAGNOSIS — G47.00 INSOMNIA, UNSPECIFIED TYPE: ICD-10-CM

## 2024-05-16 DIAGNOSIS — H18.513 FUCHS' CORNEAL DYSTROPHY OF BOTH EYES: ICD-10-CM

## 2024-05-16 DIAGNOSIS — R97.20 ELEVATED PSA: ICD-10-CM

## 2024-05-16 DIAGNOSIS — K21.9 GASTROESOPHAGEAL REFLUX DISEASE, UNSPECIFIED WHETHER ESOPHAGITIS PRESENT: ICD-10-CM

## 2024-05-16 DIAGNOSIS — K40.90 RIGHT INGUINAL HERNIA: ICD-10-CM

## 2024-05-16 DIAGNOSIS — I10 PRIMARY HYPERTENSION: Primary | ICD-10-CM

## 2024-05-16 PROCEDURE — 99213 OFFICE O/P EST LOW 20 MIN: CPT | Mod: PBBFAC,25 | Performed by: PHYSICIAN ASSISTANT

## 2024-05-16 PROCEDURE — 99999PBSHW PR PBB SHADOW TECHNICAL ONLY FILED TO HB: Mod: JZ,PBBFAC,,

## 2024-05-16 PROCEDURE — 20610 DRAIN/INJ JOINT/BURSA W/O US: CPT | Mod: PBBFAC,RT | Performed by: PHYSICIAN ASSISTANT

## 2024-05-16 PROCEDURE — 99999 PR PBB SHADOW E&M-EST. PATIENT-LVL V: CPT | Mod: PBBFAC,,, | Performed by: SURGERY

## 2024-05-16 PROCEDURE — 99214 OFFICE O/P EST MOD 30 MIN: CPT | Mod: PBBFAC,27 | Performed by: INTERNAL MEDICINE

## 2024-05-16 PROCEDURE — 99203 OFFICE O/P NEW LOW 30 MIN: CPT | Mod: S$PBB,,, | Performed by: SURGERY

## 2024-05-16 PROCEDURE — 99999 PR PBB SHADOW E&M-EST. PATIENT-LVL III: CPT | Mod: PBBFAC,,, | Performed by: PHYSICIAN ASSISTANT

## 2024-05-16 PROCEDURE — 20610 DRAIN/INJ JOINT/BURSA W/O US: CPT | Mod: S$PBB,RT,, | Performed by: PHYSICIAN ASSISTANT

## 2024-05-16 PROCEDURE — 99214 OFFICE O/P EST MOD 30 MIN: CPT | Mod: S$PBB,,, | Performed by: INTERNAL MEDICINE

## 2024-05-16 PROCEDURE — 99499 UNLISTED E&M SERVICE: CPT | Mod: S$PBB,,, | Performed by: PHYSICIAN ASSISTANT

## 2024-05-16 PROCEDURE — 99999 PR PBB SHADOW E&M-EST. PATIENT-LVL IV: CPT | Mod: PBBFAC,,, | Performed by: INTERNAL MEDICINE

## 2024-05-16 PROCEDURE — 99215 OFFICE O/P EST HI 40 MIN: CPT | Mod: PBBFAC,25,27 | Performed by: SURGERY

## 2024-05-16 RX ORDER — ALPRAZOLAM 0.5 MG/1
TABLET ORAL
Qty: 30 TABLET | Refills: 5 | Status: SHIPPED | OUTPATIENT
Start: 2024-05-16 | End: 2024-06-18

## 2024-05-16 RX ADMIN — Medication 20 MG: at 07:05

## 2024-05-16 NOTE — PROGRESS NOTES
Wero Spangler is a 68 y.o. year old his here today for his 3rd Euflexxa injection for degenerative changes of his right knee . he was last seen and treated in the clinic on 5/9/2024. There has been no significant change in his medical status since his last visit. No Fever, chills, malaise, or unexplained weight change.      Allergies, Medications, past medical and surgical history were reviewed .    Examination of the knee demonstrates  No evidence of edema, erythema , echymosis strength and range of motion are unchanged from previous visit.    The risks, benefits, pros, cons, and potential side effects of the procedure were discussed with the patient in detail all questions were answered.  The patient is comfortable and willing to proceed with the procedure. Verbal consent was obtained and the proper joint was identified by the patient and provider.     The injection site was identified and the skin was prepared with a betadine solution. The  right knee  joint was injected with 2 ml of Euflexxa solution under sterile technique. Wero Spangler tolerated the procedure well, he was advised to rest the knee today, ice and elevation. I may take 3 -6 weeks following the last injection to get the full benefit of the medication.  I will see him back in 6 months. Sooner if he has any problems or concerns.           .     ICD-10-CM ICD-9-CM   1. Primary osteoarthritis of right knee  M17.11 715.16

## 2024-05-16 NOTE — PROGRESS NOTES
Subjective:       Patient ID: Wero Spangler is a 68 y.o. male.    Chief Complaint: Follow-up (Discuss alprazolam)    Patient seen for follow-up of several issues including review of alprazolam for insomnia/anxiety.    Also follow-up hernia discovery.  Subsequently he has met with a surgeon and has surgery planned.  He will get clearance from his cardiologist.  He understands he would hold his blood thinner for 7 days.    No chest pain or shortness a breath.  He is holding his beta-blocker because of bradycardia.  No escalation.  He otherwise feels well from a cardiac standpoint.  The hernia does bother him so he is looking forward to doing the surgery    Abdominal Pain  This is a new problem. The current episode started 1 to 4 weeks ago. The onset quality is sudden. The problem occurs intermittently. The most recent episode lasted 1 hours. The problem has been waxing and waning. The pain is located in the RLQ. The pain is at a severity of 1/10. The pain is mild. The quality of the pain is a sensation of fullness. The abdominal pain radiates to the RLQ. Associated symptoms include flatus and frequency. Pertinent negatives include no anorexia, arthralgias, belching, constipation, diarrhea, dysuria, fever, headaches, hematochezia, hematuria, melena, myalgias, nausea, vomiting or weight loss. The pain is aggravated by certain positions. The pain is relieved by Activity. He has tried acetaminophen for the symptoms. The treatment provided mild relief. Prior diagnostic workup includes ultrasound. His past medical history is significant for gallstones and GERD. There is no history of abdominal surgery, colon cancer, Crohn's disease, irritable bowel syndrome, pancreatitis, PUD or ulcerative colitis. Patient's medical history does not include kidney stones and UTI.     Review of Systems   Constitutional:  Negative for fever and weight loss.   Gastrointestinal:  Positive for abdominal pain and flatus. Negative for  anorexia, constipation, diarrhea, hematochezia, melena, nausea and vomiting.   Genitourinary:  Positive for frequency. Negative for dysuria and hematuria.   Musculoskeletal:  Negative for arthralgias and myalgias.   Neurological:  Negative for headaches.           Past Medical History:   Diagnosis Date    Aftercataract     Allergy     BPH (benign prostatic hyperplasia)     Cataract     Corneal dystrophy     Elevated PSA     Enlarged prostate     Fatty liver     Fuchs' corneal dystrophy     Gallstones     General anesthetics causing adverse effect in therapeutic use 2000    delayed emergence after back surgery    GERD (gastroesophageal reflux disease)     HTN (hypertension) 9/24/2013    Hypertension     Insomnia     Prostate nodule     Urinary tract infection      Past Surgical History:   Procedure Laterality Date    BACK SURGERY  4/2000    CATARACT EXTRACTION W/  INTRAOCULAR LENS IMPLANT      Both Eyes    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    Procedure: TRANSPLANT, CORNEA;  Surgeon: Vu Wilson MD;  Location: [facility];  Service: Ophthalmology;  Laterality: Right;  DMEK    EYE SURGERY      LAPAROSCOPIC MARSUPIALIZATION OF CYST OF KIDNEY      PROSTATE SURGERY      prostate biopsy benign    TONSILLECTOMY      VASECTOMY        Patient Active Problem List   Diagnosis    Seasonal allergies    Elevated PSA    BPH with urinary obstruction    Corneal dystrophy - Both Eyes    Aftercataract - Both Eyes    Prostate nodule    Primary hypertension    Insomnia    Primary localized osteoarthrosis, lower leg    Chondromalacia patellae    Fuchs' corneal dystrophy    Fatty liver    GERD (gastroesophageal reflux disease)    Elevated bilirubin    Erectile dysfunction due to arterial insufficiency    Dyslipidemia    Atherosclerosis of native coronary artery of native heart    Coronary atherosclerosis of native coronary artery    Aortic atherosclerosis    Postsurgical percutaneous transluminal coronary angioplasty status    Bilateral  leg weakness        Objective:      Physical Exam  Constitutional:       General: He is not in acute distress.     Appearance: He is well-developed.   HENT:      Head: Normocephalic and atraumatic.      Right Ear: External ear normal.      Left Ear: External ear normal.      Mouth/Throat:      Pharynx: No oropharyngeal exudate or posterior oropharyngeal erythema.   Eyes:      General: No scleral icterus.     Conjunctiva/sclera: Conjunctivae normal.      Pupils: Pupils are equal, round, and reactive to light.   Neck:      Thyroid: No thyromegaly.      Comments: No supraclavicular nodes palpated  Cardiovascular:      Rate and Rhythm: Normal rate and regular rhythm.      Pulses: Normal pulses.      Heart sounds: Normal heart sounds. No murmur heard.  Pulmonary:      Effort: Pulmonary effort is normal.      Breath sounds: Normal breath sounds. No wheezing.   Abdominal:      General: Bowel sounds are normal.      Palpations: Abdomen is soft. There is no mass.      Tenderness: There is no abdominal tenderness.   Musculoskeletal:         General: No tenderness.      Cervical back: Normal range of motion and neck supple.      Right lower leg: No edema.      Left lower leg: No edema.   Lymphadenopathy:      Cervical: No cervical adenopathy.   Skin:     Coloration: Skin is not jaundiced or pale.   Neurological:      General: No focal deficit present.      Mental Status: He is alert and oriented to person, place, and time.   Psychiatric:         Mood and Affect: Mood normal.         Behavior: Behavior normal.         Assessment:       Problem List Items Addressed This Visit          Ophtho    Fuchs' corneal dystrophy       Cardiac/Vascular    Primary hypertension - Primary    Dyslipidemia       Renal/    Elevated PSA       GI    GERD (gastroesophageal reflux disease)       Other    Insomnia    Relevant Medications    ALPRAZolam (XANAX) 0.5 MG tablet       Plan:         Wero was seen today for follow-up.    Diagnoses and all  "orders for this visit:    Primary hypertension    Insomnia, unspecified type  -     ALPRAZolam (XANAX) 0.5 MG tablet; TAKE 1 TABLET BY MOUTH EVERY NIGHT    Gastroesophageal reflux disease, unspecified whether esophagitis present    Elevated PSA    Dyslipidemia    Fuchs' corneal dystrophy of both eyes        reviewed.  Follow-up later in the summer for labs.        Portions of this note may have been created with voice recognition software. Occasional "wrong-word" or "sound-a-like" substitutions may have occurred due to the inherent limitations of voice recognition software. Please, read the note carefully and recognize, using context, where substitutions have occurred.  "

## 2024-05-16 NOTE — PROGRESS NOTES
General Surgery Office Visit   History and Physical    Patient Name: Wero Spangler  YOB: 1955 (68 y.o.)  MRN: [id number]  Today's Date: 05/16/2024    Referring Md:   Ginny Mercer, Np  [address] [place] [address]    SUBJECTIVE:     Chief Complaint: R inguinal hernia    History of Present Illness:  Wero Spangler is a 68 y.o. male with PMHx of  BPH, RCC, HLD, HTN, GERD,  who presents to the clinic today complaining of R inguinal hernia which he first noticed 10 days ago. He reports he has been straining with stool. He reports discomfort and feeling of fullness in R inguinal region. He also reports that occasionally the area becomes hard and tense, and this returns to normal. He notices a bulge in the area. Denies skin changes or erythema. Denies any similar symptoms on L side.     He denies fever, chills, unintentional weight loss, n/v/d, constipation, hematochezia, dysuria, hematuria, CP, SOB, and all other symptoms. Patient reports being compliant with home medication regimen.     Hx of MI in September.   Patient denies personal history of MI, CVA, lung disease, DM  Previous abdominal surgeries include: lap nephrectomy   Denies alcohol, tobacco, and elicit drug use.   Not currently on any anticoagulants      Review of patient's allergies indicates:  No Known Allergies    Past Medical History:   Diagnosis Date    Aftercataract     Allergy     BPH (benign prostatic hyperplasia)     Cataract     Corneal dystrophy     Elevated PSA     Enlarged prostate     Fatty liver     Fuchs' corneal dystrophy     Gallstones     General anesthetics causing adverse effect in therapeutic use 2000    delayed emergence after back surgery    GERD (gastroesophageal reflux disease)     HTN (hypertension) 9/24/2013    Hypertension     Insomnia     Prostate nodule     Urinary tract infection      Past Surgical History:   Procedure Laterality Date    BACK SURGERY  4/2000    CATARACT EXTRACTION W/   INTRAOCULAR LENS IMPLANT      Both Eyes    CORNEAL TRANSPLANT Right 2019    Procedure: TRANSPLANT, CORNEA;  Surgeon: Vu Wilson MD;  Location: [facility];  Service: Ophthalmology;  Laterality: Right;  DMEK    EYE SURGERY      LAPAROSCOPIC MARSUPIALIZATION OF CYST OF KIDNEY      PROSTATE SURGERY      prostate biopsy benign    TONSILLECTOMY      VASECTOMY       Family History   Problem Relation Name Age of Onset    Cancer Mother          breast    Prostate cancer Brother prostate     Cancer Brother prostate         prostate    Macular degeneration Maternal Grandmother      Cancer Other      Cancer Other      Amblyopia Neg Hx      Blindness Neg Hx      Cataracts Neg Hx      Glaucoma Neg Hx      Retinal detachment Neg Hx      Strabismus Neg Hx       Social History     Tobacco Use    Smoking status: Former     Current packs/day: 0.00     Types: Cigarettes     Quit date:      Years since quittin.3    Smokeless tobacco: Never   Substance Use Topics    Alcohol use: Not Currently    Drug use: No        Review of Systems:  Review of Systems   Constitutional:  Negative for chills, fever and weight loss.   Respiratory:  Negative for cough and shortness of breath.    Cardiovascular:  Negative for chest pain and palpitations.   Gastrointestinal:  Negative for abdominal pain, constipation, diarrhea, nausea and vomiting.   Genitourinary:  Negative for dysuria, frequency and urgency.   Endo/Heme/Allergies:  Does not bruise/bleed easily.       OBJECTIVE:     Vital Signs (Most Recent)  /60 (BP Location: Right arm, Patient Position: Sitting, BP Method: Medium (Automatic))   Pulse (!) 58   Ht 6' (1.829 m)   Wt 79.6 kg (175 lb 9.5 oz)   SpO2 99%   BMI 23.82 kg/m²     Physical Exam  Constitutional:       Appearance: Normal appearance.   Cardiovascular:      Rate and Rhythm: Normal rate and regular rhythm.   Pulmonary:      Effort: Pulmonary effort is normal.   Abdominal:      General: Abdomen is flat.       Palpations: Abdomen is soft.      Hernia: A hernia (R inguinal) is present.   Skin:     General: Skin is warm and dry.   Neurological:      General: No focal deficit present.      Mental Status: He is alert and oriented to person, place, and time.           Labs:     Lab Results   Component Value Date    WBC 6.59 05/12/2024    HGB 14.3 05/12/2024    HCT 41.6 05/12/2024    MCV 93 05/12/2024     05/12/2024         CMP  Sodium   Date Value Ref Range Status   05/12/2024 140 136 - 145 mmol/L Final     Potassium   Date Value Ref Range Status   05/12/2024 3.6 3.5 - 5.1 mmol/L Final     Chloride   Date Value Ref Range Status   05/12/2024 104 95 - 110 mmol/L Final     CO2   Date Value Ref Range Status   05/12/2024 26 23 - 29 mmol/L Final     Glucose   Date Value Ref Range Status   05/12/2024 84 70 - 110 mg/dL Final     BUN   Date Value Ref Range Status   05/12/2024 16 8 - 23 mg/dL Final     Creatinine   Date Value Ref Range Status   05/12/2024 0.9 0.5 - 1.4 mg/dL Final     Calcium   Date Value Ref Range Status   05/12/2024 9.3 8.7 - 10.5 mg/dL Final     Total Protein   Date Value Ref Range Status   05/12/2024 6.6 6.0 - 8.4 g/dL Final     Albumin   Date Value Ref Range Status   05/12/2024 3.9 3.5 - 5.2 g/dL Final     Total Bilirubin   Date Value Ref Range Status   05/12/2024 1.3 (H) 0.1 - 1.0 mg/dL Final     Comment:     For infants and newborns, interpretation of results should be based  on gestational age, weight and in agreement with clinical  observations.    Premature Infant recommended reference ranges:  Up to 24 hours.............<8.0 mg/dL  Up to 48 hours............<12.0 mg/dL  3-5 days..................<15.0 mg/dL  6-29 days.................<15.0 mg/dL       Alkaline Phosphatase   Date Value Ref Range Status   05/12/2024 65 55 - 135 U/L Final     AST   Date Value Ref Range Status   05/12/2024 25 10 - 40 U/L Final     ALT   Date Value Ref Range Status   05/12/2024 19 10 - 44 U/L Final     Anion Gap   Date  Value Ref Range Status   05/12/2024 10 8 - 16 mmol/L Final     eGFR if    Date Value Ref Range Status   07/25/2022 >60.0 >60 mL/min/1.73 m^2 Final     eGFR if non    Date Value Ref Range Status   07/25/2022 >60.0 >60 mL/min/1.73 m^2 Final     Comment:     Calculation used to obtain the estimated glomerular filtration  rate (eGFR) is the CKD-EPI equation.              Imaging:   US 5/13: 1cm hernia, fat containing.      ASSESSMENT/PLAN:     Wero was seen today for consult.    Diagnoses and all orders for this visit:    Right inguinal hernia  -     Ambulatory referral/consult to General Surgery        Wero Spangler is a 68 y.o. male with reducible R inguinal hernia.      - Schedule for open R inguinal hernia repair  - Schedule for pre-op clearance  - Patient instructed to call clinic with any questions, concerns, or new symptoms  - Patient understands and in agreement with plan; all questions answered    Case discussed with Dr. Sen.      Brenda Carrera, MS4   [facility]       I have seen the patient, reviewed the documentation, and have helped formulate the medical student's assessment and plan. I agree with the findings above    Mr. Spangler is a 68 y.o. male with symptomatic right inguinal hernia. Will need cardiac risk stratification prior to surgery. Would perform under local/MAC    Sudeep Sen MD, DAKSHA, FACS  Acute Care Surgery & Surgical Critical Care  [facility]

## 2024-05-17 ENCOUNTER — TELEPHONE (OUTPATIENT)
Dept: PREADMISSION TESTING | Facility: HOSPITAL | Age: 69
End: 2024-05-17
Payer: MEDICARE

## 2024-05-17 ENCOUNTER — TELEPHONE (OUTPATIENT)
Dept: CARDIOLOGY | Facility: CLINIC | Age: 69
End: 2024-05-17
Payer: MEDICARE

## 2024-05-17 NOTE — TELEPHONE ENCOUNTER
----- Message from Tremaine Steele RN sent at 5/16/2024  2:04 PM CDT -----  Good afternoon,    The above patient will need to see Dr. Wright or Nurse practitioner for optimization. Surgery will be on 5/27/24 (Open Right Inguinal Hernia Repair).

## 2024-05-17 NOTE — TELEPHONE ENCOUNTER
Followed up with Catrina Crys. Reports that he had a few doctors appointments yesterday. His HR is now in the 50s and blood pressure in normal range. Reports feeling well after stopping bisoprolol. Is it ok to discontinue the bisoprolol?    Also, he is having hernia surgery on 5/27 with Dr. Sudeep Sen. Patient needs clearance and instructions on holding of blood thinners. Please advise.

## 2024-05-17 NOTE — TELEPHONE ENCOUNTER
Spoke to the pt. Instructions given to pt about stopping blood thinning medication Brilinta 5 days prior to surgery date. All question answered at this time. Pt v/u.

## 2024-05-18 NOTE — TELEPHONE ENCOUNTER
Patient can continue off Bisoprolol.  Unfortunately any elective non-cardiac surgery should be postponed for 12 months until after heart attack,mostly due to treatment with ASA and Brlinta.  While we could potentially discontinue Brilinta 6 months after surgery and restart ASAP, patient should be continued on at least 1 antiplatelet agent ( low dose ASA).  I am not sure if the surgeon will be able to operate on ASA since it increases risk of major bleeding. On the other hand stopping both antiplatelet agent witiin 6 months after heart attack and stenting increases the risk of stent thrombosis and another myocardial infarction.  In short::  any elective surgery should be postponed until after 12 months since the MI and stent  If surgery is still contemplated it should be performed while patient is on ASA ( without interruption).  Brilinta can be stopped 7 days prior and restarted ASAP

## 2024-05-19 ENCOUNTER — NURSE TRIAGE (OUTPATIENT)
Dept: ADMINISTRATIVE | Facility: CLINIC | Age: 69
End: 2024-05-19
Payer: MEDICARE

## 2024-05-19 NOTE — TELEPHONE ENCOUNTER
[place]    PCP:  Dr. Duke Cano    He reports sees Dr. Palacios with [facility].  He states he had a HR issue and MD stopped his beta-blocker.  He reports that he's been off the beta-blocker x 6 days now.  C/O lightheadedness (began last night), ringing in his ears, and SOB with activity.  He reports palpated pulse is 72 and regular.  Denies palpitations, fever, N/V/D, bleeding, and CP.  Per protocol, care advised is go to ED now.  Pt VU and refused care advised.  Care advice reinforced.  Unsure whether pt will follow care advice.  He states he'll sit there and think about it then make a decision.  Advised to call for worsening/questions/concerns.  VU.    Reason for Disposition   Difficulty breathing    Additional Information   Negative: SEVERE difficulty breathing (e.g., struggling for each breath, speaks in single words)   Negative: [1] Difficulty breathing or swallowing AND [2] started suddenly after medicine, an allergic food or bee sting   Negative: Shock suspected (e.g., cold/pale/clammy skin, too weak to stand, low BP, rapid pulse)   Negative: Difficult to awaken or acting confused (e.g., disoriented, slurred speech)   Negative: [1] Weakness (i.e., paralysis, loss of muscle strength) of the face, arm or leg on one side of the body AND [2] sudden onset AND [3] present now   Negative: [1] Numbness (i.e., loss of sensation) of the face, arm or leg on one side of the body AND [2] sudden onset AND [3] present now   Negative: [1] Loss of speech or garbled speech AND [2] sudden onset AND [3] present now   Negative: Overdose (accidental or intentional) of medications   Negative: [1] Fainted > 15 minutes ago AND [2] still feels too weak or dizzy to stand   Negative: Heart beating < 50 beats per minute OR > 140 beats per minute   Negative: Sounds like a life-threatening emergency to the triager    Protocols used: Dizziness - [id number]

## 2024-05-20 ENCOUNTER — TELEPHONE (OUTPATIENT)
Dept: SURGERY | Facility: CLINIC | Age: 69
End: 2024-05-20
Payer: MEDICARE

## 2024-05-20 ENCOUNTER — PATIENT MESSAGE (OUTPATIENT)
Dept: SURGERY | Facility: CLINIC | Age: 69
End: 2024-05-20
Payer: MEDICARE

## 2024-05-20 NOTE — TELEPHONE ENCOUNTER
Certainly I am happy to see the patient if it is appropriate in particular if Cardiology suggests that

## 2024-05-20 NOTE — TELEPHONE ENCOUNTER
Mr. Spangler has also sent a message to Dr. Palacios staff via portal. The message has been forwarded to Dr. Palacios awaiting response.

## 2024-05-20 NOTE — TELEPHONE ENCOUNTER
Thank you for the note. I do not think these symptoms are related to stopping BIsoprolol. MR. Spangler was on a very small dose of the medication and I hear he is doing better. Would continue to monitor BP and Pulse.      I think the best way to assess MR Spangler symptoms is to have him see Kristine. I will be available to discuss it with her afterwards.

## 2024-05-20 NOTE — TELEPHONE ENCOUNTER
Called and spoke to the pt. He stated per his cardiologist he will hold off on hernia surgery for now and will follow up with us to reschedule surgery.

## 2024-05-29 ENCOUNTER — OFFICE VISIT (OUTPATIENT)
Dept: CARDIOLOGY | Facility: CLINIC | Age: 69
End: 2024-05-29
Payer: MEDICARE

## 2024-05-29 VITALS
BODY MASS INDEX: 23.69 KG/M2 | WEIGHT: 174.94 LBS | HEIGHT: 72 IN | HEART RATE: 57 BPM | SYSTOLIC BLOOD PRESSURE: 102 MMHG | DIASTOLIC BLOOD PRESSURE: 58 MMHG

## 2024-05-29 DIAGNOSIS — I70.0 AORTIC ATHEROSCLEROSIS: ICD-10-CM

## 2024-05-29 DIAGNOSIS — I10 PRIMARY HYPERTENSION: ICD-10-CM

## 2024-05-29 DIAGNOSIS — Z98.61 POSTSURGICAL PERCUTANEOUS TRANSLUMINAL CORONARY ANGIOPLASTY STATUS: ICD-10-CM

## 2024-05-29 DIAGNOSIS — K76.0 FATTY LIVER: ICD-10-CM

## 2024-05-29 DIAGNOSIS — I25.10 ATHEROSCLEROSIS OF NATIVE CORONARY ARTERY OF NATIVE HEART WITHOUT ANGINA PECTORIS: Primary | ICD-10-CM

## 2024-05-29 DIAGNOSIS — E78.5 DYSLIPIDEMIA: ICD-10-CM

## 2024-05-29 LAB
OHS QRS DURATION: 148 MS
OHS QTC CALCULATION: 414 MS

## 2024-05-29 PROCEDURE — 99214 OFFICE O/P EST MOD 30 MIN: CPT | Mod: S$PBB,,, | Performed by: INTERNAL MEDICINE

## 2024-05-29 PROCEDURE — 93010 ELECTROCARDIOGRAM REPORT: CPT | Mod: S$PBB,,, | Performed by: INTERNAL MEDICINE

## 2024-05-29 PROCEDURE — 93005 ELECTROCARDIOGRAM TRACING: CPT | Mod: PBBFAC,PO | Performed by: INTERNAL MEDICINE

## 2024-05-29 PROCEDURE — 99999 PR PBB SHADOW E&M-EST. PATIENT-LVL IV: CPT | Mod: PBBFAC,,, | Performed by: INTERNAL MEDICINE

## 2024-05-29 PROCEDURE — 99214 OFFICE O/P EST MOD 30 MIN: CPT | Mod: PBBFAC,PO,25 | Performed by: INTERNAL MEDICINE

## 2024-05-29 NOTE — PROGRESS NOTES
Subjective:   Patient ID:  Wero Spangler is a 68 y.o. male who presents for follow-up of Bradycardia      HPI: Couple of weeks ago patient was seen in ER with vague symptoms of dizziness. CVA was ruled out. Patient was found to be bradycardic with  normal BP. He was concerned about it.  In response we have discontinued Bisoprolol. He was on low dose ( 2.5mg daily).  Subsequently he was concerned about occasional dyspnea unrelated to activity, exercise, eating.  Today he reports that his symptoms have resolved and he is doing well.     We reviewed his most recent blood work.  Patient graduated from cardiac rehab, but is not physically active as in the past.  Recently he was going to  undergo hernia surgery, but it was postopned due to the  need of DAPT at least till end of September of this year.    ECG from today was independently reviewed. SB (58bpm), RBBB    Lab Results   Component Value Date     05/12/2024    K 3.6 05/12/2024     05/12/2024    CO2 26 05/12/2024    BUN 16 05/12/2024    CREATININE 0.9 05/12/2024    GLU 84 05/12/2024    HGBA1C 4.9 07/25/2022    MG 2.0 01/15/2021    AST 25 05/12/2024    ALT 19 05/12/2024    ALBUMIN 3.9 05/12/2024    PROT 6.6 05/12/2024    BILITOT 1.3 (H) 05/12/2024    WBC 6.59 05/12/2024    HGB 14.3 05/12/2024    HCT 41.6 05/12/2024    HCT 39 05/12/2024    MCV 93 05/12/2024     05/12/2024    INR 1.1 05/12/2024    INR 1.3 (H) 05/12/2024    PSA 14.0 (H) 09/22/2014    TSH 1.353 05/12/2024    CHOL 118 (L) 05/12/2024    HDL 36 (L) 05/12/2024    LDLCALC 51.8 (L) 05/12/2024    TRIG 151 (H) 05/12/2024       No results found in the last 24 hours.     Review of Systems   Constitutional: Negative.   HENT:  Positive for hearing loss.    Eyes: Negative.    Cardiovascular: Negative.  Negative for chest pain, claudication, dyspnea on exertion, irregular heartbeat, leg swelling, near-syncope, palpitations and syncope.   Respiratory: Negative.  Negative for cough, shortness of  breath, snoring and wheezing.    Endocrine: Negative.  Negative for cold intolerance, heat intolerance, polydipsia, polyphagia and polyuria.   Skin: Negative.    Musculoskeletal:  Positive for arthritis, joint pain and muscle cramps.   Gastrointestinal: Negative.    Genitourinary: Negative.    Neurological:  Positive for light-headedness and loss of balance.   Psychiatric/Behavioral:  The patient is nervous/anxious.        Objective:   Physical Exam  Vitals and nursing note reviewed.   Constitutional:       Appearance: He is well-developed.      Comments: BP (!) 102/58   Pulse (!) 57   Ht 6' (1.829 m)   Wt 79.3 kg (174 lb 15 oz)   BMI 23.73 kg/m²      HENT:      Head: Normocephalic.   Eyes:      Pupils: Pupils are equal, round, and reactive to light.   Neck:      Thyroid: No thyromegaly.      Vascular: No carotid bruit.   Cardiovascular:      Rate and Rhythm: Normal rate and regular rhythm.      Pulses: Intact distal pulses.           Carotid pulses are 2+ on the right side and 2+ on the left side.       Radial pulses are 2+ on the right side and 2+ on the left side.        Femoral pulses are 2+ on the right side and 2+ on the left side.       Popliteal pulses are 2+ on the right side and 2+ on the left side.        Dorsalis pedis pulses are 2+ on the right side and 2+ on the left side.        Posterior tibial pulses are 2+ on the right side and 2+ on the left side.      Heart sounds: Normal heart sounds. No murmur heard.     No friction rub. No gallop.   Pulmonary:      Effort: Pulmonary effort is normal. No respiratory distress.      Breath sounds: Normal breath sounds. No wheezing or rales.   Chest:      Chest wall: No tenderness.   Abdominal:      General: Bowel sounds are normal.      Palpations: Abdomen is soft.   Musculoskeletal:         General: Normal range of motion.      Cervical back: Normal range of motion and neck supple.   Skin:     General: Skin is warm and dry.   Neurological:      Mental  Status: He is alert and oriented to person, place, and time.           Assessment and Plan:     Problem List Items Addressed This Visit          Cardiology Problems    Primary hypertension    Relevant Orders    Echo    Coronary atherosclerosis of native coronary artery - Primary    Relevant Orders    Echo    IN OFFICE EKG 12-LEAD (to Muse)    Aortic atherosclerosis       Other    Fatty liver    Dyslipidemia    Postsurgical percutaneous transluminal coronary angioplasty status    Relevant Orders    Echo    IN OFFICE EKG 12-LEAD (to Muse)       Patient's Medications   New Prescriptions    No medications on file   Previous Medications    ALFUZOSIN (UROXATRAL) 10 MG TB24    Take 1 tablet (10 mg total) by mouth once daily.    ALPRAZOLAM (XANAX) 0.5 MG TABLET    TAKE 1 TABLET BY MOUTH EVERY NIGHT    ASPIRIN 81 MG CHEW    Take 81 mg by mouth once daily.    ATORVASTATIN (LIPITOR) 20 MG TABLET    Take 1 tablet (20 mg total) by mouth once daily.    BISOPROLOL (ZEBETA) 2.5 MG TAB SPLIT TABLET    Take 2.5 mg by mouth once daily.    EFINACONAZOLE (JUBLIA) 10 % MARIA ELENA    APPLY TO AFFECTED NAIL DAILY. REMOVE WEEKLY    FLUTICASONE PROPIONATE (FLONASE) 50 MCG/ACTUATION NASAL SPRAY    2 sprays (100 mcg total) by Each Nostril route once daily.    LANSOPRAZOLE (PREVACID) 15 MG CAPSULE    Take 1 capsule (15 mg total) by mouth once daily.    MECLIZINE (ANTIVERT) 25 MG TABLET    Take 1 tablet (25 mg total) by mouth 3 (three) times daily as needed for Dizziness.    NITROGLYCERIN (NITROSTAT) 0.4 MG SL TABLET    Place 1 tablet (0.4 mg total) under the tongue every 5 (five) minutes as needed for Chest pain.    SODIUM CHLORIDE 2% (BARBI 128) 2 % OPHTHALMIC SOLUTION    1 drop as needed (takes 3-4 times/day).    TICAGRELOR (BRILINTA) 90 MG TABLET    Take 1 tablet (90 mg total) by mouth 2 (two) times daily.    TRIAMTERENE-HYDROCHLOROTHIAZIDE 37.5-25 MG (MAXZIDE-25) 37.5-25 MG PER TABLET    Take 1 tablet by mouth once daily.   Modified Medications     No medications on file   Discontinued Medications    No medications on file     We have discussed possible side effects of Brilinta.  Foe now will continue off Bisoprolol.  Patient will monitor his BP and pulse.  We have elected to continue on the present DAPT. If symptoms continue we can change Brilinta to Effient or Plavix.  Repeat CFD and advise.  Stricter dietary follow up recommended ans well as continued physical exercises.  Follow up in September as planned.      No follow-ups on file.

## 2024-06-04 ENCOUNTER — HOSPITAL ENCOUNTER (OUTPATIENT)
Dept: CARDIOLOGY | Facility: HOSPITAL | Age: 69
Discharge: HOME | End: 2024-06-04
Attending: INTERNAL MEDICINE
Payer: MEDICARE

## 2024-06-04 VITALS
SYSTOLIC BLOOD PRESSURE: 122 MMHG | HEART RATE: 65 BPM | BODY MASS INDEX: 23.57 KG/M2 | HEIGHT: 72 IN | WEIGHT: 174 LBS | DIASTOLIC BLOOD PRESSURE: 68 MMHG

## 2024-06-04 DIAGNOSIS — Z98.61 POSTSURGICAL PERCUTANEOUS TRANSLUMINAL CORONARY ANGIOPLASTY STATUS: ICD-10-CM

## 2024-06-04 DIAGNOSIS — I10 PRIMARY HYPERTENSION: ICD-10-CM

## 2024-06-04 DIAGNOSIS — I25.10 ATHEROSCLEROSIS OF NATIVE CORONARY ARTERY OF NATIVE HEART WITHOUT ANGINA PECTORIS: ICD-10-CM

## 2024-06-04 LAB
ASCENDING AORTA: 2.78 CM
AV INDEX (PROSTH): 0.84
AV MEAN GRADIENT: 3 MMHG
AV PEAK GRADIENT: 5 MMHG
AV VALVE AREA BY VELOCITY RATIO: 3.18 CM²
AV VALVE AREA: 2.89 CM²
AV VELOCITY RATIO: 0.93
BSA FOR ECHO PROCEDURE: 2 M2
CV ECHO LV RWT: 0.35 CM
DOP CALC AO PEAK VEL: 1.12 M/S
DOP CALC AO VTI: 23.54 CM
DOP CALC LVOT AREA: 3.4 CM2
DOP CALC LVOT DIAMETER: 2.09 CM
DOP CALC LVOT PEAK VEL: 1.04 M/S
DOP CALC LVOT STROKE VOLUME: 68.03 CM3
DOP CALCLVOT PEAK VEL VTI: 19.84 CM
E WAVE DECELERATION TIME: 279.31 MSEC
E/A RATIO: 0.74
E/E' RATIO: 9.14 M/S
ECHO LV POSTERIOR WALL: 0.89 CM (ref 0.6–1.1)
EJECTION FRACTION: 58 %
FRACTIONAL SHORTENING: 40 % (ref 28–44)
INTERVENTRICULAR SEPTUM: 0.93 CM (ref 0.6–1.1)
IVRT: 131.3 MSEC
LA MAJOR: 5.05 CM
LA MINOR: 4.96 CM
LA WIDTH: 4.11 CM
LEFT ATRIUM SIZE: 3.31 CM
LEFT ATRIUM VOLUME INDEX MOD: 24.1 ML/M2
LEFT ATRIUM VOLUME INDEX: 28.8 ML/M2
LEFT ATRIUM VOLUME MOD: 48.37 CM3
LEFT ATRIUM VOLUME: 57.87 CM3
LEFT INTERNAL DIMENSION IN SYSTOLE: 3.09 CM (ref 2.1–4)
LEFT VENTRICLE DIASTOLIC VOLUME INDEX: 62.64 ML/M2
LEFT VENTRICLE DIASTOLIC VOLUME: 125.9 ML
LEFT VENTRICLE MASS INDEX: 84 G/M2
LEFT VENTRICLE SYSTOLIC VOLUME INDEX: 18.7 ML/M2
LEFT VENTRICLE SYSTOLIC VOLUME: 37.62 ML
LEFT VENTRICULAR INTERNAL DIMENSION IN DIASTOLE: 5.14 CM (ref 3.5–6)
LEFT VENTRICULAR MASS: 168.13 G
LV LATERAL E/E' RATIO: 9.14 M/S
LV SEPTAL E/E' RATIO: 9.14 M/S
MV PEAK A VEL: 0.86 M/S
MV PEAK E VEL: 0.64 M/S
MV STENOSIS PRESSURE HALF TIME: 81 MS
MV VALVE AREA P 1/2 METHOD: 2.72 CM2
OHS CV RV/LV RATIO: 0.58 CM
PISA TR MAX VEL: 2.08 M/S
RA MAJOR: 4.69 CM
RA PRESSURE ESTIMATED: 3 MMHG
RA WIDTH: 3.28 CM
RIGHT VENTRICULAR END-DIASTOLIC DIMENSION: 2.99 CM
RV TB RVSP: 5 MMHG
SINUS: 3.29 CM
STJ: 2.88 CM
TDI LATERAL: 0.07 M/S
TDI SEPTAL: 0.07 M/S
TDI: 0.07 M/S
TR MAX PG: 17 MMHG
TRICUSPID ANNULAR PLANE SYSTOLIC EXCURSION: 2.78 CM
TV REST PULMONARY ARTERY PRESSURE: 20 MMHG
Z-SCORE OF LEFT VENTRICULAR DIMENSION IN END DIASTOLE: -1.36
Z-SCORE OF LEFT VENTRICULAR DIMENSION IN END SYSTOLE: -1.24

## 2024-06-04 PROCEDURE — 93306 TTE W/DOPPLER COMPLETE: CPT | Mod: PO

## 2024-06-04 PROCEDURE — 93306 TTE W/DOPPLER COMPLETE: CPT | Mod: 26,,, | Performed by: INTERNAL MEDICINE

## 2024-06-10 ENCOUNTER — PATIENT MESSAGE (OUTPATIENT)
Dept: INTERNAL MEDICINE | Facility: CLINIC | Age: 69
End: 2024-06-10
Payer: MEDICARE

## 2024-06-14 ENCOUNTER — OFFICE VISIT (OUTPATIENT)
Dept: URGENT CARE | Facility: CLINIC | Age: 69
End: 2024-06-14
Payer: MEDICARE

## 2024-06-14 VITALS
TEMPERATURE: 99 F | RESPIRATION RATE: 18 BRPM | DIASTOLIC BLOOD PRESSURE: 65 MMHG | BODY MASS INDEX: 23.57 KG/M2 | HEIGHT: 72 IN | WEIGHT: 174 LBS | SYSTOLIC BLOOD PRESSURE: 131 MMHG | HEART RATE: 70 BPM | OXYGEN SATURATION: 98 %

## 2024-06-14 DIAGNOSIS — H11.31 SUBCONJUNCTIVAL HEMORRHAGE OF RIGHT EYE: Primary | ICD-10-CM

## 2024-06-14 PROCEDURE — 99213 OFFICE O/P EST LOW 20 MIN: CPT | Mod: S$GLB,,, | Performed by: NURSE PRACTITIONER

## 2024-06-14 NOTE — PROGRESS NOTES
Subjective:      Patient ID: Wero Spangler is a 68 y.o. male.    Vitals:  height is 6' (1.829 m) and weight is 78.9 kg (174 lb). His temperature is 98.5 °F (36.9 °C). His blood pressure is 131/65 and his pulse is 70. His respiration is 18 and oxygen saturation is 98%.     Chief Complaint: Eye Problem    Pt is a 67 yo male w/ c/o R eye redness. Pt explains that someone noticed his eye has a clot and was swollen on Monday. Pt denies any change in vision or pain in the eye. Denies injury. Pt is on anticoagulants and states he does often have to strain w/ Bms, explains that could have been the cause.     Eye Problem   The right eye is affected. This is a new problem. Episode onset: monday. The problem has been unchanged. The injury mechanism is unknown. There is No known exposure to pink eye. He Does not wear contacts. Associated symptoms include eye redness. Pertinent negatives include no blurred vision, eye discharge, double vision, fever, foreign body sensation, itching, nausea, photophobia, recent URI or vomiting. He has tried nothing for the symptoms.       Constitution: Negative for fever.   Eyes:  Positive for eye redness. Negative for eye discharge, eye itching, photophobia, double vision and blurred vision.   Gastrointestinal:  Negative for nausea and vomiting.      Objective:     Physical Exam   Constitutional: He is oriented to person, place, and time.   HENT:   Head: Normocephalic.   Ears:   Right Ear: External ear normal.   Left Ear: External ear normal.   Nose: Nose normal.   Mouth/Throat: Mucous membranes are moist.   Eyes: Lids are normal. Pupils are equal, round, and reactive to light. No visual field deficit is present. Right eye exhibits no discharge, no exudate and no hordeolum. No foreign body present in the right eye. Left eye exhibits no discharge, no exudate and no hordeolum. No foreign body present in the left eye. Right conjunctiva has a hemorrhage. Left conjunctiva has no hemorrhage.  Right pupil is round, reactive and not sluggish. Left pupil is round, reactive and not sluggish. Pupils are equal.   Fundoscopic exam:       The right eye shows red reflex present. vision grossly intact   Cardiovascular: Normal rate.   Pulmonary/Chest: Effort normal.   Musculoskeletal: Normal range of motion.         General: Normal range of motion.   Neurological: He is alert and oriented to person, place, and time.   Skin: Skin is dry.   Psychiatric: His behavior is normal.   Nursing note and vitals reviewed.    Assessment:     1. Subconjunctival hemorrhage of right eye        Plan:       Subconjunctival hemorrhage of right eye      Patient Instructions   - You must understand that you have received an Urgent Care treatment only and that you may be released before all of your medical problems are known or treated.   - You, the patient, will arrange for follow up care as instructed.   - If your condition worsens or fails to improve we recommend that you receive another evaluation at the ER immediately or contact your PCP to discuss your concerns.   - You can call [phone] or [phone] to help schedule an appointment with the appropriate provider.    Follow up with Ophthalmology for persistent symptoms  Strict ER precautions for new or worsening symptoms

## 2024-06-14 NOTE — PATIENT INSTRUCTIONS
- You must understand that you have received an Urgent Care treatment only and that you may be released before all of your medical problems are known or treated.   - You, the patient, will arrange for follow up care as instructed.   - If your condition worsens or fails to improve we recommend that you receive another evaluation at the ER immediately or contact your PCP to discuss your concerns.   - You can call [phone] or [phone] to help schedule an appointment with the appropriate provider.    Follow up with Ophthalmology for persistent symptoms  Strict ER precautions for new or worsening symptoms

## 2024-06-17 DIAGNOSIS — G47.00 INSOMNIA, UNSPECIFIED TYPE: ICD-10-CM

## 2024-06-17 NOTE — TELEPHONE ENCOUNTER
No care due was identified.  [facility] Embedded Care Due Messages. Reference number: [id number].   6/17/2024 6:22:19 PM CDT

## 2024-06-18 RX ORDER — ALPRAZOLAM 0.5 MG/1
TABLET ORAL
Qty: 30 TABLET | Refills: 1 | Status: SHIPPED | OUTPATIENT
Start: 2024-06-18

## 2024-06-26 ENCOUNTER — OFFICE VISIT (OUTPATIENT)
Dept: CARDIOLOGY | Facility: CLINIC | Age: 69
End: 2024-06-26
Payer: MEDICARE

## 2024-06-26 VITALS
SYSTOLIC BLOOD PRESSURE: 115 MMHG | WEIGHT: 177.25 LBS | HEART RATE: 66 BPM | DIASTOLIC BLOOD PRESSURE: 55 MMHG | HEIGHT: 72 IN | BODY MASS INDEX: 24.01 KG/M2

## 2024-06-26 DIAGNOSIS — I10 PRIMARY HYPERTENSION: ICD-10-CM

## 2024-06-26 DIAGNOSIS — I70.0 AORTIC ATHEROSCLEROSIS: ICD-10-CM

## 2024-06-26 DIAGNOSIS — E78.5 DYSLIPIDEMIA: ICD-10-CM

## 2024-06-26 DIAGNOSIS — K76.0 FATTY LIVER: ICD-10-CM

## 2024-06-26 DIAGNOSIS — Z98.61 POSTSURGICAL PERCUTANEOUS TRANSLUMINAL CORONARY ANGIOPLASTY STATUS: ICD-10-CM

## 2024-06-26 DIAGNOSIS — I25.10 ATHEROSCLEROSIS OF NATIVE CORONARY ARTERY OF NATIVE HEART WITHOUT ANGINA PECTORIS: Primary | ICD-10-CM

## 2024-06-26 PROCEDURE — 99214 OFFICE O/P EST MOD 30 MIN: CPT | Mod: S$PBB,,, | Performed by: INTERNAL MEDICINE

## 2024-06-26 PROCEDURE — 99214 OFFICE O/P EST MOD 30 MIN: CPT | Mod: PBBFAC,PO | Performed by: INTERNAL MEDICINE

## 2024-06-26 PROCEDURE — 99999 PR PBB SHADOW E&M-EST. PATIENT-LVL IV: CPT | Mod: PBBFAC,,, | Performed by: INTERNAL MEDICINE

## 2024-06-26 NOTE — PROGRESS NOTES
Subjective:   Patient ID:  Wero Spangler is a 68 y.o. male who presents for follow-up of Hypertension      HPI: Patient was last seen in MAy 2024 ( unscheduled visit after ER eval). He is doing well and denies any CV symptoms. His main concerns is when he will be able to have hernia surgery in light of a planned 8 weeks trip outside of [place].  Last CFD from June 2024 was normal:  Summary      Left Ventricle: The left ventricle is normal in size. Ventricular mass is normal. Normal wall thickness. There is normal systolic function with a visually estimated ejection fraction of 55 - 60%. Ejection fraction by visual approximation is 58%. There is normal diastolic function.    Right Ventricle: Normal right ventricular cavity size. Wall thickness is normal. Systolic function is normal.    Tricuspid Valve: There is trace regurgitation.    Pulmonary Artery: The estimated pulmonary artery systolic pressure is 20 mmHg.    IVC/SVC: Normal venous pressure at 3 mmHg.    We reviewed his  blood work.      Lab Results   Component Value Date     05/12/2024    K 3.6 05/12/2024     05/12/2024    CO2 26 05/12/2024    BUN 16 05/12/2024    CREATININE 0.9 05/12/2024    GLU 84 05/12/2024    HGBA1C 4.9 07/25/2022    MG 2.0 01/15/2021    AST 25 05/12/2024    ALT 19 05/12/2024    ALBUMIN 3.9 05/12/2024    PROT 6.6 05/12/2024    BILITOT 1.3 (H) 05/12/2024    WBC 6.59 05/12/2024    HGB 14.3 05/12/2024    HCT 41.6 05/12/2024    HCT 39 05/12/2024    MCV 93 05/12/2024     05/12/2024    INR 1.1 05/12/2024    INR 1.3 (H) 05/12/2024    PSA 14.0 (H) 09/22/2014    TSH 1.353 05/12/2024    CHOL 118 (L) 05/12/2024    HDL 36 (L) 05/12/2024    LDLCALC 51.8 (L) 05/12/2024    TRIG 151 (H) 05/12/2024       No results found in the last 24 hours.     Review of Systems   Constitutional: Negative.   HENT: Negative.     Eyes: Negative.    Cardiovascular: Negative.  Negative for chest pain, claudication, dyspnea on exertion, irregular  heartbeat, leg swelling, near-syncope, palpitations and syncope.   Respiratory: Negative.  Negative for cough, shortness of breath, snoring and wheezing.    Endocrine: Negative.  Negative for cold intolerance, heat intolerance, polydipsia, polyphagia and polyuria.   Skin: Negative.    Musculoskeletal:  Positive for arthritis and joint pain.   Gastrointestinal:  Positive for constipation.   Genitourinary: Negative.    Neurological: Negative.    Psychiatric/Behavioral: Negative.         Objective:   Physical Exam  Vitals and nursing note reviewed.   Constitutional:       Appearance: He is well-developed.      Comments: BP (!) 115/55   Pulse 66   Ht 6' (1.829 m)   Wt 80.4 kg (177 lb 4 oz)   BMI 24.04 kg/m²      HENT:      Head: Normocephalic.   Eyes:      Pupils: Pupils are equal, round, and reactive to light.   Neck:      Thyroid: No thyromegaly.      Vascular: No carotid bruit.   Cardiovascular:      Rate and Rhythm: Normal rate and regular rhythm.      Pulses: Intact distal pulses.           Carotid pulses are 2+ on the right side and 2+ on the left side.       Radial pulses are 2+ on the right side and 2+ on the left side.        Femoral pulses are 2+ on the right side and 2+ on the left side.       Popliteal pulses are 2+ on the right side and 2+ on the left side.        Dorsalis pedis pulses are 2+ on the right side and 2+ on the left side.        Posterior tibial pulses are 2+ on the right side and 2+ on the left side.      Heart sounds: Normal heart sounds. No murmur heard.     No friction rub. No gallop.   Pulmonary:      Effort: Pulmonary effort is normal. No respiratory distress.      Breath sounds: Normal breath sounds. No wheezing or rales.   Chest:      Chest wall: No tenderness.   Abdominal:      General: Bowel sounds are normal.      Palpations: Abdomen is soft.   Musculoskeletal:         General: Normal range of motion.      Cervical back: Normal range of motion and neck supple.   Skin:      General: Skin is warm and dry.   Neurological:      Mental Status: He is alert and oriented to person, place, and time.           Assessment and Plan:     Problem List Items Addressed This Visit          Cardiology Problems    Primary hypertension    Atherosclerosis of native coronary artery of native heart - Primary    Aortic atherosclerosis       Other    Fatty liver    Dyslipidemia    Postsurgical percutaneous transluminal coronary angioplasty status       Patient's Medications   New Prescriptions    No medications on file   Previous Medications    ALFUZOSIN (UROXATRAL) 10 MG TB24    Take 1 tablet (10 mg total) by mouth once daily.    ALPRAZOLAM (XANAX) 0.5 MG TABLET    TAKE 1 TABLET BY MOUTH EVERY NIGHT    ASPIRIN 81 MG CHEW    Take 81 mg by mouth once daily.    ATORVASTATIN (LIPITOR) 20 MG TABLET    Take 1 tablet (20 mg total) by mouth once daily.    EFINACONAZOLE (JUBLIA) 10 % MARIA ELENA    APPLY TO AFFECTED NAIL DAILY. REMOVE WEEKLY    FLUTICASONE PROPIONATE (FLONASE) 50 MCG/ACTUATION NASAL SPRAY    2 sprays (100 mcg total) by Each Nostril route once daily.    LANSOPRAZOLE (PREVACID) 15 MG CAPSULE    Take 1 capsule (15 mg total) by mouth once daily.    NITROGLYCERIN (NITROSTAT) 0.4 MG SL TABLET    Place 1 tablet (0.4 mg total) under the tongue every 5 (five) minutes as needed for Chest pain.    SODIUM CHLORIDE 2% (BARBI 128) 2 % OPHTHALMIC SOLUTION    1 drop as needed (takes 3-4 times/day).    TICAGRELOR (BRILINTA) 90 MG TABLET    Take 1 tablet (90 mg total) by mouth 2 (two) times daily.    TRIAMTERENE-HYDROCHLOROTHIAZIDE 37.5-25 MG (MAXZIDE-25) 37.5-25 MG PER TABLET    Take 1 tablet by mouth once daily.   Modified Medications    No medications on file   Discontinued Medications    BISOPROLOL (ZEBETA) 2.5 MG TAB SPLIT TABLET    Take 2.5 mg by mouth once daily.    MECLIZINE (ANTIVERT) 25 MG TABLET    Take 1 tablet (25 mg total) by mouth 3 (three) times daily as needed for Dizziness.     Patient is to discuss hernia  surgery with his surgeon If he is advised to proceed before his planned trip then I will be able to stop DAPT for a short period of time in mid July with subsequent restring both till 1 year anniversary from his stenting ( late September).  Alternately, if surgery can be safely postponed until after his trip it will definitely be postmarked after 1 year anniversary from his MI/Stent.      .    No follow-ups on file.             Principal Discharge DX:	Palpitations   1

## 2024-06-27 ENCOUNTER — OFFICE VISIT (OUTPATIENT)
Dept: SURGERY | Facility: CLINIC | Age: 69
End: 2024-06-27
Payer: MEDICARE

## 2024-06-27 VITALS
SYSTOLIC BLOOD PRESSURE: 127 MMHG | WEIGHT: 178.13 LBS | BODY MASS INDEX: 24.13 KG/M2 | OXYGEN SATURATION: 97 % | HEIGHT: 72 IN | HEART RATE: 53 BPM | DIASTOLIC BLOOD PRESSURE: 63 MMHG

## 2024-06-27 DIAGNOSIS — K40.90 RIGHT INGUINAL HERNIA: Primary | ICD-10-CM

## 2024-06-27 PROCEDURE — 99999 PR PBB SHADOW E&M-EST. PATIENT-LVL IV: CPT | Mod: PBBFAC,,, | Performed by: SURGERY

## 2024-06-27 PROCEDURE — 99214 OFFICE O/P EST MOD 30 MIN: CPT | Mod: PBBFAC | Performed by: SURGERY

## 2024-06-27 PROCEDURE — 99213 OFFICE O/P EST LOW 20 MIN: CPT | Mod: S$PBB,GC,, | Performed by: SURGERY

## 2024-06-27 NOTE — H&P (VIEW-ONLY)
General Surgery Office Visit   History and Physical    Patient Name: Wero Spangler  YOB: 1955 (68 y.o.)  MRN: [id number]  Today's Date: 06/27/2024    Referring Md:   No referring provider defined for this encounter.    SUBJECTIVE:     Chief Complaint: R inguinal hernia    History of Present Illness:  Wero Spangler is a 68 y.o. male with PMHx of  BPH, RCC, HLD, HTN, GERD,  who presents to the clinic today complaining of R inguinal hernia which he first noticed 10 days ago. He reports he has been straining with stool. He reports discomfort and feeling of fullness in R inguinal region. He also reports that occasionally the area becomes hard and tense, and this returns to normal. He notices a bulge in the area. Denies skin changes or erythema. Denies any similar symptoms on L side.     He denies fever, chills, unintentional weight loss, n/v/d, constipation, hematochezia, dysuria, hematuria, CP, SOB, and all other symptoms. Patient reports being compliant with home medication regimen.     Hx of MI in September.   Previous abdominal surgeries include: lap nephrectomy   Denies alcohol, tobacco, and elicit drug use.   Not currently on any anticoagulants    Interval hx 6/27/24:  Mr. Spangler presents today after his hernia surgery was canceled due to needing to be on DAPT due to recent MI. He met with his cardiologist and underwent an echo which was fine. Her cardiologist is ok with stopping his DAPT (Brillinta) prior to surgery. He would like to undergo his repair in July because of a planned trip in August. He states his hernia is getting bigger but is still reducible.     Review of patient's allergies indicates:  No Known Allergies    Past Medical History:   Diagnosis Date    Aftercataract     Allergy     BPH (benign prostatic hyperplasia)     Cataract     Corneal dystrophy     Elevated PSA     Enlarged prostate     Fatty liver     Fuchs' corneal dystrophy     Gallstones     General anesthetics causing  adverse effect in therapeutic use     delayed emergence after back surgery    GERD (gastroesophageal reflux disease)     HTN (hypertension) 2013    Hypertension     Insomnia     Prostate nodule     Urinary tract infection      Past Surgical History:   Procedure Laterality Date    BACK SURGERY  2000    CATARACT EXTRACTION W/  INTRAOCULAR LENS IMPLANT      Both Eyes    CORNEAL TRANSPLANT Right 2019    Procedure: TRANSPLANT, CORNEA;  Surgeon: Vu Wilson MD;  Location: [facility];  Service: Ophthalmology;  Laterality: Right;  DMEK    EYE SURGERY      LAPAROSCOPIC MARSUPIALIZATION OF CYST OF KIDNEY      PROSTATE SURGERY      prostate biopsy benign    TONSILLECTOMY      VASECTOMY       Family History   Problem Relation Name Age of Onset    Cancer Mother          breast    Prostate cancer Brother prostate     Cancer Brother prostate         prostate    Macular degeneration Maternal Grandmother      Cancer Other      Cancer Other      Amblyopia Neg Hx      Blindness Neg Hx      Cataracts Neg Hx      Glaucoma Neg Hx      Retinal detachment Neg Hx      Strabismus Neg Hx       Social History     Tobacco Use    Smoking status: Former     Current packs/day: 0.00     Types: Cigarettes     Quit date:      Years since quittin.5    Smokeless tobacco: Never   Substance Use Topics    Alcohol use: Not Currently    Drug use: No        Review of Systems:  Review of Systems   Constitutional:  Negative for chills, fever and weight loss.   Respiratory:  Negative for cough and shortness of breath.    Cardiovascular:  Negative for chest pain and palpitations.   Gastrointestinal:  Negative for abdominal pain, constipation, diarrhea, nausea and vomiting.   Genitourinary:  Negative for dysuria, frequency and urgency.   Endo/Heme/Allergies:  Does not bruise/bleed easily.       OBJECTIVE:     Vital Signs (Most Recent)  /63 (BP Location: Left arm, Patient Position: Sitting, BP Method: Medium (Automatic))   Pulse  (!) 53   Ht 6' (1.829 m)   Wt 80.8 kg (178 lb 2.1 oz)   SpO2 97%   BMI 24.16 kg/m²     Physical Exam  Constitutional:       Appearance: Normal appearance.   Cardiovascular:      Rate and Rhythm: Normal rate and regular rhythm.   Pulmonary:      Effort: Pulmonary effort is normal.   Abdominal:      General: Abdomen is flat.      Palpations: Abdomen is soft.      Hernia: A hernia (R inguinal) is present.   Skin:     General: Skin is warm and dry.   Neurological:      General: No focal deficit present.      Mental Status: He is alert and oriented to person, place, and time.           Labs:     Lab Results   Component Value Date    WBC 6.59 05/12/2024    HGB 14.3 05/12/2024    HCT 41.6 05/12/2024    MCV 93 05/12/2024     05/12/2024         CMP  Sodium   Date Value Ref Range Status   05/12/2024 140 136 - 145 mmol/L Final     Potassium   Date Value Ref Range Status   05/12/2024 3.6 3.5 - 5.1 mmol/L Final     Chloride   Date Value Ref Range Status   05/12/2024 104 95 - 110 mmol/L Final     CO2   Date Value Ref Range Status   05/12/2024 26 23 - 29 mmol/L Final     Glucose   Date Value Ref Range Status   05/12/2024 84 70 - 110 mg/dL Final     BUN   Date Value Ref Range Status   05/12/2024 16 8 - 23 mg/dL Final     Creatinine   Date Value Ref Range Status   05/12/2024 0.9 0.5 - 1.4 mg/dL Final     Calcium   Date Value Ref Range Status   05/12/2024 9.3 8.7 - 10.5 mg/dL Final     Total Protein   Date Value Ref Range Status   05/12/2024 6.6 6.0 - 8.4 g/dL Final     Albumin   Date Value Ref Range Status   05/12/2024 3.9 3.5 - 5.2 g/dL Final     Total Bilirubin   Date Value Ref Range Status   05/12/2024 1.3 (H) 0.1 - 1.0 mg/dL Final     Comment:     For infants and newborns, interpretation of results should be based  on gestational age, weight and in agreement with clinical  observations.    Premature Infant recommended reference ranges:  Up to 24 hours.............<8.0 mg/dL  Up to 48 hours............<12.0 mg/dL  3-5  days..................<15.0 mg/dL  6-29 days.................<15.0 mg/dL       Alkaline Phosphatase   Date Value Ref Range Status   05/12/2024 65 55 - 135 U/L Final     AST   Date Value Ref Range Status   05/12/2024 25 10 - 40 U/L Final     ALT   Date Value Ref Range Status   05/12/2024 19 10 - 44 U/L Final     Anion Gap   Date Value Ref Range Status   05/12/2024 10 8 - 16 mmol/L Final     eGFR if    Date Value Ref Range Status   07/25/2022 >60.0 >60 mL/min/1.73 m^2 Final     eGFR if non    Date Value Ref Range Status   07/25/2022 >60.0 >60 mL/min/1.73 m^2 Final     Comment:     Calculation used to obtain the estimated glomerular filtration  rate (eGFR) is the CKD-EPI equation.              Imaging:   US 5/13: 1cm hernia, fat containing.      ASSESSMENT/PLAN:     There are no diagnoses linked to this encounter.      Wero Spangler is a 68 y.o. male with reducible R inguinal hernia. We discussed surgery prior or after his planned trip. He elected to undergo surgery prior if it can be done in July.     - He will need to stop his Brillinta 7 days prior to surgery.   - Schedule for open R inguinal hernia repair in July.   - Patient instructed to call clinic with any questions, concerns, or new symptoms  - Patient understands and in agreement with plan; all questions answered    Martine Jimenez MD  General Surgery PGY2

## 2024-07-05 ENCOUNTER — ANESTHESIA EVENT (OUTPATIENT)
Dept: SURGERY | Facility: HOSPITAL | Age: 69
End: 2024-07-05
Payer: MEDICARE

## 2024-07-05 ENCOUNTER — TELEPHONE (OUTPATIENT)
Dept: OPHTHALMOLOGY | Facility: CLINIC | Age: 69
End: 2024-07-05
Payer: MEDICARE

## 2024-07-05 NOTE — TELEPHONE ENCOUNTER
----- Message from Bong Angulo sent at 7/5/2024  8:09 AM CDT -----  Consult/Advisory    Name Of Caller:Wero       Contact Preference:[phone]  Nature of call: Ptn called regarding right eye irritation he stated he feels as something is in the eye that is causing pain and as the day goes it gets worse

## 2024-07-05 NOTE — PRE-PROCEDURE INSTRUCTIONS
PreOp Instructions given:   - Verbal medication information (what to hold and what to take)   - NPO guidelines   - Arrival place directions given; time to be given the day before procedure by the   Surgeon's Office [facility]  - Bathing with antibacterial soap   - Don't wear any jewelry or bring any valuables AM of surgery   - No makeup or moisturizer to face   - No perfume/cologne, powder, lotions or aftershave   Pt. verbalized understanding.   Patient does not know arrival time.  Explained that this information comes from the surgeon's office and if they haven't heard from them by 2 or 3 pm to call the office.  Patient stated an understanding.   Anesthesia Hx:  No problems with previous Anesthesia Hx of Anesthetic complications Difficulty waking from past surgery on back History of prior surgery of interest to airway management or planning: Denies Family Hx of Anesthesia complications.  Personal Hx of Anesthesia complications Slow To Awaken/Delayed Emergence

## 2024-07-05 NOTE — ANESTHESIA PREPROCEDURE EVALUATION
[facility]  Anesthesia Pre-Operative Evaluation         Patient Name: Wero Spangler  YOB: 1955  MRN: [id number]    SUBJECTIVE:     Pre-operative evaluation for Procedure(s) (LRB):  REPAIR, HERNIA, INGUINAL, with Mesh (Right)     07/08/2024    Wero Spangler is a 68 y.o. male w/ a significant PMHx of BPH, RCC s/p nephrectomy,  HLD, HTN, GERD, CAD s/p PCI stent (9/23) on DAPT.     Patient now presents for the above procedure(s).    Echo Summary  Results for orders placed during the hospital encounter of 06/04/24    Echo    Interpretation Summary    Left Ventricle: The left ventricle is normal in size. Ventricular mass is normal. Normal wall thickness. There is normal systolic function with a visually estimated ejection fraction of 55 - 60%. Ejection fraction by visual approximation is 58%. There is normal diastolic function.    Right Ventricle: Normal right ventricular cavity size. Wall thickness is normal. Systolic function is normal.    Tricuspid Valve: There is trace regurgitation.    Pulmonary Artery: The estimated pulmonary artery systolic pressure is 20 mmHg.    IVC/SVC: Normal venous pressure at 3 mmHg.       Prev airway: None documented.        Patient Active Problem List   Diagnosis    Seasonal allergies    Elevated PSA    BPH with urinary obstruction    Corneal dystrophy - Both Eyes    Aftercataract - Both Eyes    Prostate nodule    Primary hypertension    Insomnia    Primary localized osteoarthrosis, lower leg    Chondromalacia patellae    Fuchs' corneal dystrophy    Fatty liver    GERD (gastroesophageal reflux disease)    Elevated bilirubin    Erectile dysfunction due to arterial insufficiency    Dyslipidemia    Atherosclerosis of native coronary artery of native heart    Coronary atherosclerosis of native coronary artery    Aortic atherosclerosis    Postsurgical percutaneous transluminal coronary angioplasty status    Bilateral leg weakness       Review of  patient's allergies indicates:  No Known Allergies    Current Inpatient Medications:      No current facility-administered medications on file prior to encounter.     Current Outpatient Medications on File Prior to Encounter   Medication Sig Dispense Refill    atorvastatin (LIPITOR) 20 MG tablet Take 1 tablet (20 mg total) by mouth once daily. (Patient taking differently: Take 20 mg by mouth every evening.) 90 tablet 3    alfuzosin (UROXATRAL) 10 mg Tb24 Take 1 tablet (10 mg total) by mouth once daily. 30 tablet 11    ALPRAZolam (XANAX) 0.5 MG tablet TAKE 1 TABLET BY MOUTH EVERY NIGHT 30 tablet 1    aspirin 81 MG Chew Take 81 mg by mouth nightly.      efinaconazole (JUBLIA) 10 % Faviola APPLY TO AFFECTED NAIL DAILY. REMOVE WEEKLY 8 mL 1    fluticasone propionate (FLONASE) 50 mcg/actuation nasal spray 2 sprays (100 mcg total) by Each Nostril route once daily. (Patient taking differently: 2 sprays by Each Nostril route every evening.) 16 g 0    lansoprazole (PREVACID) 15 MG capsule Take 1 capsule (15 mg total) by mouth once daily. 90 capsule 12    nitroGLYCERIN (NITROSTAT) 0.4 MG SL tablet Place 1 tablet (0.4 mg total) under the tongue every 5 (five) minutes as needed for Chest pain. 90 tablet 3    sodium chloride 2% (BARBI 128) 2 % ophthalmic solution 1 drop as needed (takes 3-4 times/day).      ticagrelor (BRILINTA) 90 mg tablet Take 1 tablet (90 mg total) by mouth 2 (two) times daily. 180 tablet 3    triamterene-hydrochlorothiazide 37.5-25 mg (MAXZIDE-25) 37.5-25 mg per tablet Take 1 tablet by mouth once daily. 90 tablet 11       Past Surgical History:   Procedure Laterality Date    BACK SURGERY  4/2000    CATARACT EXTRACTION W/  INTRAOCULAR LENS IMPLANT      Both Eyes    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    Procedure: TRANSPLANT, CORNEA;  Surgeon: Vu Wilson MD;  Location: [facility];  Service: Ophthalmology;  Laterality: Right;  DMEK    EYE SURGERY      LAPAROSCOPIC MARSUPIALIZATION OF CYST OF KIDNEY      PROSTATE  SURGERY      prostate biopsy benign    TONSILLECTOMY      VASECTOMY         Social History:  Tobacco Use: Medium Risk (6/27/2024)    Patient History     Smoking Tobacco Use: Former     Smokeless Tobacco Use: Never     Passive Exposure: Not on file      Alcohol Use: Not At Risk (11/27/2023)    AUDIT-C     Frequency of Alcohol Consumption: Never     Average Number of Drinks: Patient does not drink     Frequency of Binge Drinking: Never        OBJECTIVE:     Vital Signs Range (Last 24H):         Significant Labs:  Lab Results   Component Value Date    WBC 6.59 05/12/2024    HGB 14.3 05/12/2024    HCT 41.6 05/12/2024     05/12/2024    CHOL 118 (L) 05/12/2024    TRIG 151 (H) 05/12/2024    HDL 36 (L) 05/12/2024    ALT 19 05/12/2024    AST 25 05/12/2024     05/12/2024    K 3.6 05/12/2024     05/12/2024    CREATININE 0.9 05/12/2024    BUN 16 05/12/2024    CO2 26 05/12/2024    TSH 1.353 05/12/2024    PSA 14.0 (H) 09/22/2014    INR 1.1 05/12/2024    GLUF 100 05/01/2024    HGBA1C 4.9 07/25/2022       Diagnostic Studies: No relevant studies.    EKG:   Results for orders placed or performed in visit on 05/29/24   IN OFFICE EKG 12-LEAD (to [place])    Collection Time: 05/29/24  8:45 AM   Result Value Ref Range    QRS Duration 148 ms    [facility] QTC Calculation 414 ms    Narrative    Test Reason : I25.10,Z98.61,    Vent. Rate : 056 BPM     Atrial Rate : 056 BPM     P-R Int : 160 ms          QRS Dur : 148 ms      QT Int : 430 ms       P-R-T Axes : 078 090 063 degrees     QTc Int : 414 ms    Sinus bradycardia  Right bundle branch block  Abnormal ECG  When compared with ECG of 12-MAY-2024 10:45,  Right bundle branch block is now Present  Confirmed by NUHA CR MD ([id number]) on 5/29/2024 3:08:06 PM    Referred By:  YAMIL           Confirmed By:NUHA CR MD       2D ECHO:  TTE:  Results for orders placed or performed during the hospital encounter of 06/04/24   Echo   Result Value Ref Range    RA Width 3.28 cm    LA  volume (mod) 48.37 cm3    Left Atrium Major Axis 5.05 cm    Left Atrium Minor Axis 4.96 cm    RA Major Axis 4.69 cm    LV Diastolic Volume 125.90 mL    LV Systolic Volume 37.62 mL    MV Peak A Jr 0.86 m/s    MV stenosis pressure 1/2 time 81.00 ms    TR Max Jr 2.08 m/s    MV Peak E Jr 0.64 m/s    Ao VTI 23.54 cm    Ao peak jr 1.12 m/s    LVOT peak VTI 19.84 cm    LVOT peak jr 1.04 m/s    LVOT diameter 2.09 cm    IVRT 131.30 msec    E wave deceleration time 279.31 msec    AV mean gradient 3 mmHg    TAPSE 2.78 cm    RVDD 2.99 cm    LA size 3.31 cm    Ascending aorta 2.78 cm    STJ 2.88 cm    Sinus 3.29 cm    LVIDs 3.09 2.1 - 4.0 cm    Posterior Wall 0.89 0.6 - 1.1 cm    IVS 0.93 0.6 - 1.1 cm    LVIDd 5.14 3.5 - 6.0 cm    TDI LATERAL 0.07 m/s    LA WIDTH 4.11 cm    TDI SEPTAL 0.07 m/s    LV LATERAL E/E' RATIO 9.14 m/s    LV SEPTAL E/E' RATIO 9.14 m/s    RV/LV Ratio 0.58 cm    FS 40 28 - 44 %    LA volume 57.87 cm3    LV mass 168.13 g    Left Ventricle Relative Wall Thickness 0.35 cm    AV valve area 2.89 cm²    AV Velocity Ratio 0.93     AV index (prosthetic) 0.84     MV valve area p 1/2 method 2.72 cm2    E/A ratio 0.74     Mean e' 0.07 m/s    LVOT area 3.4 cm2    LVOT stroke volume 68.03 cm3    AV peak gradient 5 mmHg    E/E' ratio 9.14 m/s    Triscuspid Valve Regurgitation Peak Gradient 17 mmHg    SAMEERA by Velocity Ratio 3.18 cm²    BSA 2 m2    LV Systolic Volume Index 18.7 mL/m2    LV Diastolic Volume Index 62.64 mL/m2    LV Mass Index 84 g/m2    LA Volume Index 28.8 mL/m2    LA Volume Index (Mod) 24.1 mL/m2    ZLVIDS -1.24     ZLVIDD -1.36     EF 58 %    TV resting pulmonary artery pressure 20 mmHg    RV TB RVSP 5 mmHg    Est. RA pres 3 mmHg    Narrative      Left Ventricle: The left ventricle is normal in size. Ventricular mass   is normal. Normal wall thickness. There is normal systolic function with a   visually estimated ejection fraction of 55 - 60%. Ejection fraction by   visual approximation is 58%. There  is normal diastolic function.    Right Ventricle: Normal right ventricular cavity size. Wall thickness   is normal. Systolic function is normal.    Tricuspid Valve: There is trace regurgitation.    Pulmonary Artery: The estimated pulmonary artery systolic pressure is   20 mmHg.    IVC/SVC: Normal venous pressure at 3 mmHg.         CHRISTINE:  No results found for this or any previous visit.    ASSESSMENT/PLAN:         Pre-op Assessment    I have reviewed the Patient Summary Reports.     I have reviewed the Nursing Notes.    I have reviewed the Medications.     Review of Systems  Anesthesia Hx:              Personal Hx of Anesthesia complications          Slow To Awaken/Delayed Emergence and significant; extubation delayed; prolonged PACU stay          EENT/Dental:  EENT/Dental Normal           Cardiovascular:     Hypertension   CAD                                        Pulmonary:  Pulmonary Normal                       Hepatic/GI:     GERD, well controlled Liver Disease,            Musculoskeletal:  Arthritis               Endocrine:  Endocrine Normal                Physical Exam    Airway:  Mallampati: II   Mouth Opening: Normal  TM Distance: Normal  Tongue: Normal    Dental:  Intact, Caps / Implants        Anesthesia Plan  Type of Anesthesia, risks & benefits discussed:    Anesthesia Type: Gen ETT, Gen Supraglottic Airway, Gen Natural Airway  Intra-op Monitoring Plan: Standard ASA Monitors  Post Op Pain Control Plan: multimodal analgesia and IV/PO Opioids PRN  Induction:  IV  Airway Plan: Direct, Post-Induction  Informed Consent: Informed consent signed with the Patient and all parties understand the risks and agree with anesthesia plan.  All questions answered.   ASA Score: 3  Day of Surgery Review of History & Physical: H&P Update referred to the surgeon/provider.    Ready For Surgery From Anesthesia Perspective.     .

## 2024-07-08 ENCOUNTER — TELEPHONE (OUTPATIENT)
Dept: SURGERY | Facility: CLINIC | Age: 69
End: 2024-07-08
Payer: MEDICARE

## 2024-07-08 RX ORDER — GLUCAGON 1 MG
1 KIT INJECTION
OUTPATIENT
Start: 2024-07-08

## 2024-07-09 ENCOUNTER — HOSPITAL ENCOUNTER (OUTPATIENT)
Facility: HOSPITAL | Age: 69
Discharge: HOME | End: 2024-07-09
Attending: SURGERY | Admitting: SURGERY
Payer: MEDICARE

## 2024-07-09 ENCOUNTER — ANESTHESIA (OUTPATIENT)
Dept: SURGERY | Facility: HOSPITAL | Age: 69
End: 2024-07-09
Payer: MEDICARE

## 2024-07-09 VITALS
WEIGHT: 176.81 LBS | OXYGEN SATURATION: 99 % | HEART RATE: 61 BPM | RESPIRATION RATE: 20 BRPM | DIASTOLIC BLOOD PRESSURE: 72 MMHG | BODY MASS INDEX: 23.95 KG/M2 | SYSTOLIC BLOOD PRESSURE: 154 MMHG | TEMPERATURE: 98 F | HEIGHT: 72 IN

## 2024-07-09 DIAGNOSIS — K40.90 RIGHT INGUINAL HERNIA: Primary | ICD-10-CM

## 2024-07-09 PROCEDURE — 88305 TISSUE EXAM BY PATHOLOGIST: CPT | Mod: 26,,, | Performed by: PATHOLOGY

## 2024-07-09 PROCEDURE — 27201423 OPTIME MED/SURG SUP & DEVICES STERILE SUPPLY: Performed by: SURGERY

## 2024-07-09 PROCEDURE — 71000044 HC DOSC ROUTINE RECOVERY FIRST HOUR: Performed by: SURGERY

## 2024-07-09 PROCEDURE — 25000003 PHARM REV CODE 250: Performed by: SURGERY

## 2024-07-09 PROCEDURE — 25000003 PHARM REV CODE 250: Performed by: STUDENT IN AN ORGANIZED HEALTH CARE EDUCATION/TRAINING PROGRAM

## 2024-07-09 PROCEDURE — 88302 TISSUE EXAM BY PATHOLOGIST: CPT | Mod: 26,,, | Performed by: PATHOLOGY

## 2024-07-09 PROCEDURE — 36000707: Performed by: SURGERY

## 2024-07-09 PROCEDURE — 88302 TISSUE EXAM BY PATHOLOGIST: CPT | Performed by: PATHOLOGY

## 2024-07-09 PROCEDURE — 37000009 HC ANESTHESIA EA ADD 15 MINS: Performed by: SURGERY

## 2024-07-09 PROCEDURE — 49505 PRP I/HERN INIT REDUC >5 YR: CPT | Mod: RT,,, | Performed by: SURGERY

## 2024-07-09 PROCEDURE — 88305 TISSUE EXAM BY PATHOLOGIST: CPT | Performed by: PATHOLOGY

## 2024-07-09 PROCEDURE — 37000008 HC ANESTHESIA 1ST 15 MINUTES: Performed by: SURGERY

## 2024-07-09 PROCEDURE — 88304 TISSUE EXAM BY PATHOLOGIST: CPT | Mod: 59 | Performed by: PATHOLOGY

## 2024-07-09 PROCEDURE — C1781 MESH (IMPLANTABLE): HCPCS | Performed by: SURGERY

## 2024-07-09 PROCEDURE — 63600175 PHARM REV CODE 636 W HCPCS: Mod: JZ,JG | Performed by: SURGERY

## 2024-07-09 PROCEDURE — 71000015 HC POSTOP RECOV 1ST HR: Performed by: SURGERY

## 2024-07-09 PROCEDURE — 36000706: Performed by: SURGERY

## 2024-07-09 PROCEDURE — 63600175 PHARM REV CODE 636 W HCPCS: Performed by: STUDENT IN AN ORGANIZED HEALTH CARE EDUCATION/TRAINING PROGRAM

## 2024-07-09 DEVICE — IMPLANTABLE DEVICE: Type: IMPLANTABLE DEVICE | Site: INGUINAL | Status: FUNCTIONAL

## 2024-07-09 RX ORDER — MIDAZOLAM HYDROCHLORIDE 1 MG/ML
INJECTION INTRAMUSCULAR; INTRAVENOUS
Status: DISCONTINUED | OUTPATIENT
Start: 2024-07-09 | End: 2024-07-09

## 2024-07-09 RX ORDER — ONDANSETRON HYDROCHLORIDE 2 MG/ML
INJECTION, SOLUTION INTRAVENOUS
Status: DISCONTINUED | OUTPATIENT
Start: 2024-07-09 | End: 2024-07-09

## 2024-07-09 RX ORDER — HALOPERIDOL 5 MG/ML
0.5 INJECTION INTRAMUSCULAR EVERY 10 MIN PRN
Status: DISCONTINUED | OUTPATIENT
Start: 2024-07-09 | End: 2024-07-09 | Stop reason: HOSPADM

## 2024-07-09 RX ORDER — SODIUM CHLORIDE 0.9 % (FLUSH) 0.9 %
10 SYRINGE (ML) INJECTION
Status: DISCONTINUED | OUTPATIENT
Start: 2024-07-09 | End: 2024-07-09 | Stop reason: HOSPADM

## 2024-07-09 RX ORDER — PROPOFOL 10 MG/ML
VIAL (ML) INTRAVENOUS
Status: DISCONTINUED | OUTPATIENT
Start: 2024-07-09 | End: 2024-07-09

## 2024-07-09 RX ORDER — FENTANYL CITRATE 50 UG/ML
INJECTION, SOLUTION INTRAMUSCULAR; INTRAVENOUS
Status: DISCONTINUED | OUTPATIENT
Start: 2024-07-09 | End: 2024-07-09

## 2024-07-09 RX ORDER — OXYCODONE HYDROCHLORIDE 5 MG/1
5 TABLET ORAL EVERY 4 HOURS PRN
Qty: 8 TABLET | Refills: 0 | Status: SHIPPED | OUTPATIENT
Start: 2024-07-09

## 2024-07-09 RX ORDER — DEXAMETHASONE SODIUM PHOSPHATE 4 MG/ML
INJECTION, SOLUTION INTRA-ARTICULAR; INTRALESIONAL; INTRAMUSCULAR; INTRAVENOUS; SOFT TISSUE
Status: DISCONTINUED | OUTPATIENT
Start: 2024-07-09 | End: 2024-07-09

## 2024-07-09 RX ORDER — BUPIVACAINE HYDROCHLORIDE 2.5 MG/ML
INJECTION, SOLUTION EPIDURAL; INFILTRATION; INTRACAUDAL
Status: DISCONTINUED | OUTPATIENT
Start: 2024-07-09 | End: 2024-07-09 | Stop reason: HOSPADM

## 2024-07-09 RX ORDER — HYDROMORPHONE HYDROCHLORIDE 1 MG/ML
0.2 INJECTION, SOLUTION INTRAMUSCULAR; INTRAVENOUS; SUBCUTANEOUS EVERY 5 MIN PRN
Status: DISCONTINUED | OUTPATIENT
Start: 2024-07-09 | End: 2024-07-09 | Stop reason: HOSPADM

## 2024-07-09 RX ORDER — PHENYLEPHRINE HCL IN 0.9% NACL 1 MG/10 ML
SYRINGE (ML) INTRAVENOUS
Status: DISCONTINUED | OUTPATIENT
Start: 2024-07-09 | End: 2024-07-09

## 2024-07-09 RX ORDER — CEFAZOLIN SODIUM 1 G/3ML
INJECTION, POWDER, FOR SOLUTION INTRAMUSCULAR; INTRAVENOUS
Status: DISCONTINUED | OUTPATIENT
Start: 2024-07-09 | End: 2024-07-09

## 2024-07-09 RX ORDER — LIDOCAINE HYDROCHLORIDE 20 MG/ML
INJECTION INTRAVENOUS
Status: DISCONTINUED | OUTPATIENT
Start: 2024-07-09 | End: 2024-07-09

## 2024-07-09 RX ORDER — BUPIVACAINE HYDROCHLORIDE AND EPINEPHRINE 2.5; 5 MG/ML; UG/ML
INJECTION, SOLUTION EPIDURAL; INFILTRATION; INTRACAUDAL; PERINEURAL
Status: DISCONTINUED | OUTPATIENT
Start: 2024-07-09 | End: 2024-07-09 | Stop reason: HOSPADM

## 2024-07-09 RX ORDER — LIDOCAINE HYDROCHLORIDE 10 MG/ML
INJECTION, SOLUTION EPIDURAL; INFILTRATION; INTRACAUDAL; PERINEURAL
Status: DISCONTINUED | OUTPATIENT
Start: 2024-07-09 | End: 2024-07-09 | Stop reason: HOSPADM

## 2024-07-09 RX ORDER — PROPOFOL 10 MG/ML
VIAL (ML) INTRAVENOUS CONTINUOUS PRN
Status: DISCONTINUED | OUTPATIENT
Start: 2024-07-09 | End: 2024-07-09

## 2024-07-09 RX ORDER — SODIUM CHLORIDE 9 MG/ML
INJECTION, SOLUTION INTRAVENOUS CONTINUOUS
Status: DISCONTINUED | OUTPATIENT
Start: 2024-07-09 | End: 2024-07-09 | Stop reason: HOSPADM

## 2024-07-09 RX ADMIN — FENTANYL CITRATE 50 MCG: 50 INJECTION, SOLUTION INTRAMUSCULAR; INTRAVENOUS at 05:07

## 2024-07-09 RX ADMIN — FENTANYL CITRATE 12.5 MCG: 50 INJECTION, SOLUTION INTRAMUSCULAR; INTRAVENOUS at 03:07

## 2024-07-09 RX ADMIN — PROPOFOL 30 MG: 10 INJECTION, EMULSION INTRAVENOUS at 03:07

## 2024-07-09 RX ADMIN — SODIUM CHLORIDE: 0.9 INJECTION, SOLUTION INTRAVENOUS at 03:07

## 2024-07-09 RX ADMIN — MIDAZOLAM HYDROCHLORIDE 1 MG: 1 INJECTION, SOLUTION INTRAMUSCULAR; INTRAVENOUS at 03:07

## 2024-07-09 RX ADMIN — PROPOFOL 10 MG: 10 INJECTION, EMULSION INTRAVENOUS at 03:07

## 2024-07-09 RX ADMIN — Medication 50 MCG: at 03:07

## 2024-07-09 RX ADMIN — CEFAZOLIN 2 G: 330 INJECTION, POWDER, FOR SOLUTION INTRAMUSCULAR; INTRAVENOUS at 03:07

## 2024-07-09 RX ADMIN — PROPOFOL 10 MG: 10 INJECTION, EMULSION INTRAVENOUS at 04:07

## 2024-07-09 RX ADMIN — LIDOCAINE HYDROCHLORIDE 80 MG: 20 INJECTION INTRAVENOUS at 03:07

## 2024-07-09 RX ADMIN — ONDANSETRON 4 MG: 2 INJECTION INTRAMUSCULAR; INTRAVENOUS at 03:07

## 2024-07-09 RX ADMIN — Medication 50 MCG: at 04:07

## 2024-07-09 RX ADMIN — DEXAMETHASONE SODIUM PHOSPHATE 4 MG: 4 INJECTION, SOLUTION INTRAMUSCULAR; INTRAVENOUS at 03:07

## 2024-07-09 RX ADMIN — FENTANYL CITRATE 12.5 MCG: 50 INJECTION, SOLUTION INTRAMUSCULAR; INTRAVENOUS at 04:07

## 2024-07-09 RX ADMIN — PROPOFOL 150 MCG/KG/MIN: 10 INJECTION, EMULSION INTRAVENOUS at 03:07

## 2024-07-09 NOTE — PROGRESS NOTES
Pt discharged per orders. AAOx'a 3. VSS. No s/s of acute distress. Resp even and unlabored. IV removed prior to discharge. Medication delivered at bedside.Reviewed discharge instructions, follow up care/appointment. Verbalized understanding of discharge and follow up care/appointments. Discharged with all personal belongings.Escorted out with staff in wheelchair.Pt transported home via personal transportation.

## 2024-07-09 NOTE — BRIEF OP NOTE
[facility] - Surgery ([facility])  Brief Operative Note    Surgery Date: 7/9/2024     Surgeons and Role:     * Sudeep Sen MD - Primary     * Martine Jimenez MD - Resident - Assisting        Pre-op Diagnosis:  Right inguinal hernia [K40.90]    Post-op Diagnosis:  Post-Op Diagnosis Codes:     * Right inguinal hernia [K40.90]    Procedure(s) (LRB):  REPAIR, HERNIA, INGUINAL, with Mesh (Right)    Anesthesia: General    Operative Findings:   Right inguinal hernia - indirect with cord lipoma.  Repaired with mesh.  Hemostasis achieved.    Estimated Blood Loss: < 5cc         Specimens:   Specimen (24h ago, onward)       Start     Ordered    07/09/24 1621  Specimen to Pathology, Surgery General Surgery  Once        Comments: Pre-op Diagnosis: Right inguinal hernia [K40.90]Procedure(s):REPAIR, HERNIA, INGUINAL, with Mesh Number of specimens: 3Name of specimens: 1 Inguinal Nerve2 Cord Lipoma3 Hernia Sac     References:    Click here for ordering Quick Tip   Question Answer Comment   Procedure Type: General Surgery    Specimen Class: Routine/Screening    Release to patient Immediate        07/09/24 1625                      Discharge Note    OUTCOME: Patient tolerated treatment/procedure well without complication and is now ready for discharge.    DISPOSITION: Home or Self Care    FINAL DIAGNOSIS: Right inguinal hernia [K40.90]    FOLLOWUP: In clinic    DISCHARGE INSTRUCTIONS:    Discharge Procedure Orders   Notify your health care provider if you experience any of the following:  temperature >100.4     Notify your health care provider if you experience any of the following:  persistent nausea and vomiting or diarrhea     Notify your health care provider if you experience any of the following:  severe uncontrolled pain     Notify your health care provider if you experience any of the following:  redness, tenderness, or signs of infection (pain, swelling, redness, odor or green/yellow discharge around incision site)     Notify  your health care provider if you experience any of the following:  difficulty breathing or increased cough     Notify your health care provider if you experience any of the following:  severe persistent headache     Notify your health care provider if you experience any of the following:  worsening rash     Notify your health care provider if you experience any of the following:  persistent dizziness, light-headedness, or visual disturbances     Notify your health care provider if you experience any of the following:  increased confusion or weakness     No dressing needed   Order Comments: You have glue over your incision. Do not remove, it will fall off on its own. You are OK to shower in 24 hours, do not soak or submerge in water - no swimming or baths for 2 weeks.     Activity as tolerated

## 2024-07-09 NOTE — TRANSFER OF CARE
Anesthesia Transfer of Care Note    Patient: Wero Spangler    Procedure(s) Performed: Procedure(s) (LRB):  REPAIR, HERNIA, INGUINAL, with Mesh (Right)    Patient location: [facility]    Anesthesia Type: general    Transport from OR: Transported from OR on room air with adequate spontaneous ventilation    Post pain: adequate analgesia    Post assessment: no apparent anesthetic complications and tolerated procedure well    Post vital signs: stable    Level of consciousness: awake, alert and oriented    Nausea/Vomiting: no nausea/vomiting    Complications: none    Transfer of care protocol was followed      Last vitals: Visit Vitals  /69 (BP Location: Right arm, Patient Position: Lying)   Pulse 67   Temp 36.6 °C (97.9 °F) (Temporal)   Resp 16   Ht 6' (1.829 m)   Wt 80.2 kg (176 lb 12.9 oz)   SpO2 99%   BMI 23.98 kg/m²

## 2024-07-10 NOTE — ANESTHESIA POSTPROCEDURE EVALUATION
`Anesthesia Post Evaluation    Patient: Wero Spangler    Procedure(s) Performed: Procedure(s) (LRB):  REPAIR, HERNIA, INGUINAL, with Mesh (Right)    Final Anesthesia Type: general      Patient location during evaluation: PACU  Patient participation: Yes- Able to Participate  Level of consciousness: awake and alert  Post-procedure vital signs: reviewed and stable  Pain management: adequate  Airway patency: patent    PONV status at discharge: No PONV  Anesthetic complications: no      Cardiovascular status: blood pressure returned to baseline  Respiratory status: room air  Hydration status: euvolemic  Follow-up not needed.              Vitals Value Taken Time   /72 07/09/24 1830   Temp 36.7 °C (98.1 °F) 07/09/24 1730   Pulse 61 07/09/24 1830   Resp 20 07/09/24 1830   SpO2 99 % 07/09/24 1830         No case tracking events are documented in the log.      Pain/Hernandez Score: Hernandez Score: 10 (7/9/2024  6:00 PM)

## 2024-07-11 LAB
FINAL PATHOLOGIC DIAGNOSIS: NORMAL
GROSS: NORMAL
Lab: NORMAL

## 2024-07-12 NOTE — OP NOTE
[facility]  General Surgery  Operative Note    DATE: 7/9/2024    PREOPERATIVE DIAGNOSIS: Right inguinal hernia [K40.90]     POSTOPERATIVE DIAGNOSIS: Right inguinal hernia [K40.90]     Procedure(s):  REPAIR, HERNIA, INGUINAL, with Mesh     Surgeons and Role:     * Sudeep Sen MD - Primary     * Martine Jimenez MD - Resident - Assisting    ANESTHESIA: General endotracheal anesthesia    FINDINGS: indirect hernia and cord lipoma.    INDICATION: Wero Spangler is a 68 y.o.male referred to my General Surgery Clinic with a history of a symptomatic, reducible inguinal bulge. The history and exam were consistent with right inguinal hernia. We recommended an open inguinal hernia repair with mesh and the patient agreed to proceed. The patient signed informed consent and expressed understanding of the risks and benefits of surgery.     PROCEDURE IN DETAIL: The patient was taken to the operating room in stable condition and placed supine. After induction of general endotracheal tube anesthesia, the right groin was prepped and draped in the standard fashion. Timeout was performed. After the skin was infiltrated with local anesthetic, a 5cm horizontal incision was made, 2 cm above and parallel to the inguinal ligament, near the pubic tubercle. over the inguinal canal. Subcutaneous tissue was divided with the monopolar electrosurgical device. The superficial epigastric vessel was ligated, tied and divided. The dissection continued  to the level of the external oblique aponeurosis. Small flaps of subcutaneous tissue were made and a small Haja wound retractor was placed into the wound. The external oblique aponeurosis was incised in line with its fibers, first with a scalpel and then Metzenbaum scissors, and extended through the external inguinal ring. The ilioinguinal nerve was identified, ligated at its proximal and distal ends, divided and passed off as specimen. The inguinal canal contents were  bluntly encircled and isolated with a Penrose drain. The floor of the inguinal canal was then inspected and no direct hernia was identified.  Skeletonization of the spermatic cord was performed. A indirect inguinal hernia sac was identifed, which was carefully dissected away from the cord structures to the level of the internal ring. The sac was opened and examined for adhered intraabdominal structures and was highly ligated and reduced into the abdomen. Preperitoneal fat (cord lipoma) was also identified, isolated, ligated, and passed off as specimen All cord structures were confirmed intact. A piece of pre-shaped Surgimesh WN was cut to fit the inguinal canal appropriately. It was then sutured in place with a running 2-0 Prolene to the shelving edge of the inguinal ligament, beginning at the pubic tubercle. A separate 2-0 Prolene was used to suture the mesh to the conjoined tendon beginning at the pubic tubercle. The tails of the mesh were brought together around the cord structures to create a new internal ring that just admitted the tip of a finger and tied together with a single interrupted 2-0 Prolene. The mesh laid in appropriate position without any redundancy or tension. The wound was irrigated and hemostasis achieved. The external oblique aponeuosis was closed with a running 2-0 Vicryl suture. The Haja wound retractor was removed and Karissa's fascia was closed with interrupted 3-0 Vicryl sutures. The skin was closed with interrupted deep dermal 3-0 Vicryl suture and a running subcuticular 4-0 Monocryl suture. Dermabond was applied. The patient was then awakened from anesthesia and transferred to the PACU in good condition. All needle and sponge counts were correct at the conclusion of the case.     EBL: minimal    COMPLICATIONS: none apparent.    SPECIMEN:     Start     Ordered     07/09/24 2562   Specimen to Pathology, Surgery General Surgery  Once        Comments: Pre-op Diagnosis: Right inguinal  hernia [K40.90]Procedure(s):REPAIR, HERNIA, INGUINAL, with Mesh Number of specimens: 3Name of specimens: 1 Inguinal Nerve2 Cord Lipoma3 Hernia Sac      References:    Click here for ordering Quick Tip   Question Answer Comment   Procedure Type: General Surgery     Specimen Class: Routine/Screening     Release to patient Immediate         07/09/24 1625     DRAINS: none    DISPOSITION: PACU.    ATTESTATION:  I was present and scrubbed for the entire procedure including all critical portions of the procedure.    Sudeep Sen MD, DAKSHA, [facility]  Acute Care Surgery and Surgical Critical Care  [facility]  7/9/2024

## 2024-07-13 ENCOUNTER — NURSE TRIAGE (OUTPATIENT)
Dept: ADMINISTRATIVE | Facility: CLINIC | Age: 69
End: 2024-07-13
Payer: MEDICARE

## 2024-07-13 ENCOUNTER — PATIENT MESSAGE (OUTPATIENT)
Dept: SURGERY | Facility: CLINIC | Age: 69
End: 2024-07-13
Payer: MEDICARE

## 2024-07-13 NOTE — TELEPHONE ENCOUNTER
Spoke with pt who reports he had hernia surgery Tuesday. States has not had BM since Monday. Denies abdominal pain. Reports use of suppository, and colace with no relief. Advised to be seen by provider within 4 hours. Ed/UC advised. Pt verbalized understanding    Reason for Disposition   [1] Rectal pain or fullness from fecal impaction (rectum full of stool) AND [2] NOT better after SITZ bath, suppository or enema    Additional Information   Negative: [1] Vomiting AND [2] contains bile (green color)   Negative: Patient sounds very sick or weak to the triager   Negative: [1] Vomiting AND [2] abdomen looks much more swollen than usual   Negative: [1] Constant abdominal pain AND [2] present > 2 hours    Protocols used: Constipation-A-AH

## 2024-07-19 ENCOUNTER — OFFICE VISIT (OUTPATIENT)
Dept: OPTOMETRY | Facility: CLINIC | Age: 69
End: 2024-07-19
Payer: MEDICARE

## 2024-07-19 DIAGNOSIS — H02.051 TRICHIASIS OF RIGHT UPPER EYELID: ICD-10-CM

## 2024-07-19 DIAGNOSIS — H18.513 FUCHS' CORNEAL DYSTROPHY OF BOTH EYES: Primary | ICD-10-CM

## 2024-07-19 DIAGNOSIS — Z13.5 GLAUCOMA SCREENING: ICD-10-CM

## 2024-07-19 PROCEDURE — 99999 PR PBB SHADOW E&M-EST. PATIENT-LVL III: CPT | Mod: PBBFAC,,, | Performed by: OPTOMETRIST

## 2024-07-19 PROCEDURE — 99213 OFFICE O/P EST LOW 20 MIN: CPT | Mod: PBBFAC,PO | Performed by: OPTOMETRIST

## 2024-07-19 PROCEDURE — 92014 COMPRE OPH EXAM EST PT 1/>: CPT | Mod: S$PBB,,, | Performed by: OPTOMETRIST

## 2024-07-19 NOTE — PROGRESS NOTES
HPI    69 Y/o male is here for routine eye exam with C/o pt states he had a sub   hemm in OD at the end of June and then on July 5th he felt like something   was in OD pt states he has AT'S gel gtt in OD TID, pt say's eye's has been   doing a little better but he also have been a sharp pain in OD   Pt denies pain and discomfort   No f/f    Eye med: OTC at's gel gtt OU TID   Last edited by Robert Cooney MA on 7/19/2024  9:27 AM.            Assessment /Plan     For exam results, see Encounter Report.    Fuchs' corneal dystrophy of both eyes    Trichiasis of right upper eyelid    Glaucoma screening      1. Mild pco sp pciol ou  2. Sp focal laser for periph hole OD.  Stable (hx high myopia prior to cat surgery)  3. Fuchs dyst OD>OS sp DMEK OD.  Pt to cont w BARBI 128 QID OS  4. Trichiasis  RUL--Epilated lashes with forceps after instillation of Fluress.  Pt tolerated procedure well         Plan:    Rtc 1 yr to me, and as sched w Dr Wilson

## 2024-07-25 ENCOUNTER — OFFICE VISIT (OUTPATIENT)
Dept: SURGERY | Facility: CLINIC | Age: 69
End: 2024-07-25
Payer: MEDICARE

## 2024-07-25 VITALS
HEART RATE: 70 BPM | DIASTOLIC BLOOD PRESSURE: 60 MMHG | OXYGEN SATURATION: 96 % | WEIGHT: 175.81 LBS | SYSTOLIC BLOOD PRESSURE: 127 MMHG | BODY MASS INDEX: 23.85 KG/M2

## 2024-07-25 DIAGNOSIS — K40.90 RIGHT INGUINAL HERNIA: Primary | ICD-10-CM

## 2024-07-25 PROCEDURE — 99999 PR PBB SHADOW E&M-EST. PATIENT-LVL III: CPT | Mod: PBBFAC,,, | Performed by: SURGERY

## 2024-07-25 PROCEDURE — 99213 OFFICE O/P EST LOW 20 MIN: CPT | Mod: PBBFAC | Performed by: SURGERY

## 2024-07-25 PROCEDURE — 99024 POSTOP FOLLOW-UP VISIT: CPT | Mod: POP,,, | Performed by: SURGERY

## 2024-07-25 NOTE — PROGRESS NOTES
[facility]  Post Op Visit    SUBJECTIVE:  Wero Spangler is a 68 y.o. male who presents to clinic today for post op follow up 2 weeks s/p R indirect inguinal hernia repair with mesh on 7/9/24. He  denies pain, fevers, chills, nausea, vomiting, diarrhea, and constipation and is returning to usual activity level. He is tolerating diet with normal appetite and bowel function and denies redness around or drainage from incisions.    ROS     OBJECTIVE:  Vitals:    07/25/24 1306   BP: 127/60   Pulse: 70     Body mass index is 23.85 kg/m².    General: male in NAD. Not toxic appearing.   HENT: EOM intact.   Pulmonary: No respiratory distress. Effort normal.  Cardiovascular: Regular rate.   Abdomen: soft, non-tender, non-distended  Skin: R groin incision healing well without signs of infection. No erythema, drainage, or increased warmth noted.       Path:   Specimens (From admission, onward)      None              ASSESSMENT/PLAN:    Wero Spangler is a 68 y.o. y/o male who presents to clinic today for f/u s/p R inguinal hernia repair on 7/9. Clinically improving and meeting all post op milestones     - Patient can advance activity as tolerated, including working out and swimming  - Path reviewed  - Follow-up PRN. Call clinic with any questions or concerns  - All questions answered; patient is comfortable with the above follow-up plan and verbalized understanding.    Patient seen and discussed with Dr. Sen.     Jeancarlos Willis, MS4     I have seen the patient, reviewed the documentation, and have helped formulate the  medical student's assessment and plan. I agree with the findings below.     Mr. Spangler is a 68 y.o. male s/p open right inguinal hernia repair. Doing well. Incisions C/D/I.  Can return to activities. F/u prn    Sudeep Sen MD, DAKSHA, FACS  Acute Care Surgery & Surgical Critical Care  [facility]

## 2024-07-30 ENCOUNTER — PATIENT MESSAGE (OUTPATIENT)
Dept: OPTOMETRY | Facility: CLINIC | Age: 69
End: 2024-07-30
Payer: MEDICARE

## 2024-08-02 ENCOUNTER — OFFICE VISIT (OUTPATIENT)
Dept: OPTOMETRY | Facility: CLINIC | Age: 69
End: 2024-08-02
Payer: MEDICARE

## 2024-08-02 DIAGNOSIS — H11.441 CONJUNCTIVAL CYST, RIGHT: Primary | ICD-10-CM

## 2024-08-02 PROCEDURE — 99213 OFFICE O/P EST LOW 20 MIN: CPT | Mod: PBBFAC,PO | Performed by: OPTOMETRIST

## 2024-08-02 PROCEDURE — 99999 PR PBB SHADOW E&M-EST. PATIENT-LVL III: CPT | Mod: PBBFAC,,, | Performed by: OPTOMETRIST

## 2024-08-02 RX ORDER — FLUOROMETHOLONE 1 MG/ML
1 SUSPENSION/ DROPS OPHTHALMIC 4 TIMES DAILY
Qty: 5 ML | Refills: 0 | Status: SHIPPED | OUTPATIENT
Start: 2024-08-02 | End: 2024-08-12

## 2024-08-02 NOTE — PROGRESS NOTES
HPI    DLS: 7/19/2024   Urgent f/u     Pt his symptoms have improved but he still has the fb sensation and it   becomes worse during the evening. Pt states that he has occasionally sharp   pain.    GTTS:   Gel AT's QD - PRN   Last edited by Esmer Min MA on 8/2/2024  1:11 PM.            Assessment /Plan     For exam results, see Encounter Report.    Conjunctival cyst, right  -     fluorometholone 0.1% (FML) 0.1 % DrpS; Place 1 drop into the right eye 4 (four) times daily. for 10 days  Dispense: 5 mL; Refill: 0      See notes from last month:  1. Mild pco sp pciol ou  2. Sp focal laser for periph hole OD.  Stable (hx high myopia prior to cat surgery)  3. Fuchs dyst OD>OS sp DMEK OD.  Pt to cont w BARBI 128 QID OS  4. Trichiasis  RUL--Epilated lashes with forceps after instillation of Fluress.  Pt tolerated procedure well    Pt presents TODAY because still having off and on FBS OD, wore at night.  Todays exam shows large lymphatic cyst nasally OD    PLAN:    FML QID OD X 10 days  Pt will call if sx persist w tx, otherwise rtc as sched

## 2024-09-03 ENCOUNTER — PATIENT MESSAGE (OUTPATIENT)
Dept: ADMINISTRATIVE | Facility: HOSPITAL | Age: 69
End: 2024-09-03
Payer: MEDICARE

## 2024-09-04 DIAGNOSIS — Z12.11 SCREENING FOR COLON CANCER: ICD-10-CM

## 2024-09-05 ENCOUNTER — TELEPHONE (OUTPATIENT)
Dept: OPTOMETRY | Facility: CLINIC | Age: 69
End: 2024-09-05
Payer: MEDICARE

## 2024-09-06 ENCOUNTER — OFFICE VISIT (OUTPATIENT)
Dept: OPTOMETRY | Facility: CLINIC | Age: 69
End: 2024-09-06
Payer: MEDICARE

## 2024-09-06 DIAGNOSIS — H11.441 CONJUNCTIVAL CYST, RIGHT: Primary | ICD-10-CM

## 2024-09-06 PROCEDURE — 99999 PR PBB SHADOW E&M-EST. PATIENT-LVL III: CPT | Mod: PBBFAC,,, | Performed by: OPTOMETRIST

## 2024-09-06 PROCEDURE — 99213 OFFICE O/P EST LOW 20 MIN: CPT | Mod: PBBFAC,PO | Performed by: OPTOMETRIST

## 2024-09-06 NOTE — Clinical Note
This is a patient of Dr Wilson w hx DMEK OD.  Now has a large conj cyst on that eye, which is bothersome and causing FBS.  No improvement after 2 rounds FML tx.  Can you call pt to sched cyst excision w Dr Wilson?  Thanks!

## 2024-09-06 NOTE — PROGRESS NOTES
HPI    RITESH: 8/2/2024  DFE: 7/19/2024  Chief complaint (CC): patient here for od cyst follow up, still   experiencing some slight irritation OD  Was getting some relief with eye drops but started to experience some   discomfort again after stopping  Glasses? +  Eye gtts?   FML QID OD X 10 days (finished)      (-) Flashes (-)  Floaters (-) Mucous   (-)  Tearing (++) Itching (-) Burning   (-) Headaches (++) Eye Pain/discomfort (++) Irritation   (+)  Redness (-) Double vision (-) Blurry vision    Diabetic? -  A1c? -      Last edited by Nancy Delgado on 9/6/2024  7:55 AM.            Assessment /Plan     For exam results, see Encounter Report.    Conjunctival cyst, right      See prev notes:  1. Mild pco sp pciol ou  2. Sp focal laser for periph hole OD.  Stable (hx high myopia prior to cat surgery)  3. Fuchs dyst OD>OS sp DMEK OD.  Pt to cont w BARBI 128 QID OS  4. Pt presents because still having off and on FBS OD, wore at night.  Todays exam shows large lymphatic cyst nasally OD    TODAY pt presents w no improvement w lymphatic cyst after 2 rounds of FML.  Needs cherry and drain since it is bothersome to pt (FBS).  Given DMEK hx on that eye will let Dr Wilson take care of problem    PLAN:    Consult--Dr Wilson--conj lesion excision  NOLOS for my exam today

## 2024-09-18 ENCOUNTER — OFFICE VISIT (OUTPATIENT)
Dept: OPHTHALMOLOGY | Facility: CLINIC | Age: 69
End: 2024-09-18
Payer: MEDICARE

## 2024-09-18 DIAGNOSIS — H11.442 CONJUNCTIVAL CYST, LEFT: Primary | ICD-10-CM

## 2024-09-18 PROCEDURE — 92014 COMPRE OPH EXAM EST PT 1/>: CPT | Mod: S$PBB,,, | Performed by: OPHTHALMOLOGY

## 2024-09-18 PROCEDURE — 99999 PR PBB SHADOW E&M-EST. PATIENT-LVL III: CPT | Mod: PBBFAC,,, | Performed by: OPHTHALMOLOGY

## 2024-09-18 PROCEDURE — 99213 OFFICE O/P EST LOW 20 MIN: CPT | Mod: PBBFAC | Performed by: OPHTHALMOLOGY

## 2024-09-18 NOTE — PROGRESS NOTES
HPI    Patient is here today for a conjunctival cyst in OD. Last seen on   09/06/2024 with Dr. Pereyra. Patient states he noticed the cyst in July 2024. No pain or discomfort.     FML TID/QID OD  Last edited by Marnie Ospina on 9/18/2024  8:48 AM.            Assessment /Plan     For exam results, see Encounter Report.    Conjunctival cyst, left      Benign conj cyst OD nasal, monitor vs excision    OD DMEK 2019 graft attached and clear. Signs and symptoms of graft rejection reviewed.    OS Fuchs stable  IOP good on meds, so Continue current treatment/medications

## 2024-09-22 ENCOUNTER — PATIENT MESSAGE (OUTPATIENT)
Dept: INTERNAL MEDICINE | Facility: CLINIC | Age: 69
End: 2024-09-22
Payer: MEDICARE

## 2024-10-10 ENCOUNTER — OFFICE VISIT (OUTPATIENT)
Dept: INTERNAL MEDICINE | Facility: CLINIC | Age: 69
End: 2024-10-10
Payer: MEDICARE

## 2024-10-10 VITALS
DIASTOLIC BLOOD PRESSURE: 70 MMHG | HEART RATE: 77 BPM | HEIGHT: 72 IN | OXYGEN SATURATION: 98 % | WEIGHT: 179.44 LBS | BODY MASS INDEX: 24.3 KG/M2 | SYSTOLIC BLOOD PRESSURE: 120 MMHG

## 2024-10-10 DIAGNOSIS — E78.5 DYSLIPIDEMIA: ICD-10-CM

## 2024-10-10 DIAGNOSIS — I10 PRIMARY HYPERTENSION: ICD-10-CM

## 2024-10-10 DIAGNOSIS — I25.10 ATHEROSCLEROSIS OF NATIVE CORONARY ARTERY OF NATIVE HEART WITHOUT ANGINA PECTORIS: Primary | ICD-10-CM

## 2024-10-10 DIAGNOSIS — H18.513 FUCHS' CORNEAL DYSTROPHY OF BOTH EYES: ICD-10-CM

## 2024-10-10 DIAGNOSIS — L98.9 SKIN LESION: ICD-10-CM

## 2024-10-10 DIAGNOSIS — G47.00 INSOMNIA, UNSPECIFIED TYPE: ICD-10-CM

## 2024-10-10 DIAGNOSIS — K21.9 GASTROESOPHAGEAL REFLUX DISEASE, UNSPECIFIED WHETHER ESOPHAGITIS PRESENT: ICD-10-CM

## 2024-10-10 DIAGNOSIS — N13.8 BPH WITH URINARY OBSTRUCTION: ICD-10-CM

## 2024-10-10 DIAGNOSIS — I25.10 CORONARY ARTERY DISEASE INVOLVING NATIVE CORONARY ARTERY OF NATIVE HEART WITHOUT ANGINA PECTORIS: ICD-10-CM

## 2024-10-10 DIAGNOSIS — R97.20 ELEVATED PSA: ICD-10-CM

## 2024-10-10 DIAGNOSIS — N40.1 BPH WITH URINARY OBSTRUCTION: ICD-10-CM

## 2024-10-10 DIAGNOSIS — I21.4 NSTEMI (NON-ST ELEVATED MYOCARDIAL INFARCTION): ICD-10-CM

## 2024-10-10 PROCEDURE — 99999 PR PBB SHADOW E&M-EST. PATIENT-LVL III: CPT | Mod: PBBFAC,,, | Performed by: INTERNAL MEDICINE

## 2024-10-10 PROCEDURE — G2211 COMPLEX E/M VISIT ADD ON: HCPCS | Mod: S$PBB,,, | Performed by: INTERNAL MEDICINE

## 2024-10-10 PROCEDURE — 99213 OFFICE O/P EST LOW 20 MIN: CPT | Mod: PBBFAC | Performed by: INTERNAL MEDICINE

## 2024-10-10 PROCEDURE — 99214 OFFICE O/P EST MOD 30 MIN: CPT | Mod: S$PBB,,, | Performed by: INTERNAL MEDICINE

## 2024-10-10 RX ORDER — ZOSTER VACCINE RECOMBINANT, ADJUVANTED 50 MCG/0.5
KIT INTRAMUSCULAR
COMMUNITY
Start: 2024-05-17 | End: 2024-10-10

## 2024-10-10 NOTE — PROGRESS NOTES
Subjective:       Patient ID: Wero Spangler is a 69 y.o. male.    Chief Complaint: Annual Exam    Here for annual follow-up of medical problems including CAD.  Patient last summer was on a trip out of town and had a cardiac event.  He ended up with a stent, med adjustment and has been on medication since.  He has been seeing Cardiology but his cardiologist is about to retire so he wanted a few new names.  Labs have been done recently and reviewed by me in great detail.  We discussed them at length and he was satisfied with results so we will not repeat any labs now.  We reviewed vaccines.  He had a small soft tissue bump or lump on the top of his head and wanted me to take a look at it.  No change in color or bleeding but he thought it fluctuated in size.      Review of Systems   Constitutional:  Negative for activity change and unexpected weight change.   HENT:  Positive for hearing loss. Negative for rhinorrhea and trouble swallowing.    Eyes:  Negative for discharge and visual disturbance.   Respiratory:  Negative for chest tightness and wheezing.    Cardiovascular:  Negative for chest pain and palpitations.   Gastrointestinal:  Negative for blood in stool, constipation, diarrhea and vomiting.   Endocrine: Negative for polydipsia and polyuria.   Genitourinary:  Negative for difficulty urinating, hematuria and urgency.   Musculoskeletal:  Positive for arthralgias. Negative for joint swelling and neck pain.   Integumentary:  Positive for mole/lesion.   Neurological:  Positive for headaches. Negative for weakness.   Psychiatric/Behavioral:  Negative for confusion and dysphoric mood.            Past Medical History:   Diagnosis Date    Aftercataract     Allergy     BPH (benign prostatic hyperplasia)     Cataract     Corneal dystrophy     Elevated PSA     Enlarged prostate     Fatty liver     Fuchs' corneal dystrophy     Gallstones     General anesthetics causing adverse effect in therapeutic use 2000    delayed  emergence after back surgery    GERD (gastroesophageal reflux disease)     HTN (hypertension) 9/24/2013    Hypertension     Insomnia     Prostate nodule     Urinary tract infection      Past Surgical History:   Procedure Laterality Date    BACK SURGERY  4/2000    CATARACT EXTRACTION W/  INTRAOCULAR LENS IMPLANT      Both Eyes    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    Procedure: TRANSPLANT, CORNEA;  Surgeon: Vu Wilson MD;  Location: [facility];  Service: Ophthalmology;  Laterality: Right;  DMEK    EYE SURGERY      LAPAROSCOPIC MARSUPIALIZATION OF CYST OF KIDNEY      PROSTATE SURGERY      prostate biopsy benign    REPAIR, HERNIA, INGUINAL Right 7/9/2024    Procedure: REPAIR, HERNIA, INGUINAL, with Mesh;  Surgeon: Sudeep Sen MD;  Location: [address];  Service: General;  Laterality: Right;    TONSILLECTOMY      VASECTOMY        Patient Active Problem List   Diagnosis    Seasonal allergies    Elevated PSA    BPH with urinary obstruction    Corneal dystrophy - Both Eyes    Aftercataract - Both Eyes    Prostate nodule    Primary hypertension    Insomnia    Primary localized osteoarthrosis, lower leg    Chondromalacia patellae    Fuchs' corneal dystrophy    Fatty liver    GERD (gastroesophageal reflux disease)    Elevated bilirubin    Erectile dysfunction due to arterial insufficiency    Dyslipidemia    Atherosclerosis of native coronary artery of native heart    Coronary atherosclerosis of native coronary artery    Aortic atherosclerosis    Postsurgical percutaneous transluminal coronary angioplasty status    Bilateral leg weakness        Objective:      Physical Exam  Constitutional:       General: He is not in acute distress.     Appearance: He is well-developed.   HENT:      Head: Normocephalic and atraumatic.      Right Ear: Tympanic membrane, ear canal and external ear normal.      Left Ear: Tympanic membrane, ear canal and external ear normal.      Mouth/Throat:      Pharynx: No oropharyngeal exudate  or posterior oropharyngeal erythema.   Eyes:      General: No scleral icterus.     Conjunctiva/sclera: Conjunctivae normal.      Pupils: Pupils are equal, round, and reactive to light.   Neck:      Thyroid: No thyromegaly.      Comments: No supraclavicular nodes palpated  Cardiovascular:      Rate and Rhythm: Normal rate and regular rhythm.      Pulses: Normal pulses.      Heart sounds: Normal heart sounds. No murmur heard.  Pulmonary:      Effort: Pulmonary effort is normal.      Breath sounds: Normal breath sounds. No wheezing.   Abdominal:      General: Bowel sounds are normal.      Palpations: Abdomen is soft. There is no mass.      Tenderness: There is no abdominal tenderness.   Musculoskeletal:         General: No tenderness.      Cervical back: Normal range of motion and neck supple.      Right lower leg: No edema.      Left lower leg: No edema.   Lymphadenopathy:      Cervical: No cervical adenopathy.   Skin:     Coloration: Skin is not jaundiced or pale.      Findings: Lesion (left side of midline posterior scalp small soft tissue area.  No discoloration.  Feels like a small cyst or lipoma. .  We discussed Dermatology versus General surgery and he mentioned that he already had a dermatology referral and will make an appointment) present.   Neurological:      General: No focal deficit present.      Mental Status: He is alert and oriented to person, place, and time.   Psychiatric:         Mood and Affect: Mood normal.         Behavior: Behavior normal.         Assessment:       Problem List Items Addressed This Visit          Ophtho    Fuchs' corneal dystrophy       Cardiac/Vascular    Primary hypertension    Dyslipidemia    Coronary atherosclerosis of native coronary artery - Primary       Renal/    Elevated PSA    BPH with urinary obstruction       GI    GERD (gastroesophageal reflux disease)       Other    Insomnia     Other Visit Diagnoses       Skin lesion                Plan:         Wero was seen  "today for annual exam.    Diagnoses and all orders for this visit:    Atherosclerosis of native coronary artery of native heart without angina pectoris    Dyslipidemia    Primary hypertension    Fuchs' corneal dystrophy of both eyes    Gastroesophageal reflux disease, unspecified whether esophagitis present    Insomnia, unspecified type    BPH with urinary obstruction    Elevated PSA    Skin lesion     Has Derm referral.       Labs reviewed, vaccines reviewed, prescriptions reviewed    As this patient's PCP, I am actively managing and/or treating their chronic medical conditions including CAD, HTN and have been for at least 1 year. This includes, but is not limited to, medication management, coordination of care, documentation review from their specialists and labs/imaging review where pertinent.        Portions of this note may have been created with voice recognition software. Occasional "wrong-word" or "sound-a-like" substitutions may have occurred due to the inherent limitations of voice recognition software. Please, read the note carefully and recognize, using context, where substitutions have occurred.  "

## 2024-10-12 RX ORDER — ATORVASTATIN CALCIUM 20 MG/1
20 TABLET, FILM COATED ORAL
Qty: 90 TABLET | Refills: 3 | Status: SHIPPED | OUTPATIENT
Start: 2024-10-12

## 2024-10-12 RX ORDER — TICAGRELOR 90 MG/1
TABLET ORAL
Qty: 180 TABLET | Refills: 3 | Status: SHIPPED | OUTPATIENT
Start: 2024-10-12

## 2024-10-15 ENCOUNTER — TELEPHONE (OUTPATIENT)
Dept: OPHTHALMOLOGY | Facility: CLINIC | Age: 69
End: 2024-10-15
Payer: MEDICARE

## 2024-10-15 DIAGNOSIS — H11.442 CONJUNCTIVAL CYST, LEFT: Primary | ICD-10-CM

## 2024-10-22 ENCOUNTER — PATIENT MESSAGE (OUTPATIENT)
Dept: CARDIOLOGY | Facility: CLINIC | Age: 69
End: 2024-10-22
Payer: MEDICARE

## 2024-11-15 ENCOUNTER — OFFICE VISIT (OUTPATIENT)
Dept: OPHTHALMOLOGY | Facility: CLINIC | Age: 69
End: 2024-11-15
Payer: MEDICARE

## 2024-11-15 DIAGNOSIS — H11.442 CONJUNCTIVAL CYST, LEFT: ICD-10-CM

## 2024-11-15 DIAGNOSIS — Z94.7 STATUS POST CORNEAL TRANSPLANT: ICD-10-CM

## 2024-11-15 DIAGNOSIS — H18.513 FUCHS' CORNEAL DYSTROPHY OF BOTH EYES: Primary | ICD-10-CM

## 2024-11-15 PROCEDURE — 99212 OFFICE O/P EST SF 10 MIN: CPT | Mod: PBBFAC | Performed by: OPHTHALMOLOGY

## 2024-11-15 PROCEDURE — 99999 PR PBB SHADOW E&M-EST. PATIENT-LVL II: CPT | Mod: PBBFAC,,, | Performed by: OPHTHALMOLOGY

## 2024-11-15 NOTE — PROGRESS NOTES
HPI    Patient present today for  conjunctival cyst in OD f/u   Pt state he think cyst is gone. Would like for a check up   Last edited by Xander Ott on 11/15/2024  9:24 AM.            Assessment /Plan     For exam results, see Encounter Report.    Fuchs' corneal dystrophy of both eyes    Conjunctival cyst, left    Status post corneal transplant        Benign conj cyst OD nasal, resolved    OD DMEK 2019 graft attached and clear. Signs and symptoms of graft rejection reviewed.    OS Fuchs stable  IOP good on meds, so Continue current treatment/medications

## 2024-11-26 ENCOUNTER — LAB VISIT (OUTPATIENT)
Dept: LAB | Facility: HOSPITAL | Age: 69
End: 2024-11-26
Attending: UROLOGY
Payer: MEDICARE

## 2024-11-26 DIAGNOSIS — R97.20 ELEVATED PSA: ICD-10-CM

## 2024-11-26 LAB — COMPLEXED PSA SERPL-MCNC: 23.6 NG/ML (ref 0–4)

## 2024-11-26 PROCEDURE — 36415 COLL VENOUS BLD VENIPUNCTURE: CPT | Performed by: UROLOGY

## 2024-11-26 PROCEDURE — 84153 ASSAY OF PSA TOTAL: CPT | Performed by: UROLOGY

## 2024-11-27 ENCOUNTER — OFFICE VISIT (OUTPATIENT)
Dept: UROLOGY | Facility: CLINIC | Age: 69
End: 2024-11-27
Payer: MEDICARE

## 2024-11-27 ENCOUNTER — LAB VISIT (OUTPATIENT)
Dept: LAB | Facility: HOSPITAL | Age: 69
End: 2024-11-27
Attending: UROLOGY
Payer: MEDICARE

## 2024-11-27 VITALS
WEIGHT: 178.56 LBS | BODY MASS INDEX: 24.18 KG/M2 | SYSTOLIC BLOOD PRESSURE: 135 MMHG | DIASTOLIC BLOOD PRESSURE: 75 MMHG | HEART RATE: 63 BPM | HEIGHT: 72 IN

## 2024-11-27 DIAGNOSIS — E78.5 DYSLIPIDEMIA: ICD-10-CM

## 2024-11-27 DIAGNOSIS — I10 PRIMARY HYPERTENSION: ICD-10-CM

## 2024-11-27 DIAGNOSIS — N40.2 PROSTATE NODULE: ICD-10-CM

## 2024-11-27 DIAGNOSIS — N52.01 ERECTILE DYSFUNCTION DUE TO ARTERIAL INSUFFICIENCY: ICD-10-CM

## 2024-11-27 DIAGNOSIS — R97.20 ELEVATED PSA: Primary | ICD-10-CM

## 2024-11-27 DIAGNOSIS — R97.20 ELEVATED PSA: ICD-10-CM

## 2024-11-27 DIAGNOSIS — N13.8 BPH WITH URINARY OBSTRUCTION: ICD-10-CM

## 2024-11-27 DIAGNOSIS — N40.1 BPH WITH URINARY OBSTRUCTION: ICD-10-CM

## 2024-11-27 PROBLEM — Z98.61 POSTSURGICAL PERCUTANEOUS TRANSLUMINAL CORONARY ANGIOPLASTY STATUS: Status: RESOLVED | Noted: 2024-05-03 | Resolved: 2024-11-27

## 2024-11-27 LAB
CREAT SERPL-MCNC: 0.9 MG/DL (ref 0.5–1.4)
EST. GFR  (NO RACE VARIABLE): >60 ML/MIN/1.73 M^2

## 2024-11-27 PROCEDURE — 99214 OFFICE O/P EST MOD 30 MIN: CPT | Mod: PBBFAC | Performed by: UROLOGY

## 2024-11-27 PROCEDURE — 99214 OFFICE O/P EST MOD 30 MIN: CPT | Mod: S$PBB,,, | Performed by: UROLOGY

## 2024-11-27 PROCEDURE — 82565 ASSAY OF CREATININE: CPT | Performed by: UROLOGY

## 2024-11-27 PROCEDURE — 99999 PR PBB SHADOW E&M-EST. PATIENT-LVL IV: CPT | Mod: PBBFAC,,, | Performed by: UROLOGY

## 2024-11-27 PROCEDURE — 36415 COLL VENOUS BLD VENIPUNCTURE: CPT | Performed by: UROLOGY

## 2024-11-27 RX ORDER — ALFUZOSIN HYDROCHLORIDE 10 MG/1
10 TABLET, EXTENDED RELEASE ORAL DAILY
Qty: 30 TABLET | Refills: 11 | Status: SHIPPED | OUTPATIENT
Start: 2024-11-27 | End: 2025-11-22

## 2024-11-27 RX ORDER — DIAZEPAM 5 MG/1
TABLET ORAL
Qty: 2 TABLET | Refills: 0 | Status: SHIPPED | OUTPATIENT
Start: 2024-11-27

## 2024-11-27 RX ORDER — FINASTERIDE 5 MG/1
5 TABLET, FILM COATED ORAL DAILY
Qty: 30 TABLET | Refills: 11 | Status: SHIPPED | OUTPATIENT
Start: 2024-11-27 | End: 2025-11-27

## 2024-11-27 NOTE — PROGRESS NOTES
CHIEF COMPLAINT:    Mr. Spangler is a 69 y.o. male presenting with an elevated PSA.    PRESENTING ILLNESS:    Wero Spangler is a 69 y.o. male with an elevated PSA.  He has had multiple biopsies done.  One was in 2012 when his PSA was 10.14.  There was also a prostate nodule at that time.  Most recent one was 8/23/16 when his PSA was 18.1.  This was a cognitive fusion biopsy.    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  Path returned an oncocytoma.    He also has LUTS.  He's s/p rezum on 5/14/19.  He's voiding much better.  Good FOS. However, he does c/o irritative symptoms that are worsened by caffeine and alcohol. Currently on uroxatral.   Nocturia has increased to every 2 hours.  Would like to add proscar.    He c/o ED.  He's tried Cialis with good results, but he c/o some decreased sensation.  His T is normal.  He is s/p an angioplasty and now has an Rx for NTG.    REVIEW OF SYSTEMS:    Wero Spangler denies chest pain,sore throat, headache, blurred vision, fever, nausea, vomiting, chills, flank discomfort, abdominal pain, bleeding per rectum, cough, SOB, recent loss of consciousness, recent mental status changes, seizures, dizziness, or upper or lower extremity weakness.    JOSE MANUEL  1. 2  2. 2  3. 2  4. 3  5. 2     PATIENT HISTORY:    Past Medical History:   Diagnosis Date    Aftercataract     Allergy     BPH (benign prostatic hyperplasia)     Cataract     Corneal dystrophy     Elevated PSA     Enlarged prostate     Fatty liver     Fuchs' corneal dystrophy     Gallstones     General anesthetics causing adverse effect in therapeutic use 2000    delayed emergence after back surgery    GERD (gastroesophageal reflux disease)     HTN (hypertension) 9/24/2013    Hypertension     Insomnia     Prostate nodule     Urinary tract infection        Past Surgical History:   Procedure Laterality Date    BACK SURGERY  4/2000    CATARACT EXTRACTION W/  INTRAOCULAR LENS IMPLANT      Both Eyes    CORNEAL TRANSPLANT  Right 2019    Procedure: TRANSPLANT, CORNEA;  Surgeon: Vu Wilson MD;  Location: [facility];  Service: Ophthalmology;  Laterality: Right;  DMEK    EYE SURGERY      LAPAROSCOPIC MARSUPIALIZATION OF CYST OF KIDNEY      PROSTATE SURGERY      prostate biopsy benign    REPAIR, HERNIA, INGUINAL Right 2024    Procedure: REPAIR, HERNIA, INGUINAL, with Mesh;  Surgeon: Sudeep Sen MD;  Location: [facility] OR [address] [place] [address];  Service: General;  Laterality: Right;    TONSILLECTOMY      VASECTOMY         Family History   Problem Relation Name Age of Onset    Cancer Mother          breast    Prostate cancer Brother prostate     Cancer Brother prostate         prostate    Macular degeneration Maternal Grandmother      Cancer Other      Cancer Other      Amblyopia Neg Hx      Blindness Neg Hx      Cataracts Neg Hx      Glaucoma Neg Hx      Retinal detachment Neg Hx      Strabismus Neg Hx         Social History     Socioeconomic History    Marital status:    Tobacco Use    Smoking status: Former     Current packs/day: 0.00     Types: Cigarettes     Quit date:      Years since quittin.9    Smokeless tobacco: Never   Substance and Sexual Activity    Alcohol use: Not Currently    Drug use: No    Sexual activity: Yes     Partners: Female     Social Drivers of Health     Financial Resource Strain: Low Risk  (2023)    Overall Financial Resource Strain (CARDIA)     Difficulty of Paying Living Expenses: Not hard at all   Food Insecurity: No Food Insecurity (2023)    Hunger Vital Sign     Worried About Running Out of Food in the Last Year: Never true     Ran Out of Food in the Last Year: Never true   Transportation Needs: No Transportation Needs (2023)    PRAPARE - Transportation     Lack of Transportation (Medical): No     Lack of Transportation (Non-Medical): No   Physical Activity: Sufficiently Active (2023)    Exercise Vital Sign     Days of Exercise per Week: 3 days     Minutes of Exercise  per Session: 50 min   Recent Concern: Physical Activity - Insufficiently Active (10/30/2023)    Exercise Vital Sign     Days of Exercise per Week: 3 days     Minutes of Exercise per Session: 40 min   Stress: No Stress Concern Present (11/27/2023)    [place] [place] [employer] - Occupational Stress Questionnaire     Feeling of Stress : Only a little   Recent Concern: Stress - Stress Concern Present (10/30/2023)    [place] [employer] - Occupational Stress Questionnaire     Feeling of Stress : To some extent   Housing Stability: Low Risk  (11/27/2023)    Housing Stability Vital Sign     Unable to Pay for Housing in the Last Year: No     Number of Places Lived in the Last Year: 1     Unstable Housing in the Last Year: No       Allergies:  Patient has no known allergies.    Medications:    Current Outpatient Medications:     alfuzosin (UROXATRAL) 10 mg Tb24, Take 1 tablet (10 mg total) by mouth once daily., Disp: 30 tablet, Rfl: 11    ALPRAZolam (XANAX) 0.5 MG tablet, TAKE 1 TABLET BY MOUTH EVERY NIGHT, Disp: 30 tablet, Rfl: 1    aspirin 81 MG Chew, Take 81 mg by mouth nightly., Disp: , Rfl:     atorvastatin (LIPITOR) 20 MG tablet, TAKE 1 TABLET(20 MG) BY MOUTH EVERY DAY, Disp: 90 tablet, Rfl: 3    fluticasone propionate (FLONASE) 50 mcg/actuation nasal spray, 2 sprays (100 mcg total) by Each Nostril route once daily., Disp: 16 g, Rfl: 0    lansoprazole (PREVACID) 15 MG capsule, Take 1 capsule (15 mg total) by mouth once daily., Disp: 90 capsule, Rfl: 12    nitroGLYCERIN (NITROSTAT) 0.4 MG SL tablet, Place 1 tablet (0.4 mg total) under the tongue every 5 (five) minutes as needed for Chest pain., Disp: 90 tablet, Rfl: 3    sodium chloride 2% (BARBI 128) 2 % ophthalmic solution, 1 drop as needed (takes 3-4 times/day)., Disp: , Rfl:     triamterene-hydrochlorothiazide 37.5-25 mg (MAXZIDE-25) 37.5-25 mg per tablet, Take 1 tablet by mouth once daily., Disp: 90 tablet, Rfl: 11    PHYSICAL  EXAMINATION:    The patient generally appears in good health, is appropriately interactive, and is in no apparent distress.     Eyes: anicteric sclerae, moist conjunctivae; no lid-lag; PERRLA     HENT: Atraumatic; oropharynx clear with moist mucous membranes and no mucosal ulcerations;normal hard and soft palate.  No evidence of lymphadenopathy.    Neck: Trachea midline.  No thyromegaly.    Musculoskeletal: No abnormal gait.    Skin: No lesions.    Mental: Cooperative with normal affect.  Is oriented to time, place, and person.    Neuro: Grossly intact.    Chest: Normal inspiratory effort.   No accessory muscles.  No audible wheezes.  Respirations symmetric on inspiration and expiration.    Heart: Regular rhythm.      Abdomen:  Soft, non-tender. No masses or organomegaly. Bladder is not palpable. No evidence of flank discomfort. No evidence of inguinal hernia.    Genitourinary: The penis is not circumcised with no evidence of plaques or induration. The urethral meatus is normal. The testes, epididymides, and cord structures are normal in size and contour bilaterally. The scrotum is normal in size and contour.    Normal anal sphincter tone. No rectal mass.    The prostate is 40 g. Normal landmarks. Lateral sulci. Median furrow intact. There is a 1.5 cm x 1.5 cm nodule in the midportion of the gland. Stable.  Seminal vesicles are normal.    Extremities: No clubbing, cyanosis, or edema      LABS:    Started proscar 11/2024  Lab Results   Component Value Date    PSA 14.0 (H) 09/22/2014    PSA 15.48 (H) 08/16/2013    PSA 11.06 (H) 02/25/2013    PSADIAG 23.6 (H) 11/26/2024    PSADIAG 18.1 (H) 05/01/2024    PSADIAG 13.3 (H) 10/27/2023    PSATOTAL 6.8 (H) 07/12/2011    PSATOTAL 7.9 (H) 01/11/2011    PSAFREE 1.07 07/12/2011    PSAFREE 1.67 (H) 01/11/2011    PSAFREEPCT 15.74 07/12/2011    PSAFREEPCT 21.14 01/11/2011        IMPRESSION:    Encounter Diagnoses   Name Primary?    Elevated PSA Yes    BPH with urinary obstruction      Prostate nodule     Erectile dysfunction due to arterial insufficiency     Primary hypertension     Dyslipidemia    HTN, stable    PLAN:    1. Discussed options for his LUTS.  Continue uroxatral.  Refilled.   Will also start proscar. Side effects discussed.  A new Rx was given.  Discussed this will artificially lower his PSA.  2. Discussed options for his ED (affected by above comorbidities). Stop Cialis given his active Rx for NTG.  He'd like to think about [facility].  3.  Went over his PSA.  Discussed that it's increased.  Will get a prostate MRI.  4. RTC 6 months with a PSA unless his MRI is concerning.      Copy to:

## 2024-12-03 RX ORDER — CALC/MAG/B COMPLEX/D3/HERB 61
TABLET ORAL
Qty: 90 CAPSULE | Refills: 3 | Status: SHIPPED | OUTPATIENT
Start: 2024-12-03

## 2024-12-03 NOTE — TELEPHONE ENCOUNTER
Refill Decision Note   Wero Spangler  is requesting a refill authorization.  Brief Assessment and Rationale for Refill:  Approve     Medication Therapy Plan:         Pharmacist review requested: Yes   Extended chart review required: Yes   Comments:     Note composed:5:31 PM 12/03/2024

## 2024-12-03 NOTE — TELEPHONE ENCOUNTER
No care due was identified.  [facility] Embedded Care Due Messages. Reference number: [id number].   12/03/2024 3:06:56 PM CST

## 2024-12-03 NOTE — TELEPHONE ENCOUNTER
Refill Routing Note   Medication(s) are not appropriate for processing by [facility] for the following reason(s):        Drug-disease interaction    [facility] action(s):  Defer      Medication Therapy Plan: Drug-Disease: lansoprazole and Elevated bilirubin    Pharmacist review requested: Yes     Appointments  past 12m or future 3m with PCP    Date Provider   Last Visit   10/10/2024 Duke Cano MD   Next Visit   Visit date not found Duke Cano MD   ED visits in past 90 days: 0        Note composed:3:21 PM 12/03/2024

## 2024-12-04 ENCOUNTER — OFFICE VISIT (OUTPATIENT)
Dept: CARDIOLOGY | Facility: CLINIC | Age: 69
End: 2024-12-04
Payer: MEDICARE

## 2024-12-04 VITALS
DIASTOLIC BLOOD PRESSURE: 61 MMHG | BODY MASS INDEX: 24.79 KG/M2 | WEIGHT: 183 LBS | OXYGEN SATURATION: 97 % | HEIGHT: 72 IN | HEART RATE: 66 BPM | SYSTOLIC BLOOD PRESSURE: 134 MMHG

## 2024-12-04 DIAGNOSIS — I25.10 ATHEROSCLEROSIS OF NATIVE CORONARY ARTERY OF NATIVE HEART WITHOUT ANGINA PECTORIS: Primary | ICD-10-CM

## 2024-12-04 DIAGNOSIS — I10 PRIMARY HYPERTENSION: ICD-10-CM

## 2024-12-04 DIAGNOSIS — I70.0 AORTIC ATHEROSCLEROSIS: ICD-10-CM

## 2024-12-04 PROCEDURE — 99213 OFFICE O/P EST LOW 20 MIN: CPT | Mod: PBBFAC | Performed by: INTERNAL MEDICINE

## 2024-12-04 PROCEDURE — 99214 OFFICE O/P EST MOD 30 MIN: CPT | Mod: S$PBB,,, | Performed by: INTERNAL MEDICINE

## 2024-12-04 PROCEDURE — 99999 PR PBB SHADOW E&M-EST. PATIENT-LVL III: CPT | Mod: PBBFAC,,, | Performed by: INTERNAL MEDICINE

## 2024-12-04 NOTE — PROGRESS NOTES
Subjective:   Patient ID:  Wero Spangler is a 69 y.o. male who presents for follow up of No chief complaint on file.      HPI: Very pleasant man with a history of CAD s/p NSTEMI while vacationing in [place] in 2023.      He is doing well with no new symptoms or cardiovascular complaints and no change in exercise capacity.  He denies chest discomfort, SIERRA, palpitations, PND/orthopnea, lightheadedness and syncope.        Exercise: averaging 5.4 miles per day of walking at [place] and most days of the week he's going to [place].        Patient Active Problem List   Diagnosis    Seasonal allergies    Elevated PSA    BPH with urinary obstruction    Corneal dystrophy - Both Eyes    Aftercataract - Both Eyes    Prostate nodule    Primary hypertension    Insomnia    Primary localized osteoarthrosis, lower leg    Chondromalacia patellae    Fuchs' corneal dystrophy    Fatty liver    GERD (gastroesophageal reflux disease)    Elevated bilirubin    Erectile dysfunction due to arterial insufficiency    Dyslipidemia    Atherosclerosis of native coronary artery of native heart    Coronary atherosclerosis of native coronary artery    Aortic atherosclerosis    Bilateral leg weakness       Current Outpatient Medications   Medication Sig    alfuzosin (UROXATRAL) 10 mg Tb24 Take 1 tablet (10 mg total) by mouth once daily.    ALPRAZolam (XANAX) 0.5 MG tablet TAKE 1 TABLET BY MOUTH EVERY NIGHT    aspirin 81 MG Chew Take 81 mg by mouth nightly.    atorvastatin (LIPITOR) 20 MG tablet TAKE 1 TABLET(20 MG) BY MOUTH EVERY DAY    diazePAM (VALIUM) 5 MG tablet Take 2 pills 30 min prior to procedure    finasteride (PROSCAR) 5 mg tablet Take 1 tablet (5 mg total) by mouth once daily.    fluticasone propionate (FLONASE) 50 mcg/actuation nasal spray 2 sprays (100 mcg total) by Each Nostril route once daily.    lansoprazole (PREVACID) 15 MG capsule TAKE 1 CAPSULE(15 MG) BY MOUTH EVERY DAY    nitroGLYCERIN (NITROSTAT) 0.4 MG SL tablet Place 1  tablet (0.4 mg total) under the tongue every 5 (five) minutes as needed for Chest pain.    sodium chloride 2% (BARBI 128) 2 % ophthalmic solution 1 drop as needed (takes 3-4 times/day).    triamterene-hydrochlorothiazide 37.5-25 mg (MAXZIDE-25) 37.5-25 mg per tablet Take 1 tablet by mouth once daily.     No current facility-administered medications for this visit.       ROS  The review of systems is negative except as above.     Objective:   Physical Exam  Vitals reviewed.   Constitutional:       Appearance: He is well-developed.   HENT:      Head: Normocephalic and atraumatic.   Eyes:      General: No scleral icterus.     Conjunctiva/sclera: Conjunctivae normal.   Neck:      Vascular: No JVD.   Cardiovascular:      Rate and Rhythm: Normal rate and regular rhythm.      Pulses: Intact distal pulses.      Heart sounds: Normal heart sounds. No murmur heard.     No friction rub. No gallop.   Pulmonary:      Effort: Pulmonary effort is normal.      Breath sounds: Normal breath sounds. No wheezing or rales.   Abdominal:      General: Bowel sounds are normal. There is no distension.      Palpations: Abdomen is soft.      Tenderness: There is no abdominal tenderness.   Musculoskeletal:         General: Normal range of motion.      Cervical back: Normal range of motion and neck supple.   Skin:     General: Skin is warm and dry.      Findings: No erythema or rash.   Neurological:      Mental Status: He is alert and oriented to person, place, and time.   Psychiatric:         Behavior: Behavior normal.         Thought Content: Thought content normal.         Judgment: Judgment normal.         Lab Results   Component Value Date    WBC 6.59 05/12/2024    HGB 14.3 05/12/2024    HCT 41.6 05/12/2024    MCV 93 05/12/2024     05/12/2024         Chemistry        Component Value Date/Time     05/12/2024 1049    K 3.6 05/12/2024 1049     05/12/2024 1049    CO2 26 05/12/2024 1049    BUN 16 05/12/2024 1049    CREATININE  0.9 11/27/2024 0827    GLU 84 05/12/2024 1049        Component Value Date/Time    CALCIUM 9.3 05/12/2024 1049    ALKPHOS 65 05/12/2024 1049    AST 25 05/12/2024 1049    ALT 19 05/12/2024 1049    BILITOT 1.3 (H) 05/12/2024 1049    ESTGFRAFRICA >60.0 07/25/2022 0810    EGFRNONAA >60.0 07/25/2022 0810            Lab Results   Component Value Date    CHOL 118 (L) 05/12/2024    CHOL 122 05/01/2024    CHOL 112 (L) 12/20/2023     Lab Results   Component Value Date    HDL 36 (L) 05/12/2024    HDL 39 (L) 05/01/2024    HDL 38 (L) 12/20/2023     Lab Results   Component Value Date    LDLCALC 51.8 (L) 05/12/2024    LDLCALC 65.2 05/01/2024    LDLCALC 60.8 (L) 12/20/2023     Lab Results   Component Value Date    TRIG 151 (H) 05/12/2024    TRIG 89 05/01/2024    TRIG 66 12/20/2023     Lab Results   Component Value Date    CHOLHDL 30.5 05/12/2024    CHOLHDL 32.0 05/01/2024    CHOLHDL 33.9 12/20/2023       Lab Results   Component Value Date    TSH 1.353 05/12/2024       Lab Results   Component Value Date    HGBA1C 4.9 07/25/2022       Assessment:     1. Atherosclerosis of native coronary artery of native heart without angina pectoris    2. Aortic atherosclerosis    3. Primary hypertension    4.      NSTEMI with PCI/stent in [facility], in 2023     Plan:     Continue current medicines.    Diet/exercise goals reinforced.    F/U 12 months

## 2024-12-30 ENCOUNTER — HOSPITAL ENCOUNTER (OUTPATIENT)
Dept: RADIOLOGY | Facility: HOSPITAL | Age: 69
Discharge: HOME | End: 2024-12-30
Attending: UROLOGY
Payer: MEDICARE

## 2024-12-30 DIAGNOSIS — R97.20 ELEVATED PSA: ICD-10-CM

## 2024-12-30 PROCEDURE — A9585 GADOBUTROL INJECTION: HCPCS | Performed by: UROLOGY

## 2024-12-30 PROCEDURE — 25500020 PHARM REV CODE 255: Performed by: UROLOGY

## 2024-12-30 PROCEDURE — 72197 MRI PELVIS W/O & W/DYE: CPT | Mod: 26,,, | Performed by: RADIOLOGY

## 2024-12-30 PROCEDURE — 72197 MRI PELVIS W/O & W/DYE: CPT | Mod: TC

## 2024-12-30 RX ORDER — GADOBUTROL 604.72 MG/ML
10 INJECTION INTRAVENOUS
Status: COMPLETED | OUTPATIENT
Start: 2024-12-30 | End: 2024-12-30

## 2024-12-30 RX ADMIN — GADOBUTROL 10 ML: 604.72 INJECTION INTRAVENOUS at 06:12

## 2025-01-02 ENCOUNTER — TELEPHONE (OUTPATIENT)
Dept: UROLOGY | Facility: CLINIC | Age: 70
End: 2025-01-02
Payer: MEDICARE

## 2025-01-02 NOTE — TELEPHONE ENCOUNTER
Please notify his MRI is not concerning for malignancy.  RTC as scheduled.    Pt verbalized understanding and agreed to f/u per last clinic note (11/27/24) in 6 months.

## 2025-02-17 ENCOUNTER — OFFICE VISIT (OUTPATIENT)
Dept: INTERNAL MEDICINE | Facility: CLINIC | Age: 70
End: 2025-02-17
Payer: MEDICARE

## 2025-02-17 ENCOUNTER — LAB VISIT (OUTPATIENT)
Dept: LAB | Facility: HOSPITAL | Age: 70
End: 2025-02-17
Attending: INTERNAL MEDICINE
Payer: MEDICARE

## 2025-02-17 VITALS
OXYGEN SATURATION: 98 % | DIASTOLIC BLOOD PRESSURE: 70 MMHG | HEART RATE: 62 BPM | SYSTOLIC BLOOD PRESSURE: 120 MMHG | HEIGHT: 72 IN | BODY MASS INDEX: 24.61 KG/M2 | WEIGHT: 181.69 LBS

## 2025-02-17 DIAGNOSIS — M15.1: ICD-10-CM

## 2025-02-17 DIAGNOSIS — I70.0 AORTIC ATHEROSCLEROSIS: ICD-10-CM

## 2025-02-17 DIAGNOSIS — M19.049 HAND ARTHRITIS: Primary | ICD-10-CM

## 2025-02-17 DIAGNOSIS — K21.9 GASTROESOPHAGEAL REFLUX DISEASE, UNSPECIFIED WHETHER ESOPHAGITIS PRESENT: ICD-10-CM

## 2025-02-17 DIAGNOSIS — G47.00 INSOMNIA, UNSPECIFIED TYPE: ICD-10-CM

## 2025-02-17 DIAGNOSIS — M19.049 HAND ARTHRITIS: ICD-10-CM

## 2025-02-17 PROBLEM — J84.10 PULMONARY FIBROSIS, UNSPECIFIED: Status: ACTIVE | Noted: 2025-02-17

## 2025-02-17 PROBLEM — J84.10 PULMONARY FIBROSIS, UNSPECIFIED: Status: RESOLVED | Noted: 2025-02-17 | Resolved: 2025-02-17

## 2025-02-17 LAB — RHEUMATOID FACT SERPL-ACNC: 14 IU/ML (ref 0–15)

## 2025-02-17 PROCEDURE — 99214 OFFICE O/P EST MOD 30 MIN: CPT | Mod: PBBFAC | Performed by: INTERNAL MEDICINE

## 2025-02-17 PROCEDURE — 86431 RHEUMATOID FACTOR QUANT: CPT | Performed by: INTERNAL MEDICINE

## 2025-02-17 PROCEDURE — 36415 COLL VENOUS BLD VENIPUNCTURE: CPT | Performed by: INTERNAL MEDICINE

## 2025-02-17 RX ORDER — ALPRAZOLAM 0.5 MG/1
TABLET ORAL
Qty: 30 TABLET | Refills: 2 | Status: SHIPPED | OUTPATIENT
Start: 2025-02-17

## 2025-02-17 NOTE — PROGRESS NOTES
HPI  History of Present Illness    CHIEF COMPLAINT:  Wero presents with concerns about swollen, tender, and stiff finger joints, particularly in the left hand.    HPI:  Wero reports swelling, tenderness, and stiffness in some of his finger joints, primarily affecting his left hand. He notes a growth on index finger that is less than 3 weeks old and feels like bone. He describes swelling and tenderness in 2 specific joints of his left hand, present for 5-6 months. His left hand is his dominant hand and was his pitching hand when he was younger. The affected joint feels slightly warm compared to others, indicating inflammation. He has not had shooting pains or aches in the finger with the new growth, but reports intermittent aching in the other affected fingers. Moving his fingers, similar to how he used to loosen up before pitching, provides some relief but does not completely alleviate the aching. He has not tried any specific treatments for the joint issues. He also mentions having osteoarthritis in another joint. His mother had rheumatoid arthritis, diagnosed when she was approximately 70 years old, which has prompted concern about his own symptoms. He reports ongoing acid reflux symptoms. He has been walking about 5 miles a day, which has helped with his knee arthritis. He has not needed knee injections since last May, whereas previously he was getting 3 shots a week apart.    He denies any issues with his right hand, breathing problems, or heart problems. He denies any significant joint problems in his ankles or toes.     reviewed.     MEDICAL HISTORY:  Wero has a history of osteoarthritis in the left hand and knee, as well as acid reflux. He received a flu vaccine this season.    FAMILY HISTORY:  Family history is significant for mother with rheumatoid arthritis, diagnosed in her 70s.      ROS:  Cardiovascular: -chest pain  Respiratory: -shortness of breath  Gastrointestinal: controlled  heartburn  Musculoskeletal: +joint pain, +joint stiffness, +joint swelling             Review of Systems    Past Medical History:   Diagnosis Date    Aftercataract     Allergy     BPH (benign prostatic hyperplasia)     Cataract     Corneal dystrophy     Elevated PSA     Enlarged prostate     Fatty liver     Fuchs' corneal dystrophy     Gallstones     General anesthetics causing adverse effect in therapeutic use 2000    delayed emergence after back surgery    GERD (gastroesophageal reflux disease)     HTN (hypertension) 9/24/2013    Hypertension     Insomnia     Prostate nodule     Urinary tract infection      Past Surgical History:   Procedure Laterality Date    BACK SURGERY  4/2000    CATARACT EXTRACTION W/  INTRAOCULAR LENS IMPLANT      Both Eyes    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    Procedure: TRANSPLANT, CORNEA;  Surgeon: Vu Wilson MD;  Location: [facility];  Service: Ophthalmology;  Laterality: Right;  DMEK    EYE SURGERY      LAPAROSCOPIC MARSUPIALIZATION OF CYST OF KIDNEY      PROSTATE SURGERY      prostate biopsy benign    REPAIR, HERNIA, INGUINAL Right 7/9/2024    Procedure: REPAIR, HERNIA, INGUINAL, with Mesh;  Surgeon: Sudeep Sen MD;  Location: [address];  Service: General;  Laterality: Right;    TONSILLECTOMY      VASECTOMY        Problem List[1]       Objective:      Physical Exam    MSK: Hand/Wrist - Left: Nodule on left hand, slightly warm to touch. Heberden's nodes on left hand. Fingers 2-4  Cardiovascular: Good circulation in lower extremities. No edema. RRR  Skin: no rash      Physical Exam      Assessment:       Problem List Items Addressed This Visit          Cardiac/Vascular    Aortic atherosclerosis       GI    GERD (gastroesophageal reflux disease)       Other    Insomnia    Relevant Medications    ALPRAZolam (XANAX) 0.5 MG tablet     Other Visit Diagnoses         Hand arthritis    -  Primary    Relevant Orders    RHEUMATOID FACTOR      Heberden nodes of left hand         "Relevant Orders    RHEUMATOID FACTOR            Plan:       Wero was seen today for joint swelling.    Diagnoses and all orders for this visit:    Hand arthritis  -     RHEUMATOID FACTOR; Future    Heberden nodes of left hand  -     RHEUMATOID FACTOR; Future    Insomnia, unspecified type  -     ALPRAZolam (XANAX) 0.5 MG tablet; TAKE 1 TABLET BY MOUTH EVERY NIGHT    Gastroesophageal reflux disease, unspecified whether esophagitis present    Aortic atherosclerosis  Comments:  Followed by Cardiology, constinue statin and BP medication and ASA         Fingers consistent with OA nodules, but with Fam Hx will check Rheum Factor and review. SYmptomatic treatment with topical warmth, voltaren gel, OTC Tylenol or NSAID Prn.     As this patient's PCP, I am actively managing and/or treating their chronic medical conditions including GERD, Insomnia  and have been for at least 1 year. This includes, but is not limited to, medication management, coordination of care, documentation review from their specialists and labs/imaging review where pertinent.          Portions of this note may have been created with [employer] voice recognition software. Occasional "wrong-word" or "sound-a-like" substitutions may have occurred due to the inherent limitations of voice recognition software. Please, read the note carefully and recognize, using context, where substitutions have occurred.       [1]   Patient Active Problem List  Diagnosis    Seasonal allergies    Elevated PSA    BPH with urinary obstruction    Corneal dystrophy - Both Eyes    Aftercataract - Both Eyes    Prostate nodule    Primary hypertension    Insomnia    Primary localized osteoarthrosis, lower leg    Chondromalacia patellae    Fuchs' corneal dystrophy    Fatty liver    GERD (gastroesophageal reflux disease)    Elevated bilirubin    Erectile dysfunction due to arterial insufficiency    Dyslipidemia    Atherosclerosis of native coronary artery of native heart    Coronary " atherosclerosis of native coronary artery    Aortic atherosclerosis    Bilateral leg weakness

## 2025-02-18 ENCOUNTER — TELEPHONE (OUTPATIENT)
Dept: INTERNAL MEDICINE | Facility: CLINIC | Age: 70
End: 2025-02-18
Payer: MEDICARE

## 2025-02-18 ENCOUNTER — PATIENT MESSAGE (OUTPATIENT)
Dept: INTERNAL MEDICINE | Facility: CLINIC | Age: 70
End: 2025-02-18
Payer: MEDICARE

## 2025-03-05 ENCOUNTER — OFFICE VISIT (OUTPATIENT)
Dept: DERMATOLOGY | Facility: CLINIC | Age: 70
End: 2025-03-05
Payer: MEDICARE

## 2025-03-05 DIAGNOSIS — D18.01 CHERRY ANGIOMA: ICD-10-CM

## 2025-03-05 DIAGNOSIS — D22.9 MULTIPLE BENIGN NEVI: ICD-10-CM

## 2025-03-05 DIAGNOSIS — Z12.83 SCREENING EXAM FOR SKIN CANCER: ICD-10-CM

## 2025-03-05 DIAGNOSIS — L91.0 KELOID: ICD-10-CM

## 2025-03-05 DIAGNOSIS — L81.4 LENTIGO: Primary | ICD-10-CM

## 2025-03-05 PROCEDURE — 99213 OFFICE O/P EST LOW 20 MIN: CPT | Mod: PBBFAC | Performed by: STUDENT IN AN ORGANIZED HEALTH CARE EDUCATION/TRAINING PROGRAM

## 2025-03-05 PROCEDURE — 99999 PR PBB SHADOW E&M-EST. PATIENT-LVL III: CPT | Mod: PBBFAC,,, | Performed by: STUDENT IN AN ORGANIZED HEALTH CARE EDUCATION/TRAINING PROGRAM

## 2025-03-05 NOTE — PROGRESS NOTES
Subjective:      Patient ID:  Wero Spangler is a 69 y.o. male who presents for   Chief Complaint   Patient presents with    Skin Check     Pt here for UBSE     Pt denies personal h/o skin cancer but reports possible family h/o NMSC (father)    Pt concerned about lesion on top of scalp- changing texture        Review of Systems    Objective:   Physical Exam   Constitutional: He appears well-developed and well-nourished. No distress.   Neurological: He is alert and oriented to person, place, and time. He is not disoriented.   Psychiatric: He has a normal mood and affect.   Skin:   Areas Examined (abnormalities noted in diagram):   Scalp / Hair Palpated and Inspected  Head / Face Inspection Performed  Neck Inspection Performed  Chest / Axilla Inspection Performed  Abdomen Inspection Performed  Back Inspection Performed  RUE Inspected  LUE Inspection Performed  Nails and Digits Inspection Performed                 Diagram Legend     Erythematous scaling macule/papule c/w actinic keratosis       Vascular papule c/w angioma      Pigmented verrucoid papule/plaque c/w seborrheic keratosis      Yellow umbilicated papule c/w sebaceous hyperplasia      Irregularly shaped tan macule c/w lentigo     1-2 mm smooth white papules consistent with Milia      Movable subcutaneous cyst with punctum c/w epidermal inclusion cyst      Subcutaneous movable cyst c/w pilar cyst      Firm pink to brown papule c/w dermatofibroma      Pedunculated fleshy papule(s) c/w skin tag(s)      Evenly pigmented macule c/w junctional nevus     Mildly variegated pigmented, slightly irregular-bordered macule c/w mildly atypical nevus      Flesh colored to evenly pigmented papule c/w intradermal nevus       Pink pearly papule/plaque c/w basal cell carcinoma      Erythematous hyperkeratotic cursted plaque c/w SCC      Surgical scar with no sign of skin cancer recurrence      Open and closed comedones      Inflammatory papules and pustules       Verrucoid papule consistent consistent with wart     Erythematous eczematous patches and plaques     Dystrophic onycholytic nail with subungual debris c/w onychomycosis     Umbilicated papule    Erythematous-base heme-crusted tan verrucoid plaque consistent with inflamed seborrheic keratosis     Erythematous Silvery Scaling Plaque c/w Psoriasis     See annotation      Assessment / Plan:        Lentigo  Cherry angioma  Multiple benign nevi  Keloid  Reassurance given to patient. No treatment is necessary.       Screening exam for skin cancer  Upper body skin examination performed today including at least 6 points as noted in physical examination. No lesions suspicious for malignancy noted.    Recommend daily sun protection/avoidance and use of at least SPF 30, broad spectrum sunscreen (OTC drug).          Follow up in about 1 year (around 3/5/2026) for TBSE.

## 2025-03-05 NOTE — PATIENT INSTRUCTIONS
Sunscreen Guidelines  Sunscreen protects your skin by absorbing and reflecting ultraviolet rays. All sunscreens have a sun protection factor (SPF) rating that indicates how long a sunscreen will remain effective on the skin.    Why protect your skin?  The sun's rays are composed of many different wavelengths, including UVA, UVB, and visible light that each affect the skin differently.    UVB: sunburn, photoaging, skin cancer (melanoma, basal cell, and squamous cell carcinomas) and modulation of the skin's immune system.    UVA: similar to above but thought to contribute more to aging; at the same dose of UVB it is less powerful however the sun has 10-20 times the levels of UVA as compared with UVB.  Visible light: implicated in causing unwanted darkening of skin, such as melasma and post-inflammatory hyperpigmentation in darker skin types     If I have dark skin, do I need to worry about the sun?    More darkly pigmented skin is more protected against UV-induced skin cancer, sunburn, and photoaging, though may still suffer from sun-related conditions, including melasma, hyperpigmentation, and other dark spots.    Sun avoidance  As a general rule, stay out of the sun as much as possible between 10 a.m. - 4 p.m.    Download the EPA UV index yousif to track the UV index by hour in your zip code.      Which sunscreen should I choose?  The best sunscreen to use varies by individual. The one that feels best on your skin and fits your lifestyle will be the one you will likely use most regularly.   Active ingredients of sunscreen vary by , and may be a chemical (such as avobenzone or oxybenzone) or physical agent (such as zinc oxide or titanium dioxide). I recommend a physical agent.  A water-resistant sunscreen is one that maintains the SPF level after 40 minutes of water exposure. A very water-resistant sunscreen maintains the SPF level after 80 minutes of water exposure.    Sunscreen: this is the last layer in  "sun protection   Be generous: 1 shot glass of sunscreen for your body, ½ teaspoon for your face/neck  Reapply every 2 hours  Broad spectrum (provides UVA/UVB protection), SPF 50 or above  Avoid spray sunscreens: less effective and have been found to contain benzene (carcinogen)    Sun protective clothing  Although sunscreen helps minimize sun damage, no sunscreen completely blocks all wavelengths of UV light. Wearing sun protective clothing such as hats, rashguards or swim shirts, and long sleeves and/or pants, as well as avoiding sun exposure from 10 a.m. to 4 p.m. will help protect your skin from overexposure and minimize sun damage. Seek shade.  Long sleeved clothing, hats, and sunglasses: makes sun protection easier, more effective, and can even be more affordable, since sunscreen needs to be reapplied frequently.    Solumbra (www.sunprectautions.The Filter)  WorldState (www.Popego)  Coolibar (www.[employer] Wave Technologies.The Filter)  Land's end (www.Pure Energies Group)  Hats from [employer] (www.helenkaminski.com)    My Favorite Sunscreens:  Physical blockers: Can have a "white case" but in general are more effective  - Face: CeraVe tinted mineral sunscreen, Bare Minerals complexion rescue (20 shades), Elta MD (UV elements, UV physical, UV restore, etc), Tizo ultra zinc tinted, Cetaphil Sheer Mineral Face Liquid Sunscreen  - Body: Blue Lizard, Neutrogena Sheer Zinc, Eucerin Daily Protection, Aveeno Baby   "

## 2025-03-23 RX ORDER — TRIAMTERENE/HYDROCHLOROTHIAZID 37.5-25 MG
1 TABLET ORAL
Qty: 90 TABLET | Refills: 0 | Status: SHIPPED | OUTPATIENT
Start: 2025-03-23

## 2025-03-23 NOTE — TELEPHONE ENCOUNTER
Refill Routing Note   Medication(s) are not appropriate for processing by [facility] for the following reason(s):        Drug-disease interaction    [facility] action(s):  Defer   Requires labs : Yes      Medication Therapy Plan: Elevated bilirubin      Appointments  past 12m or future 3m with PCP    Date Provider   Last Visit   2/17/2025 Duke Cano MD   Next Visit   Visit date not found Duke Cano MD   ED visits in past 90 days: 0        Note composed:10:13 AM 03/23/2025

## 2025-03-23 NOTE — TELEPHONE ENCOUNTER
Care Due:                  Date            Visit Type   Department     Provider  --------------------------------------------------------------------------------                                MYCHART                              FOLLOWUP/OF  [facility] INTERNAL  Last Visit: 02-      FICE VISIT   MEDICINE       Duke Cano  Next Visit: None Scheduled  None         None Found                                                            Last  Test          Frequency    Reason                     Performed    Due Date  --------------------------------------------------------------------------------    CMP.........  12 months..  triamterene-hydrochloroth  05- 05-                             rufino...................    [employer] Embedded Care Due Messages. Reference number: [id number].   3/23/2025 5:43:11 AM CDT

## 2025-03-24 DIAGNOSIS — Z00.00 ENCOUNTER FOR MEDICARE ANNUAL WELLNESS EXAM: ICD-10-CM

## 2025-04-14 ENCOUNTER — HOSPITAL ENCOUNTER (EMERGENCY)
Facility: HOSPITAL | Age: 70
Discharge: HOME | End: 2025-04-14
Attending: EMERGENCY MEDICINE
Payer: MEDICARE

## 2025-04-14 VITALS
TEMPERATURE: 98 F | SYSTOLIC BLOOD PRESSURE: 136 MMHG | RESPIRATION RATE: 18 BRPM | HEART RATE: 60 BPM | HEIGHT: 72 IN | BODY MASS INDEX: 24.38 KG/M2 | DIASTOLIC BLOOD PRESSURE: 83 MMHG | WEIGHT: 180 LBS | OXYGEN SATURATION: 97 %

## 2025-04-14 DIAGNOSIS — R53.1 WEAKNESS: ICD-10-CM

## 2025-04-14 LAB
ABSOLUTE EOSINOPHIL (OHS): 0.07 K/UL
ABSOLUTE MONOCYTE (OHS): 0.58 K/UL (ref 0.3–1)
ABSOLUTE NEUTROPHIL COUNT (OHS): 6.75 K/UL (ref 1.8–7.7)
ALBUMIN SERPL BCP-MCNC: 3.9 G/DL (ref 3.5–5.2)
ALP SERPL-CCNC: 67 UNIT/L (ref 40–150)
ALT SERPL W/O P-5'-P-CCNC: 25 UNIT/L (ref 10–44)
ANION GAP (OHS): 8 MMOL/L (ref 8–16)
AST SERPL-CCNC: 22 UNIT/L (ref 11–45)
BASOPHILS # BLD AUTO: 0.04 K/UL
BASOPHILS NFR BLD AUTO: 0.5 %
BILIRUB SERPL-MCNC: 1.8 MG/DL (ref 0.1–1)
BILIRUB UR QL STRIP.AUTO: NEGATIVE
BUN SERPL-MCNC: 16 MG/DL (ref 6–30)
BUN SERPL-MCNC: 16 MG/DL (ref 8–23)
CALCIUM SERPL-MCNC: 9 MG/DL (ref 8.7–10.5)
CHLORIDE SERPL-SCNC: 100 MMOL/L (ref 95–110)
CHLORIDE SERPL-SCNC: 101 MMOL/L (ref 95–110)
CLARITY UR: CLEAR
CO2 SERPL-SCNC: 28 MMOL/L (ref 23–29)
COLOR UR AUTO: YELLOW
CREAT SERPL-MCNC: 0.8 MG/DL (ref 0.5–1.4)
CREAT SERPL-MCNC: 1.1 MG/DL (ref 0.5–1.4)
ERYTHROCYTE [DISTWIDTH] IN BLOOD BY AUTOMATED COUNT: 13.8 % (ref 11.5–14.5)
GFR SERPLBLD CREATININE-BSD FMLA CKD-EPI: >60 ML/MIN/1.73/M2
GLUCOSE SERPL-MCNC: 100 MG/DL (ref 70–110)
GLUCOSE SERPL-MCNC: 91 MG/DL (ref 70–110)
GLUCOSE UR QL STRIP: NEGATIVE
HCT VFR BLD AUTO: 42.8 % (ref 40–54)
HCT VFR BLD CALC: 42 %PCV (ref 36–54)
HCV AB SERPL QL IA: NORMAL
HGB BLD-MCNC: 14.7 GM/DL (ref 14–18)
HGB UR QL STRIP: NEGATIVE
HIV 1+2 AB+HIV1 P24 AG SERPL QL IA: NORMAL
IMM GRANULOCYTES # BLD AUTO: 0.05 K/UL (ref 0–0.04)
IMM GRANULOCYTES NFR BLD AUTO: 0.6 % (ref 0–0.5)
KETONES UR QL STRIP: NEGATIVE
LEUKOCYTE ESTERASE UR QL STRIP: NEGATIVE
LYMPHOCYTES # BLD AUTO: 0.97 K/UL (ref 1–4.8)
MAGNESIUM SERPL-MCNC: 2 MG/DL (ref 1.6–2.6)
MCH RBC QN AUTO: 30.8 PG (ref 27–31)
MCHC RBC AUTO-ENTMCNC: 34.3 G/DL (ref 32–36)
MCV RBC AUTO: 90 FL (ref 82–98)
NITRITE UR QL STRIP: NEGATIVE
NUCLEATED RBC (/100WBC) (OHS): 0 /100 WBC
OHS QRS DURATION: 100 MS
OHS QTC CALCULATION: 428 MS
PH UR STRIP: 7 [PH]
PLATELET # BLD AUTO: 155 K/UL (ref 150–450)
PMV BLD AUTO: 10.2 FL (ref 9.2–12.9)
POC IONIZED CALCIUM: 1.16 MMOL/L (ref 1.06–1.42)
POC TCO2 (MEASURED): 28 MMOL/L (ref 23–29)
POTASSIUM BLD-SCNC: 3.9 MMOL/L (ref 3.5–5.1)
POTASSIUM SERPL-SCNC: 3.9 MMOL/L (ref 3.5–5.1)
PROT SERPL-MCNC: 6.8 GM/DL (ref 6–8.4)
PROT UR QL STRIP: NEGATIVE
RBC # BLD AUTO: 4.78 M/UL (ref 4.6–6.2)
RELATIVE EOSINOPHIL (OHS): 0.8 %
RELATIVE LYMPHOCYTE (OHS): 11.5 % (ref 18–48)
RELATIVE MONOCYTE (OHS): 6.9 % (ref 4–15)
RELATIVE NEUTROPHIL (OHS): 79.7 % (ref 38–73)
SAMPLE: NORMAL
SODIUM BLD-SCNC: 139 MMOL/L (ref 136–145)
SODIUM SERPL-SCNC: 137 MMOL/L (ref 136–145)
SP GR UR STRIP: 1.01
UROBILINOGEN UR STRIP-ACNC: NEGATIVE EU/DL
WBC # BLD AUTO: 8.46 K/UL (ref 3.9–12.7)

## 2025-04-14 PROCEDURE — 80053 COMPREHEN METABOLIC PANEL: CPT | Performed by: EMERGENCY MEDICINE

## 2025-04-14 PROCEDURE — 93010 ELECTROCARDIOGRAM REPORT: CPT | Mod: ,,, | Performed by: INTERNAL MEDICINE

## 2025-04-14 PROCEDURE — 80047 BASIC METABLC PNL IONIZED CA: CPT

## 2025-04-14 PROCEDURE — 83735 ASSAY OF MAGNESIUM: CPT | Performed by: EMERGENCY MEDICINE

## 2025-04-14 PROCEDURE — 87389 HIV-1 AG W/HIV-1&-2 AB AG IA: CPT | Performed by: PHYSICIAN ASSISTANT

## 2025-04-14 PROCEDURE — 93005 ELECTROCARDIOGRAM TRACING: CPT

## 2025-04-14 PROCEDURE — 85025 COMPLETE CBC W/AUTO DIFF WBC: CPT | Performed by: EMERGENCY MEDICINE

## 2025-04-14 PROCEDURE — 86803 HEPATITIS C AB TEST: CPT | Performed by: PHYSICIAN ASSISTANT

## 2025-04-14 PROCEDURE — 82330 ASSAY OF CALCIUM: CPT | Mod: 59

## 2025-04-14 PROCEDURE — 81003 URINALYSIS AUTO W/O SCOPE: CPT | Performed by: EMERGENCY MEDICINE

## 2025-04-14 PROCEDURE — 99285 EMERGENCY DEPT VISIT HI MDM: CPT | Mod: 25

## 2025-04-14 RX ORDER — LORAZEPAM 2 MG/ML
0.5 INJECTION INTRAMUSCULAR
Status: DISCONTINUED | OUTPATIENT
Start: 2025-04-14 | End: 2025-04-14 | Stop reason: HOSPADM

## 2025-04-14 NOTE — ED PROVIDER NOTES
Encounter Date: 4/14/2025       History     Chief Complaint   Patient presents with    Extremity Weakness     R arm, hand , leg weakness since thurs, feels like a cramp     The patient is a 69-year-old male who presents to the emergency department with right-sided upper and lower extremity weakness.  He states that he has had intermittent episodes over the past 2 months.  He states that he has frequent episodes each day.  Some of the episodes will last all day long.  At times, he also has mild left occipital headaches and the sensation that he feels off, although he does not state that he is dizzy.  He denies visual changes, chest pain, shortness of breath, nausea, vomiting, abdominal pain, diarrhea, constipation, dysuria, hematuria, numbness, tingling, neck pain, or back pain.  He is unclear of the etiology.  He has no other complaints.      Review of patient's allergies indicates:  No Known Allergies  Past Medical History:   Diagnosis Date    Aftercataract     Allergy     BPH (benign prostatic hyperplasia)     Cataract     Corneal dystrophy     Elevated PSA     Enlarged prostate     Fatty liver     Fuchs' corneal dystrophy     Gallstones     General anesthetics causing adverse effect in therapeutic use 2000    delayed emergence after back surgery    GERD (gastroesophageal reflux disease)     HTN (hypertension) 9/24/2013    Hypertension     Insomnia     Prostate nodule     Urinary tract infection      Past Surgical History:   Procedure Laterality Date    BACK SURGERY  4/2000    CATARACT EXTRACTION W/  INTRAOCULAR LENS IMPLANT      Both Eyes    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    Procedure: TRANSPLANT, CORNEA;  Surgeon: Vu Wilson MD;  Location: [facility];  Service: Ophthalmology;  Laterality: Right;  DMEK    EYE SURGERY      LAPAROSCOPIC MARSUPIALIZATION OF CYST OF KIDNEY      PROSTATE SURGERY      prostate biopsy benign    REPAIR, HERNIA, INGUINAL Right 7/9/2024    Procedure: REPAIR, HERNIA, INGUINAL, with  Mesh;  Surgeon: Sudeep Sen MD;  Location: [facility] OR [address] [place] [address];  Service: General;  Laterality: Right;    TONSILLECTOMY      VASECTOMY       Family History   Problem Relation Name Age of Onset    Cancer Mother          breast    Prostate cancer Brother prostate     Cancer Brother prostate         prostate    Macular degeneration Maternal Grandmother      Cancer Other      Cancer Other      Amblyopia Neg Hx      Blindness Neg Hx      Cataracts Neg Hx      Glaucoma Neg Hx      Retinal detachment Neg Hx      Strabismus Neg Hx       Social History[1]  Review of Systems  See HPI     Physical Exam     Initial Vitals [04/14/25 1303]   BP Pulse Resp Temp SpO2   (!) 160/77 69 18 98 °F (36.7 °C) 99 %      MAP       --         Physical Exam  General: No apparent distress.  Well-nourished.  Well-developed.  Alert and oriented x3.  HENT: Moist mucous membranes.  Normocephalic atraumatic.  Oropharynx clear.   Eyes: Pupils equally round and reactive to light.  Extraocular movements intact.  No scleral icterus.  No conjunctival pallor.  Cardiovascular: Regular rate and rhythm.  No murmurs, rubs, or gallops.  Brisk capillary fill.  2+ distal pulses.  Respiratory: Clear to auscultation bilaterally.  No wheezes, rales, or rhonchi.  No respiratory distress.  Abdomen: Soft.  Nontender.  Nondistended.  No guarding.  No rebound.  No masses.  No abdominal bruit auscultated.  Skin: No rashes.  No lesions.  No pallor.  No jaundice.  Neuro: Cranial nerves II through XII grossly intact.  Moving all extremities equally.  No sensory deficits.  Strength 5 out of 5 in all 4 extremities.  Normal finger-to-nose.  No pronator drift.  No ataxia.  Negative Romberg.  Musculoskeletal: Neck supple.  No extremity tenderness.  Moving all extremities without pain.  No back tenderness.  No neck tenderness.        ED Course   Procedures  Labs Reviewed   COMPREHENSIVE METABOLIC PANEL - Abnormal       Result Value    Sodium 137      Potassium 3.9      Chloride  101      CO2 28      Glucose 91      BUN 16      Creatinine 0.8      Calcium 9.0      Protein Total 6.8      Albumin 3.9      Bilirubin Total 1.8 (*)     ALP 67      AST 22      ALT 25      Anion Gap 8      eGFR >60     CBC WITH DIFFERENTIAL - Abnormal    WBC 8.46      RBC 4.78      HGB 14.7      HCT 42.8      MCV 90      MCH 30.8      MCHC 34.3      RDW 13.8      Platelet Count 155      MPV 10.2      Nucleated RBC 0      Neut % 79.7 (*)     Lymph % 11.5 (*)     Mono % 6.9      Eos % 0.8      Basophil % 0.5      Imm Grans % 0.6 (*)     Neut # 6.75      Lymph # 0.97 (*)     Mono # 0.58      Eos # 0.07      Baso # 0.04      Imm Grans # 0.05 (*)    HEPATITIS C ANTIBODY - Normal    Hep C Ab Interp Non-Reactive     HIV 1 / 2 ANTIBODY - Normal    HIV 1/2 Ag/Ab Non-Reactive     MAGNESIUM - Normal    Magnesium  2.0     URINALYSIS, REFLEX TO URINE CULTURE - Normal    Color, UA Yellow      Appearance, UA Clear      pH, UA 7.0      Spec Grav UA 1.010      Protein, UA Negative      Glucose, UA Negative      Ketones, UA Negative      Bilirubin, UA Negative      Blood, UA Negative      Nitrites, UA Negative      Urobilinogen, UA Negative      Leukocyte Esterase, UA Negative     CBC W/ AUTO DIFFERENTIAL    Narrative:     The following orders were created for panel order CBC auto differential.  Procedure                               Abnormality         Status                     ---------                               -----------         ------                     CBC with Differential[[id number]]       Abnormal            Final result                 Please view results for these tests on the individual orders.   GREY TOP URINE HOLD   ISTAT PROCEDURE    POC Glucose 100      POC BUN 16      POC Creatinine 1.1      POC Sodium 139      POC Potassium 3.9      POC Chloride 100      POC TCO2 (MEASURED) 28      POC Ionized Calcium 1.16      POC Hematocrit 42      Sample ANIRUDH       EKG Readings: (Independently Interpreted)   I  independently interpreted this EKG.  Sinus rhythm with a rate of 60.  Normal axis.  Normal intervals.  No ischemic changes.     ECG Results              EKG 12-lead (Final result)        Collection Time Result Time QRS Duration OHS QTC Calculation    04/14/25 14:28:58 04/14/25 15:50:45 100 428                     Final result by Interface, Lab In [facility] (04/14/25 15:50:48)                   Narrative:    Test Reason : R53.1,    Vent. Rate :  60 BPM     Atrial Rate :  60 BPM     P-R Int : 150 ms          QRS Dur : 100 ms      QT Int : 428 ms       P-R-T Axes :  63  57  43 degrees    QTcB Int : 428 ms    Sinus rhythm with Fusion complexes  RBBB, complete alternating with incomplete right bundle brane block  When compared with ECG of 29-May-2024 08:45,  Fusion complexes are now Present  No significant change was found  Confirmed by Felipe Encarnacion ([id number]) on 4/14/2025 3:50:42 PM    Referred By: AAAREFERRAL SELF           Confirmed By: Felipe Encarnacion                                  Imaging Results              MRI Brain Without Contrast (Final result)  Result time 04/14/25 19:02:25      Final result by Calvin Ramirez MD (04/14/25 19:02:25)                   Impression:      No acute intracranial process.  Specifically, no evidence of acute infarction.    Changes of chronic vessel ischemic disease and cerebral volume loss.      Electronically signed by: Calvin Ramirez MD  Date:    04/14/2025  Time:    19:02               Narrative:    EXAMINATION:  MRI BRAIN WITHOUT CONTRAST    CLINICAL HISTORY:  Transient ischemic attack (TIA);    TECHNIQUE:  Multiplanar multisequence MR imaging of the brain was performed without contrast.    COMPARISON:  CT dated 05/12/2024..    FINDINGS:  The craniocervical junction is intact.  The sellar and parasellar structures are within normal limits.  The intracranial flow voids are within normal limits.    No acute diffusion-weighted signal abnormality is present.  The ventricles and sulci are  prominent, consistent cerebral volume loss.  There are T2/FLAIR signal hyperintensities within the periventricular and subcortical white matter.  There are no extra-axial fluid collections.  There is no evidence of intracranial hemorrhage.  There is unchanged appearance of prominent CSF overlying the bilateral cerebellum.  No appreciable mass effect.    There are postoperative changes in the globes.  There is mucosal thickening within the ethmoid sinuses.  There is a mucosal retention cyst within the right maxillary sinus.  There is mucosal thickening within the bilateral maxillary sinuses.  The mastoid air cells are clear.                                       Medications - No data to display    Medical Decision Making  This is an emergent evaluation.  The etiology of the patient's presenting complaints is unclear.  Intermittent TIAs is on my differential as well as intracranial mass.  MRI of the brain along with laboratory studies have been ordered.  I will reassess.    4:47 p.m.  Laboratory studies show no clinically significant abnormalities.  The patient has yet to provide a urine sample.  MRI is still pending.  On reassessment, he continues to have no obvious focal neurological deficits.  If the workup is negative, I feel that the patient can be safely discharged with close follow-up.    Amount and/or Complexity of Data Reviewed  Radiology: ordered.    Risk  Prescription drug management.                                      Clinical Impression:  Final diagnoses:  [R53.1] Weakness          ED Disposition Condition    Discharge Stable          ED Prescriptions    None       Follow-up Information       Follow up With Specialties Details Why Contact Info Additional Information    Duke Cano MD Internal Medicine Schedule an appointment as soon as possible for a visit in 1 week  [address]  [facility] [address]  [phone]       [facility] Neu[facility] Neurology Schedule an appointment as soon  as possible for a visit in 1 week  [address]  [facility] [address] [phone] [facility] - Main Building, 7th Floor Please park in [facility] and take Clinic elevator                 [1]   Social History  Tobacco Use    Smoking status: Former     Current packs/day: 0.00     Types: Cigarettes     Quit date:      Years since quittin.3    Smokeless tobacco: Never   Substance Use Topics    Alcohol use: Not Currently    Drug use: No        Dragan Bennett MD  04/15/25 0801

## 2025-04-14 NOTE — ED NOTES
Pt provided a urine cup and made aware that we need a urine sample. He stated that he just went before he got here and doesn't have to go at the moment.

## 2025-04-14 NOTE — ED NOTES
Patient comes into the emergency department by POV with complaints of right sided weakness. Patient states that since Thursday he has been experiencing right sided weakness, numbness, tingling intermittently. Pt reports walking/running miles daily, has not been able to run/walk due to weakness. Pt also reports left sided occipital HA sharp pain intermittent for months. Patient denies SOB, CP, N/V/D, vision changes, fall/injury, LOC.

## 2025-04-14 NOTE — FIRST PROVIDER EVALUATION
Medical screening examination initiated.  I have conducted a focused provider triage encounter, findings are as follows:    Brief history of present illness:  Sciatica 69-year-old male with a history of gallstones, GERD, hypertension, BPH, fatty liver disease presenting with right sided weakness, cramping pain in his upper extremity, lower extremity but denies any visual deficits, dizziness, facial droop, speech abnormality or falls.  Symptoms began initially on Thursday then improved on Friday and Saturday and then reoccurred today.  Mild-to-moderate pain described as cramping.    Vitals:    04/14/25 1303   BP: (!) 160/77   Pulse: 69   Resp: 18   Temp: 98 °F (36.7 °C)   TempSrc: Oral   SpO2: 99%   Weight: 81.6 kg (180 lb)   Height: 6' (1.829 m)       Pertinent physical exam:  Ambulatory, no focal deficits    Brief workup plan:  Basic labs, ECG, evaluation by provider    Preliminary workup initiated; this workup will be continued and followed by the physician or advanced practice provider that is assigned to the patient when roomed.    Kyler Chan DO, MARTI  Emergency Staff Physician   Dept of Emergency Medicine   [facility]  Spectralink: [id number]        Disclaimer: This note has been generated using voice-recognition software. There may be typographical errors that have been missed during proof-reading.

## 2025-04-15 NOTE — PLAN OF CARE
[facility] - Emergency Dept  Discharge Assessment    Primary Care Provider: Duke Cano MD     Discharge Assessment (most recent)       BRIEF DISCHARGE ASSESSMENT - 04/14/25 1958          Discharge Planning    Assessment Type Discharge Planning Assessment (P)      Resource/Environmental Concerns none (P)      Support Systems None (P)      Equipment Currently Used at Home none (P)      Current Living Arrangements home (P)      Patient/Family Anticipates Transition to home (P)      Patient/Family Anticipated Services at Transition none (P)      DME Needed Upon Discharge  none (P)      Discharge Plan A Home (P)      Discharge Plan B Home (P)         Physical Activity    On average, how many days per week do you engage in moderate to strenuous exercise (like a brisk walk)? 7 days (P)      On average, how many minutes do you engage in exercise at this level? 150+ min (P)         Financial Resource Strain    How hard is it for you to pay for the very basics like food, housing, medical care, and heating? Not very hard (P)         Housing Stability    In the last 12 months, was there a time when you were not able to pay the mortgage or rent on time? No (P)      At any time in the past 12 months, were you homeless or living in a shelter (including now)? No (P)         Transportation Needs    In the past 12 months, has lack of transportation kept you from medical appointments or from getting medications? No (P)      In the past 12 months, has lack of transportation kept you from meetings, work, or from getting things needed for daily living? No (P)         Food Insecurity    Within the past 12 months, you worried that your food would run out before you got the money to buy more. Never true (P)      Within the past 12 months, the food you bought just didn't last and you didn't have money to get more. Never true (P)         Stress    Do you feel stress - tense, restless, nervous, or anxious, or unable to sleep at night  because your mind is troubled all the time - these days? To some extent (P)         Social Isolation    How often do you feel lonely or isolated from those around you?  Never (P)         Alcohol Use    Q1: How often do you have a drink containing alcohol? Never (P)      Q2: How many drinks containing alcohol do you have on a typical day when you are drinking? Patient does not drink (P)      Q3: How often do you have six or more drinks on one occasion? Never (P)         Utilities    In the past 12 months has the electric, gas, oil, or water company threatened to shut off services in your home? No (P)         Health Literacy    How often do you need to have someone help you when you read instructions, pamphlets, or other written material from your doctor or pharmacy? Never (P)                       met patient at bedside, patient stated he lives alone but exercises daily.    Patient has no acute case management knees at this time.    Ngozi Garvin LMSW  Case Management  Emergency Department  [phone]

## 2025-04-15 NOTE — DISCHARGE INSTRUCTIONS
You were seen in the emergency department today for intermittent weakness.  Your workup in the emergency department was reassuring including your MRI of the brain.  You need to follow up outpatient with the primary care doctor within 1 week for re-evaluation.  A referral has been placed for outpatient follow up with Neurology.    Return to the emergency department if you experience any new or concerning symptoms including increasing weakness facial droop, slurred speech, confusion, chest pain, or shortness of breath.

## 2025-04-16 ENCOUNTER — OFFICE VISIT (OUTPATIENT)
Dept: INTERNAL MEDICINE | Facility: CLINIC | Age: 70
End: 2025-04-16
Payer: MEDICARE

## 2025-04-16 ENCOUNTER — LAB VISIT (OUTPATIENT)
Dept: LAB | Facility: HOSPITAL | Age: 70
End: 2025-04-16
Payer: MEDICARE

## 2025-04-16 ENCOUNTER — RESULTS FOLLOW-UP (OUTPATIENT)
Dept: INTERNAL MEDICINE | Facility: CLINIC | Age: 70
End: 2025-04-16

## 2025-04-16 VITALS
TEMPERATURE: 98 F | DIASTOLIC BLOOD PRESSURE: 70 MMHG | WEIGHT: 184.5 LBS | OXYGEN SATURATION: 96 % | HEART RATE: 64 BPM | SYSTOLIC BLOOD PRESSURE: 136 MMHG | HEIGHT: 72 IN | BODY MASS INDEX: 24.99 KG/M2

## 2025-04-16 DIAGNOSIS — R29.898 RIGHT ARM WEAKNESS: ICD-10-CM

## 2025-04-16 DIAGNOSIS — R29.898 RIGHT LEG WEAKNESS: ICD-10-CM

## 2025-04-16 DIAGNOSIS — J06.9 VIRAL URI WITH COUGH: Primary | ICD-10-CM

## 2025-04-16 DIAGNOSIS — I25.10 CORONARY ARTERY DISEASE INVOLVING NATIVE CORONARY ARTERY OF NATIVE HEART WITHOUT ANGINA PECTORIS: ICD-10-CM

## 2025-04-16 DIAGNOSIS — M54.16 LUMBAR RADICULOPATHY: Primary | ICD-10-CM

## 2025-04-16 DIAGNOSIS — M54.16 LUMBAR RADICULOPATHY: ICD-10-CM

## 2025-04-16 DIAGNOSIS — Z98.890 HISTORY OF BACK SURGERY: ICD-10-CM

## 2025-04-16 LAB
CK SERPL-CCNC: 73 U/L (ref 20–200)
CRP SERPL-MCNC: 5.7 MG/L
CTP QC/QA: YES
CTP QC/QA: YES
ERYTHROCYTE [SEDIMENTATION RATE] IN BLOOD BY PHOTOMETRIC METHOD: <2 MM/HR
POC MOLECULAR INFLUENZA A AGN: NEGATIVE
POC MOLECULAR INFLUENZA B AGN: NEGATIVE
SARS-COV-2 RDRP RESP QL NAA+PROBE: NEGATIVE

## 2025-04-16 PROCEDURE — 87635 SARS-COV-2 COVID-19 AMP PRB: CPT | Mod: PBBFAC | Performed by: PHYSICIAN ASSISTANT

## 2025-04-16 PROCEDURE — 99215 OFFICE O/P EST HI 40 MIN: CPT | Mod: PBBFAC | Performed by: PHYSICIAN ASSISTANT

## 2025-04-16 PROCEDURE — 86140 C-REACTIVE PROTEIN: CPT

## 2025-04-16 PROCEDURE — 82550 ASSAY OF CK (CPK): CPT

## 2025-04-16 PROCEDURE — 99999PBSHW POCT INFLUENZA A/B MOLECULAR: Mod: PBBFAC,,,

## 2025-04-16 PROCEDURE — 99999PBSHW: Mod: PBBFAC,,,

## 2025-04-16 PROCEDURE — 87502 INFLUENZA DNA AMP PROBE: CPT | Mod: PBBFAC | Performed by: PHYSICIAN ASSISTANT

## 2025-04-16 PROCEDURE — 99214 OFFICE O/P EST MOD 30 MIN: CPT | Mod: S$PBB,,, | Performed by: PHYSICIAN ASSISTANT

## 2025-04-16 PROCEDURE — 36415 COLL VENOUS BLD VENIPUNCTURE: CPT

## 2025-04-16 PROCEDURE — 85652 RBC SED RATE AUTOMATED: CPT

## 2025-04-16 PROCEDURE — 99999 PR PBB SHADOW E&M-EST. PATIENT-LVL V: CPT | Mod: PBBFAC,,, | Performed by: PHYSICIAN ASSISTANT

## 2025-04-16 NOTE — PROGRESS NOTES
Subjective     Patient ID: Wero Spangler is a 69 y.o. male with PMH of HTN, CAD, HLD, BPH, GERD and insomnia.    Chief Complaint: Weakness (Right arm and leg) and Cough    HPI    Established pt of Duke Cano MD     Here for ED f/u, seen there 2 days ago for right arm and leg weakness. W/u was unremarkable, including negative MRI. He was referred to Neurology.     He reports intermittent episodes over the past few months of right arm and leg weakness that occurs with exercise, for instance walking at [employer], about half-way he feels weak of RUE/RLE that he describes as spasms, cramp, tingling.     Sometimes it occurs without exercise, he rests and then it goes away    Hx of back sx in 2000.    He reports having similar episode before May 2024 (presented to the ED, CTA obtained negative)    He tried increased fluids/magnesium    Inquires if its pinched nerve  Inquires about PAD    Today, also c/o cough (clear) and scratchy throat that started last night, took left over codeine cough syrup last night which helped. Stopped flonase several weeks ago inquires he could restart. MRI Brain in the ED revealed mucosal thickening within the bilateral maxillary sinuses. No fevers.       Answers submitted by the patient for this visit:  Back Pain Questionnaire (Submitted on 4/15/2025)  Chief Complaint: Back pain  Chronicity: new  Onset: more than 1 month ago  Frequency: every several days  Progression since onset: gradually worsening  Pain location: thoracic spine  Pain quality: cramping  Radiates to: right thigh  Pain - numeric: 4/10  Pain is: worse during the day  Aggravated by: stress  Stiffness is present: all day  abdominal pain: No  bladder incontinence: No  bowel incontinence: No  chest pain: No  dysuria: No  fever: No  headaches: Yes  leg pain: No  numbness: Yes  paresthesias: Yes  pelvic pain: No  perianal numbness: No  tingling: Yes  weakness: Yes  weight loss: No  genital pain: No  hematuria:  No  Pain severity: mild  Improvement on treatment: mild      Review of Systems   Constitutional:  Negative for fever.   Cardiovascular:  Negative for chest pain.   Gastrointestinal:  Negative for abdominal pain.   Genitourinary:  Negative for bladder incontinence, dysuria and hematuria.   Musculoskeletal:  Positive for back pain. Negative for leg pain.   Neurological:  Positive for weakness, numbness and headaches.          Objective  /70 (BP Location: Right arm, Patient Position: Sitting)   Pulse 64   Temp 98.1 °F (36.7 °C) (Oral)   Ht 6' (1.829 m)   Wt 83.7 kg (184 lb 8.4 oz)   SpO2 96%   BMI 25.03 kg/m²       Physical Exam  Vitals reviewed.   Constitutional:       General: He is not in acute distress.     Appearance: He is well-developed. He is not ill-appearing.   HENT:      Head: Normocephalic and atraumatic.      Right Ear: Tympanic membrane, ear canal and external ear normal.      Left Ear: Tympanic membrane, ear canal and external ear normal.      Nose: Congestion present.      Right Sinus: No maxillary sinus tenderness or frontal sinus tenderness.      Left Sinus: No maxillary sinus tenderness or frontal sinus tenderness.   Eyes:      Extraocular Movements: Extraocular movements intact.      Conjunctiva/sclera: Conjunctivae normal.      Pupils: Pupils are equal, round, and reactive to light.   Cardiovascular:      Rate and Rhythm: Normal rate and regular rhythm.      Heart sounds: No murmur heard.  Pulmonary:      Effort: Pulmonary effort is normal.      Breath sounds: Normal breath sounds. No wheezing or rales.   Abdominal:      General: Bowel sounds are normal.      Palpations: Abdomen is soft.      Tenderness: There is no abdominal tenderness.   Musculoskeletal:      Lumbar back: No tenderness. Negative right straight leg raise test and negative left straight leg raise test.      Right lower leg: No edema.      Left lower leg: No edema.      Comments: Ambulating without difficulty   Negative  SLR b/l  5/5 strength to BUE/BLE   Lymphadenopathy:      Cervical: No cervical adenopathy.   Skin:     General: Skin is warm and dry.      Findings: No rash.   Neurological:      Mental Status: He is alert and oriented to person, place, and time.      Cranial Nerves: No dysarthria or facial asymmetry.      Motor: No weakness or pronator drift.      Coordination: Finger-Nose-Finger Test normal.      Gait: Gait normal.   Psychiatric:         Mood and Affect: Mood normal.            Assessment and Plan     1. Viral URI with cough  -     POCT COVID-19 Rapid Screening (negative)  -     POCT Influenza A/B Molecular (negative)  -     symptomatic care discussed    2. Lumbar radiculopathy  -     MRI Lumbar Spine Without Contrast; Future; Expected date: 04/16/2025    3. Right leg weakness  -     CK; Future; Expected date: 04/16/2025  -     Sedimentation rate; Future; Expected date: 04/16/2025  -     C-Reactive Protein; Future; Expected date: 04/16/2025    4. Right arm weakness  -     CK; Future; Expected date: 04/16/2025  -     Ambulatory referral/consult to Cardiology; Future; Expected date: 04/23/2025  -     MRI Cervical Spine Without Contrast; Future; Expected date: 04/16/2025  -     Sedimentation rate; Future; Expected date: 04/16/2025  -     C-Reactive Protein; Future; Expected date: 04/16/2025    5. History of back surgery  -     MRI Lumbar Spine Without Contrast; Future; Expected date: 04/16/2025    6. Coronary artery disease involving native coronary artery of native heart without angina pectoris  -     Ambulatory referral/consult to Cardiology; Future; Expected date: 04/23/2025    ED notes, lab and imaging reviewed  Further eval with imaging and lab as above for RUE/RLE weakness  Recommend cardiology f/u given episodic exertional symptoms  Recommend to keep neurology f/u as well  RTC/ED precautions discussed    Future Appointments   Date Time Provider Department Center   4/24/2025  7:30 AM Cesar King PA-C  [facility] ORTHO [facility]   4/26/2025  8:15 AM [facility] OIC-MRI3 [facility]   4/26/2025  8:45 AM [facility] OIC-MRI3 [facility]   5/14/2025  8:00 AM Mak Nicolas MD [facility] CARDIO [facility]   5/28/2025  8:15 AM Darren Yuen MD [facility] UROLOGY [facility]   6/12/2025  3:00 PM Jose Ayon MD [facility] STROKE8 [facility]         Radha Prasad PA-C

## 2025-04-22 ENCOUNTER — PATIENT MESSAGE (OUTPATIENT)
Dept: INTERNAL MEDICINE | Facility: CLINIC | Age: 70
End: 2025-04-22
Payer: MEDICARE

## 2025-04-22 RX ORDER — BENZONATATE 100 MG/1
100 CAPSULE ORAL 3 TIMES DAILY PRN
Qty: 30 CAPSULE | Refills: 0 | Status: SHIPPED | OUTPATIENT
Start: 2025-04-22 | End: 2025-05-02

## 2025-04-22 NOTE — TELEPHONE ENCOUNTER
Pt states cough, phlegm still interrupting his sleep. Requesting Benzoate Pearls as Dr. Cano has prescribed in the past with success.

## 2025-04-24 RX ORDER — METHYLPREDNISOLONE 4 MG/1
TABLET ORAL
Qty: 1 EACH | Refills: 0 | Status: SHIPPED | OUTPATIENT
Start: 2025-04-24

## 2025-04-24 NOTE — TELEPHONE ENCOUNTER
Pt still fighting mucus/phlegm leading to cough. Pt is worried about his enlarged prostate and wondering what he can take for  his cough.

## 2025-04-28 NOTE — TELEPHONE ENCOUNTER
----- Message from Coco Prasad sent at 12/27/2023 12:15 PM CST -----  Contact: Wero   Patient needs call back. He states he needs to start back on a medication Dr Cano discontinued in November. Also states his Cardio doctor told him to restart this medication Triamterene 37.5mg that is for High Blood Pressure, but I didn't see it in chart. Please call to advise      Patient called the RX Refill Line. Message is being forwarded to the office.     Patient called to confirm if next Wegovy dose was sent to pharmacy. Attempted to confirm current dose that patient is to be on. Patient could not confirm dose she is on. Please confirm current dose with a patient and confirm if script was sent into the pharmacy.

## 2025-04-30 ENCOUNTER — NURSE TRIAGE (OUTPATIENT)
Dept: ADMINISTRATIVE | Facility: CLINIC | Age: 70
End: 2025-04-30
Payer: MEDICARE

## 2025-04-30 ENCOUNTER — OCHSNER VIRTUAL EMERGENCY DEPARTMENT (OUTPATIENT)
Facility: CLINIC | Age: 70
End: 2025-04-30
Payer: MEDICARE

## 2025-04-30 ENCOUNTER — PATIENT OUTREACH (OUTPATIENT)
Facility: OTHER | Age: 70
End: 2025-04-30
Payer: MEDICARE

## 2025-04-30 ENCOUNTER — HOSPITAL ENCOUNTER (EMERGENCY)
Facility: HOSPITAL | Age: 70
Discharge: HOME | End: 2025-04-30
Attending: STUDENT IN AN ORGANIZED HEALTH CARE EDUCATION/TRAINING PROGRAM
Payer: MEDICARE

## 2025-04-30 ENCOUNTER — TELEPHONE (OUTPATIENT)
Dept: INTERNAL MEDICINE | Facility: CLINIC | Age: 70
End: 2025-04-30

## 2025-04-30 VITALS
RESPIRATION RATE: 16 BRPM | OXYGEN SATURATION: 98 % | BODY MASS INDEX: 24.38 KG/M2 | HEIGHT: 72 IN | WEIGHT: 180 LBS | SYSTOLIC BLOOD PRESSURE: 157 MMHG | DIASTOLIC BLOOD PRESSURE: 70 MMHG | HEART RATE: 63 BPM | TEMPERATURE: 98 F

## 2025-04-30 DIAGNOSIS — R53.1 WEAK: ICD-10-CM

## 2025-04-30 DIAGNOSIS — R00.2 PALPITATIONS: Primary | ICD-10-CM

## 2025-04-30 DIAGNOSIS — R42 LIGHTHEADED: ICD-10-CM

## 2025-04-30 DIAGNOSIS — E87.6 HYPOKALEMIA: ICD-10-CM

## 2025-04-30 DIAGNOSIS — R53.1 GENERALIZED WEAKNESS: Primary | ICD-10-CM

## 2025-04-30 DIAGNOSIS — R42 DIZZINESS: ICD-10-CM

## 2025-04-30 LAB
ABSOLUTE EOSINOPHIL (OHS): 0.04 K/UL
ABSOLUTE MONOCYTE (OHS): 0.7 K/UL (ref 0.3–1)
ABSOLUTE NEUTROPHIL COUNT (OHS): 7.42 K/UL (ref 1.8–7.7)
ALBUMIN SERPL BCP-MCNC: 3.8 G/DL (ref 3.5–5.2)
ALP SERPL-CCNC: 66 UNIT/L (ref 40–150)
ALT SERPL W/O P-5'-P-CCNC: 41 UNIT/L (ref 10–44)
ANION GAP (OHS): 9 MMOL/L (ref 8–16)
AST SERPL-CCNC: 20 UNIT/L (ref 11–45)
BASOPHILS # BLD AUTO: 0.04 K/UL
BASOPHILS NFR BLD AUTO: 0.4 %
BILIRUB SERPL-MCNC: 1.5 MG/DL (ref 0.1–1)
BILIRUB UR QL STRIP.AUTO: NEGATIVE
BNP SERPL-MCNC: 37 PG/ML (ref 0–99)
BUN SERPL-MCNC: 16 MG/DL (ref 8–23)
CALCIUM SERPL-MCNC: 8.9 MG/DL (ref 8.7–10.5)
CHLORIDE SERPL-SCNC: 100 MMOL/L (ref 95–110)
CLARITY UR: CLEAR
CO2 SERPL-SCNC: 28 MMOL/L (ref 23–29)
COLOR UR AUTO: COLORLESS
CREAT SERPL-MCNC: 1 MG/DL (ref 0.5–1.4)
ERYTHROCYTE [DISTWIDTH] IN BLOOD BY AUTOMATED COUNT: 14 % (ref 11.5–14.5)
GFR SERPLBLD CREATININE-BSD FMLA CKD-EPI: >60 ML/MIN/1.73/M2
GLUCOSE SERPL-MCNC: 101 MG/DL (ref 70–110)
GLUCOSE UR QL STRIP: NEGATIVE
HCT VFR BLD AUTO: 44.5 % (ref 40–54)
HGB BLD-MCNC: 15.2 GM/DL (ref 14–18)
HGB UR QL STRIP: NEGATIVE
HOLD SPECIMEN: NORMAL
IMM GRANULOCYTES # BLD AUTO: 0.05 K/UL (ref 0–0.04)
IMM GRANULOCYTES NFR BLD AUTO: 0.5 % (ref 0–0.5)
KETONES UR QL STRIP: NEGATIVE
LEUKOCYTE ESTERASE UR QL STRIP: NEGATIVE
LYMPHOCYTES # BLD AUTO: 1.13 K/UL (ref 1–4.8)
MAGNESIUM SERPL-MCNC: 1.8 MG/DL (ref 1.6–2.6)
MCH RBC QN AUTO: 30.6 PG (ref 27–31)
MCHC RBC AUTO-ENTMCNC: 34.2 G/DL (ref 32–36)
MCV RBC AUTO: 90 FL (ref 82–98)
NITRITE UR QL STRIP: NEGATIVE
NUCLEATED RBC (/100WBC) (OHS): 0 /100 WBC
OHS QRS DURATION: 148 MS
OHS QTC CALCULATION: 437 MS
PH UR STRIP: 7 [PH]
PHOSPHATE SERPL-MCNC: 3.8 MG/DL (ref 2.7–4.5)
PLATELET # BLD AUTO: 163 K/UL (ref 150–450)
PMV BLD AUTO: 9.2 FL (ref 9.2–12.9)
POTASSIUM SERPL-SCNC: 3.3 MMOL/L (ref 3.5–5.1)
PROT SERPL-MCNC: 6.5 GM/DL (ref 6–8.4)
PROT UR QL STRIP: NEGATIVE
RBC # BLD AUTO: 4.96 M/UL (ref 4.6–6.2)
RELATIVE EOSINOPHIL (OHS): 0.4 %
RELATIVE LYMPHOCYTE (OHS): 12 % (ref 18–48)
RELATIVE MONOCYTE (OHS): 7.5 % (ref 4–15)
RELATIVE NEUTROPHIL (OHS): 79.2 % (ref 38–73)
SODIUM SERPL-SCNC: 137 MMOL/L (ref 136–145)
SP GR UR STRIP: 1.01
TROPONIN I SERPL HS-MCNC: <3 NG/L
TROPONIN I SERPL HS-MCNC: <3 NG/L
TSH SERPL-ACNC: 1.32 UIU/ML (ref 0.4–4)
UROBILINOGEN UR STRIP-ACNC: NEGATIVE EU/DL
WBC # BLD AUTO: 9.38 K/UL (ref 3.9–12.7)

## 2025-04-30 PROCEDURE — 84100 ASSAY OF PHOSPHORUS: CPT | Performed by: STUDENT IN AN ORGANIZED HEALTH CARE EDUCATION/TRAINING PROGRAM

## 2025-04-30 PROCEDURE — 84443 ASSAY THYROID STIM HORMONE: CPT | Performed by: STUDENT IN AN ORGANIZED HEALTH CARE EDUCATION/TRAINING PROGRAM

## 2025-04-30 PROCEDURE — 83735 ASSAY OF MAGNESIUM: CPT | Performed by: STUDENT IN AN ORGANIZED HEALTH CARE EDUCATION/TRAINING PROGRAM

## 2025-04-30 PROCEDURE — 84484 ASSAY OF TROPONIN QUANT: CPT | Performed by: STUDENT IN AN ORGANIZED HEALTH CARE EDUCATION/TRAINING PROGRAM

## 2025-04-30 PROCEDURE — 93005 ELECTROCARDIOGRAM TRACING: CPT

## 2025-04-30 PROCEDURE — 99285 EMERGENCY DEPT VISIT HI MDM: CPT | Mod: 25

## 2025-04-30 PROCEDURE — 85025 COMPLETE CBC W/AUTO DIFF WBC: CPT | Performed by: PHYSICIAN ASSISTANT

## 2025-04-30 PROCEDURE — 83880 ASSAY OF NATRIURETIC PEPTIDE: CPT | Performed by: PHYSICIAN ASSISTANT

## 2025-04-30 PROCEDURE — 84484 ASSAY OF TROPONIN QUANT: CPT | Performed by: PHYSICIAN ASSISTANT

## 2025-04-30 PROCEDURE — 81003 URINALYSIS AUTO W/O SCOPE: CPT | Performed by: PHYSICIAN ASSISTANT

## 2025-04-30 PROCEDURE — 25000003 PHARM REV CODE 250: Performed by: STUDENT IN AN ORGANIZED HEALTH CARE EDUCATION/TRAINING PROGRAM

## 2025-04-30 PROCEDURE — 93010 ELECTROCARDIOGRAM REPORT: CPT | Mod: ,,, | Performed by: INTERNAL MEDICINE

## 2025-04-30 PROCEDURE — 82374 ASSAY BLOOD CARBON DIOXIDE: CPT | Performed by: PHYSICIAN ASSISTANT

## 2025-04-30 RX ORDER — POTASSIUM CHLORIDE 20 MEQ/1
20 TABLET, EXTENDED RELEASE ORAL
Status: COMPLETED | OUTPATIENT
Start: 2025-04-30 | End: 2025-04-30

## 2025-04-30 RX ADMIN — POTASSIUM CHLORIDE 20 MEQ: 1500 TABLET, EXTENDED RELEASE ORAL at 02:04

## 2025-04-30 NOTE — TELEPHONE ENCOUNTER
"Can we connect this pt with nurse triage?    He is scheduled today at 4pm with "weakness, lightheadedness and low heart rate." This sounds like he may need ED.   "

## 2025-04-30 NOTE — ED PROVIDER NOTES
Chief Complaint   Fatigue (Pt endorses feeling light headed and weak that started this morning. )      History Of Present Illness   Wero Spangler is a 69 y.o. male with a PMHx including CAD s/p stent, acid reflux, arthritis presenting with lightheadedness.  Patient was had previous ED visits for weakness including most recently on 04/14 with a negative MRI brain at that visit.  He presents today with lightheadedness, palpitations, and generalized weakness since waking up this morning.  He reports no focal weakness, or numbness.  He reports no vision changes, facial droop, slurred speech.  He reports no recent falls, or injuries.  He reports no dizziness, loss of consciousness.  He reports no chest pain, or shortness of breath.  He reports no abdominal pain, nausea, vomiting, or diarrhea.  Patient does note that he has been having since similar symptoms including his recent ED presentation going on for the past month. States his sxs previously seemed to be exertional until today.     Independent Historian: Yes  Other Historian or Collateral: Chart review  Interpretor: No      Review of patient's allergies indicates:  No Known Allergies    Medications Ordered Prior to Encounter[1]    Past History   As per HPI and below:  Past Medical History:   Diagnosis Date    Aftercataract     Allergy     BPH (benign prostatic hyperplasia)     Cataract     Corneal dystrophy     Elevated PSA     Enlarged prostate     Fatty liver     Fuchs' corneal dystrophy     Gallstones     General anesthetics causing adverse effect in therapeutic use 2000    delayed emergence after back surgery    GERD (gastroesophageal reflux disease)     Hearing loss years ago    Heart attack     HTN (hypertension) 09/24/2013    Hypertension     Insomnia     Keloid cicatrix     Nasal polyps years ago    Osteoarthritis 7 years ago    Prostate nodule     Urinary tract infection      Past Surgical History:   Procedure Laterality Date    BACK SURGERY  04/2000     CATARACT EXTRACTION W/  INTRAOCULAR LENS IMPLANT      Both Eyes    CORNEAL TRANSPLANT Right 2019    Procedure: TRANSPLANT, CORNEA;  Surgeon: Vu Wilson MD;  Location: [facility];  Service: Ophthalmology;  Laterality: Right;  DMEK    COSMETIC SURGERY  21    EYE SURGERY      KNEE SURGERY  9 years ago    LAPAROSCOPIC MARSUPIALIZATION OF CYST OF KIDNEY      PROSTATE SURGERY      prostate biopsy benign    REPAIR, HERNIA, INGUINAL Right 2024    Procedure: REPAIR, HERNIA, INGUINAL, with Mesh;  Surgeon: Sudeep Sen MD;  Location: [address];  Service: General;  Laterality: Right;    TONSILLECTOMY      VASECTOMY         Social History     Socioeconomic History    Marital status:    Tobacco Use    Smoking status: Former     Current packs/day: 0.00     Average packs/day: 0.3 packs/day for 4.0 years (1.0 ttl pk-yrs)     Types: Cigarettes, Cigars     Quit date: 2000     Years since quittin.3    Smokeless tobacco: Never   Substance and Sexual Activity    Alcohol use: Not Currently    Drug use: No    Sexual activity: Yes     Partners: Female     Social Drivers of Health     Financial Resource Strain: Low Risk  (2025)    Overall Financial Resource Strain (CARDIA)     Difficulty of Paying Living Expenses: Not very hard   Food Insecurity: No Food Insecurity (2025)    Hunger Vital Sign     Worried About Running Out of Food in the Last Year: Never true     Ran Out of Food in the Last Year: Never true   Transportation Needs: No Transportation Needs (2025)    PRAPARE - Transportation     Lack of Transportation (Medical): No     Lack of Transportation (Non-Medical): No   Physical Activity: Sufficiently Active (2025)    Exercise Vital Sign     Days of Exercise per Week: 7 days     Minutes of Exercise per Session: 150+ min   Stress: Stress Concern Present (2025)    [employer] - Occupational Stress Questionnaire     Feeling of Stress : To  some extent   Housing Stability: Low Risk  (4/14/2025)    Housing Stability Vital Sign     Unable to Pay for Housing in the Last Year: No     Homeless in the Last Year: No       Family History   Problem Relation Name Age of Onset    Cancer Mother          breast    Prostate cancer Brother prostate     Cancer Brother prostate         prostate    Macular degeneration Maternal Grandmother      Cancer Other      Cancer Other      Amblyopia Neg Hx      Blindness Neg Hx      Cataracts Neg Hx      Glaucoma Neg Hx      Retinal detachment Neg Hx      Strabismus Neg Hx         Physical Exam     Vitals:    04/30/25 1122 04/30/25 1240 04/30/25 1550   BP: (!) 130/57 (!) 145/78 (!) 157/70   BP Location:   Left arm   Patient Position:   Sitting   Pulse: 70 60 63   Resp: 20 16 16   Temp: 97.7 °F (36.5 °C) 97.6 °F (36.4 °C) 97.9 °F (36.6 °C)   TempSrc: Oral Oral Oral   SpO2: 98% 98% 98%   Weight: 81.6 kg (180 lb)     Height: 6' (1.829 m)         Physical Exam  Constitutional:       General: He is not in acute distress.     Appearance: He is well-developed. He is not toxic-appearing or diaphoretic.   HENT:      Head: Normocephalic and atraumatic.      Nose: Nose normal. No congestion.      Mouth/Throat:      Mouth: Mucous membranes are moist.      Pharynx: Oropharynx is clear.   Eyes:      General: No scleral icterus.  Cardiovascular:      Rate and Rhythm: Normal rate and regular rhythm.   Pulmonary:      Effort: No respiratory distress.      Breath sounds: No wheezing or rhonchi.   Abdominal:      General: There is no distension.      Tenderness: There is no abdominal tenderness. There is no guarding.   Musculoskeletal:         General: No deformity.      Cervical back: No rigidity.   Skin:     General: Skin is warm and dry.      Capillary Refill: Capillary refill takes less than 2 seconds.   Neurological:      General: No focal deficit present.      Mental Status: He is alert and oriented to person, place, and time.      Cranial  Nerves: No cranial nerve deficit.      Sensory: No sensory deficit.      Motor: No weakness.             Results     Labs Reviewed   COMPREHENSIVE METABOLIC PANEL - Abnormal       Result Value    Sodium 137      Potassium 3.3 (*)     Chloride 100      CO2 28      Glucose 101      BUN 16      Creatinine 1.0      Calcium 8.9      Protein Total 6.5      Albumin 3.8      Bilirubin Total 1.5 (*)     ALP 66      AST 20      ALT 41      Anion Gap 9      eGFR >60     URINALYSIS, REFLEX TO URINE CULTURE - Abnormal    Color, UA Colorless (*)     Appearance, UA Clear      pH, UA 7.0      Spec Grav UA 1.010      Protein, UA Negative      Glucose, UA Negative      Ketones, UA Negative      Bilirubin, UA Negative      Blood, UA Negative      Nitrites, UA Negative      Urobilinogen, UA Negative      Leukocyte Esterase, UA Negative     CBC WITH DIFFERENTIAL - Abnormal    WBC 9.38      RBC 4.96      HGB 15.2      HCT 44.5      MCV 90      MCH 30.6      MCHC 34.2      RDW 14.0      Platelet Count 163      MPV 9.2      Nucleated RBC 0      Neut % 79.2 (*)     Lymph % 12.0 (*)     Mono % 7.5      Eos % 0.4      Basophil % 0.4      Imm Grans % 0.5      Neut # 7.42      Lymph # 1.13      Mono # 0.70      Eos # 0.04      Baso # 0.04      Imm Grans # 0.05 (*)    B-TYPE NATRIURETIC PEPTIDE - Normal    BNP 37     TROPONIN I HIGH SENSITIVITY - Normal    Troponin High Sensitive <3     MAGNESIUM - Normal    Magnesium  1.8     PHOSPHORUS - Normal    Phosphorus Level 3.8     TSH - Normal    TSH 1.323     TROPONIN I HIGH SENSITIVITY - Normal    Troponin High Sensitive <3     CBC W/ AUTO DIFFERENTIAL    Narrative:     The following orders were created for panel order CBC auto differential.                  Procedure                               Abnormality         Status                                     ---------                               -----------         ------                                     CBC with Differential[[id number]]        Abnormal            Final result                                                 Please view results for these tests on the individual orders.   GREY TOP URINE HOLD    Extra Tube Hold for add-ons.         Imaging Results              X-Ray Chest AP Portable (Final result)  Result time 04/30/25 14:05:41      Final result by Pako Alcantara MD (04/30/25 14:05:41)                   Impression:      See above      Electronically signed by: Pako Alcantara MD  Date:    04/30/2025  Time:    14:05               Narrative:    EXAMINATION:  XR CHEST AP PORTABLE    CLINICAL HISTORY:  Dizziness and giddiness    TECHNIQUE:  Single frontal view of the chest was performed.    COMPARISON:  No 03/23/2024 ne    FINDINGS:  Heart size normal.  The lungs are clear.  No pleural effusion                                        Initial MDM   Medical Decision Making  Patient was a 69-year-old male presenting with lightheadedness, palpitations, and generalized weakness since this morning.  Consider arrhythmia, electrolyte abnormality, acute anemia.  Lower suspicion for infectious etiology.  No focal neurologic deficit, very low suspicion for CVA.  Patient was recently on steroids for URI and finished those, consider steroid withdrawal.     Amount and/or Complexity of Data Reviewed  Labs: ordered. Decision-making details documented in ED Course.  ECG/medicine tests:  Decision-making details documented in ED Course.    Risk  Prescription drug management.                     Medications Given / Interventions     Medications   potassium chloride SA CR tablet 20 mEq (20 mEq Oral Given 4/30/25 1400)       Procedures     ED POCUS Performed: No    Reassessment and ED Course     ED Course as of 05/06/25 1423   Wed Apr 30, 2025   1259 CBC grossly okay [CH]   1259 EKG 12-lead  Sinus rhythm, rate 67, right bundle-branch block, no STEMI, similar compared to prior [CH]   1318 CMP with mild hypokalemia. Will orally replace [CH]   1319 Magnesium : 1.8 [CH]    1319 Phosphorus Level: 3.8 [CH]      ED Course User Index  [CH] Lawanda Brar MD     Patient signed out to oncoming provider pending 2nd trop and UA results.         Final diagnoses:  [R53.1] Weak  [R53.1] Generalized weakness (Primary)  [E87.6] Hypokalemia                 Dispo      ED Disposition Condition    Discharge Stable            ED Prescriptions    None       Follow-up Information       Follow up With Specialties Details Why Contact Info    Duke Cano MD Internal Medicine Schedule an appointment as soon as possible for a visit   [address]  [place] [address]  [phone]      [facility] - Emergency Dept Emergency Medicine Go to  If symptoms worsen [address] [facility] [id number] [phone]                          [1]   No current facility-administered medications on file prior to encounter.     Current Outpatient Medications on File Prior to Encounter   Medication Sig Dispense Refill    alfuzosin (UROXATRAL) 10 mg Tb24 Take 1 tablet (10 mg total) by mouth once daily. 30 tablet 11    ALPRAZolam (XANAX) 0.5 MG tablet TAKE 1 TABLET BY MOUTH EVERY NIGHT 30 tablet 2    aspirin 81 MG Chew Take 81 mg by mouth nightly.      atorvastatin (LIPITOR) 20 MG tablet TAKE 1 TABLET(20 MG) BY MOUTH EVERY DAY 90 tablet 3    finasteride (PROSCAR) 5 mg tablet Take 1 tablet (5 mg total) by mouth once daily. 30 tablet 11    fluticasone propionate (FLONASE) 50 mcg/actuation nasal spray 2 sprays (100 mcg total) by Each Nostril route once daily. 16 g 0    lansoprazole (PREVACID) 15 MG capsule TAKE 1 CAPSULE(15 MG) BY MOUTH EVERY DAY 90 capsule 3    magnesium aspartate HCl 61 mg (615 mg) TbEC Take by mouth once.      methylPREDNISolone (MEDROL, SRIKANTH,) 4 mg tablet use as directed 1 each 0    nitroGLYCERIN (NITROSTAT) 0.4 MG SL tablet Place 1 tablet (0.4 mg total) under the tongue every 5 (five) minutes as needed for Chest pain. 90 tablet 3    sodium chloride 2% (BARBI 128) 2 %  ophthalmic solution 1 drop as needed (takes 3-4 times/day).      triamterene-hydrochlorothiazide 37.5-25 mg (MAXZIDE-25) 37.5-25 mg per tablet TAKE 1 TABLET BY MOUTH EVERY DAY 90 tablet 0        Lawanda Brar MD  05/06/25 [id number]

## 2025-04-30 NOTE — PLAN OF CARE-OVED
[facility] Emergency Department Plan of Care Note  Referral Source: Nurse On-Call                               Chief Complaint   Patient presents with    Palpitations     this patient on the phone. He was seen in the ER on 4/14 for right sided weakness. He is calling today due to symptoms of bilateral weakness, palpitations, and dizziness. Feels like heart is skipping beats. Seen in the ED recently for possible TIA. MRI brain neg       Recommendation: Emergency Department            Emergency Department: [facility]               Encounter Diagnoses   Name Primary?    Palpitations Yes    Dizziness

## 2025-04-30 NOTE — TELEPHONE ENCOUNTER
Called by physician's office with request to triage pt who has appt at 4 pm today. Waited on hold for almost 6 minutes for pt call to be transferred. Disconnected the line and called pt phone number and left voicemail on first attempt. No answer on second attempt. Will route call to provider's office.   Reason for Disposition   Second attempt to contact caller AND no contact made. Phone number verified.    Protocols used: No Contact or Duplicate Contact Call-A-[place]

## 2025-04-30 NOTE — TELEPHONE ENCOUNTER
Patient was seen in the ER on 4/14 for right sided weakness. He is calling today due to symptoms of bilateral weakness, palpitations, and dizziness. Disposition is ED/UC/PCP with approval. Will refer to [facility]. Per Dr. Bansal, patient should go to the nearest ED now. Advised the patient to call back with any further questions or if symptoms worsen.        Reason for Disposition   Extra heartbeats, irregular heart beating, or heart is beating very fast (i.e., 'palpitations')    Additional Information   Negative: SEVERE difficulty breathing (e.g., struggling for each breath, speaks in single words)   Negative: Shock suspected (e.g., cold/pale/clammy skin, too weak to stand, low BP, rapid pulse)   Negative: Difficult to awaken or acting confused (e.g., disoriented, slurred speech)   Negative: Fainted, and still feels dizzy afterwards   Negative: Overdose (accidental or intentional) of medications   Negative: New neurologic deficit that is present now: * Weakness of the face, arm, or leg on one side of the body * Numbness of the face, arm, or leg on one side of the body * Loss of speech or garbled speech   Negative: Heart beating < 50 beats per minute OR > 140 beats per minute   Negative: Sounds like a life-threatening emergency to the triager   Negative: SEVERE dizziness (e.g., unable to stand, requires support to walk, feels like passing out now)   Negative: SEVERE headache or neck pain   Negative: Spinning or tilting sensation (vertigo) present now and one or more stroke risk factors (i.e., hypertension, diabetes mellitus, prior stroke/TIA, heart attack, age over 60) (Exception: Prior physician evaluation for this AND no different/worse than usual.)   Negative: Neurologic deficit that was brief (now gone), ANY of the following:* Weakness of the face, arm, or leg on one side of the body* Numbness of the face, arm, or leg on one side of the body* Loss of speech or garbled speech   Negative: Loss of vision or double vision   (Exception: Similar to previous migraines.)    Protocols used: Dizziness-A-OH

## 2025-04-30 NOTE — FIRST PROVIDER EVALUATION
Emergency Department TeleTriage Encounter Note      CHIEF COMPLAINT    Chief Complaint   Patient presents with    Fatigue     Pt endorses feeling light headed and weak that started this morning.        VITAL SIGNS   Initial Vitals [04/30/25 1122]   BP Pulse Resp Temp SpO2   (!) 130/57 70 20 97.7 °F (36.5 °C) 98 %      MAP       --            ALLERGIES    Review of patient's allergies indicates:  No Known Allergies    PROVIDER TRIAGE NOTE  Patient presents with complaint of feeling lightheaded and weak. He was seen for the same April 14th for similar complaints. He has just recovered from URI.       ORDERS  Labs Reviewed - No data to display    ED Orders (720h ago, onward)      Start Ordered     Status Ordering Provider    04/30/25 1124 04/30/25 1123  EKG 12-lead  Once         Completed by KIM MASTERS on 4/30/2025 at 11:28 AM BLAYNE LEMUS              Virtual Visit Note: The provider triage portion of this emergency department evaluation and documentation was performed via [employer], a HIPAA-compliant telemedicine application, in concert with a tele-presenter in the room. A face to face patient evaluation with one of my colleagues will occur once the patient is placed in an emergency department room.      DISCLAIMER: This note was prepared with [employer] voice recognition transcription software. Garbled syntax, mangled pronouns, and other bizarre constructions may be attributed to that software system.

## 2025-04-30 NOTE — PROVIDER PROGRESS NOTES - EMERGENCY DEPT.
Encounter Date: 4/30/2025    ED Physician Progress Notes            ED Resident HAND-OFF NOTE:  2:08 PM 4/30/2025  Wero Spangler is a 69 y.o. male who presented to the ED on 4/30/2025 and has been managed by , who reports patient C/O weakness. I assumed care of patient from off-going ED physician team at 2:08 PM pending 2nd trop, UA.      Pw palpitations and weakness, recent ED visit with negative MRI brain.  If negative trop, follow up with PCP .  Repeat troponin stable, patient ambulated steadily.  He reports no significant improvement in his symptoms but feels safe going home.  Plan for PCP follow up, has outpatient MRIs still ordered.  Mild hypokalemia which was managed with oral potassium replacement.    On my evaluation, Wero Spangler appears well, hemodynamically stable and in NAD. Thus far, Wero Spangler has received:  Medications   potassium chloride SA CR tablet 20 mEq (20 mEq Oral Given 4/30/25 1400)       On my exam, I appreciate:  BP (!) 145/78   Pulse 60   Temp 97.6 °F (36.4 °C) (Oral)   Resp 16   Ht 6' (1.829 m)   Wt 81.6 kg (180 lb)   SpO2 98%   BMI 24.41 kg/m²   ED Course as of 04/30/25 1541   Wed Apr 30, 2025   1259 CBC grossly okay [CH]   1259 EKG 12-lead  Sinus rhythm, rate 67, right bundle-branch block, no STEMI, similar compared to prior [CH]   1318 CMP with mild hypokalemia. Will orally replace [CH]   1319 Magnesium : 1.8 [CH]   1319 Phosphorus Level: 3.8 [CH]      ED Course User Index  [CH] Lawanda Brar MD       Disposition: I anticipate patient will DC  I have discussed and counseled Wero Spangler regarding exam, results, diagnosis, treatment, and plan.  ______________________  Karina Collins DO  Emergency Medicine Resident  2:08 PM 4/30/2025

## 2025-04-30 NOTE — PROGRESS NOTES
"Patient called the OOC RN on 4/30/25 for c/o "seen in the ER on 4/14 for right sided weakness. He is calling today due to symptoms of bilateral weakness, palpitations, and dizziness". OOC RN consult [facility].     [facility] Provider Dr Fawn Ramon disposition recommendation patient to go to ED.    Follow up scheduled for 5/1/25 to assess for additional needs.      "

## 2025-04-30 NOTE — ED TRIAGE NOTES
Patient arrives to ED via POV c/o generalized weakness/fatigue, muscle cramping, and lightheadedness. Patient states coming to ED 2 weeks ago for similar symptoms which never quite resolved. Patient endorses having nasal congestion and productive cough. Lightheadedness is intermittent. Denies chest pain/SOB.

## 2025-05-02 ENCOUNTER — PATIENT OUTREACH (OUTPATIENT)
Facility: OTHER | Age: 70
End: 2025-05-02
Payer: MEDICARE

## 2025-05-02 NOTE — PROGRESS NOTES
Patient was seen in the ED on 4/30/25. ED AVS instructs the patient to follow up with primary care. No appointment was noted. A follow-up call was made to assess for additional needs and offer scheduling assistance. There was no answer, and a message was left requesting a return call. Assistance will be provided as needed if the patient returns the call.

## 2025-05-06 ENCOUNTER — PATIENT MESSAGE (OUTPATIENT)
Dept: INTERNAL MEDICINE | Facility: CLINIC | Age: 70
End: 2025-05-06
Payer: MEDICARE

## 2025-05-06 DIAGNOSIS — M54.16 LUMBAR RADICULOPATHY: Primary | ICD-10-CM

## 2025-05-06 DIAGNOSIS — R29.898 RIGHT ARM WEAKNESS: ICD-10-CM

## 2025-05-06 NOTE — TELEPHONE ENCOUNTER
LOV with Duke Cano MD , 4/16/2025    Patient requesting rescheduling of MRI of Spine due to cancellation    Order pended for review diagnosis not complete    Note dated 4/16/25:  5. History of back surgery  -     MRI Lumbar Spine Without Contrast; Future; Expected date: 04/16/2025

## 2025-05-12 ENCOUNTER — LAB VISIT (OUTPATIENT)
Dept: LAB | Facility: HOSPITAL | Age: 70
End: 2025-05-12
Attending: INTERNAL MEDICINE
Payer: MEDICARE

## 2025-05-12 ENCOUNTER — OFFICE VISIT (OUTPATIENT)
Dept: INTERNAL MEDICINE | Facility: CLINIC | Age: 70
End: 2025-05-12
Payer: MEDICARE

## 2025-05-12 ENCOUNTER — RESULTS FOLLOW-UP (OUTPATIENT)
Dept: INTERNAL MEDICINE | Facility: CLINIC | Age: 70
End: 2025-05-12

## 2025-05-12 VITALS
DIASTOLIC BLOOD PRESSURE: 72 MMHG | SYSTOLIC BLOOD PRESSURE: 100 MMHG | HEIGHT: 72 IN | OXYGEN SATURATION: 100 % | BODY MASS INDEX: 25.26 KG/M2 | WEIGHT: 186.5 LBS | HEART RATE: 56 BPM

## 2025-05-12 DIAGNOSIS — D69.2 PURPURA: Primary | ICD-10-CM

## 2025-05-12 DIAGNOSIS — D69.2 PURPURA: ICD-10-CM

## 2025-05-12 LAB
ABSOLUTE EOSINOPHIL (OHS): 0.07 K/UL
ABSOLUTE MONOCYTE (OHS): 0.64 K/UL (ref 0.3–1)
ABSOLUTE NEUTROPHIL COUNT (OHS): 4.74 K/UL (ref 1.8–7.7)
APTT PPP: 27.3 SECONDS (ref 21–32)
BASOPHILS # BLD AUTO: 0.02 K/UL
BASOPHILS NFR BLD AUTO: 0.3 %
ERYTHROCYTE [DISTWIDTH] IN BLOOD BY AUTOMATED COUNT: 14.2 % (ref 11.5–14.5)
HCT VFR BLD AUTO: 42.6 % (ref 40–54)
HGB BLD-MCNC: 14.1 GM/DL (ref 14–18)
IMM GRANULOCYTES # BLD AUTO: 0.02 K/UL (ref 0–0.04)
IMM GRANULOCYTES NFR BLD AUTO: 0.3 % (ref 0–0.5)
INR PPP: 1.2 (ref 0.8–1.2)
LYMPHOCYTES # BLD AUTO: 1.06 K/UL (ref 1–4.8)
MCH RBC QN AUTO: 31.1 PG (ref 27–31)
MCHC RBC AUTO-ENTMCNC: 33.1 G/DL (ref 32–36)
MCV RBC AUTO: 94 FL (ref 82–98)
NUCLEATED RBC (/100WBC) (OHS): 0 /100 WBC
PLATELET # BLD AUTO: 144 K/UL (ref 150–450)
PMV BLD AUTO: 10.1 FL (ref 9.2–12.9)
PROTHROMBIN TIME: 13 SECONDS (ref 9–12.5)
RBC # BLD AUTO: 4.54 M/UL (ref 4.6–6.2)
RELATIVE EOSINOPHIL (OHS): 1.1 %
RELATIVE LYMPHOCYTE (OHS): 16.2 % (ref 18–48)
RELATIVE MONOCYTE (OHS): 9.8 % (ref 4–15)
RELATIVE NEUTROPHIL (OHS): 72.3 % (ref 38–73)
WBC # BLD AUTO: 6.55 K/UL (ref 3.9–12.7)

## 2025-05-12 PROCEDURE — 85025 COMPLETE CBC W/AUTO DIFF WBC: CPT

## 2025-05-12 PROCEDURE — 85730 THROMBOPLASTIN TIME PARTIAL: CPT

## 2025-05-12 PROCEDURE — 85610 PROTHROMBIN TIME: CPT

## 2025-05-12 PROCEDURE — 99214 OFFICE O/P EST MOD 30 MIN: CPT | Mod: S$PBB,,, | Performed by: INTERNAL MEDICINE

## 2025-05-12 PROCEDURE — 99999 PR PBB SHADOW E&M-EST. PATIENT-LVL III: CPT | Mod: PBBFAC,,, | Performed by: INTERNAL MEDICINE

## 2025-05-12 PROCEDURE — 36415 COLL VENOUS BLD VENIPUNCTURE: CPT

## 2025-05-12 PROCEDURE — 99213 OFFICE O/P EST LOW 20 MIN: CPT | Mod: PBBFAC | Performed by: INTERNAL MEDICINE

## 2025-05-12 NOTE — PROGRESS NOTES
Subjective:       Patient ID: Wero Spangler is a 69 y.o. male.    Chief Complaint: Bleeding/Bruising      HPI  Wero Spangler is a 69 y.o. year old male established patient of Dr. Cano presents for urgent care evaluation of skin changes noted to L dorsal aspect of fore arm and at lateral corner of right eye. States he woke up with these skin changes. Denies any recollection of trauma. Did see dermatology previously for purpura on left arm, which is fading.     Left arm purpura on sun exposed areas. Continues baby aspirin.     Review of Systems   Constitutional:  Negative for activity change, appetite change, chills, fatigue, fever and unexpected weight change.   HENT:  Negative for congestion, rhinorrhea and sore throat.    Eyes:  Negative for visual disturbance.   Respiratory:  Negative for shortness of breath.    Cardiovascular:  Negative for chest pain.   Gastrointestinal:  Negative for abdominal pain, diarrhea, nausea and vomiting.   Genitourinary:  Negative for difficulty urinating and dysuria.   Musculoskeletal:  Negative for arthralgias, back pain and myalgias.   Skin:  Positive for color change and rash.   Neurological:  Negative for dizziness, weakness and headaches.         Past Medical History:   Diagnosis Date    Aftercataract     Allergy     BPH (benign prostatic hyperplasia)     Cataract     Corneal dystrophy     Elevated PSA     Enlarged prostate     Fatty liver     Fuchs' corneal dystrophy     Gallstones     General anesthetics causing adverse effect in therapeutic use 2000    delayed emergence after back surgery    GERD (gastroesophageal reflux disease)     Hearing loss years ago    Heart attack     HTN (hypertension) 09/24/2013    Hypertension     Insomnia     Keloid cicatrix     Nasal polyps years ago    Osteoarthritis 7 years ago    Prostate nodule     Urinary tract infection         Prior to Admission medications    Medication Sig Start Date End Date Taking? Authorizing Provider    alfuzosin (UROXATRAL) 10 mg Tb24 Take 1 tablet (10 mg total) by mouth once daily. 11/27/24 11/22/25 Yes Darren Yuen MD   ALPRAZolam (XANAX) 0.5 MG tablet TAKE 1 TABLET BY MOUTH EVERY NIGHT 2/17/25  Yes Duke Cano MD   aspirin 81 MG Chew Take 81 mg by mouth nightly.   Yes Provider, Historical   atorvastatin (LIPITOR) 20 MG tablet TAKE 1 TABLET(20 MG) BY MOUTH EVERY DAY 10/12/24  Yes Pili Palacios MD   finasteride (PROSCAR) 5 mg tablet Take 1 tablet (5 mg total) by mouth once daily. 11/27/24 11/27/25 Yes Darren Yuen MD   fluticasone propionate (FLONASE) 50 mcg/actuation nasal spray 2 sprays (100 mcg total) by Each Nostril route once daily. 3/25/24  Yes Radha Prasad PA-C   lansoprazole (PREVACID) 15 MG capsule TAKE 1 CAPSULE(15 MG) BY MOUTH EVERY DAY 12/3/24  Yes Duke Cano MD   magnesium aspartate HCl 61 mg (615 mg) TbEC Take by mouth once.   Yes Provider, Historical   nitroGLYCERIN (NITROSTAT) 0.4 MG SL tablet Place 1 tablet (0.4 mg total) under the tongue every 5 (five) minutes as needed for Chest pain. 11/7/23  Yes Pili Palacios MD   sodium chloride 2% (BARBI 128) 2 % ophthalmic solution 1 drop as needed (takes 3-4 times/day).   Yes Provider, Historical   triamterene-hydrochlorothiazide 37.5-25 mg (MAXZIDE-25) 37.5-25 mg per tablet TAKE 1 TABLET BY MOUTH EVERY DAY 3/23/25  Yes Duke Cano MD   methylPREDNISolone (MEDROL, SRIKANTH,) 4 mg tablet use as directed  Patient not taking: Reported on 5/12/2025 4/24/25   Radha Prasad PA-C        Past medical history, surgical history, and family medical history reviewed and updated as appropriate.    Medications and allergies reviewed.     Objective:          Vitals:    05/12/25 0847   BP: 100/72   BP Location: Right arm   Patient Position: Sitting   Pulse: (!) 56   SpO2: 100%   Weight: 84.6 kg (186 lb 8.2 oz)   Height: 6' (1.829 m)     Body mass index is 25.3 kg/m².  Physical Exam  Constitutional:       General:  He is not in acute distress.     Appearance: He is well-developed.   HENT:      Head: Normocephalic and atraumatic.      Nose: Nose normal.   Eyes:      General: No scleral icterus.     Extraocular Movements: Extraocular movements intact.   Neck:      Thyroid: No thyromegaly.      Vascular: No JVD.      Trachea: No tracheal deviation.   Cardiovascular:      Rate and Rhythm: Normal rate and regular rhythm.      Heart sounds: Normal heart sounds. No murmur heard.     No friction rub. No gallop.   Pulmonary:      Effort: Pulmonary effort is normal. No respiratory distress.      Breath sounds: Normal breath sounds. No wheezing or rales.   Abdominal:      General: Bowel sounds are normal. There is no distension.      Palpations: Abdomen is soft. There is no mass.      Tenderness: There is no abdominal tenderness.   Musculoskeletal:         General: No tenderness. Normal range of motion.      Cervical back: Normal range of motion and neck supple.   Lymphadenopathy:      Cervical: No cervical adenopathy.   Skin:     General: Skin is warm and dry.      Findings: Bruising present. No rash.   Neurological:      Mental Status: He is alert and oriented to person, place, and time.      Cranial Nerves: No cranial nerve deficit.      Deep Tendon Reflexes: Reflexes normal.   Psychiatric:         Behavior: Behavior normal.         Lab Results   Component Value Date    WBC 9.38 04/30/2025    HGB 15.2 04/30/2025    HCT 44.5 04/30/2025     04/30/2025    CHOL 118 (L) 05/12/2024    TRIG 151 (H) 05/12/2024    HDL 36 (L) 05/12/2024    ALT 41 04/30/2025    AST 20 04/30/2025     04/30/2025    K 3.3 (L) 04/30/2025     04/30/2025    CREATININE 1.0 04/30/2025    BUN 16 04/30/2025    CO2 28 04/30/2025    TSH 1.323 04/30/2025    PSA 14.0 (H) 09/22/2014    INR 1.1 05/12/2024    GLUF 100 05/01/2024    HGBA1C 4.9 07/25/2022       Assessment:       1. Purpura          Plan:     Wero was seen today for  bleeding/bruising.    Diagnoses and all orders for this visit:    Purpura  -     CBC Auto Differential; Future  -     Protime-INR; Future  -     APTT; Future        Health maintenance reviewed with patient.     Follow up if symptoms worsen or fail to improve.    Conner Pantoja MD  Internal Medicine / Primary Care  [facility]  5/12/2025

## 2025-05-13 NOTE — PROGRESS NOTES
PCP - Duke Cano MD  Referring Physician:     Subjective:   Patient ID:  Wero Spangler is a 69 y.o. male who presents for follow up.    PMHx  PVCs  HTN  HLD  GHISLAINE, not treated  HFpEF  CAD s/p NSTEMI with PCI in [place] 2023    Patient previously followed by Dr. Palacios and last by Dr. Fenton 2024. NSTEMI while traveling in [place] 2023. Unclear which coronary artery was stented. He completed cardiac rehab after and had been doing well. He had been working out at [employer] with cardio almost daily, doing 4-5 miles on the treadmill. Reports worsening fatigue and dizziness during the end of workouts and progressed to at rest when already standing up and walking around. No syncope. Evaluated in the ED twice with MRIs for neurologic symptoms associated with episodes that manifest as predominantly right sided weakness. MRI brain and CTA head/neck unremarkable 25. No focal abnormalities identified. Pending lumbar spine MRI.     EKGs at the time showed iRBBB progressed to RBBB but otherwise not clearly explaining his symptoms. He has avoided exercising while getting evaluated due to concern for symptoms. Denies chest pain, chest pressure, palpitations, lower extremity edema, orthopnea, nausea/vomiting, syncope.     Previously evaluated in PVCs in  (prior to NSTEMI) with 48hr holter showing 4% PVC burden. Occurred after he had COVID. After recovered from COVID, PVC symptoms improved. No rate/rhythm medication used at that time. No CHF symptoms.    History:     Social History     Tobacco Use    Smoking status: Former     Current packs/day: 0.00     Average packs/day: 0.3 packs/day for 4.0 years (1.0 ttl pk-yrs)     Types: Cigarettes, Cigars     Quit date:      Years since quittin.3    Smokeless tobacco: Never   Substance Use Topics    Alcohol use: Not Currently     Family History   Problem Relation Name Age of Onset    Cancer Mother          breast    Prostate cancer Brother  prostate     Cancer Brother prostate         prostate    Macular degeneration Maternal Grandmother      Cancer Other      Cancer Other      Amblyopia Neg Hx      Blindness Neg Hx      Cataracts Neg Hx      Glaucoma Neg Hx      Retinal detachment Neg Hx      Strabismus Neg Hx       Meds:   Review of patient's allergies indicates:  No Known Allergies  Current Medications[1]  Review of Systems   Constitutional:  Positive for malaise/fatigue. Negative for chills, diaphoresis and fever.   HENT:  Negative for sore throat.    Eyes:  Negative for double vision.   Respiratory:  Positive for shortness of breath. Negative for cough and wheezing.    Cardiovascular:  Positive for palpitations. Negative for chest pain, orthopnea, claudication, leg swelling and PND.   Gastrointestinal:  Negative for abdominal pain, blood in stool, constipation, heartburn, nausea and vomiting.   Genitourinary:  Negative for hematuria.   Musculoskeletal:  Negative for falls and myalgias.   Neurological:  Negative for dizziness, loss of consciousness, weakness and headaches.   Psychiatric/Behavioral:  The patient is not nervous/anxious.      Objective:   /76 (Patient Position: Sitting)   Pulse (!) 46   Ht 6' (1.829 m)   Wt 84.6 kg (186 lb 8.2 oz)   SpO2 97%   BMI 25.30 kg/m²   BMI Readings from Last 15 Encounters:   05/14/25 25.30 kg/m²   05/12/25 25.30 kg/m²   04/30/25 24.41 kg/m²   04/16/25 25.03 kg/m²   04/14/25 24.41 kg/m²   02/17/25 24.64 kg/m²   12/04/24 24.82 kg/m²   11/27/24 24.22 kg/m²   10/10/24 24.34 kg/m²   07/25/24 23.85 kg/m²   07/09/24 23.98 kg/m²   06/27/24 24.16 kg/m²   06/26/24 24.04 kg/m²   06/14/24 23.60 kg/m²   06/04/24 23.60 kg/m²      Physical Exam  Vitals and nursing note reviewed.   Constitutional:       General: He is not in acute distress.     Appearance: Normal appearance. He is not diaphoretic.   HENT:      Head: Normocephalic and atraumatic.      Mouth/Throat:      Mouth: Mucous membranes are moist.   Eyes:       General: No scleral icterus.     Extraocular Movements: Extraocular movements intact.   Neck:      Vascular: No carotid bruit, hepatojugular reflux or JVD.   Cardiovascular:      Rate and Rhythm: Normal rate and regular rhythm. Frequent Extrasystoles are present.     Pulses: Normal pulses.           Radial pulses are 2+ on the right side and 2+ on the left side.      Heart sounds: No murmur heard.     No friction rub. No S3 sounds.   Pulmonary:      Effort: Pulmonary effort is normal. No respiratory distress.      Breath sounds: Normal breath sounds. No wheezing.   Chest:      Chest wall: No tenderness.   Abdominal:      General: Abdomen is flat. Bowel sounds are normal. There is no distension.      Tenderness: There is no abdominal tenderness.   Musculoskeletal:      Right lower leg: No edema.      Left lower leg: No edema.   Skin:     General: Skin is warm and dry.      Capillary Refill: Capillary refill takes less than 2 seconds.      Findings: No rash or wound.   Neurological:      General: No focal deficit present.      Mental Status: He is alert and oriented to person, place, and time.   Psychiatric:         Mood and Affect: Mood normal.         Thought Content: Thought content normal.       Labs:     Lab Results   Component Value Date     04/30/2025     05/12/2024    K 3.3 (L) 04/30/2025    K 3.6 05/12/2024     04/30/2025     05/12/2024    CO2 28 04/30/2025    CO2 26 05/12/2024    BUN 16 04/30/2025    CREATININE 1.0 04/30/2025    ANIONGAP 9 04/30/2025     Lab Results   Component Value Date    HGBA1C 4.9 07/25/2022     Lab Results   Component Value Date    BNP 37 04/30/2025    BNP 88 11/07/2023    BNP <10.0 10/10/2012    Lab Results   Component Value Date    WBC 6.55 05/12/2025    HGB 14.1 05/12/2025    HGB 14.3 05/12/2024    HCT 42.6 05/12/2025    HCT 42 04/14/2025     (L) 05/12/2025     05/12/2024    GRAN 4.6 05/12/2024    GRAN 69.7 05/12/2024     Lab Results    Component Value Date    CHOL 118 (L) 05/12/2024    HDL 36 (L) 05/12/2024    LDLCALC 51.8 (L) 05/12/2024    TRIG 151 (H) 05/12/2024          Cardiovascular Imaging:     Echo 6/4/24    Left Ventricle: The left ventricle is normal in size. Ventricular mass is normal. Normal wall thickness. There is normal systolic function with a visually estimated ejection fraction of 55 - 60%. Ejection fraction by visual approximation is 58%. There is normal diastolic function.    Right Ventricle: Normal right ventricular cavity size. Wall thickness is normal. Systolic function is normal.    Tricuspid Valve: There is trace regurgitation.    Pulmonary Artery: The estimated pulmonary artery systolic pressure is 20 mmHg.    IVC/SVC: Normal venous pressure at 3 mmHg.    CPX Study 5/1/24  Interpretation Summary    The ECG portion of this study is negative for myocardial ischemia.    The patient's exercise capacity was mildly impaired.    There were no arrhythmias during stress.    The test was stopped because the patient experienced atypical leg pain.    Mild to moderate functional impairment associated with a normal breathing reserve, normal oxygen stauration, suboptimal effort, and a normal AT. These findings are indicative of functional impairment secondary to deconditioning, poor effort.    There was no ST segment deviation noted during stress.     Assessment & Plan:     1. Atherosclerosis of native coronary artery of native heart without angina pectoris    2. Primary hypertension    3. Dyslipidemia    4. Right arm weakness    5. Coronary artery disease involving native coronary artery of native heart without angina pectoris    6. Frequent PVCs    7. Dizziness    8. NSTEMI (non-ST elevated myocardial infarction)    9. Chronic heart failure with preserved ejection fraction (HFpEF)      # PVCs, HFpEF  -echo  -holter monitor to assess burden  -start coreg 6.25mg BID, titrate as able  -start jardiance 10mg  -stop triamterene and HCTZ  for now, add additional HTN/GDMT as needed for BP  -scheduled to see Dr. Aponte for evaluation 7/7/25   -PVC on EKG today frequent with RBBB and V3 transition with +inferior and negative aVL suggestive of LVOT possibly LCC origin    # HTN  -start coreg 6.25mg BID, titrate as able  -start jardiance 10mg  -stop triamterene and HCTZ for now, add additional HTN/GDMT as needed for BP    # CAD, HLD  -stable, denies chest pain/angina which was typical substernal pressure radiating down L arm at time of prior NSTEMI  -continue lipitor, LDL 51 52/2024 at goal <70, repeat ordered  -ASA 81mg    PREVENTION   For smokers: Educated and strongly counseled on smoking cessation.   Adopt a heart healthy diet  Counseled on various heart healthy diets  -Mediterranean diet- High in fruits vegetable, grains, fish, nuts and extra-virgin olive oil. Minimize processed, salty foods, packaged foods.  -Dash diet to lower HTN-Similar to mediterranean diet, low fat dairy, meatless meals and low sodium  -Plant based diet: Foundation being minimally processed and whole foods.   -Avoid foods in saturated fats and trans fats.   Aerobic exercise 30 minutes 5 times/ week.   Reduce and limit alcohol intake.   Learn stress management / relaxation techniques such as mindfulness and meditation.   Encourage self and family members to adopt healthy choices at a young age.     COMMUNICATION   Patients are encouraged to call clinic if they have any questions or concerns.   Clinic line is not for emergency purposes.  If they are feeling unwell and especially if they have certain red flag symptoms such as new or worsening  chest pain, chest pressure, shortness of breath, palpitations, dizziness, fall (especially on anticoagulants),   low blood pressure, focal weakness, etc., they should go to the emergency room.      Case discussed with Dr. Blanton. Unless there are intervening problems, patient should return for re-evaluation in 4 months.    Signed:  Mak WILSON  DELIO Nicolas.  Cardiovascular Fellow  [facility]          [1]   Current Outpatient Medications:     alfuzosin (UROXATRAL) 10 mg Tb24, Take 1 tablet (10 mg total) by mouth once daily., Disp: 30 tablet, Rfl: 11    ALPRAZolam (XANAX) 0.5 MG tablet, TAKE 1 TABLET BY MOUTH EVERY NIGHT, Disp: 30 tablet, Rfl: 2    aspirin 81 MG Chew, Take 81 mg by mouth nightly., Disp: , Rfl:     finasteride (PROSCAR) 5 mg tablet, Take 1 tablet (5 mg total) by mouth once daily., Disp: 30 tablet, Rfl: 11    fluticasone propionate (FLONASE) 50 mcg/actuation nasal spray, 2 sprays (100 mcg total) by Each Nostril route once daily., Disp: 16 g, Rfl: 0    lansoprazole (PREVACID) 15 MG capsule, TAKE 1 CAPSULE(15 MG) BY MOUTH EVERY DAY, Disp: 90 capsule, Rfl: 3    magnesium aspartate HCl 61 mg (615 mg) TbEC, Take by mouth once., Disp: , Rfl:     methylPREDNISolone (MEDROL, SRIKANTH,) 4 mg tablet, use as directed, Disp: 1 each, Rfl: 0    nitroGLYCERIN (NITROSTAT) 0.4 MG SL tablet, Place 1 tablet (0.4 mg total) under the tongue every 5 (five) minutes as needed for Chest pain., Disp: 90 tablet, Rfl: 3    sodium chloride 2% (BARBI 128) 2 % ophthalmic solution, 1 drop as needed (takes 3-4 times/day)., Disp: , Rfl:     atorvastatin (LIPITOR) 20 MG tablet, Take 1 tablet (20 mg total) by mouth once daily., Disp: 30 tablet, Rfl: 11    carvediloL (COREG) 6.25 MG tablet, Take 1 tablet (6.25 mg total) by mouth 2 (two) times daily with meals., Disp: 180 tablet, Rfl: 3    empagliflozin (JARDIANCE) 10 mg tablet, Take 1 tablet (10 mg total) by mouth once daily., Disp: 30 tablet, Rfl: 11

## 2025-05-14 ENCOUNTER — OFFICE VISIT (OUTPATIENT)
Dept: CARDIOLOGY | Facility: CLINIC | Age: 70
End: 2025-05-14
Payer: MEDICARE

## 2025-05-14 VITALS
HEIGHT: 72 IN | OXYGEN SATURATION: 97 % | SYSTOLIC BLOOD PRESSURE: 126 MMHG | WEIGHT: 186.5 LBS | DIASTOLIC BLOOD PRESSURE: 76 MMHG | BODY MASS INDEX: 25.26 KG/M2 | HEART RATE: 46 BPM

## 2025-05-14 DIAGNOSIS — I10 PRIMARY HYPERTENSION: ICD-10-CM

## 2025-05-14 DIAGNOSIS — E78.5 DYSLIPIDEMIA: ICD-10-CM

## 2025-05-14 DIAGNOSIS — I50.32 CHRONIC HEART FAILURE WITH PRESERVED EJECTION FRACTION (HFPEF): ICD-10-CM

## 2025-05-14 DIAGNOSIS — I21.4 NSTEMI (NON-ST ELEVATED MYOCARDIAL INFARCTION): ICD-10-CM

## 2025-05-14 DIAGNOSIS — R29.898 RIGHT ARM WEAKNESS: ICD-10-CM

## 2025-05-14 DIAGNOSIS — R42 DIZZINESS: ICD-10-CM

## 2025-05-14 DIAGNOSIS — I25.10 ATHEROSCLEROSIS OF NATIVE CORONARY ARTERY OF NATIVE HEART WITHOUT ANGINA PECTORIS: Primary | ICD-10-CM

## 2025-05-14 DIAGNOSIS — I49.3 FREQUENT PVCS: ICD-10-CM

## 2025-05-14 DIAGNOSIS — I25.10 CORONARY ARTERY DISEASE INVOLVING NATIVE CORONARY ARTERY OF NATIVE HEART WITHOUT ANGINA PECTORIS: ICD-10-CM

## 2025-05-14 LAB
OHS QRS DURATION: 106 MS
OHS QTC CALCULATION: 421 MS

## 2025-05-14 PROCEDURE — 93005 ELECTROCARDIOGRAM TRACING: CPT | Mod: PBBFAC | Performed by: INTERNAL MEDICINE

## 2025-05-14 PROCEDURE — 99999 PR PBB SHADOW E&M-EST. PATIENT-LVL V: CPT | Mod: PBBFAC,GC,, | Performed by: STUDENT IN AN ORGANIZED HEALTH CARE EDUCATION/TRAINING PROGRAM

## 2025-05-14 PROCEDURE — 93010 ELECTROCARDIOGRAM REPORT: CPT | Mod: S$PBB,,, | Performed by: INTERNAL MEDICINE

## 2025-05-14 PROCEDURE — 99215 OFFICE O/P EST HI 40 MIN: CPT | Mod: PBBFAC,25 | Performed by: STUDENT IN AN ORGANIZED HEALTH CARE EDUCATION/TRAINING PROGRAM

## 2025-05-14 RX ORDER — ATORVASTATIN CALCIUM 20 MG/1
20 TABLET, FILM COATED ORAL DAILY
Qty: 30 TABLET | Refills: 11 | Status: SHIPPED | OUTPATIENT
Start: 2025-05-14 | End: 2026-05-14

## 2025-05-14 RX ORDER — CARVEDILOL 6.25 MG/1
6.25 TABLET ORAL 2 TIMES DAILY WITH MEALS
Qty: 180 TABLET | Refills: 3 | Status: SHIPPED | OUTPATIENT
Start: 2025-05-14 | End: 2026-05-14

## 2025-05-16 DIAGNOSIS — I49.3 PVC'S (PREMATURE VENTRICULAR CONTRACTIONS): Primary | ICD-10-CM

## 2025-05-18 ENCOUNTER — HOSPITAL ENCOUNTER (OUTPATIENT)
Dept: RADIOLOGY | Facility: HOSPITAL | Age: 70
Discharge: HOME | End: 2025-05-18
Attending: PHYSICIAN ASSISTANT
Payer: MEDICARE

## 2025-05-18 DIAGNOSIS — Z98.890 HISTORY OF BACK SURGERY: ICD-10-CM

## 2025-05-18 DIAGNOSIS — M54.16 LUMBAR RADICULOPATHY: ICD-10-CM

## 2025-05-18 DIAGNOSIS — R29.898 RIGHT ARM WEAKNESS: ICD-10-CM

## 2025-05-18 PROCEDURE — 72148 MRI LUMBAR SPINE W/O DYE: CPT | Mod: TC

## 2025-05-18 PROCEDURE — 72148 MRI LUMBAR SPINE W/O DYE: CPT | Mod: 26,,, | Performed by: RADIOLOGY

## 2025-05-18 PROCEDURE — 72141 MRI NECK SPINE W/O DYE: CPT | Mod: TC

## 2025-05-18 PROCEDURE — 72141 MRI NECK SPINE W/O DYE: CPT | Mod: 26,,, | Performed by: RADIOLOGY

## 2025-05-19 ENCOUNTER — OFFICE VISIT (OUTPATIENT)
Facility: CLINIC | Age: 70
End: 2025-05-19
Payer: MEDICARE

## 2025-05-19 VITALS
HEART RATE: 50 BPM | WEIGHT: 186.75 LBS | HEIGHT: 72 IN | BODY MASS INDEX: 25.29 KG/M2 | SYSTOLIC BLOOD PRESSURE: 114 MMHG | DIASTOLIC BLOOD PRESSURE: 58 MMHG

## 2025-05-19 DIAGNOSIS — R29.898 RIGHT ARM WEAKNESS: ICD-10-CM

## 2025-05-19 DIAGNOSIS — R27.9 UNSPECIFIED LACK OF COORDINATION: ICD-10-CM

## 2025-05-19 DIAGNOSIS — R27.0 ATAXIA, UNSPECIFIED: ICD-10-CM

## 2025-05-19 DIAGNOSIS — R55 NEAR SYNCOPE: Primary | ICD-10-CM

## 2025-05-19 DIAGNOSIS — R42 DIZZINESS: ICD-10-CM

## 2025-05-19 DIAGNOSIS — R53.1 WEAKNESS: ICD-10-CM

## 2025-05-19 PROCEDURE — 99214 OFFICE O/P EST MOD 30 MIN: CPT | Mod: S$PBB,,, | Performed by: NEUROLOGICAL SURGERY

## 2025-05-19 PROCEDURE — 99213 OFFICE O/P EST LOW 20 MIN: CPT | Mod: PBBFAC | Performed by: NEUROLOGICAL SURGERY

## 2025-05-19 PROCEDURE — 99999 PR PBB SHADOW E&M-EST. PATIENT-LVL III: CPT | Mod: PBBFAC,,, | Performed by: NEUROLOGICAL SURGERY

## 2025-05-19 NOTE — PROGRESS NOTES
Name: Wero Spangler  MRN: [id number]   CSN: [id number]      Date: 05/19/2025      History of Present Illness    CHIEF COMPLAINT:  Mr. Spangler presents today for evaluation of occasional weakness, lightheadedness, and fatigue.    HISTORY OF PRESENT ILLNESS:  He reports experiencing episodes of lightheadedness and fatigue that began 5 weeks ago, initially triggered by exercise but now occurring independently of physical activity. The episodes have become more frequent, with symptoms initially more pronounced on the right side but now widespread. He experiences slight shortness of breath during episodes but denies chest pain, loss of consciousness, or palpitations. The severity has necessitated two emergency room visits (April 14 and approximately 1.5-2 weeks ago) requiring transportation via taxi or others. Recent mornings have been particularly challenging, with difficulty performing simple exercises like touching his toes due to symptom onset. He is currently unable to walk half a mile to the local coffee shop, whereas he previously walked 5-6 miles daily.    CARDIAC HISTORY:  He has a history of myocardial infarction 21 months ago requiring one stent placement, followed by 3 months of cardiac rehabilitation.    MEDICATIONS:  His cardiologist recently discontinued a blood pressure medication and added Jardiance and another medication starting last Wednesday. He takes CoQ10 for statin-related side effects. Previously, his statin dosage was reduced due to heart rate concerns, which resulted in slight improvement in heart rate.      ROS:  General: -fever, -chills, +fatigue, -weight gain, -weight loss, +weakness, +muscle weakness  Eyes: -vision changes, -redness, -discharge  ENT: -ear pain, -nasal congestion, -sore throat  Cardiovascular: -chest pain, -palpitations, -lower extremity edema  Respiratory: -cough, +shortness of breath  Gastrointestinal: -abdominal pain, -nausea, -vomiting, -diarrhea, -constipation, -blood  in stool  Genitourinary: -dysuria, -hematuria, -frequency  Musculoskeletal: -joint pain, -muscle pain, +back pain  Skin: -rash, -lesion  Neurological: -headache, -dizziness, -numbness, -tingling, +lightheadedness  Psychiatric: +anxiety, -depression, -sleep difficulty              Past Medical History: The patient  has a past medical history of Aftercataract, Allergy, BPH (benign prostatic hyperplasia), Cataract, Corneal dystrophy, Elevated PSA, Enlarged prostate, Fatty liver, Fuchs' corneal dystrophy, Gallstones, General anesthetics causing adverse effect in therapeutic use (2000), GERD (gastroesophageal reflux disease), Hearing loss (years ago), Heart attack, HTN (hypertension) (09/24/2013), Hypertension, Insomnia, Keloid cicatrix, Nasal polyps (years ago), Osteoarthritis (7 years ago), Prostate nodule, and Urinary tract infection.    Social History: The patient  reports that he quit smoking about 25 years ago. His smoking use included cigarettes and cigars. He has a 1 pack-year smoking history. He has never used smokeless tobacco. He reports that he does not currently use alcohol. He reports that he does not use drugs.    Family History: Their family history includes Cancer in his brother, mother, and other family members; Macular degeneration in his maternal grandmother; Prostate cancer in his brother.    Allergies: Patient has no known allergies.     Meds: Scheduled Meds:  Continuous Infusions:  PRN Meds:.    Exam:  Physical Exam    Vitals: Heart rate: 55 bpm.  General: No acute distress. Well-developed. Well-nourished.  Eyes: EOMI. Sclerae anicteric.  HENT: Normocephalic. Atraumatic. Nares patent. Moist oral mucosa.  Ears: Bilateral TMs clear. Bilateral EACs clear.  Cardiovascular: Regular rate. Regular rhythm. No murmurs. No rubs. No gallops. Normal S1, S2.  Respiratory: Normal respiratory effort. Clear to auscultation bilaterally. No rales. No rhonchi. No wheezing.  Abdomen: Soft. Non-tender. Non-distended.  Normoactive bowel sounds.  Musculoskeletal: No  obvious deformity.  Extremities: No lower extremity edema.  Neurological: Alert & oriented x3. No slurred speech. Normal gait.  Psychiatric: Normal mood. Normal affect. Good insight. Good judgment.  Skin: Warm. Dry. No rash.          BP (!) 114/58   Pulse (!) 50   Ht 6' (1.829 m)   Wt 84.7 kg (186 lb 11.7 oz)   BMI 25.33 kg/m²     Constitutional  Well-developed, well-nourished, appears stated age   Ophthalmoscopic  No papilledema with no hemorrhages or exudates bilaterally   Cardiovascular  Radial pulses 2+ and symmetric, no LE edema bilaterally   Neurological    * Mental status      - Orientation  Oriented to person, place, time, and situation     - Memory   Intact recent and remote     - Attention/concentration  Attentive, vigilant during exam     - Language  Naming & repetition intact, +2-step commands     - Fund of knowledge  Aware of current events     - Executive  Well-organized thoughts     - Other     * Cranial nerves       - CN II  PERRL, visual fields full to confrontation     - CN III, IV, VI  Extraocular movements full, normal pursuits and saccades     - CN V  Sensation V1 - V3 intact     - CN VII  Face strong and symmetric bilaterally     - CN VIII  Hearing intact bilaterally     - CN IX, X  Palate raises midline and symmetric     - CN XI  SCM and trapezius 5/5 bilaterally     - CN XII  Tongue midline   * Motor  Muscle bulk normal, strength 5/5 throughout   * Sensory   Intact to temperature and vibration throughout   * Coordination  No dysmetria with finger-to-nose or heel-to-shin   * Gait  See below.   * Deep tendon reflexes  2+ and symmetric throughout   Babinski downgoing bilaterally     Laboratory/Radiological:Reviewed  - Results:  Office Visit on 05/14/2025   Component Date Value Ref Range Status    QRS Duration 05/14/2025 106  ms Final    [facility] QTC Calculation 05/14/2025 421  ms Final   Lab Visit on 05/12/2025   Component Date Value Ref Range  Status    PT 05/12/2025 13.0 (H)  9.0 - 12.5 seconds Final    INR 05/12/2025 1.2  0.8 - 1.2 Final    PTT 05/12/2025 27.3  21.0 - 32.0 seconds Final    WBC 05/12/2025 6.55  3.90 - 12.70 K/uL Final    RBC 05/12/2025 4.54 (L)  4.60 - 6.20 M/uL Final    HGB 05/12/2025 14.1  14.0 - 18.0 gm/dL Final    HCT 05/12/2025 42.6  40.0 - 54.0 % Final    MCV 05/12/2025 94  82 - 98 fL Final    MCH 05/12/2025 31.1 (H)  27.0 - 31.0 pg Final    MCHC 05/12/2025 33.1  32.0 - 36.0 g/dL Final    RDW 05/12/2025 14.2  11.5 - 14.5 % Final    Platelet Count 05/12/2025 144 (L)  150 - 450 K/uL Final    MPV 05/12/2025 10.1  9.2 - 12.9 fL Final    Nucleated RBC 05/12/2025 0  <=0 /100 WBC Final    Neut % 05/12/2025 72.3  38 - 73 % Final    Lymph % 05/12/2025 16.2 (L)  18 - 48 % Final    Mono % 05/12/2025 9.8  4 - 15 % Final    Eos % 05/12/2025 1.1  <=8 % Final    Basophil % 05/12/2025 0.3  <=1.9 % Final    Imm Grans % 05/12/2025 0.3  0.0 - 0.5 % Final    Neut # 05/12/2025 4.74  1.8 - 7.7 K/uL Final    Lymph # 05/12/2025 1.06  1 - 4.8 K/uL Final    Mono # 05/12/2025 0.64  0.3 - 1 K/uL Final    Eos # 05/12/2025 0.07  <=0.5 K/uL Final    Baso # 05/12/2025 0.02  <=0.2 K/uL Final    Imm Grans # 05/12/2025 0.02  0.00 - 0.04 K/uL Final   Admission on 04/30/2025, Discharged on 04/30/2025   Component Date Value Ref Range Status    QRS Duration 04/30/2025 148  ms Final    [facility] QTC Calculation 04/30/2025 437  ms Final    BNP 04/30/2025 37  0 - 99 pg/mL Final    Sodium 04/30/2025 137  136 - 145 mmol/L Final    Potassium 04/30/2025 3.3 (L)  3.5 - 5.1 mmol/L Final    Chloride 04/30/2025 100  95 - 110 mmol/L Final    CO2 04/30/2025 28  23 - 29 mmol/L Final    Glucose 04/30/2025 101  70 - 110 mg/dL Final    BUN 04/30/2025 16  8 - 23 mg/dL Final    Creatinine 04/30/2025 1.0  0.5 - 1.4 mg/dL Final    Calcium 04/30/2025 8.9  8.7 - 10.5 mg/dL Final    Protein Total 04/30/2025 6.5  6.0 - 8.4 gm/dL Final    Albumin 04/30/2025 3.8  3.5 - 5.2 g/dL Final    Bilirubin Total  04/30/2025 1.5 (H)  0.1 - 1.0 mg/dL Final    ALP 04/30/2025 66  40 - 150 unit/L Final    AST 04/30/2025 20  11 - 45 unit/L Final    ALT 04/30/2025 41  10 - 44 unit/L Final    Anion Gap 04/30/2025 9  8 - 16 mmol/L Final    eGFR 04/30/2025 >60  >60 mL/min/1.73/m2 Final    Troponin High Sensitive 04/30/2025 <3  <=35 ng/L Final    Color, UA 04/30/2025 Colorless (A)  Straw, [place], Yellow, Light-Orange Final    Appearance, UA 04/30/2025 Clear  Clear Final    pH, UA 04/30/2025 7.0  5.0 - 8.0 Final    Spec Grav UA 04/30/2025 1.010  1.005 - 1.030 Final    Protein, UA 04/30/2025 Negative  Negative Final    Glucose, UA 04/30/2025 Negative  Negative Final    Ketones, UA 04/30/2025 Negative  Negative Final    Bilirubin, UA 04/30/2025 Negative  Negative Final    Blood, UA 04/30/2025 Negative  Negative Final    Nitrites, UA 04/30/2025 Negative  Negative Final    Urobilinogen, UA 04/30/2025 Negative  <2.0 EU/dL Final    Leukocyte Esterase, UA 04/30/2025 Negative  Negative Final    WBC 04/30/2025 9.38  3.90 - 12.70 K/uL Final    RBC 04/30/2025 4.96  4.60 - 6.20 M/uL Final    HGB 04/30/2025 15.2  14.0 - 18.0 gm/dL Final    HCT 04/30/2025 44.5  40.0 - 54.0 % Final    MCV 04/30/2025 90  82 - 98 fL Final    MCH 04/30/2025 30.6  27.0 - 31.0 pg Final    MCHC 04/30/2025 34.2  32.0 - 36.0 g/dL Final    RDW 04/30/2025 14.0  11.5 - 14.5 % Final    Platelet Count 04/30/2025 163  150 - 450 K/uL Final    MPV 04/30/2025 9.2  9.2 - 12.9 fL Final    Nucleated RBC 04/30/2025 0  <=0 /100 WBC Final    Neut % 04/30/2025 79.2 (H)  38 - 73 % Final    Lymph % 04/30/2025 12.0 (L)  18 - 48 % Final    Mono % 04/30/2025 7.5  4 - 15 % Final    Eos % 04/30/2025 0.4  <=8 % Final    Basophil % 04/30/2025 0.4  <=1.9 % Final    Imm Grans % 04/30/2025 0.5  0.0 - 0.5 % Final    Neut # 04/30/2025 7.42  1.8 - 7.7 K/uL Final    Lymph # 04/30/2025 1.13  1 - 4.8 K/uL Final    Mono # 04/30/2025 0.70  0.3 - 1 K/uL Final    Eos # 04/30/2025 0.04  <=0.5 K/uL Final    Baso #  04/30/2025 0.04  <=0.2 K/uL Final    Imm Grans # 04/30/2025 0.05 (H)  0.00 - 0.04 K/uL Final    Magnesium  04/30/2025 1.8  1.6 - 2.6 mg/dL Final    Phosphorus Level 04/30/2025 3.8  2.7 - 4.5 mg/dL Final    TSH 04/30/2025 1.323  0.400 - 4.000 uIU/mL Final    Extra Tube 04/30/2025 Hold for add-ons.   Final    Troponin High Sensitive 04/30/2025 <3  <=35 ng/L Final       - Independent review of images:  CT of the head and MRI of the brain reviewed along with images demonstrating only mild chronic age-appropriate changes  MRIs of the cervical and lumbar spine reviewed as well, revealing only a left-sided disc bulge at L5-S1 which does not explain the patient's symptomatology    Assessment & Plan    R53.1 Weakness  R29.898 Right arm weakness  R42 Dizziness    IMPRESSION:  - Considered symptoms of occasional weakness, fatigue, and lightheadedness in context of recent heart attack history.  - Noted low heart rate (around 55 bpm), which may be contributing to symptoms.  - Reviewed recent MRI results showing no significant abnormalities in brain and neck.  - Considered possible causes including arrhythmias, vitamin deficiencies, or thyroid issues.  - Planning further cardiac workup to rule out a-fib due to stroke risk.    DIZZINESS:  - Explained that low heart rate can lead to insufficient blood pumping, causing lightheadedness or fainting.  - Discussed that existing arterial narrowing or blockages may exacerbate symptoms of low heart rate.  - Educated on how to manually check pulse and its importance in monitoring heart rate.  - Mr. Spangler to monitor pulse rate during symptomatic episodes using finger pulse oximeter and report if pulse rate drops below 50 bpm during episodes.  - Contact the office if another symptomatic episode occurs, especially if pulse rate is lower than current rate.  - Ordered carotid artery US to check for narrowing or blockages.  - Referred to electrophysiologist (EP) for evaluation of possible  arrhythmias.  - Ordered labs for vitamin levels (including B vitamins) and thyroid function.  - Follow up after receiving results from ordered tests.    PLAN SUMMARY:  - Ordered labs for vitamin levels and thyroid function  - Ordered carotid artery ultrasound and TCD  - Referred to electrophysiologist for evaluation of possible arrhythmias  - Follow up after receiving results from ordered tests          Risks/benefits, potential side effects of medications, and alternative therapies discussed as appropriate.    This note was generated with the assistance of ambient listening technology. Verbal consent was obtained by the patient and accompanying visitor(s) for the recording of patient appointment to facilitate this note. I attest to having reviewed and edited the generated note for accuracy, though some syntax or spelling errors may persist. Please contact the author of this note for any clarification.       JACKI Montero M.D.

## 2025-05-21 ENCOUNTER — PATIENT MESSAGE (OUTPATIENT)
Facility: CLINIC | Age: 70
End: 2025-05-21
Payer: MEDICARE

## 2025-05-21 ENCOUNTER — TELEPHONE (OUTPATIENT)
Dept: ADMINISTRATIVE | Facility: OTHER | Age: 70
End: 2025-05-21
Payer: MEDICARE

## 2025-05-21 ENCOUNTER — HOSPITAL ENCOUNTER (OUTPATIENT)
Dept: CARDIOLOGY | Facility: HOSPITAL | Age: 70
Discharge: HOME | End: 2025-05-21
Attending: STUDENT IN AN ORGANIZED HEALTH CARE EDUCATION/TRAINING PROGRAM
Payer: MEDICARE

## 2025-05-21 VITALS
WEIGHT: 185.19 LBS | DIASTOLIC BLOOD PRESSURE: 58 MMHG | HEIGHT: 71 IN | SYSTOLIC BLOOD PRESSURE: 114 MMHG | HEART RATE: 57 BPM | BODY MASS INDEX: 25.93 KG/M2

## 2025-05-21 DIAGNOSIS — R42 DIZZINESS: ICD-10-CM

## 2025-05-21 DIAGNOSIS — I49.3 FREQUENT PVCS: ICD-10-CM

## 2025-05-21 LAB
AORTIC SIZE INDEX (SOV): 1.6 CM/M2
AORTIC SIZE INDEX: 1.5 CM/M2
ASCENDING AORTA: 3 CM
AV AREA BY CONTINUOUS VTI: 2.9 CM2
AV INDEX (PROSTH): 0.68
AV LVOT MEAN GRADIENT: 2 MMHG
AV LVOT PEAK GRADIENT: 4 MMHG
AV MEAN GRADIENT: 4 MMHG
AV PEAK GRADIENT: 8 MMHG
AV VALVE AREA BY VELOCITY RATIO: 3 CM²
AV VALVE AREA: 2.8 CM2
AV VELOCITY RATIO: 0.71
BSA FOR ECHO PROCEDURE: 2.05 M2
CV ECHO LV RWT: 0.3 CM
DOP CALC AO PEAK VEL: 1.4 M/S
DOP CALC AO VTI: 34.5 CM
DOP CALC LVOT AREA: 4.2 CM2
DOP CALC LVOT DIAMETER: 2.3 CM
DOP CALC LVOT PEAK VEL: 1 M/S
DOP CALC LVOT STROKE VOLUME: 97.2 CM3
DOP CALCLVOT PEAK VEL VTI: 23.4 CM
E WAVE DECELERATION TIME: 241 MS
E/A RATIO: 0.92
E/E' RATIO: 9 M/S
ECHO EF ESTIMATED: 64 %
ECHO LV POSTERIOR WALL: 0.8 CM (ref 0.6–1.1)
FRACTIONAL SHORTENING: 35.8 % (ref 28–44)
INTERVENTRICULAR SEPTUM: 0.7 CM (ref 0.6–1.1)
IVC DIAMETER: 1.47 CM
IVRT: 94 MS
LA MAJOR: 5.5 CM
LA MINOR: 5.6 CM
LA WIDTH: 3.2 CM
LEFT ATRIUM SIZE: 4.3 CM
LEFT ATRIUM VOLUME INDEX MOD: 32 ML/M2
LEFT ATRIUM VOLUME INDEX: 32 ML/M2
LEFT ATRIUM VOLUME MOD: 66 ML
LEFT ATRIUM VOLUME: 65 CM3
LEFT INTERNAL DIMENSION IN SYSTOLE: 3.4 CM (ref 2.1–4)
LEFT VENTRICLE DIASTOLIC VOLUME INDEX: 65.69 ML/M2
LEFT VENTRICLE DIASTOLIC VOLUME: 134 ML
LEFT VENTRICLE MASS INDEX: 67.8 G/M2
LEFT VENTRICLE SYSTOLIC VOLUME INDEX: 24 ML/M2
LEFT VENTRICLE SYSTOLIC VOLUME: 49 ML
LEFT VENTRICULAR INTERNAL DIMENSION IN DIASTOLE: 5.3 CM (ref 3.5–6)
LEFT VENTRICULAR MASS: 138.3 G
LV LATERAL E/E' RATIO: 10
LV SEPTAL E/E' RATIO: 8.9
MV A" WAVE DURATION": 182.68 MS
MV PEAK A VEL: 0.87 M/S
MV PEAK E VEL: 0.8 M/S
OHS CV RV/LV RATIO: 0.79 CM
PISA TR MAX VEL: 2.5 M/S
PULM VEIN A" WAVE DURATION": 182.68 MS
PULM VEIN S/D RATIO: 1.89
PULMONIC VEIN PEAK A VELOCITY: 0.2 M/S
PV PEAK D VEL: 0.28 M/S
PV PEAK S VEL: 0.53 M/S
RA MAJOR: 5.43 CM
RA PRESSURE ESTIMATED: 3 MMHG
RA WIDTH: 4.74 CM
RIGHT ATRIAL AREA: 21 CM2
RIGHT VENTRICLE DIASTOLIC BASEL DIMENSION: 4.2 CM
RV TB RVSP: 6 MMHG
RV TISSUE DOPPLER FREE WALL SYSTOLIC VELOCITY 1 (APICAL 4 CHAMBER VIEW): 12.75 CM/S
SINUS: 3.22 CM
STJ: 2.9 CM
TDI LATERAL: 0.08 M/S
TDI SEPTAL: 0.09 M/S
TDI: 0.09 M/S
TRICUSPID ANNULAR PLANE SYSTOLIC EXCURSION: 2.5 CM
TV PEAK GRADIENT: 24 MMHG
TV REST PULMONARY ARTERY PRESSURE: 28 MMHG
Z-SCORE OF LEFT VENTRICULAR DIMENSION IN END DIASTOLE: -1.38
Z-SCORE OF LEFT VENTRICULAR DIMENSION IN END SYSTOLE: -0.72

## 2025-05-21 PROCEDURE — 93306 TTE W/DOPPLER COMPLETE: CPT

## 2025-05-21 PROCEDURE — 93306 TTE W/DOPPLER COMPLETE: CPT | Mod: 26,,, | Performed by: STUDENT IN AN ORGANIZED HEALTH CARE EDUCATION/TRAINING PROGRAM

## 2025-05-22 ENCOUNTER — RESULTS FOLLOW-UP (OUTPATIENT)
Dept: CARDIOLOGY | Facility: HOSPITAL | Age: 70
End: 2025-05-22

## 2025-05-22 ENCOUNTER — LAB VISIT (OUTPATIENT)
Dept: LAB | Facility: HOSPITAL | Age: 70
End: 2025-05-22
Payer: MEDICARE

## 2025-05-22 DIAGNOSIS — R55 NEAR SYNCOPE: ICD-10-CM

## 2025-05-22 DIAGNOSIS — R27.0 ATAXIA, UNSPECIFIED: ICD-10-CM

## 2025-05-22 DIAGNOSIS — R27.9 UNSPECIFIED LACK OF COORDINATION: ICD-10-CM

## 2025-05-22 DIAGNOSIS — R53.1 WEAKNESS: ICD-10-CM

## 2025-05-22 DIAGNOSIS — R29.898 RIGHT ARM WEAKNESS: ICD-10-CM

## 2025-05-22 DIAGNOSIS — R42 DIZZINESS: ICD-10-CM

## 2025-05-22 LAB — FOLATE SERPL-MCNC: 10.5 NG/ML (ref 4–24)

## 2025-05-22 PROCEDURE — 82941 ASSAY OF GASTRIN: CPT

## 2025-05-22 PROCEDURE — 82746 ASSAY OF FOLIC ACID SERUM: CPT

## 2025-05-22 PROCEDURE — 86340 INTRINSIC FACTOR ANTIBODY: CPT

## 2025-05-22 PROCEDURE — 36415 COLL VENOUS BLD VENIPUNCTURE: CPT

## 2025-05-22 PROCEDURE — 84425 ASSAY OF VITAMIN B-1: CPT

## 2025-05-23 ENCOUNTER — OFFICE VISIT (OUTPATIENT)
Facility: CLINIC | Age: 70
End: 2025-05-23
Payer: MEDICARE

## 2025-05-23 VITALS
BODY MASS INDEX: 26.61 KG/M2 | RESPIRATION RATE: 18 BRPM | TEMPERATURE: 98 F | WEIGHT: 190.06 LBS | OXYGEN SATURATION: 95 % | DIASTOLIC BLOOD PRESSURE: 61 MMHG | HEART RATE: 47 BPM | HEIGHT: 71 IN | SYSTOLIC BLOOD PRESSURE: 139 MMHG

## 2025-05-23 DIAGNOSIS — Z98.890 HISTORY OF BACK SURGERY: ICD-10-CM

## 2025-05-23 DIAGNOSIS — R53.83 FATIGUE, UNSPECIFIED TYPE: Primary | ICD-10-CM

## 2025-05-23 DIAGNOSIS — M54.16 LUMBAR RADICULOPATHY: ICD-10-CM

## 2025-05-23 DIAGNOSIS — R29.898 RIGHT LEG WEAKNESS: ICD-10-CM

## 2025-05-23 PROCEDURE — 99214 OFFICE O/P EST MOD 30 MIN: CPT | Mod: PBBFAC | Performed by: STUDENT IN AN ORGANIZED HEALTH CARE EDUCATION/TRAINING PROGRAM

## 2025-05-23 PROCEDURE — 99999 PR PBB SHADOW E&M-EST. PATIENT-LVL IV: CPT | Mod: PBBFAC,,, | Performed by: STUDENT IN AN ORGANIZED HEALTH CARE EDUCATION/TRAINING PROGRAM

## 2025-05-23 NOTE — PROGRESS NOTES
[facility] - New Patient Evaluation    Referred by: Radha Prasad   Reason for referral: Lumbar radiculopathy  Right leg weakness  History of back surgery     CC:   Chief Complaint   Patient presents with    Lumbar Spine Pain (L-Spine)    Cervical Spine Pain (C-spine)          No data to display                Subjective 05/23/2025:   Wero Spangler is a 69 y.o. male who presents complaining of generalized fatigue with activity.  Denies any pain, radiating or myelopathic symptoms.  States he did recently have a heart attack was placed into cardiac rehab.  Over the last 6-7 weeks he has noted some increased fatigue, usually brought on by activity.  Does report low heart rate, usually in the 30s to low 50s at baseline.    Initial Pain Assessment:  Location: intermittent discomfort due to possible bone fragment pressing on a nerve  - patients reports herniated disc surgery 25 years ago   Onset (Approx Date Pain started): 6-7 weeks ago   Current Pain Score: 0/10  Daily Pain of Range: 0-0/10  Quality: Dull  Radiation: no   Worsened by: exercise, standing for more than a few minutes, straining, twisting, and walking for more than a few minutes  Improved by: sitting, rest    Patient denies urinary incontinence, bowel incontinence, significant weight loss, significant motor weakness, and loss of sensations.      Previous Interventions:  - herniated disc surgery 25 years ago     Previous Therapies:  PT/OT: yes, 25 years ago after herniated disc surgery   Chiropractor:   HEP:   Relevant Surgery: yes, 25 years ago - herniated disc     Previous Medications:   - Tylenol or NSAIDS: Baby aspirin daily, Tylenol if needed to help sleep   - Muscle Relaxants:    - TCAs:   - SNRIs:   - Topicals:   - Anticonvulsants:    - Opioids:   - Adjuvants:     Current Pain Medications:  None      Anticoagulation: baby aspirin daily     Review of Systems:  ROS    GENERAL:  No weight loss, malaise or fevers.  NO  HEENT:   No recent changes in vision or hearing NO  NECK:  No difficulty with swallowing. No stridor. NO  RESPIRATORY:  Negative for cough, wheezing or shortness of breath, patient denies any recent URI. YES, sometimes SOB due to the intermittent fatigue   CARDIOVASCULAR:  Negative for chest pain, leg swelling or palpitations. NO  GI:  Negative for abdominal discomfort, blood in stools or black stools or change in bowel habits. NO  MUSCULOSKELETAL:  See HPI.  SKIN:  Negative for lesions, rash, and itching. YES, rash from taking aspirin and sunlight per Dr Pantoja in primary   PSYCH:  No mood disorder or recent psychosocial stressors.  NO   HEMATOLOGY/LYMPHOLOGY:  Negative for prolonged bleeding, bruising easily or swollen nodes.  Patient is not currently taking any anti-coagulants YES, baby aspirin daily, skin rash from daily use    NEURO:   No history of headaches, syncope, paralysis, seizures or tremors. NO  All other reviewed and negative other than HPI.    History:  Current medications, allergies, medical history, surgical history,   family history, and social history were reviewed in the chart as marked.    Full Medication List:  Current Medications[1]     Allergies:  Patient has no known allergies.     Medical History:   has a past medical history of Aftercataract, Allergy, BPH (benign prostatic hyperplasia), Cataract, Corneal dystrophy, Elevated PSA, Enlarged prostate, Fatty liver, Fuchs' corneal dystrophy, Gallstones, General anesthetics causing adverse effect in therapeutic use (2000), GERD (gastroesophageal reflux disease), Hearing loss (years ago), Heart attack, HTN (hypertension) (09/24/2013), Hypertension, Insomnia, Keloid cicatrix, Nasal polyps (years ago), Osteoarthritis (7 years ago), Prostate nodule, and Urinary tract infection.    Surgical History:   has a past surgical history that includes Eye surgery; Tonsillectomy; Cataract extraction w/  intraocular lens implant; Back surgery (04/2000);  "Prostate surgery; Vasectomy; Laparoscopic marsupialization of cyst of kidney; Corneal transplant (Right, 11/21/2019); repair, hernia, inguinal (Right, 07/09/2024); Cosmetic surgery (07/11/23); and Knee surgery (9 years ago).    Family History:  family history includes Cancer in his brother, mother, and other family members; Macular degeneration in his maternal grandmother; Prostate cancer in his brother.    Social History:   reports that he quit smoking about 25 years ago. His smoking use included cigarettes and cigars. He has a 1 pack-year smoking history. He has never used smokeless tobacco. He reports that he does not currently use alcohol. He reports that he does not use drugs.    Physical Exam:  Vitals:    05/23/25 1051   BP: 139/61   Pulse: (!) 47   Resp: 18   Temp: 98 °F (36.7 °C)   TempSrc: Oral   SpO2: 95%   Weight: 86.2 kg (190 lb 0.6 oz)   Height: 5' 11" (1.803 m)   PainSc: 0-No pain       GENERAL: Well appearing, in no acute distress, alert and oriented x3.  PSYCH:  Mood and affect appropriate.  SKIN: Skin color, texture, turgor normal, no rashes or lesions.  HEAD/FACE:  Normocephalic, atraumatic. Cranial nerves grossly intact.  NECK: Normal ROM. Supple.  No pain to palpation over the cervical paraspinous muscles. Spurling Negative. No pain with neck flexion, extension, or lateral rotation.   CV:  Bradycardic.  PULM: No evidence of respiratory difficulty, symmetric chest rise.  GI:  Soft and non-distended.  MSK: Straight leg raising is  negative to radicular pain. No pain to palpation over the facet joints of the lumbar spine. No pain with lumbar facet loading. No pain over the SI joints. Sacral Thrust is negative.  ANURADHA test is negative. No pain over the GTB bilaterally.  Normal range of motion of the lumbar spine without pain reproduction.  Peripheral joint ROM is full and pain free without obvious instability or laxity in all four extremities. No obvious deformities, edema, or skin discoloration.  No " atrophy or tone abnormalities are noted.   NEURO: Bilateral upper and lower extremity coordination and strength is symmetric.  No loss of sensation is noted. No clonus. Peralta negative.  MENTAL STATUS: A x O x 3, good concentration, speech is fluent and goal directed  MOTOR: 5/5 in all muscle groups  GAIT: Normal.l Ambulates unassisted.    Imaging:    MRI Lumbar Spine Without Contrast  Status: Final result     MyChart Results Release    MyChart Status: Active  Results Release        MRI Lumbar Spine Without Contrast (Order [id number])     MRI Lumbar Spine Without Contrast (Order [id number])  PACS Images for ViTAL [place] Viewer     Show images for MRI Lumbar Spine Without Contrast     MRI Lumbar Spine Without Contrast (Order [id number])  All Reviewers List    Ignacia Xavier on 5/21/2025 13:26   Radha Prasad PA-C on 5/21/2025 07:56     MRI Lumbar Spine Without Contrast  Order: [id number]   Status: Final result       Next appt: 05/28/2025 at 08:15 AM in Urology (Darren Yuen MD)       Dx: Lumbar radiculopathy; History of back...    Test Result Released: Yes (seen)       Messages: Seen    0 Result Notes       1 Patient Communication       View Follow-Up Encounter  Details    Reading Physician Reading Date Result Priority   Howard Razo Jr., MD  [phone]  5/19/2025 Ellis Elkins MD  [phone]  5/19/2025      Narrative & Impression  EXAMINATION:  MRI LUMBAR SPINE WITHOUT CONTRAST     CLINICAL HISTORY:  Lumbar radiculopathy, no red flags, no prior management;right leg weakness; Radiculopathy, lumbar region     TECHNIQUE:  Multiplanar, multisequence MR images were acquired from the thoracolumbar junction to the sacrum without the administration of contrast.     COMPARISON:  X-ray 01/09/2024, CT abdomen pelvis 01/16/2024     FINDINGS:  ALIGNMENT: Normal.     BONE: No compression fractures.  No marrow replacing lesions.  Low T1 signal in the iliac bones bilaterally corresponding with  areas of sclerosis seen on CT abdomen pelvis 01/16/2024.     JOINT: Disc desiccation and mild-moderate height loss at L5-S1.  Mild disc desiccation at other levels without significant height loss.  Facet arthropathy in the lower lumbar spine.  No bone marrow edema.     SPINAL CANAL: The conus medullaris has a normal appearance and terminates at the T12-L1 level.  Cauda equina nerve roots are unremarkable.  No mass or collection.     PARASPINAL SOFT TISSUES: Large right renal cyst.  Paraspinal muscle atrophy of the lower lumbar spine.     SIGNIFICANT FINDINGS BY LEVEL:     T12-L1: No spinal canal stenosis or neural foraminal narrowing.     L1-2: No spinal canal stenosis or neural foraminal narrowing.     L2-3: Circumferential disc bulge and ligamentum flavum thickening result in mild spinal canal stenosis and mild bilateral neural foraminal narrowing.     L3-4: Small circumferential disc bulge results in mild bilateral neural foraminal narrowing.     L4-5: Small circumferential disc bulge and facet arthropathy result in mild bilateral neural foraminal narrowing.     L5-S1: Circumferential disc bulge and advanced facet arthropathy.  There is a focal T1, T2 hyperintense abnormality in the right anterior epidural space which results in elevation of the posterior longitudinal ligament and corresponds with calcification seen on prior CT.  This causes effacement of the right lateral recess and mass effect on the descending right S1 nerve root.  There is also mild spinal canal stenosis and moderate-severe left and moderate right neural foraminal narrowing.     Impression:     Multilevel degenerative change of the lumbar spine as detailed above.  Focal abnormality in the right anterior epidural space at L5-S1 is favored to represent a chronically ossified sequestered disc fragment which results in effacement of the right lateral recess and mass effect on the descending right S1 nerve root.     Electronically signed by  resident: Ellis Arellano  Date:                                            05/19/2025  Time:                                           09:27     Electronically signed by:Howard Yuan Jr  Date:                                            05/19/2025  Time:                                           11:28        Exam Ended: 05/18/25 09:47 CDT Last Resulted: 05/19/25 11:28 CDT       Order Details        View Encounter        Lab and Collection Details        Routing        Result History     View All Conversations on this Encounter     Result Care Coordination      Patient Communication     Add Comments   Seen Back to Top    Your MRI shows degenerative (arthritis) changes at multiple levels of your lower spine with one area that's a  possible disc fragment that is pressing on a nerve. This could cause pain, numbness, tingling, weakness of right lower leg. I recommend follow up in our [facility]. I have placed the referral. Staff will call you to schedule Let me know of any questions.    Written by Radha Prasad PA-C on 5/21/2025  7:53 AM CDT  Seen by patient Wero Reaves Crys on 5/21/2025  9:42 AM       Encounters Related to Results    4/16/2025 Results Follow-Up  4/16/2025 Office Visit (Ordering Encounter)  5/18/2025 Hospital Encounter (Resulting Encounter) Back to Top          MRI Lumbar Spine Without Contrast: Patient Communication     Add Comments   Seen    Your MRI shows degenerative (arthritis) changes at multiple levels of your lower spine with one area that's a  possible disc fragment that is pressing on a nerve. This could cause pain, numbness, tingling, weakness of right lower leg. I recommend follow up in our [facility]. I have placed the referral. Staff will call you to schedule Let me know of any questions.    Written by Radha Prasad PA-C on 5/21/2025  7:53 AM CDT  Seen by patient Wero Reaves Spangler on 5/21/2025  9:42 AM  External Result Report    External Result Report         MRI Lumbar Spine Without Contrast (Order [id number])  Narrative & Impression    EXAMINATION:  MRI LUMBAR SPINE WITHOUT CONTRAST     CLINICAL HISTORY:  Lumbar radiculopathy, no red flags, no prior management;right leg weakness; Radiculopathy, lumbar region     TECHNIQUE:  Multiplanar, multisequence MR images were acquired from the thoracolumbar junction to the sacrum without the administration of contrast.     COMPARISON:  X-ray 01/09/2024, CT abdomen pelvis 01/16/2024     FINDINGS:  ALIGNMENT: Normal.     BONE: No compression fractures.  No marrow replacing lesions.  Low T1 signal in the iliac bones bilaterally corresponding with areas of sclerosis seen on CT abdomen pelvis 01/16/2024.     JOINT: Disc desiccation and mild-moderate height loss at L5-S1.  Mild disc desiccation at other levels without significant height loss.  Facet arthropathy in the lower lumbar spine.  No bone marrow edema.     SPINAL CANAL: The conus medullaris has a normal appearance and terminates at the T12-L1 level.  Cauda equina nerve roots are unremarkable.  No mass or collection.     PARASPINAL SOFT TISSUES: Large right renal cyst.  Paraspinal muscle atrophy of the lower lumbar spine.     SIGNIFICANT FINDINGS BY LEVEL:     T12-L1: No spinal canal stenosis or neural foraminal narrowing.     L1-2: No spinal canal stenosis or neural foraminal narrowing.     L2-3: Circumferential disc bulge and ligamentum flavum thickening result in mild spinal canal stenosis and mild bilateral neural foraminal narrowing.     L3-4: Small circumferential disc bulge results in mild bilateral neural foraminal narrowing.     L4-5: Small circumferential disc bulge and facet arthropathy result in mild bilateral neural foraminal narrowing.     L5-S1: Circumferential disc bulge and advanced facet arthropathy.  There is a focal T1, T2 hyperintense abnormality in the right anterior epidural space which results in elevation of the posterior longitudinal ligament and  corresponds with calcification seen on prior CT.  This causes effacement of the right lateral recess and mass effect on the descending right S1 nerve root.  There is also mild spinal canal stenosis and moderate-severe left and moderate right neural foraminal narrowing.     Impression:     Multilevel degenerative change of the lumbar spine as detailed above.  Focal abnormality in the right anterior epidural space at L5-S1 is favored to represent a chronically ossified sequestered disc fragment which results in effacement of the right lateral recess and mass effect on the descending right S1 nerve root.     Electronically signed by resident: Ellis Arellano  Date:                                            05/19/2025  Time:                                           09:27     Electronically signed by:Howard Yuan Jr  Date:                                            05/19/2025  Time:                                           11:28    Encounter    View Encounter             Signed by Resident    Ellis Arellano MD              Signed by    Signed Time Phone Pager   Howard Razo Jr., MD 5/19/2025 11:28 [phone]        Reviewed By    Ignacia Xavier on 5/21/2025 13:26   Radha Prasad PA-C on 5/21/2025 07:56           Exam Details    Performed Procedure Technologist Supporting Staff Performing Physician   MRI Lumbar Spine Without Contrast Joanne Coats, RT            Appointment Date/Status Modality Department    5/18/2025     Completed [facility] OI-MRI2 [facility] MRI IMAGING CENTER           Begin Exam End Exam Begin Exam Questionnaires End Exam Questionnaires   5/18/2025  9:03 AM CDT 5/18/2025  9:47 AM CDT MRI TECH NAVIGATOR QUESTIONS IMAGING END ALL              Reason for Exam  Priority: Routine  Lumbar radiculopathy, no red flags, no prior management; right leg weakness   Dx: Lumbar radiculopathy [M54.16 (ICD-10-CM)]; History of back surgery [Z98.890 (ICD-10-CM)]         Order Report     Order  Details          Reading Priority     Explore historical scores and model details   Recalculate   N/A  ED STAT: 1000 - 1200   IP STAT: 900 - 999   OP STAT: 800 - 899   ED: 600 - 799   Inpatient: 200 - 599   Outpatient: 100 - 199   Description  Priority in which an order should be read.  This model is designed to execute for all orders currently needing to be read.             MRI Cervical Spine Without Contrast  Status: Final result     MyChart Results Release    MyChart Status: Active  Results Release     PACS Images for Vi[facility] [place] Viewer     Show images for MRI Cervical Spine Without Contrast  All Reviewers List    Radha Prasad PA-C on 5/21/2025 07:56     MRI Cervical Spine Without Contrast  Order: [id number]   Status: Final result       Next appt: 05/28/2025 at 08:15 AM in Urology (Darren Yuen MD)       Dx: Right arm weakness    Test Result Released: Yes (seen)    0 Result Notes  Details    Reading Physician Reading Date Result Priority   Howard Razo Jr., MD  [phone]  5/19/2025 Routine     Narrative & Impression  EXAMINATION:  MRI CERVICAL SPINE WITHOUT CONTRAST     CLINICAL HISTORY:  right arm weakness; Other symptoms and signs involving the musculoskeletal system     TECHNIQUE:  Multiplanar, multisequence MR images of the cervical spine were performed without the administration of contrast.     COMPARISON:  X-ray 04/23/2023     FINDINGS:  Alignment: Normal.     Vertebrae: Normal marrow signal. No fracture.     Discs: Degenerative findings:     C2-C3: None     C3-C4: Uncovertebral spurring and posterior disc osteophyte complex.  Mild facet arthritis.  Moderate right and mild left foraminal stenosis.     C4-C5: Uncovertebral spurring, posterior disc osteophyte complex and facet arthritis, asymmetric to the right.  Moderate right and mild left foraminal stenosis.     C5-C6: Uncovertebral spurring, posterior disc osteophyte complex.  Moderate bilateral foraminal stenosis.     C6-C7:  OPLL posterior to the C6 vertebral body.  Posterior disc osteophyte complex and or OPLL resulting and moderate right and mild left foraminal stenosis.  Mild central canal stenosis at the level of the C6 vertebral body.     C7-T1: Facet arthritis.     T1-T2: None     Cord: Normal.     Skull base and craniocervical junction: Normal.     Paraspinal muscles & soft tissues: Unremarkable.     Impression:     Degenerative changes producing multilevel moderate right foraminal stenosis.     Mild C6 central canal stenosis.        Electronically signed by:Howard Yuan Jr  Date:                                            05/19/2025  Time:                                           09:39        Exam Ended: 05/18/25 09:47 CDT Last Resulted: 05/19/25 09:39 CDT       Order Details        View Encounter        Lab and Collection Details        Routing        Result History     View All Conversations on this Encounter     Result Care Coordination      Patient Communication     Add Comments   Seen Back to Top          MRI Cervical Spine Without Contrast: Patient Communication     Add Comments   Seen     External Result Report    External Result Report     Narrative & Impression    EXAMINATION:  MRI CERVICAL SPINE WITHOUT CONTRAST     CLINICAL HISTORY:  right arm weakness; Other symptoms and signs involving the musculoskeletal system     TECHNIQUE:  Multiplanar, multisequence MR images of the cervical spine were performed without the administration of contrast.     COMPARISON:  X-ray 04/23/2023     FINDINGS:  Alignment: Normal.     Vertebrae: Normal marrow signal. No fracture.     Discs: Degenerative findings:     C2-C3: None     C3-C4: Uncovertebral spurring and posterior disc osteophyte complex.  Mild facet arthritis.  Moderate right and mild left foraminal stenosis.     C4-C5: Uncovertebral spurring, posterior disc osteophyte complex and facet arthritis, asymmetric to the right.  Moderate right and mild left foraminal  stenosis.     C5-C6: Uncovertebral spurring, posterior disc osteophyte complex.  Moderate bilateral foraminal stenosis.     C6-C7: OPLL posterior to the C6 vertebral body.  Posterior disc osteophyte complex and or OPLL resulting and moderate right and mild left foraminal stenosis.  Mild central canal stenosis at the level of the C6 vertebral body.     C7-T1: Facet arthritis.     T1-T2: None     Cord: Normal.     Skull base and craniocervical junction: Normal.     Paraspinal muscles & soft tissues: Unremarkable.     Impression:     Degenerative changes producing multilevel moderate right foraminal stenosis.     Mild C6 central canal stenosis.        Electronically signed by:Howard Yuan Jr  Date:                                            05/19/2025  Time:                                           09:39    Encounter    View Encounter             Signed by    Signed Time Phone Pager   Howard Razo Jr., MD 5/19/2025 09:39 [phone]      Reviewed By    Radha Prasad PA-C on 5/21/2025 07:56           Exam Details    Performed Procedure Technologist Supporting Staff Performing Physician   MRI Cervical Spine Without Contrast Joanne Coats, RT            Appointment Date/Status Modality Department    5/18/2025     Completed [facility] OI-MRI2 [facility] MRI IMAGING CENTER           Begin Exam End Exam Begin Exam Questionnaires End Exam Questionnaires   5/18/2025  9:03 AM CDT 5/18/2025  9:47 AM CDT MRI TECH NAVIGATOR QUESTIONS IMAGING END ALL              Reason for Exam  Priority: Routine  right arm weakness   Dx: Right arm weakness [R29.898 (ICD-10-CM)]         Order Report     Order Details          Reading Priority     Explore historical scores and model details   Recalculate   N/A  ED STAT: 1000 - 1200   IP STAT: 900 - 999   OP STAT: 800 - 899   ED: 600 - 799   Inpatient: 200 - 599   Outpatient: 100 - 199   Description  Priority in which an order should be read.  This model is designed to execute for all  orders currently needing to be read.               Labs:  BMP  Lab Results   Component Value Date     04/30/2025    K 3.3 (L) 04/30/2025     04/30/2025    CO2 28 04/30/2025    BUN 16 04/30/2025    CREATININE 1.0 04/30/2025    CALCIUM 8.9 04/30/2025    ANIONGAP 9 04/30/2025    EGFRNORACEVR >60 04/30/2025     Lab Results   Component Value Date    ALT 41 04/30/2025    AST 20 04/30/2025    ALKPHOS 66 04/30/2025    BILITOT 1.5 (H) 04/30/2025     Lab Results   Component Value Date    WBC 6.55 05/12/2025    HGB 14.1 05/12/2025    HCT 42.6 05/12/2025    MCV 94 05/12/2025     (L) 05/12/2025           Assessment:  Problem List Items Addressed This Visit    None  Visit Diagnoses         Fatigue, unspecified type    -  Primary      Lumbar radiculopathy          Right leg weakness          History of back surgery                05/23/2025 - Wero Spangler is a 69 y.o. male who  has a past medical history of Aftercataract, Allergy, BPH (benign prostatic hyperplasia), Cataract, Corneal dystrophy, Elevated PSA, Enlarged prostate, Fatty liver, Fuchs' corneal dystrophy, Gallstones, General anesthetics causing adverse effect in therapeutic use (2000), GERD (gastroesophageal reflux disease), Hearing loss (years ago), Heart attack, HTN (hypertension) (09/24/2013), Hypertension, Insomnia, Keloid cicatrix, Nasal polyps (years ago), Osteoarthritis (7 years ago), Prostate nodule, and Urinary tract infection.  The patient presents with intermittent episodes of fatigue.  Denies any pain.  Exam today was normal aside from bradycardia noted with pulse rate in the 40s.  I am recommending that he follow back up with his PCP in his cardiologist.  Recommend that he check his blood pressure and pulse when he experiences an episode of fatigue.  He may benefit from a home blood pressure cuff.  I will reach out to his PCP with the recommendations.    Treatment Plan:   Procedures:  None indicated at this time.  PT/OT/HEP: I have  stressed the importance of physical activity and a home exercise plan to help with pain and improve health.  Medications:    - no new medications were prescribed at this visit.   -  Reviewed and consistent with medication use as prescribed.  Imaging:  Cervical and lumbar imaging was independently reviewed.  Discussed these findings with the patient.  I am recommending following up with his PCP and cardiologist.  I have recommended the patient obtain a home blood pressure cuff.  When he feels the fatigue symptoms coming on, I am recommending that he check his blood pressure and pulse.  Follow Up: RTC PRLINDA Patel, DO   Interventional Pain Medicine / Anesthesiology    Disclaimer: This note was partly generated using dictation software which may occasionally result in transcription errors.          [1]   Current Outpatient Medications:     alfuzosin (UROXATRAL) 10 mg Tb24, Take 1 tablet (10 mg total) by mouth once daily., Disp: 30 tablet, Rfl: 11    ALPRAZolam (XANAX) 0.5 MG tablet, TAKE 1 TABLET BY MOUTH EVERY NIGHT, Disp: 30 tablet, Rfl: 2    aspirin 81 MG Chew, Take 81 mg by mouth nightly., Disp: , Rfl:     atorvastatin (LIPITOR) 20 MG tablet, Take 1 tablet (20 mg total) by mouth once daily., Disp: 30 tablet, Rfl: 11    carvediloL (COREG) 6.25 MG tablet, Take 1 tablet (6.25 mg total) by mouth 2 (two) times daily with meals., Disp: 180 tablet, Rfl: 3    empagliflozin (JARDIANCE) 10 mg tablet, Take 1 tablet (10 mg total) by mouth once daily., Disp: 30 tablet, Rfl: 11    finasteride (PROSCAR) 5 mg tablet, Take 1 tablet (5 mg total) by mouth once daily., Disp: 30 tablet, Rfl: 11    fluticasone propionate (FLONASE) 50 mcg/actuation nasal spray, 2 sprays (100 mcg total) by Each Nostril route once daily., Disp: 16 g, Rfl: 0    lansoprazole (PREVACID) 15 MG capsule, TAKE 1 CAPSULE(15 MG) BY MOUTH EVERY DAY, Disp: 90 capsule, Rfl: 3    magnesium aspartate HCl 61 mg (615 mg) TbEC, Take by mouth once., Disp: ,  Rfl:     nitroGLYCERIN (NITROSTAT) 0.4 MG SL tablet, Place 1 tablet (0.4 mg total) under the tongue every 5 (five) minutes as needed for Chest pain., Disp: 90 tablet, Rfl: 3    sodium chloride 2% (BARBI 128) 2 % ophthalmic solution, 1 drop as needed (takes 3-4 times/day)., Disp: , Rfl:     methylPREDNISolone (MEDROL, SRIKANTH,) 4 mg tablet, use as directed, Disp: 1 each, Rfl: 0

## 2025-05-24 LAB
ANNOTATION COMMENT IMP: NORMAL
GASTRIN SERPL-MCNC: 338 PG/ML
IF BLOCK AB SER QL: NEGATIVE
VIT B12 SERPL-MCNC: 106 NG/L (ref 180–914)

## 2025-05-26 ENCOUNTER — RESULTS FOLLOW-UP (OUTPATIENT)
Dept: NEUROLOGY | Facility: CLINIC | Age: 70
End: 2025-05-26
Payer: MEDICARE

## 2025-05-26 ENCOUNTER — TELEPHONE (OUTPATIENT)
Facility: CLINIC | Age: 70
End: 2025-05-26
Payer: MEDICARE

## 2025-05-27 ENCOUNTER — LAB VISIT (OUTPATIENT)
Dept: LAB | Facility: HOSPITAL | Age: 70
End: 2025-05-27
Attending: UROLOGY
Payer: MEDICARE

## 2025-05-27 DIAGNOSIS — R97.20 ELEVATED PSA: ICD-10-CM

## 2025-05-27 DIAGNOSIS — R97.20 ELEVATED PSA: Primary | ICD-10-CM

## 2025-05-27 LAB — PSA SERPL-MCNC: 9.09 NG/ML

## 2025-05-27 PROCEDURE — 36415 COLL VENOUS BLD VENIPUNCTURE: CPT

## 2025-05-27 PROCEDURE — 84153 ASSAY OF PSA TOTAL: CPT

## 2025-05-28 ENCOUNTER — OFFICE VISIT (OUTPATIENT)
Dept: UROLOGY | Facility: CLINIC | Age: 70
End: 2025-05-28
Payer: MEDICARE

## 2025-05-28 VITALS
DIASTOLIC BLOOD PRESSURE: 73 MMHG | BODY MASS INDEX: 26.66 KG/M2 | WEIGHT: 191.13 LBS | SYSTOLIC BLOOD PRESSURE: 132 MMHG | HEART RATE: 55 BPM

## 2025-05-28 DIAGNOSIS — N40.1 BPH WITH URINARY OBSTRUCTION: ICD-10-CM

## 2025-05-28 DIAGNOSIS — I10 PRIMARY HYPERTENSION: ICD-10-CM

## 2025-05-28 DIAGNOSIS — E78.5 DYSLIPIDEMIA: ICD-10-CM

## 2025-05-28 DIAGNOSIS — N13.8 BPH WITH URINARY OBSTRUCTION: ICD-10-CM

## 2025-05-28 DIAGNOSIS — R97.20 ELEVATED PSA: Primary | ICD-10-CM

## 2025-05-28 DIAGNOSIS — N52.01 ERECTILE DYSFUNCTION DUE TO ARTERIAL INSUFFICIENCY: ICD-10-CM

## 2025-05-28 DIAGNOSIS — N40.2 PROSTATE NODULE: ICD-10-CM

## 2025-05-28 PROCEDURE — 99214 OFFICE O/P EST MOD 30 MIN: CPT | Mod: S$PBB,,, | Performed by: UROLOGY

## 2025-05-28 PROCEDURE — 99213 OFFICE O/P EST LOW 20 MIN: CPT | Mod: PBBFAC | Performed by: UROLOGY

## 2025-05-28 PROCEDURE — 99999 PR PBB SHADOW E&M-EST. PATIENT-LVL III: CPT | Mod: PBBFAC,,, | Performed by: UROLOGY

## 2025-05-28 RX ORDER — ALFUZOSIN HYDROCHLORIDE 10 MG/1
10 TABLET, EXTENDED RELEASE ORAL DAILY
Qty: 30 TABLET | Refills: 11 | Status: SHIPPED | OUTPATIENT
Start: 2025-05-28 | End: 2026-05-23

## 2025-05-28 NOTE — PROGRESS NOTES
CHIEF COMPLAINT:    Mr. Spangler is a 69 y.o. male presenting with an elevated PSA.    PRESENTING ILLNESS:    Wero Spangler is a 69 y.o. male with an elevated PSA.  He has had multiple biopsies done.  One was in 2012 when his PSA was 10.14.  There was also a prostate nodule at that time.  Most recent one was 8/23/16 when his PSA was 18.1.  This was a cognitive fusion biopsy.    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  Path returned an oncocytoma.    He also has LUTS.  He's s/p rezum on 5/14/19.  He's voiding much better.  Good FOS. However, he does c/o irritative symptoms that are worsened by caffeine and alcohol.  Nocturia has increased to every 2 hours.  Currently on uroxatral and proscar.  Was started on proscar at our last visit, but he notices no changes in his voiding since starting it.    He c/o ED.  He's tried Cialis with good results, but he c/o some decreased sensation.  His T is normal.  He is s/p an angioplasty and now has an Rx for NTG.    REVIEW OF SYSTEMS:    Wero Spangler denies chest pain,sore throat, headache, blurred vision, fever, nausea, vomiting, chills, flank discomfort, abdominal pain, bleeding per rectum, cough, SOB, recent loss of consciousness, recent mental status changes, seizures, dizziness, or upper or lower extremity weakness.    JOSE MANUEL  1. 2  2. 1  3. 0  4. 1  5. 0     PATIENT HISTORY:    Past Medical History:   Diagnosis Date    Aftercataract     Allergy     BPH (benign prostatic hyperplasia)     Cataract     Corneal dystrophy     Elevated PSA     Enlarged prostate     Fatty liver     Fuchs' corneal dystrophy     Gallstones     General anesthetics causing adverse effect in therapeutic use 2000    delayed emergence after back surgery    GERD (gastroesophageal reflux disease)     Hearing loss years ago    Heart attack     HTN (hypertension) 09/24/2013    Hypertension     Insomnia     Keloid cicatrix     Nasal polyps years ago    Osteoarthritis 7 years ago     Prostate nodule     Urinary tract infection        Past Surgical History:   Procedure Laterality Date    BACK SURGERY  2000    CATARACT EXTRACTION W/  INTRAOCULAR LENS IMPLANT      Both Eyes    CORNEAL TRANSPLANT Right 2019    Procedure: TRANSPLANT, CORNEA;  Surgeon: Vu Wilson MD;  Location: [facility];  Service: Ophthalmology;  Laterality: Right;  DMEK    COSMETIC SURGERY  23    EYE SURGERY      KNEE SURGERY  9 years ago    LAPAROSCOPIC MARSUPIALIZATION OF CYST OF KIDNEY      PROSTATE SURGERY      prostate biopsy benign    REPAIR, HERNIA, INGUINAL Right 2024    Procedure: REPAIR, HERNIA, INGUINAL, with Mesh;  Surgeon: Sudeep Sen MD;  Location: [address];  Service: General;  Laterality: Right;    TONSILLECTOMY      VASECTOMY         Family History   Problem Relation Name Age of Onset    Cancer Mother          breast    Prostate cancer Brother prostate     Cancer Brother prostate         prostate    Macular degeneration Maternal Grandmother      Cancer Other      Cancer Other      Amblyopia Neg Hx      Blindness Neg Hx      Cataracts Neg Hx      Glaucoma Neg Hx      Retinal detachment Neg Hx      Strabismus Neg Hx         Social History     Socioeconomic History    Marital status:    Tobacco Use    Smoking status: Former     Current packs/day: 0.00     Average packs/day: 0.3 packs/day for 4.0 years (1.0 ttl pk-yrs)     Types: Cigarettes, Cigars     Quit date:      Years since quittin.4    Smokeless tobacco: Never   Substance and Sexual Activity    Alcohol use: Not Currently    Drug use: No    Sexual activity: Yes     Partners: Female     Social Drivers of Health     Financial Resource Strain: Low Risk  (2025)    Overall Financial Resource Strain (CARDIA)     Difficulty of Paying Living Expenses: Not very hard   Food Insecurity: No Food Insecurity (2025)    Hunger Vital Sign     Worried About Running Out of Food in the Last Year: Never true     Ran Out of  Food in the Last Year: Never true   Transportation Needs: No Transportation Needs (4/14/2025)    PRAPARE - Transportation     Lack of Transportation (Medical): No     Lack of Transportation (Non-Medical): No   Physical Activity: Sufficiently Active (4/14/2025)    Exercise Vital Sign     Days of Exercise per Week: 7 days     Minutes of Exercise per Session: 150+ min   Stress: Stress Concern Present (4/14/2025)    [employer] - Occupational Stress Questionnaire     Feeling of Stress : To some extent   Housing Stability: Low Risk  (4/14/2025)    Housing Stability Vital Sign     Unable to Pay for Housing in the Last Year: No     Homeless in the Last Year: No       Allergies:  Patient has no known allergies.    Medications:    Current Outpatient Medications:     alfuzosin (UROXATRAL) 10 mg Tb24, Take 1 tablet (10 mg total) by mouth once daily., Disp: 30 tablet, Rfl: 11    ALPRAZolam (XANAX) 0.5 MG tablet, TAKE 1 TABLET BY MOUTH EVERY NIGHT, Disp: 30 tablet, Rfl: 2    aspirin 81 MG Chew, Take 81 mg by mouth nightly., Disp: , Rfl:     atorvastatin (LIPITOR) 20 MG tablet, Take 1 tablet (20 mg total) by mouth once daily., Disp: 30 tablet, Rfl: 11    carvediloL (COREG) 6.25 MG tablet, Take 1 tablet (6.25 mg total) by mouth 2 (two) times daily with meals., Disp: 180 tablet, Rfl: 3    empagliflozin (JARDIANCE) 10 mg tablet, Take 1 tablet (10 mg total) by mouth once daily., Disp: 30 tablet, Rfl: 11    finasteride (PROSCAR) 5 mg tablet, Take 1 tablet (5 mg total) by mouth once daily., Disp: 30 tablet, Rfl: 11    fluticasone propionate (FLONASE) 50 mcg/actuation nasal spray, 2 sprays (100 mcg total) by Each Nostril route once daily., Disp: 16 g, Rfl: 0    lansoprazole (PREVACID) 15 MG capsule, TAKE 1 CAPSULE(15 MG) BY MOUTH EVERY DAY, Disp: 90 capsule, Rfl: 3    magnesium aspartate HCl 61 mg (615 mg) TbEC, Take by mouth once., Disp: , Rfl:     nitroGLYCERIN (NITROSTAT) 0.4 MG SL tablet, Place 1 tablet  (0.4 mg total) under the tongue every 5 (five) minutes as needed for Chest pain., Disp: 90 tablet, Rfl: 3    sodium chloride 2% (BARBI 128) 2 % ophthalmic solution, 1 drop as needed (takes 3-4 times/day)., Disp: , Rfl:     methylPREDNISolone (MEDROL, SRIKANTH,) 4 mg tablet, use as directed, Disp: 1 each, Rfl: 0    PHYSICAL EXAMINATION:    The patient generally appears in good health, is appropriately interactive, and is in no apparent distress.     Eyes: anicteric sclerae, moist conjunctivae; no lid-lag; PERRLA     HENT: Atraumatic; oropharynx clear with moist mucous membranes and no mucosal ulcerations;normal hard and soft palate.  No evidence of lymphadenopathy.    Neck: Trachea midline.  No thyromegaly.    Musculoskeletal: No abnormal gait.    Skin: No lesions.    Mental: Cooperative with normal affect.  Is oriented to time, place, and person.    Neuro: Grossly intact.    Chest: Normal inspiratory effort.   No accessory muscles.  No audible wheezes.  Respirations symmetric on inspiration and expiration.    Heart: Regular rhythm.      Abdomen:  Soft, non-tender. No masses or organomegaly. Bladder is not palpable. No evidence of flank discomfort. No evidence of inguinal hernia.    Genitourinary: The penis is not circumcised with no evidence of plaques or induration. The urethral meatus is normal. The testes, epididymides, and cord structures are normal in size and contour bilaterally. The scrotum is normal in size and contour.    Normal anal sphincter tone. No rectal mass.    The prostate is 40 g. Normal landmarks. Lateral sulci. Median furrow intact. There is a 1.5 cm x 1.5 cm nodule in the midportion of the gland. Stable.  Seminal vesicles are normal.    Extremities: No clubbing, cyanosis, or edema      LABS:    Started proscar 11/2024  Lab Results   Component Value Date    PSA 9.09 (H) 05/27/2025    PSA 14.0 (H) 09/22/2014    PSA 15.48 (H) 08/16/2013    PSADIAG 23.6 (H) 11/26/2024    PSADIAG 18.1 (H) 05/01/2024     PSADIAG 13.3 (H) 10/27/2023    PSATOTAL 6.8 (H) 07/12/2011    PSATOTAL 7.9 (H) 01/11/2011    PSAFREE 1.07 07/12/2011    PSAFREE 1.67 (H) 01/11/2011    PSAFREEPCT 15.74 07/12/2011    PSAFREEPCT 21.14 01/11/2011        IMPRESSION:    Encounter Diagnoses   Name Primary?    Elevated PSA Yes    BPH with urinary obstruction     Prostate nodule     Erectile dysfunction due to arterial insufficiency     Primary hypertension     Dyslipidemia    HTN, stable    PLAN:    1. Discussed options for his LUTS.  Continue uroxatral.  Can stop proscar as he's not getting a benefit. Discussed that this will double his PSA.  2. Discussed options for his ED (affected by above comorbidities). Stop Cialis given his active Rx for NTG.  He'd like to think about [facility].  3.  Went over his PSA.    4. RTC 6 months with a PSA.     Copy to:

## 2025-05-28 NOTE — TELEPHONE ENCOUNTER
1---Pt c/o swelling BLE, left more than right, resolves overnight. He denies SOB.  2---Pt c/o frequency to his urologist and was told to consult mia lebron on the frequency. He says that he now has to go to bathroom while shopping etc... He admits to feeling better possibly due to recently starting B12 supplementation after finding out he had deficiency.    Please advise,

## 2025-05-29 LAB — W VITAMIN B1: 59 UG/L

## 2025-05-30 ENCOUNTER — CLINICAL SUPPORT (OUTPATIENT)
Dept: CARDIOLOGY | Facility: HOSPITAL | Age: 70
End: 2025-05-30
Attending: STUDENT IN AN ORGANIZED HEALTH CARE EDUCATION/TRAINING PROGRAM
Payer: MEDICARE

## 2025-05-30 DIAGNOSIS — I49.3 FREQUENT PVCS: ICD-10-CM

## 2025-05-30 DIAGNOSIS — R42 DIZZINESS: ICD-10-CM

## 2025-05-30 PROCEDURE — 93227 XTRNL ECG REC<48 HR R&I: CPT | Mod: ,,, | Performed by: STUDENT IN AN ORGANIZED HEALTH CARE EDUCATION/TRAINING PROGRAM

## 2025-05-30 PROCEDURE — 93226 XTRNL ECG REC<48 HR SCAN A/R: CPT

## 2025-06-04 LAB
OHS CV EVENT MONITOR DAY: 0
OHS CV HOLTER LENGTH DECIMAL HOURS: 47.98
OHS CV HOLTER LENGTH HOURS: 47
OHS CV HOLTER LENGTH MINUTES: 59
OHS CV HOLTER SINUS AVERAGE HR: 55
OHS CV HOLTER SINUS MAX HR: 79
OHS CV HOLTER SINUS MIN HR: 43

## 2025-06-09 ENCOUNTER — HOSPITAL ENCOUNTER (OUTPATIENT)
Dept: CARDIOLOGY | Facility: HOSPITAL | Age: 70
Discharge: HOME | End: 2025-06-09
Attending: NEUROLOGICAL SURGERY
Payer: MEDICARE

## 2025-06-09 DIAGNOSIS — R55 NEAR SYNCOPE: ICD-10-CM

## 2025-06-09 DIAGNOSIS — R53.1 WEAKNESS: ICD-10-CM

## 2025-06-09 DIAGNOSIS — R42 DIZZINESS: ICD-10-CM

## 2025-06-09 DIAGNOSIS — R29.898 RIGHT ARM WEAKNESS: ICD-10-CM

## 2025-06-09 LAB
LEFT CBA DIAS: 18 CM/S
LEFT CBA SYS: 82 CM/S
LEFT CCA DIST DIAS: 24 CM/S
LEFT CCA DIST SYS: 108 CM/S
LEFT CCA MID DIAS: 23 CM/S
LEFT CCA MID SYS: 109 CM/S
LEFT CCA PROX DIAS: 20 CM/S
LEFT CCA PROX SYS: 107 CM/S
LEFT ECA DIAS: 8 CM/S
LEFT ECA SYS: 88 CM/S
LEFT ICA DIST DIAS: 29 CM/S
LEFT ICA DIST SYS: 96 CM/S
LEFT ICA MID DIAS: 21 CM/S
LEFT ICA MID SYS: 111 CM/S
LEFT ICA PROX DIAS: 13 CM/S
LEFT ICA PROX SYS: 63 CM/S
LEFT VERTEBRAL DIAS: 18 CM/S
LEFT VERTEBRAL SYS: 61 CM/S
OHS CV CAROTID RIGHT ICA EDV HIGHEST: 26
OHS CV CAROTID ULTRASOUND LEFT ICA/CCA RATIO: 1.03
OHS CV CAROTID ULTRASOUND RIGHT ICA/CCA RATIO: 1.01
OHS CV PV CAROTID LEFT HIGHEST CCA: 109
OHS CV PV CAROTID LEFT HIGHEST ICA: 111
OHS CV PV CAROTID RIGHT HIGHEST CCA: 111
OHS CV PV CAROTID RIGHT HIGHEST ICA: 97
OHS CV US CAROTID LEFT HIGHEST EDV: 29
RIGHT ARM DIASTOLIC BLOOD PRESSURE: 63 MMHG
RIGHT ARM SYSTOLIC BLOOD PRESSURE: 134 MMHG
RIGHT CBA DIAS: 15 CM/S
RIGHT CBA SYS: 84 CM/S
RIGHT CCA DIST DIAS: 22 CM/S
RIGHT CCA DIST SYS: 96 CM/S
RIGHT CCA MID DIAS: 16 CM/S
RIGHT CCA MID SYS: 111 CM/S
RIGHT CCA PROX DIAS: 13 CM/S
RIGHT CCA PROX SYS: 108 CM/S
RIGHT ECA DIAS: 10 CM/S
RIGHT ECA SYS: 134 CM/S
RIGHT ICA DIST DIAS: 23 CM/S
RIGHT ICA DIST SYS: 85 CM/S
RIGHT ICA MID DIAS: 26 CM/S
RIGHT ICA MID SYS: 97 CM/S
RIGHT ICA PROX DIAS: 15 CM/S
RIGHT ICA PROX SYS: 89 CM/S
RIGHT VERTEBRAL DIAS: 12 CM/S
RIGHT VERTEBRAL SYS: 43 CM/S

## 2025-06-09 PROCEDURE — 93880 EXTRACRANIAL BILAT STUDY: CPT

## 2025-06-11 ENCOUNTER — PATIENT MESSAGE (OUTPATIENT)
Facility: CLINIC | Age: 70
End: 2025-06-11
Payer: MEDICARE

## 2025-06-13 ENCOUNTER — TELEPHONE (OUTPATIENT)
Dept: CARDIOLOGY | Facility: CLINIC | Age: 70
End: 2025-06-13
Payer: MEDICARE

## 2025-06-13 NOTE — TELEPHONE ENCOUNTER
Dr Nicolas responded per secure messaging that it was up to pt if it made him feeling bad and that pt should f/u in July. Pt was updated and verbalized understanding. He was scheduled on 7/7 w. Ms Murrell after seeing EP, and told to arrive 30 mn earlly. He verbalized understanding. and agreed to date/time of appointment(s).

## 2025-06-13 NOTE — TELEPHONE ENCOUNTER
----- Message from KUSH Keller sent at 6/13/2025 10:55 AM CDT -----  Regarding: Jardiance  Pt called me thinking I had called him. While on the phone he told me that he stopped the Jardiance altogether due to frequent urination, cannot leave the house. He has an enlarged prostate and says that issue is likely compounded by taking uroxatral. Saw urology end of last month.  Denies SOB denies swelling.    Please advise,

## 2025-06-17 ENCOUNTER — HOSPITAL ENCOUNTER (OUTPATIENT)
Dept: RADIOLOGY | Facility: HOSPITAL | Age: 70
Discharge: HOME | End: 2025-06-17
Attending: NEUROLOGICAL SURGERY
Payer: MEDICARE

## 2025-06-17 DIAGNOSIS — R53.1 WEAKNESS: ICD-10-CM

## 2025-06-17 DIAGNOSIS — R29.898 RIGHT ARM WEAKNESS: ICD-10-CM

## 2025-06-17 DIAGNOSIS — R27.0 ATAXIA, UNSPECIFIED: ICD-10-CM

## 2025-06-17 DIAGNOSIS — R55 NEAR SYNCOPE: ICD-10-CM

## 2025-06-17 PROCEDURE — 93893 TCD STD ICR ART VEN-ART SHNT: CPT | Mod: TC

## 2025-06-17 NOTE — NURSING
patient arrived for scheduled outpatient Bubble Study in NAD, patient identification verified X 2, allergies reviewed, 20G IV started to right AC using aseptic technique, IV with good blood return and flushes easily, 9 ml NS, 1 ml air and a few drops of blood agitated and injected X 2 per TCD tech's request, IV discontinued without difficulty, catheter tip intact, patient tolerated study well, patient discharged in NAD.

## 2025-06-18 DIAGNOSIS — M17.11 PRIMARY OSTEOARTHRITIS OF RIGHT KNEE: Primary | ICD-10-CM

## 2025-06-19 ENCOUNTER — OFFICE VISIT (OUTPATIENT)
Dept: ORTHOPEDICS | Facility: CLINIC | Age: 70
End: 2025-06-19
Payer: MEDICARE

## 2025-06-19 VITALS — WEIGHT: 191.38 LBS | BODY MASS INDEX: 26.79 KG/M2 | HEIGHT: 71 IN

## 2025-06-19 DIAGNOSIS — M17.11 PRIMARY OSTEOARTHRITIS OF RIGHT KNEE: Primary | ICD-10-CM

## 2025-06-19 PROCEDURE — 99213 OFFICE O/P EST LOW 20 MIN: CPT | Mod: PBBFAC | Performed by: PHYSICIAN ASSISTANT

## 2025-06-19 PROCEDURE — 99999 PR PBB SHADOW E&M-EST. PATIENT-LVL III: CPT | Mod: PBBFAC,,, | Performed by: PHYSICIAN ASSISTANT

## 2025-06-19 PROCEDURE — 99999PBSHW PR PBB SHADOW TECHNICAL ONLY FILED TO HB: Mod: JZ,PBBFAC,,

## 2025-06-19 PROCEDURE — 20610 DRAIN/INJ JOINT/BURSA W/O US: CPT | Mod: PBBFAC | Performed by: PHYSICIAN ASSISTANT

## 2025-06-19 NOTE — PROGRESS NOTES
Wero Spangler is a 69 y.o. year old his here today for his 1st Euflexxa injection for degenerative changes of his right knee . he was last seen and treated in the clinic on 5/16/2024. There has been no significant change in his medical status since his last visit. No Fever, chills, malaise, or unexplained weight change.      Allergies, Medications, past medical and surgical history were reviewed .    Examination of the knee demonstrates  No evidence of edema, erythema , echymosis strength and range of motion are unchanged from previous visit.    The risks, benefits, pros, cons, and potential side effects of the procedure were discussed with the patient in detail all questions were answered.  The patient is comfortable and willing to proceed with the procedure. Verbal consent was obtained and the proper joint was identified by the patient and provider.     The injection site was identified and the skin was prepared with a chlorhexidine solution. The  right knee  joint was injected with 2 ml of Euflexxa solution under sterile technique. Wero Spangler tolerated the procedure well, he was advised to rest the knee today, ice and elevation. I may take 3 -6 weeks following the last injection to get the full benefit of the medication.  I will see him back in 1 week. Sooner if he has any problems or concerns.           .     ICD-10-CM ICD-9-CM   1. Primary osteoarthritis of right knee  M17.11 715.16

## 2025-06-23 ENCOUNTER — PATIENT MESSAGE (OUTPATIENT)
Facility: CLINIC | Age: 70
End: 2025-06-23
Payer: MEDICARE

## 2025-06-26 ENCOUNTER — OFFICE VISIT (OUTPATIENT)
Dept: ORTHOPEDICS | Facility: CLINIC | Age: 70
End: 2025-06-26
Payer: MEDICARE

## 2025-06-26 VITALS — WEIGHT: 191.38 LBS | HEIGHT: 71 IN | BODY MASS INDEX: 26.79 KG/M2

## 2025-06-26 DIAGNOSIS — M17.11 PRIMARY OSTEOARTHRITIS OF RIGHT KNEE: Primary | ICD-10-CM

## 2025-06-26 PROCEDURE — 20610 DRAIN/INJ JOINT/BURSA W/O US: CPT | Mod: PBBFAC | Performed by: PHYSICIAN ASSISTANT

## 2025-06-26 PROCEDURE — 99999 PR PBB SHADOW E&M-EST. PATIENT-LVL III: CPT | Mod: PBBFAC,,, | Performed by: PHYSICIAN ASSISTANT

## 2025-06-26 PROCEDURE — 99999PBSHW PR PBB SHADOW TECHNICAL ONLY FILED TO HB: Mod: JZ,PBBFAC,,

## 2025-06-26 PROCEDURE — 99213 OFFICE O/P EST LOW 20 MIN: CPT | Mod: PBBFAC | Performed by: PHYSICIAN ASSISTANT

## 2025-06-26 RX ADMIN — Medication 20 MG: at 09:06

## 2025-06-26 NOTE — PROGRESS NOTES
Wero Spangler is a 69 y.o. year old his here today for his 2nd Euflexxa injection for degenerative changes of his right knee . he was last seen and treated in the clinic on 6/19/2025. There has been no significant change in his medical status since his last visit. No Fever, chills, malaise, or unexplained weight change.      Allergies, Medications, past medical and surgical history were reviewed .    Examination of the knee demonstrates  No evidence of edema, erythema , echymosis strength and range of motion are unchanged from previous visit.    The risks, benefits, pros, cons, and potential side effects of the procedure were discussed with the patient in detail all questions were answered.  The patient is comfortable and willing to proceed with the procedure. Verbal consent was obtained and the proper joint was identified by the patient and provider.     The injection site was identified and the skin was prepared with a chlorhexidine solution. The  right knee  joint was injected with 2 ml of Euflexxa solution under sterile technique. Wero Spangler tolerated the procedure well, he was advised to rest the knee today, ice and elevation. I may take 3 -6 weeks following the last injection to get the full benefit of the medication.  I will see him back in 1 week. Sooner if he has any problems or concerns.           .     ICD-10-CM ICD-9-CM   1. Primary osteoarthritis of right knee  M17.11 715.16

## 2025-07-01 PROBLEM — I45.89 CHRONOTROPIC INCOMPETENCE: Status: ACTIVE | Noted: 2025-07-01

## 2025-07-01 NOTE — PROGRESS NOTES
Subjective     HPI    I had the pleasure of seeing Wero Spangler, a 69-year-old male, in consultation at your request for the evaluation of ventricular ectopy. He is a former patient of Dr. Carvajal, last seen in 2022.  The patient has a significant medical history including hypertension, hyperlipidemia, benign prostatic hyperplasia with elevated PSA, bilateral knee osteoarthritis, cataracts, gallstones, and GERD. His history of PVCs dates back to 2021, when frequent ectopy was observed during a hospitalization for COVID pneumonia. At that time, a transthoracic echocardiogram (2021) demonstrated a left ventricular ejection fraction (LVEF) of 55%, and a 48-hour Holter monitor revealed a PVC burden of 4.2%.  He subsequently suffered an NSTEMI in 2024, for which he underwent percutaneous coronary intervention (PCI).  In recent months, Mr. Spangler has reported progressive fatigue and worsening dyspnea on exertion (SIERRA), particularly noticeable at the end of his workouts. An ECG in May 2025 demonstrated occasional PVCs, prompting further evaluation. A repeat echocardiogram in May 2025 showed an EF of 60-65%, and a 48-hour Holter monitor revealed sinus rhythm with an average HR of 55 bpm (range 43-79 bpm), PVC burden 0%, and 5 patient-reported symptoms associated with sinus bradycardia between 52-58 bpm.  Notably, the patient has ceased exercising over the past 8-10 weeks due to symptoms, though he denies any further episodes over the past couple of weeks.  Todays 12-lead ECG, which I personally reviewed and interpreted, shows sinus rhythm with right bundle branch block (RBBB) at 55 bpm.    REVIEW OF STUDIES  All relevant studies were personally reviewed by me, includin-hour Holter monitor (2025): Sinus rhythm, average HR 55 bpm, no significant ventricular ectopy, symptoms correlated with physiologic sinus bradycardia.  ECG (today): Sinus rhythm, RBBB, HR 55 bpm, no acute ischemic  changes.  Echocardiogram (5/2025): LVEF 60-65%, no regional wall motion abnormalities.  Prior Holter (2/2021) and Echo (2/2021) reviewed and consistent with earlier lower PVC burden and preserved EF.    Review of Systems   Constitutional: Negative for decreased appetite, malaise/fatigue, weight gain and weight loss.   HENT:  Negative for sore throat.    Eyes:  Negative for blurred vision.   Cardiovascular:  Negative for chest pain, dyspnea on exertion, irregular heartbeat, leg swelling, near-syncope, orthopnea, palpitations, paroxysmal nocturnal dyspnea and syncope.   Respiratory:  Negative for shortness of breath.    Skin:  Negative for rash.   Musculoskeletal:  Negative for arthritis.   Gastrointestinal:  Negative for abdominal pain.   Neurological:  Negative for focal weakness.   Psychiatric/Behavioral:  Negative for altered mental status.           Objective     Physical Exam  Vitals and nursing note reviewed.   Constitutional:       General: He is not in acute distress.     Appearance: He is well-developed.   HENT:      Head: Normocephalic and atraumatic.   Eyes:      General: No scleral icterus.     Pupils: Pupils are equal, round, and reactive to light.   Neck:      Thyroid: No thyromegaly.   Cardiovascular:      Rate and Rhythm: Regular rhythm.      Pulses: Normal pulses.      Heart sounds: Normal heart sounds. No murmur heard.     No friction rub. No gallop.   Pulmonary:      Effort: Pulmonary effort is normal.      Breath sounds: Normal breath sounds.   Abdominal:      General: Bowel sounds are normal. There is no distension.      Palpations: Abdomen is soft.      Tenderness: There is no abdominal tenderness.   Musculoskeletal:      Cervical back: Neck supple.   Skin:     General: Skin is warm and dry.      Findings: No rash.   Neurological:      Mental Status: He is alert and oriented to person, place, and time.   Psychiatric:         Behavior: Behavior normal.            Assessment and Plan     1.  Chronotropic incompetence    2. Primary hypertension    3. Dyslipidemia        Plan:    Wero Spangler is a 69-year-old male with a history of ventricular ectopy (PVCs), previously low burden and asymptomatic, now presenting with worsening exertional intolerance. A recent 48-hour Holter (5/2025) revealed no PVCs, average HR 55 bpm (range 43-79 bpm), and 5 episodes of patient-reported symptoms temporally associated with sinus bradycardia (HR 52-58 bpm). Todays ECG shows sinus rhythm with RBBB at 55 bpm. LVEF remains preserved (60-65%).  He has ceased regular exercise over the past 8-10 weeks due to exertional symptoms, without associated palpitations, presyncope, or syncope. There is no evidence of recurrent or significant ventricular ectopy as the cause of his symptoms. All relevant diagnostics, including Holter, ECG, and echocardiogram, were personally reviewed and interpreted by me.  Taken together, the clinical picture is highly suggestive of chronotropic incompetence, defined as the inability to appropriately increase heart rate with exertion, leading to exertional fatigue and dyspnea. While resting sinus bradycardia in the 50s may be physiologic, the inadequate chronotropic response on Holter, in the setting of exertional limitation, raises concern for symptomatic chronotropic incompetence requiring pacemaker therapy if confirmed on stress testing.    1. Chronotropic Incompetence - Suspected  Symptom correlation with bradycardia on Holter  Max HR only 79 bpm over 48 hours  Inadequate heart rate response may explain exertional symptoms  RBBB pattern on ECG may reflect underlying conduction disease  Plan:  Proceed with exercise treadmill test to assess chronotropic response to exertion  If max HR < 80-85 bpm or failure to reach 70% of age-predicted max, consider dual-chamber pacemaker implantation  Discontinue carvedilol to eliminate beta-blocker-related chronotropy suppression and obtain an accurate  physiologic response  Will review exercise test results and call patient with recommendation regarding need for pacing    2. PVCs  Prior PVC burden 4.2% (2021); now 0% on Holter  No symptoms during documented PVCs historically  No evidence of PVC-induced cardiomyopathy  Ventricular ectopy is not the current clinical issue  Plan:  No antiarrhythmic therapy or ablation indicated  If symptoms return or clinical suspicion for intermittent ectopy arises, consider extended rhythm monitoring (14-day Zio patch or implantable loop recorder)    3. RBBB / Conduction Disease  Present today on resting ECG (QRSd ~130 ms)  May reflect early conduction system disease contributing to bradycardia  Does not meet criteria for CRT (no LBBB, EF preserved)  Plan:  Monitor progression; no pacing indication based on RBBB alone  If pacemaker required, anticipate standard dual-chamber system    4. LVEF 60-65%  Preserved systolic function on echo  No regional wall motion abnormalities  No evidence of ischemia-induced dysfunction  Plan:  No EP intervention indicated  Defer to general cardiology for ongoing ischemic and medical management    5. Carvedilol  May be contributing to bradycardia and exercise intolerance  Plan:  Discontinue carvedilol prior to stress testing  Reassess medication strategy after evaluation of chronotropic function  Cardiologist to reassess need for GDMT in light of patients HR limitations and ischemic history    Summary  This patient likely has chronotropic incompetence contributing to functional limitation, and is a potential candidate for dual-chamber pacemaker depending on stress test findings. A thoughtful diagnostic and therapeutic pathway has been outlined, balancing competing risks and avoiding premature device implantation. All studies were personally reviewed, and high-level decision-making was required to interpret data and develop a forward plan in a patient with overlapping ischemic, conduction, and  physiologic factors.    Thank you for allowing me to participate in the care of this patient. Please do not hesitate to contact me with any questions or concerns.

## 2025-07-03 ENCOUNTER — OFFICE VISIT (OUTPATIENT)
Dept: ORTHOPEDICS | Facility: CLINIC | Age: 70
End: 2025-07-03
Payer: MEDICARE

## 2025-07-03 DIAGNOSIS — M17.11 PRIMARY OSTEOARTHRITIS OF RIGHT KNEE: Primary | ICD-10-CM

## 2025-07-03 PROCEDURE — 99213 OFFICE O/P EST LOW 20 MIN: CPT | Mod: PBBFAC

## 2025-07-03 PROCEDURE — 20610 DRAIN/INJ JOINT/BURSA W/O US: CPT | Mod: PBBFAC,RT

## 2025-07-03 PROCEDURE — 99999 PR PBB SHADOW E&M-EST. PATIENT-LVL III: CPT | Mod: PBBFAC,,,

## 2025-07-03 PROCEDURE — 99999PBSHW PR PBB SHADOW TECHNICAL ONLY FILED TO HB: Mod: JZ,PBBFAC,,

## 2025-07-03 RX ADMIN — Medication 20 MG: at 08:07

## 2025-07-03 NOTE — PROGRESS NOTES
Wero Spangler presents to clinic today for the third right knee Euflexxa injection.      Exam demonstrates the no effusion in the  right knee, and the skin is intact.    Radiographs show degenerative changes of knee    Diagnosis: primary osteoarthritis right  knee    After time out was performed and patient ID, side, and site were verified, the  right  knee was sterilly prepped in the standard fashion.  A 22-gauge needle was introduced into right knee joint from an joanie-lateral site without complication and knee was then injected with 2 ml of Euflexxa.  Sterile dressing was applied.  The patient was instructed to resume activities as tolerated and to call with any problems.     He may follow up as needed for new or worsening symptoms.

## 2025-07-07 ENCOUNTER — OFFICE VISIT (OUTPATIENT)
Dept: CARDIOLOGY | Facility: CLINIC | Age: 70
End: 2025-07-07
Payer: MEDICARE

## 2025-07-07 ENCOUNTER — HOSPITAL ENCOUNTER (OUTPATIENT)
Dept: CARDIOLOGY | Facility: CLINIC | Age: 70
Discharge: HOME | End: 2025-07-07
Payer: MEDICARE

## 2025-07-07 ENCOUNTER — OFFICE VISIT (OUTPATIENT)
Dept: ELECTROPHYSIOLOGY | Facility: CLINIC | Age: 70
End: 2025-07-07
Payer: MEDICARE

## 2025-07-07 VITALS
HEART RATE: 55 BPM | WEIGHT: 196.19 LBS | HEIGHT: 71 IN | BODY MASS INDEX: 27.47 KG/M2 | DIASTOLIC BLOOD PRESSURE: 80 MMHG | SYSTOLIC BLOOD PRESSURE: 129 MMHG

## 2025-07-07 VITALS
WEIGHT: 195.75 LBS | BODY MASS INDEX: 26.51 KG/M2 | HEIGHT: 72 IN | OXYGEN SATURATION: 96 % | SYSTOLIC BLOOD PRESSURE: 163 MMHG | HEART RATE: 50 BPM | DIASTOLIC BLOOD PRESSURE: 80 MMHG

## 2025-07-07 DIAGNOSIS — I49.3 FREQUENT PVCS: ICD-10-CM

## 2025-07-07 DIAGNOSIS — R42 DIZZINESS: ICD-10-CM

## 2025-07-07 DIAGNOSIS — E78.5 DYSLIPIDEMIA: ICD-10-CM

## 2025-07-07 DIAGNOSIS — R07.2 PRECORDIAL PAIN: ICD-10-CM

## 2025-07-07 DIAGNOSIS — I10 PRIMARY HYPERTENSION: ICD-10-CM

## 2025-07-07 DIAGNOSIS — I45.89 CHRONOTROPIC INCOMPETENCE: Primary | ICD-10-CM

## 2025-07-07 DIAGNOSIS — I25.10 ATHEROSCLEROSIS OF NATIVE CORONARY ARTERY OF NATIVE HEART WITHOUT ANGINA PECTORIS: Primary | ICD-10-CM

## 2025-07-07 DIAGNOSIS — I49.3 PVC'S (PREMATURE VENTRICULAR CONTRACTIONS): ICD-10-CM

## 2025-07-07 DIAGNOSIS — I25.10 CORONARY ARTERY DISEASE INVOLVING NATIVE CORONARY ARTERY OF NATIVE HEART WITHOUT ANGINA PECTORIS: ICD-10-CM

## 2025-07-07 DIAGNOSIS — Z98.61 POSTSURGICAL PERCUTANEOUS TRANSLUMINAL CORONARY ANGIOPLASTY STATUS: ICD-10-CM

## 2025-07-07 LAB
OHS QRS DURATION: 142 MS
OHS QTC CALCULATION: 422 MS

## 2025-07-07 PROCEDURE — 93010 ELECTROCARDIOGRAM REPORT: CPT | Mod: S$PBB,,, | Performed by: STUDENT IN AN ORGANIZED HEALTH CARE EDUCATION/TRAINING PROGRAM

## 2025-07-07 PROCEDURE — 99205 OFFICE O/P NEW HI 60 MIN: CPT | Mod: S$PBB,,, | Performed by: INTERNAL MEDICINE

## 2025-07-07 PROCEDURE — 93005 ELECTROCARDIOGRAM TRACING: CPT | Mod: PBBFAC | Performed by: STUDENT IN AN ORGANIZED HEALTH CARE EDUCATION/TRAINING PROGRAM

## 2025-07-07 PROCEDURE — 99999 PR PBB SHADOW E&M-EST. PATIENT-LVL III: CPT | Mod: PBBFAC,,, | Performed by: INTERNAL MEDICINE

## 2025-07-07 PROCEDURE — 99214 OFFICE O/P EST MOD 30 MIN: CPT | Mod: S$PBB,,, | Performed by: PHYSICIAN ASSISTANT

## 2025-07-07 PROCEDURE — 99213 OFFICE O/P EST LOW 20 MIN: CPT | Mod: PBBFAC,25 | Performed by: INTERNAL MEDICINE

## 2025-07-07 PROCEDURE — 99213 OFFICE O/P EST LOW 20 MIN: CPT | Mod: PBBFAC,27 | Performed by: PHYSICIAN ASSISTANT

## 2025-07-07 PROCEDURE — 99999 PR PBB SHADOW E&M-EST. PATIENT-LVL III: CPT | Mod: PBBFAC,,, | Performed by: PHYSICIAN ASSISTANT

## 2025-07-07 RX ORDER — TRIAMTERENE AND HYDROCHLOROTHIAZIDE 37.5; 25 MG/1; MG/1
1 CAPSULE ORAL EVERY MORNING
Qty: 90 CAPSULE | Refills: 3 | Status: SHIPPED | OUTPATIENT
Start: 2025-07-07 | End: 2026-07-07

## 2025-07-07 RX ORDER — WITCH HAZEL 50 %
2000 PADS, MEDICATED (EA) TOPICAL DAILY
COMMUNITY

## 2025-07-07 RX ORDER — NITROGLYCERIN 0.4 MG/1
0.4 TABLET SUBLINGUAL EVERY 5 MIN PRN
Qty: 90 TABLET | Refills: 3 | Status: SHIPPED | OUTPATIENT
Start: 2025-07-07

## 2025-07-07 NOTE — PROGRESS NOTES
Cardiology Clinic Note  Reason for Visit: F/u CAD, bradycardia   General Cardiologist: Dr. Fenton     HPI:     PMHx:  CAD s/p NSTEMI with PCI in [place] 9/2023   Chronotropic incompetence (suspected)  - Currently under eval with Dr. Fay Medina  HTN  HLD      Wero Spangler is a 69 y.o. M.     History of Present Illness    Patient presents today for follow up of cardiac symptoms and medication management. He reports exercise intolerance beginning in April characterized by feeling odd, exhausted, and run down toward the end of exercise sessions, which was atypical for his previous exercise experiences. He discontinued exercising due to symptoms and visited the ED twice in April, where no findings were identified. He wore a holter monitor showing HR max around 70. He was taking carvedilol 6.25 mg BID during that time. He saw Dr. Chencho Aponte this AM, who recommended exercise EKG to eval for chronotropic incompetence, possibly necessitating need for PPM. He recommended carvedilol be stopped. He recently started exercising again, initiating a gradual return to physical activity to minimize risk of recurrence of previous symptoms. He has a history of MI while in [place] in 2023. He discontinued Jardiance due to frequent urination and stopped Carvedilol today per provider recommendation. LDL cholesterol was 51.8 1 year ago. Neurologist identified low B12 levels, now supplementing. He reports recent weight gain and plans to address it through resuming exercise.       ROS:    Pertinent ROS included in HPI and below.  PMH:     Past Medical History:   Diagnosis Date    Aftercataract     Allergy     BPH (benign prostatic hyperplasia)     Cataract     Corneal dystrophy     Elevated PSA     Enlarged prostate     Fatty liver     Fuchs' corneal dystrophy     Gallstones     General anesthetics causing adverse effect in therapeutic use 2000    delayed emergence after back surgery    GERD (gastroesophageal reflux disease)      Hearing loss years ago    Heart attack     HTN (hypertension) 2013    Hypertension     Insomnia     Keloid cicatrix     Nasal polyps years ago    Osteoarthritis 7 years ago    Prostate nodule     Urinary tract infection      Past Surgical History:   Procedure Laterality Date    BACK SURGERY  2000    CATARACT EXTRACTION W/  INTRAOCULAR LENS IMPLANT      Both Eyes    CORNEAL TRANSPLANT Right 2019    Procedure: TRANSPLANT, CORNEA;  Surgeon: Vu Wilson MD;  Location: [facility];  Service: Ophthalmology;  Laterality: Right;  DMEK    COSMETIC SURGERY  23    EYE SURGERY      KNEE SURGERY  9 years ago    LAPAROSCOPIC MARSUPIALIZATION OF CYST OF KIDNEY      PROSTATE SURGERY      prostate biopsy benign    REPAIR, HERNIA, INGUINAL Right 2024    Procedure: REPAIR, HERNIA, INGUINAL, with Mesh;  Surgeon: Sudeep Sen MD;  Location: [address];  Service: General;  Laterality: Right;    TONSILLECTOMY      VASECTOMY       Allergies:   Review of patient's allergies indicates:  No Known Allergies  Medications:   Medications Ordered Prior to Encounter[1]  Social History:     Social History     Tobacco Use    Smoking status: Former     Current packs/day: 0.00     Average packs/day: 0.3 packs/day for 4.0 years (1.0 ttl pk-yrs)     Types: Cigarettes, Cigars     Quit date:      Years since quittin.5    Smokeless tobacco: Never   Substance Use Topics    Alcohol use: Not Currently     Family History:     Family History   Problem Relation Name Age of Onset    Cancer Mother          breast    Prostate cancer Brother prostate     Cancer Brother prostate         prostate    Macular degeneration Maternal Grandmother      Cancer Other      Cancer Other      Amblyopia Neg Hx      Blindness Neg Hx      Cataracts Neg Hx      Glaucoma Neg Hx      Retinal detachment Neg Hx      Strabismus Neg Hx       Physical Exam:   BP (!) 163/80 (BP Location: Left arm, Patient Position: Sitting)   Pulse (!) 50   Ht 6'  (1.829 m)   Wt 88.8 kg (195 lb 12.3 oz)   SpO2 96%   BMI 26.55 kg/m²      Physical Exam  Vitals and nursing note reviewed.   Constitutional:       Appearance: Normal appearance.   HENT:      Head: Normocephalic and atraumatic.   Neck:      Vascular: No carotid bruit or JVD.   Cardiovascular:      Rate and Rhythm: Regular rhythm. Bradycardia present.      Chest Wall: PMI is not displaced.      Pulses:           Radial pulses are 2+ on the right side and 2+ on the left side.        Dorsalis pedis pulses are 2+ on the right side and 2+ on the left side.        Posterior tibial pulses are 2+ on the right side and 2+ on the left side.      Heart sounds: No murmur heard.  Pulmonary:      Effort: Pulmonary effort is normal.      Breath sounds: Normal breath sounds. No wheezing, rhonchi or rales.   Abdominal:      General: Bowel sounds are normal. There is no abdominal bruit.      Palpations: Abdomen is soft. There is no pulsatile mass.      Tenderness: There is no abdominal tenderness.   Musculoskeletal:      Right lower leg: No edema.      Left lower leg: No edema.   Feet:      Right foot:      Skin integrity: Skin integrity normal.      Left foot:      Skin integrity: Skin integrity normal.   Skin:     Capillary Refill: Capillary refill takes less than 2 seconds.   Neurological:      General: No focal deficit present.      Mental Status: He is alert.   Psychiatric:         Mood and Affect: Mood and affect normal.         Speech: Speech normal.         Behavior: Behavior is cooperative.         Thought Content: Thought content normal.          Labs:     Blood Tests:  Lab Results   Component Value Date    BNP 37 04/30/2025     04/30/2025     05/12/2024    K 3.3 (L) 04/30/2025    K 3.6 05/12/2024     04/30/2025     05/12/2024    CO2 28 04/30/2025    CO2 26 05/12/2024    BUN 16 04/30/2025    CREATININE 1.0 04/30/2025     04/30/2025    GLU 84 05/12/2024    HGBA1C 4.9 07/25/2022    MG 1.8  "04/30/2025    AST 20 04/30/2025    AST 25 05/12/2024    ALT 41 04/30/2025    ALT 19 05/12/2024    ALBUMIN 3.8 04/30/2025    ALBUMIN 3.9 05/12/2024    PROT 6.5 04/30/2025    PROT 6.6 05/12/2024    BILITOT 1.5 (H) 04/30/2025    BILITOT 1.3 (H) 05/12/2024    WBC 6.55 05/12/2025    HGB 14.1 05/12/2025    HGB 14.3 05/12/2024    HCT 42.6 05/12/2025    HCT 42 04/14/2025    MCV 94 05/12/2025    MCV 93 05/12/2024     (L) 05/12/2025     05/12/2024    INR 1.2 05/12/2025    INR 1.1 05/12/2024    INR 1.3 (H) 05/12/2024    TSH 1.323 04/30/2025    TSH 1.353 05/12/2024       Lab Results   Component Value Date    CHOL 118 (L) 05/12/2024    HDL 36 (L) 05/12/2024    TRIG 151 (H) 05/12/2024       Lab Results   Component Value Date    LDLCALC 51.8 (L) 05/12/2024         Imaging:     Echocardiogram  TTE 5/21/2025    Left Ventricle: The left ventricle is normal in size. Ventricular mass is normal. Normal wall thickness. Normal wall motion. There is normal systolic function with a visually estimated ejection fraction of 60 - 65%. There is normal diastolic function.    Right Ventricle: The right ventricle is mildly dilated Wall thickness is normal. Systolic function is normal.    Right Atrium: The right atrium is mildly dilated .    Mitral Valve: There is mild regurgitation.    Tricuspid Valve: There is mild regurgitation.    Pulmonary Artery: The estimated pulmonary artery systolic pressure is 28 mmHg.    IVC/SVC: Normal venous pressure at 3 mmHg.    Stress testing  None    Cath Lab  None    Other  Carotid US 6/9/2025    There is less than 50% right Internal Carotid Stenosis.    There is less than 50% left Internal Carotid Stenosis.    There is antegrade flow in the vertebral arteries bilaterally.    48 Hour holter monitor 5/30/2025    Baseline rhythm was sinus bradycardia with normal intervals and an average heart rate of 55 bpm.    There were five patient-triggered episodes with reported symptoms of a "throbbing" heart, " "dull chest pain, ears ringing, leg weakness, and "rubbery" and weak arms and legs. These episodes corresponded to periods of sinus bradycardia (enid symptomatic HR 52 bpm) without ectopy.    There were very rare PVCs with an overall PVC burden of 0%. There were very rare PACs with an overall PAC burden of 0%.    There were periods of apparent RBBB. There was no evidence of high-degree AV block, nor were there prolonged pauses.    There were no atrial or ventricular arrhythmias.    [id number]  Sinus bradycardia   Right bundle branch block     Assessment & Plan:     Atherosclerosis of native coronary artery of native heart without angina pectoris  -     nitroGLYCERIN (NITROSTAT) 0.4 MG SL tablet; Place 1 tablet (0.4 mg total) under the tongue every 5 (five) minutes as needed for Chest pain.  Dispense: 90 tablet; Refill: 3  -     triamterene-hydrochlorothiazide 37.5-25 mg (DYAZIDE) 37.5-25 mg per capsule; Take 1 capsule by mouth every morning.  Dispense: 90 capsule; Refill: 3    Primary hypertension  -     nitroGLYCERIN (NITROSTAT) 0.4 MG SL tablet; Place 1 tablet (0.4 mg total) under the tongue every 5 (five) minutes as needed for Chest pain.  Dispense: 90 tablet; Refill: 3  -     triamterene-hydrochlorothiazide 37.5-25 mg (DYAZIDE) 37.5-25 mg per capsule; Take 1 capsule by mouth every morning.  Dispense: 90 capsule; Refill: 3  -     Basic metabolic panel; Future; Expected date: 2025    Dyslipidemia    Coronary artery disease involving native coronary artery of native heart without angina pectoris    Postsurgical percutaneous transluminal coronary angioplasty status          Assessment & Plan    Carvedilol may have been contributing to low heart rate and was discontinued as per Dr. Aponte's recommendation.  Determined need for pacemaker based on upcoming stress test results and discussion with EP.  Blood pressure management strategy evaluated.    PLAN SUMMARY:  - Restarted triamterene-HCTZ 37.5-25 mg daily " in the morning if BP remains elevated  - Continue aspirin  - Continue B12 supplementation at 2,000 units daily  - Continue atorvastatin  - 1 year refill for nitroglycerin  - Stress EKG scheduled for Monday, July 14th at 9:30 AM  - Follow-up on the same day as stress test  - Follow-up with Dr. Fenton in about 6 months    ATHEROSCLEROSIS OF NATIVE CORONARY ARTERY OF NATIVE HEART WITHOUT ANGINA PECTORIS:  - Explained the mechanism of action for atorvastatin in stabilizing plaques.  - Continued atorvastatin.  - Discussed the potential impact of altitude changes on cardiovascular health.  - Patient to gradually return to exercise routine, starting slowly to avoid precipitating an event.  - Recommend increasing activity level slightly this week to prepare for upcoming stress test.  - Continued aspirin. 1 year refill for nitroglycerin.    PRIMARY HYPERTENSION:  - Advised monitoring blood pressure at home.  - Restarted triamterene-HCTZ 37.5-25 mg daily in the morning if blood pressure remains above 140 over the next couple of days.  - Continued B12 supplementation at 2,000 units daily.           Signed:  Kristine Martines PA-C  Cardiology     7/7/2025 11:15 AM    Follow-up:     Future Appointments   Date Time Provider Department Center   7/10/2025 11:40 AM Isacc Montero Jr., MD [facility] NEURO [facility]   7/14/2025  9:30 AM TREADMILL, NUCLEAR [facility] ECHOSTR [facility]   7/21/2025  9:10 AM LAB, APPOINTMENT [facility] LAB [facility]   11/17/2025  8:00 AM LAB, APPOINTMENT [facility] INTMED [facility] LAB IM [facility] PCW   12/1/2025  8:15 AM Darren Yuen MD [facility] UROLOGY [facility]   1/7/2026  1:00 PM Kristine Martines PA-C [facility] CARDIO [facility]       This note was generated with the assistance of ambient listening technology. Verbal consent was obtained by the patient and accompanying visitor(s) for the recording of patient appointment to facilitate this note. I attest to having reviewed and edited the generated note for accuracy,  though some syntax or spelling errors may persist. Please contact the author of this note for any clarification.               [1]   Current Outpatient Medications on File Prior to Visit   Medication Sig Dispense Refill    alfuzosin (UROXATRAL) 10 mg Tb24 Take 1 tablet (10 mg total) by mouth once daily. 30 tablet 11    ALPRAZolam (XANAX) 0.5 MG tablet TAKE 1 TABLET BY MOUTH EVERY NIGHT 30 tablet 2    aspirin 81 MG Chew Take 81 mg by mouth nightly.      atorvastatin (LIPITOR) 20 MG tablet Take 1 tablet (20 mg total) by mouth once daily. 30 tablet 11    cyanocobalamin 2000 MCG tablet Take 2,000 mcg by mouth once daily.      fluticasone propionate (FLONASE) 50 mcg/actuation nasal spray 2 sprays (100 mcg total) by Each Nostril route once daily. 16 g 0    lansoprazole (PREVACID) 15 MG capsule TAKE 1 CAPSULE(15 MG) BY MOUTH EVERY DAY 90 capsule 3    magnesium aspartate HCl 61 mg (615 mg) TbEC Take by mouth once.      sodium chloride 2% (BARBI 128) 2 % ophthalmic solution 1 drop as needed (takes 3-4 times/day).      [DISCONTINUED] empagliflozin (JARDIANCE) 10 mg tablet Take 1 tablet (10 mg total) by mouth once daily. 30 tablet 11    [DISCONTINUED] nitroGLYCERIN (NITROSTAT) 0.4 MG SL tablet Place 1 tablet (0.4 mg total) under the tongue every 5 (five) minutes as needed for Chest pain. 90 tablet 3    methylPREDNISolone (MEDROL, SRIKANTH,) 4 mg tablet use as directed 1 each 0    [DISCONTINUED] carvediloL (COREG) 6.25 MG tablet Take 1 tablet (6.25 mg total) by mouth 2 (two) times daily with meals. 180 tablet 3     No current facility-administered medications on file prior to visit.

## 2025-07-10 ENCOUNTER — OFFICE VISIT (OUTPATIENT)
Facility: CLINIC | Age: 70
End: 2025-07-10
Payer: MEDICARE

## 2025-07-10 VITALS
HEIGHT: 72 IN | HEART RATE: 53 BPM | DIASTOLIC BLOOD PRESSURE: 80 MMHG | BODY MASS INDEX: 26.51 KG/M2 | SYSTOLIC BLOOD PRESSURE: 135 MMHG | WEIGHT: 195.75 LBS

## 2025-07-10 DIAGNOSIS — R42 DIZZINESS: ICD-10-CM

## 2025-07-10 DIAGNOSIS — R27.9 UNSPECIFIED LACK OF COORDINATION: ICD-10-CM

## 2025-07-10 DIAGNOSIS — R55 NEAR SYNCOPE: ICD-10-CM

## 2025-07-10 DIAGNOSIS — R27.0 ATAXIA, UNSPECIFIED: Primary | ICD-10-CM

## 2025-07-10 PROCEDURE — 99999 PR PBB SHADOW E&M-EST. PATIENT-LVL III: CPT | Mod: PBBFAC,,, | Performed by: NEUROLOGICAL SURGERY

## 2025-07-10 PROCEDURE — 99213 OFFICE O/P EST LOW 20 MIN: CPT | Mod: PBBFAC | Performed by: NEUROLOGICAL SURGERY

## 2025-07-10 NOTE — PROGRESS NOTES
Name: Wero Spangler  MRN: [id number]   CSN: [id number]      Date: 07/10/2025    Chief Complaint / Interval History:   Patient follows up today for continued evaluation of disequilibrium and light-headedness    History of Present Illness (HPI):  Since last visit the patient has undergone lab testing demonstrate taking a significantly low B12 level of 106.  He has started oral supplementation with 2000 mcg daily as I suggested and feels that his symptoms have improved somewhat.  Carotid ultrasound demonstrated tentatively 50% stenosis of the carotids, though in review prior CTA performed recently did not look as significant to my eye.  Transcranial Doppler was normal.  The patient is scheduled to undergo stress test with Cardiology on Monday morning.  ROS:  Review of Systems     Past Medical History: The patient  has a past medical history of Aftercataract, Allergy, BPH (benign prostatic hyperplasia), Cataract, Corneal dystrophy, Elevated PSA, Enlarged prostate, Fatty liver, Fuchs' corneal dystrophy, Gallstones, General anesthetics causing adverse effect in therapeutic use (2000), GERD (gastroesophageal reflux disease), Hearing loss (years ago), Heart attack, HTN (hypertension) (09/24/2013), Hypertension, Insomnia, Keloid cicatrix, Nasal polyps (years ago), Osteoarthritis (7 years ago), Prostate nodule, and Urinary tract infection.    Social History: The patient  reports that he quit smoking about 25 years ago. His smoking use included cigarettes and cigars. He has a 1 pack-year smoking history. He has never used smokeless tobacco. He reports that he does not currently use alcohol. He reports that he does not use drugs.    Family History: Their family history includes Cancer in his brother, mother, and other family members; Macular degeneration in his maternal grandmother; Prostate cancer in his brother.    Allergies: Patient has no known allergies.     Meds: Scheduled Meds:  Continuous Infusions:  PRN  Meds:.    Exam:  /80 (BP Location: Left arm, Patient Position: Sitting)   Pulse (!) 53   Ht 6' (1.829 m)   Wt 88.8 kg (195 lb 12.3 oz)   BMI 26.55 kg/m²     Constitutional  Well-developed, well-nourished, appears stated age   Ophthalmoscopic  No papilledema with no hemorrhages or exudates bilaterally   Cardiovascular  Radial pulses 2+ and symmetric, no LE edema bilaterally   Neurological    * Mental status      - Orientation  Oriented to person, place, time, and situation     - Memory   Intact recent and remote     - Attention/concentration  Attentive, vigilant during exam     - Language  Naming & repetition intact, +2-step commands     - Fund of knowledge  Aware of current events     - Executive  Well-organized thoughts     - Other     * Cranial nerves       - CN II  PERRL, visual fields full to confrontation     - CN III, IV, VI  Extraocular movements full, normal pursuits and saccades     - CN V  Sensation V1 - V3 intact     - CN VII  Face strong and symmetric bilaterally     - CN VIII  Hearing intact bilaterally     - CN IX, X  Palate raises midline and symmetric     - CN XI  SCM and trapezius 5/5 bilaterally     - CN XII  Tongue midline   * Motor  Muscle bulk normal, strength 5/5 throughout   * Sensory   Intact to temperature and vibration throughout   * Coordination  No dysmetria with finger-to-nose or heel-to-shin   * Gait  See below.   * Deep tendon reflexes  2+ and symmetric throughout   Babinski downgoing bilaterally     Laboratory/Radiological:Reviewed  - Results:  Hospital Outpatient Visit on 07/07/2025   Component Date Value Ref Range Status    QRS Duration 07/07/2025 142  ms Final    [facility] QTC Calculation 07/07/2025 422  ms Final       - Independent review of images:  CT brain and CTA of the head and neck reviewed    Diagnoses:          Dizziness    Medical Decision Making:  Other than B12 deficiency, I find no evidence of significant neurological pathology at this time.  I have recommended  that he continue taking B12 supplementation, 2000 mcg daily and follow-up with Cardiology to investigate possible symptomatic bradycardia or other arrhythmia.        Risks/benefits, potential side effects of medications, and alternative therapies discussed as appropriate.    JACKI Montero M.D.

## 2025-07-14 ENCOUNTER — HOSPITAL ENCOUNTER (OUTPATIENT)
Dept: CARDIOLOGY | Facility: HOSPITAL | Age: 70
Discharge: HOME | End: 2025-07-14
Attending: INTERNAL MEDICINE
Payer: MEDICARE

## 2025-07-14 VITALS
WEIGHT: 195 LBS | SYSTOLIC BLOOD PRESSURE: 139 MMHG | DIASTOLIC BLOOD PRESSURE: 79 MMHG | HEART RATE: 68 BPM | BODY MASS INDEX: 27.3 KG/M2 | HEIGHT: 71 IN

## 2025-07-14 DIAGNOSIS — I49.3 FREQUENT PVCS: ICD-10-CM

## 2025-07-14 DIAGNOSIS — I10 PRIMARY HYPERTENSION: ICD-10-CM

## 2025-07-14 DIAGNOSIS — E78.5 DYSLIPIDEMIA: ICD-10-CM

## 2025-07-14 DIAGNOSIS — I45.89 CHRONOTROPIC INCOMPETENCE: ICD-10-CM

## 2025-07-14 DIAGNOSIS — R07.2 PRECORDIAL PAIN: ICD-10-CM

## 2025-07-14 LAB
CV STRESS BASE HR: 68 BPM
DIASTOLIC BLOOD PRESSURE: 79 MMHG
OHS CV CPX 1 MINUTE RECOVERY HEART RATE: 101 BPM
OHS CV CPX 85 PERCENT MAX PREDICTED HEART RATE MALE: 127
OHS CV CPX ESTIMATED METS: 13
OHS CV CPX MAX PREDICTED HEART RATE: 149
OHS CV CPX PATIENT IS FEMALE: 0
OHS CV CPX PATIENT IS MALE: 1
OHS CV CPX PEAK DIASTOLIC BLOOD PRESSURE: 91 MMHG
OHS CV CPX PEAK HEAR RATE: 115 BPM
OHS CV CPX PEAK RATE PRESSURE PRODUCT: NORMAL
OHS CV CPX PEAK SYSTOLIC BLOOD PRESSURE: 172 MMHG
OHS CV CPX PERCENT MAX PREDICTED HEART RATE ACHIEVED: 77
OHS CV CPX RATE PRESSURE PRODUCT PRESENTING: 9452
STRESS ECHO POST EXERCISE DUR MIN: 8 MINUTES
STRESS ECHO POST EXERCISE DUR SEC: 0 SECONDS
SYSTOLIC BLOOD PRESSURE: 139 MMHG

## 2025-07-14 PROCEDURE — 93017 CV STRESS TEST TRACING ONLY: CPT

## 2025-07-21 ENCOUNTER — LAB VISIT (OUTPATIENT)
Dept: LAB | Facility: HOSPITAL | Age: 70
End: 2025-07-21
Payer: MEDICARE

## 2025-07-21 ENCOUNTER — PATIENT MESSAGE (OUTPATIENT)
Dept: ADMINISTRATIVE | Facility: HOSPITAL | Age: 70
End: 2025-07-21
Payer: MEDICARE

## 2025-07-21 DIAGNOSIS — I10 PRIMARY HYPERTENSION: ICD-10-CM

## 2025-07-21 LAB
ANION GAP (OHS): 8 MMOL/L (ref 8–16)
BUN SERPL-MCNC: 17 MG/DL (ref 8–23)
CALCIUM SERPL-MCNC: 9 MG/DL (ref 8.7–10.5)
CHLORIDE SERPL-SCNC: 100 MMOL/L (ref 95–110)
CO2 SERPL-SCNC: 30 MMOL/L (ref 23–29)
CREAT SERPL-MCNC: 0.9 MG/DL (ref 0.5–1.4)
GFR SERPLBLD CREATININE-BSD FMLA CKD-EPI: >60 ML/MIN/1.73/M2
GLUCOSE SERPL-MCNC: 143 MG/DL (ref 70–110)
POTASSIUM SERPL-SCNC: 3.9 MMOL/L (ref 3.5–5.1)
SODIUM SERPL-SCNC: 138 MMOL/L (ref 136–145)

## 2025-07-21 PROCEDURE — 80048 BASIC METABOLIC PNL TOTAL CA: CPT

## 2025-07-21 PROCEDURE — 36415 COLL VENOUS BLD VENIPUNCTURE: CPT

## 2025-07-22 DIAGNOSIS — G47.00 INSOMNIA, UNSPECIFIED TYPE: ICD-10-CM

## 2025-07-22 NOTE — TELEPHONE ENCOUNTER
No care due was identified.  Health [facility] Embedded Care Due Messages. Reference number: [id number].   7/22/2025 5:46:27 PM CDT

## 2025-07-22 NOTE — TELEPHONE ENCOUNTER
No care due was identified.  [facility] Embedded Care Due Messages. Reference number: [id number].   7/22/2025 6:25:06 PM CDT

## 2025-07-22 NOTE — TELEPHONE ENCOUNTER
No care due was identified.  [facility] Embedded Care Due Messages. Reference number: [id number].   7/22/2025 6:20:35 PM CDT

## 2025-07-23 RX ORDER — ALPRAZOLAM 0.5 MG/1
0.5 TABLET ORAL NIGHTLY
Qty: 30 TABLET | OUTPATIENT
Start: 2025-07-23

## 2025-07-23 RX ORDER — ALPRAZOLAM 0.5 MG/1
0.5 TABLET ORAL NIGHTLY
Qty: 30 TABLET | Refills: 0 | Status: SHIPPED | OUTPATIENT
Start: 2025-07-23

## 2025-07-31 ENCOUNTER — PATIENT OUTREACH (OUTPATIENT)
Dept: ADMINISTRATIVE | Facility: HOSPITAL | Age: 70
End: 2025-07-31
Payer: MEDICARE

## 2025-07-31 DIAGNOSIS — Z12.11 ENCOUNTER FOR COLORECTAL CANCER SCREENING: Primary | ICD-10-CM

## 2025-07-31 DIAGNOSIS — Z12.12 ENCOUNTER FOR COLORECTAL CANCER SCREENING: Primary | ICD-10-CM

## 2025-07-31 NOTE — PROGRESS NOTES
Chart reviewed and updated. Reconciled immunizations, health maintenance and care everywhere.  Placed fit kit order, will mailed upon returning to office. Will mail and dcoument when return to office.      Pili Snyder [facility]  Panel Care Coordinator  [facility]  [phone]  For Duke Cano MD

## 2025-08-01 ENCOUNTER — PATIENT OUTREACH (OUTPATIENT)
Dept: ADMINISTRATIVE | Facility: HOSPITAL | Age: 70
End: 2025-08-01
Payer: MEDICARE

## 2025-08-01 DIAGNOSIS — Z12.11 ENCOUNTER FOR COLORECTAL CANCER SCREENING: Primary | ICD-10-CM

## 2025-08-01 DIAGNOSIS — Z12.12 ENCOUNTER FOR COLORECTAL CANCER SCREENING: Primary | ICD-10-CM

## 2025-08-01 NOTE — PROGRESS NOTES
Chart reviewed and updated. Reconciled immunizations, health maintenance and care everywhere.  Pili Snyder [facility]  Panel Care Coordinator  [facility]  [phone]  For Duke Cano MD

## 2025-08-04 ENCOUNTER — DOCUMENTATION ONLY (OUTPATIENT)
Dept: ADMINISTRATIVE | Facility: HOSPITAL | Age: 70
End: 2025-08-04
Payer: MEDICARE

## 2025-08-18 ENCOUNTER — LAB VISIT (OUTPATIENT)
Dept: LAB | Facility: HOSPITAL | Age: 70
End: 2025-08-18
Payer: MEDICARE

## 2025-08-18 DIAGNOSIS — Z12.11 ENCOUNTER FOR COLORECTAL CANCER SCREENING: ICD-10-CM

## 2025-08-18 DIAGNOSIS — Z12.12 ENCOUNTER FOR COLORECTAL CANCER SCREENING: ICD-10-CM

## 2025-08-18 PROCEDURE — 82274 ASSAY TEST FOR BLOOD FECAL: CPT

## 2025-08-19 LAB — OB PNL STL IA: NEGATIVE

## 2025-08-20 ENCOUNTER — PATIENT MESSAGE (OUTPATIENT)
Dept: INTERNAL MEDICINE | Facility: CLINIC | Age: 70
End: 2025-08-20
Payer: MEDICARE

## (undated) DEVICE — NDL HYPO A BEVEL 30X1/2

## (undated) DEVICE — ELECTRODE REM PLYHSV RETURN 9

## (undated) DEVICE — NDL 22GA X1 1/2 REG BEVEL

## (undated) DEVICE — DRESSING TRANS 4X4 TEGADERM

## (undated) DEVICE — TOWEL OR DISP STRL BLUE 4/PK

## (undated) DEVICE — DRAPE SCOPE PILLOW WARMER

## (undated) DEVICE — SKIN MARKER DEVON 160

## (undated) DEVICE — SYS DELIVERY REZUM

## (undated) DEVICE — SUT 3-0 VICRYL SH CR/8 18

## (undated) DEVICE — SUT 3-0 VICRYL / SH (J416)

## (undated) DEVICE — DRAPE LAP T SHT W/ INSTR PAD

## (undated) DEVICE — SUT VLOC 90 3-0 V-20 NDL 6

## (undated) DEVICE — Device

## (undated) DEVICE — ELECTRODE BLADE INSULATED 1 IN

## (undated) DEVICE — KIT ANTIFOG

## (undated) DEVICE — BOVIE SUCTION

## (undated) DEVICE — SOL BETADINE 5%

## (undated) DEVICE — SUT 2-0 VICRYL / SH (J417)

## (undated) DEVICE — DRAPE ABDOMINAL TIBURON 14X11

## (undated) DEVICE — SUT 2/0 30IN PROLENE MONO

## (undated) DEVICE — APPLICATOR CHLORAPREP ORN 26ML

## (undated) DEVICE — SYR 10CC LUER LOCK

## (undated) DEVICE — SUT ETHILON 10/0 CS160-6

## (undated) DEVICE — SUT MONOCYRL 4-0 PS2 UND

## (undated) DEVICE — SUT V-LOC 2.0 GS-21 90 DAY

## (undated) DEVICE — BLADE SURG CARBON STEEL SZ11

## (undated) DEVICE — COVER TIP CURVED SCISSORS XI

## (undated) DEVICE — STRIP MEDI WND CLSR 1/4X3IN

## (undated) DEVICE — SOL NS 1000CC

## (undated) DEVICE — KIT ROBOTIC 3 ARM DA VINCI SI

## (undated) DEVICE — IRRIGATOR ENDOSCOPY DISP.

## (undated) DEVICE — SYR 3CC 21 X 1 LUER LOCK

## (undated) DEVICE — NDL RETROBULAR AKTKINSON 23G

## (undated) DEVICE — GLOVE SURG BIOGEL LATEX SZ 7.5

## (undated) DEVICE — SUT SILK 0 STRANDS 30IN BLK

## (undated) DEVICE — CANNULA ANTERIOR CHAMBER 30G

## (undated) DEVICE — NDL INSUF ULTRA VERESS 120MM

## (undated) DEVICE — HEMOSTAT SURGICEL 4X8IN

## (undated) DEVICE — COVER LIGHT HANDLE

## (undated) DEVICE — SOL ELECTROLUBE ANTI-STIC

## (undated) DEVICE — TRAY MINOR GEN SURG OMC

## (undated) DEVICE — SYR 30CC LUER LOCK

## (undated) DEVICE — TRAY MINOR GEN SURG

## (undated) DEVICE — HOLDER CATH IAB ADH STATLOCK

## (undated) DEVICE — SUT COAT VICRYL 3-0 TIE 18

## (undated) DEVICE — TANK GAS ISPAN 125GR FOR SF6

## (undated) DEVICE — SOL NACL IRR 3000ML

## (undated) DEVICE — PACK CYSTO

## (undated) DEVICE — TROCAR ENDOPATH XCEL 12X100MM

## (undated) DEVICE — TRAY CYSTO BASIN

## (undated) DEVICE — SUTURE 8422H 3-0 PROLENE

## (undated) DEVICE — LINER GLOVE POWDERFREE 8

## (undated) DEVICE — ADHESIVE DERMABOND ADVANCED

## (undated) DEVICE — ADHESIVE MASTISOL VIAL 48/BX

## (undated) DEVICE — STAPLER SKIN PROXIMATE WIDE

## (undated) DEVICE — GOWN POLY REINF BRTH SLV XL

## (undated) DEVICE — GOWN SMARTGOWN LVL4 X-LONG XL

## (undated) DEVICE — SET TRI-LUMEN FILTERED TUBE

## (undated) DEVICE — GOWN SURGICAL X-LARGE

## (undated) DEVICE — TRAY FOLEY 16FR INFECTION CONT

## (undated) DEVICE — DRAIN PENROSE XRAY 12 X 1/4 ST

## (undated) DEVICE — SUT MONOCRYL 4-0 PS-2

## (undated) DEVICE — BAG URINARY DRAINAGE 2000ML

## (undated) DEVICE — PORT AIRSEAL 12/120MM LPI

## (undated) DEVICE — SUT VICRYL 3-0 27 SH

## (undated) DEVICE — NDL HYPO A BEVEL 22X1 1/2

## (undated) DEVICE — PAD CURAD NONADH 3X4IN

## (undated) DEVICE — SCISSOR 5MMX35CM DIRECT DRIVE